# Patient Record
Sex: MALE | Race: WHITE | NOT HISPANIC OR LATINO | Employment: OTHER | RURAL
[De-identification: names, ages, dates, MRNs, and addresses within clinical notes are randomized per-mention and may not be internally consistent; named-entity substitution may affect disease eponyms.]

---

## 2017-02-24 ENCOUNTER — OFFICE VISIT (OUTPATIENT)
Dept: FAMILY MEDICINE CLINIC | Facility: CLINIC | Age: 62
End: 2017-02-24

## 2017-02-24 VITALS
WEIGHT: 304.6 LBS | SYSTOLIC BLOOD PRESSURE: 118 MMHG | DIASTOLIC BLOOD PRESSURE: 80 MMHG | HEART RATE: 74 BPM | OXYGEN SATURATION: 98 % | BODY MASS INDEX: 39.09 KG/M2 | HEIGHT: 74 IN | TEMPERATURE: 99 F

## 2017-02-24 DIAGNOSIS — J06.9 ACUTE URI: Primary | ICD-10-CM

## 2017-02-24 PROCEDURE — 96372 THER/PROPH/DIAG INJ SC/IM: CPT | Performed by: FAMILY MEDICINE

## 2017-02-24 PROCEDURE — 99213 OFFICE O/P EST LOW 20 MIN: CPT | Performed by: FAMILY MEDICINE

## 2017-02-24 RX ORDER — AZITHROMYCIN 500 MG/1
500 TABLET, FILM COATED ORAL DAILY
Qty: 3 TABLET | Refills: 0 | Status: SHIPPED | OUTPATIENT
Start: 2017-02-24 | End: 2017-02-27

## 2017-02-24 RX ORDER — METHYLPREDNISOLONE ACETATE 40 MG/ML
40 INJECTION, SUSPENSION INTRA-ARTICULAR; INTRALESIONAL; INTRAMUSCULAR; SOFT TISSUE ONCE
Status: COMPLETED | OUTPATIENT
Start: 2017-02-24 | End: 2017-02-24

## 2017-02-24 RX ORDER — PREDNISONE 20 MG/1
TABLET ORAL
Qty: 15 TABLET | Refills: 0 | Status: SHIPPED | OUTPATIENT
Start: 2017-02-24 | End: 2017-04-06

## 2017-02-24 RX ADMIN — METHYLPREDNISOLONE ACETATE 40 MG: 40 INJECTION, SUSPENSION INTRA-ARTICULAR; INTRALESIONAL; INTRAMUSCULAR; SOFT TISSUE at 12:54

## 2017-02-24 NOTE — PROGRESS NOTES
"George LION Meño    1955    Chief Complaint   Patient presents with   • URI       Vitals:    02/24/17 1233   BP: 118/80   Pulse: 74   Temp: 99 °F (37.2 °C)   SpO2: 98%   Weight: (!) 304 lb 9.6 oz (138 kg)   Height: 74\" (188 cm)       URI    This is a new problem. The current episode started 1 to 4 weeks ago. The problem has been unchanged. There has been no fever. Associated symptoms include congestion, coughing, sinus pain, sneezing and swollen glands. He has tried antihistamine for the symptoms. The treatment provided no relief.       Review of Systems   HENT: Positive for congestion, facial swelling, sinus pressure and sneezing.    Respiratory: Positive for cough.        Past Medical History   Diagnosis Date   • Arthritis    • Back pain    • Coronary artery disease    • GERD (gastroesophageal reflux disease)    • Hyperlipidemia    • Hypertension          Current Outpatient Prescriptions:   •  aspirin 325 MG tablet, Take 325 mg by mouth Daily., Disp: , Rfl:   •  celecoxib (CeleBREX) 200 MG capsule, Take 200 mg by mouth Daily., Disp: , Rfl:   •  clindamycin (CLEOCIN) 300 MG capsule, Take 1 capsule by mouth 3 (Three) Times a Day., Disp: 15 capsule, Rfl: 0  •  FLUARIX QUADRIVALENT 0.5 ML suspension prefilled syringe injection, TO BE ADMINISTERED BY PHARMACIST FOR IMMUNIZATION, Disp: , Rfl: 0  •  gabapentin (NEURONTIN) 300 MG capsule, Take 300 mg by mouth 3 (Three) Times a Day., Disp: , Rfl:   •  HYDROcodone-acetaminophen (NORCO)  MG per tablet, Take 1 tablet by mouth Every 6 (Six) Hours As Needed for moderate pain (4-6)., Disp: , Rfl:   •  metoprolol tartrate (LOPRESSOR) 50 MG tablet, Take 50 mg by mouth 2 (Two) Times a Day., Disp: , Rfl:   •  omeprazole (priLOSEC) 20 MG capsule, Take 20 mg by mouth Daily., Disp: , Rfl:   •  simvastatin (ZOCOR) 40 MG tablet, TAKE 1 TABLET BY MOUTH AT BEDTIME, Disp: , Rfl: 1  •  SIMVASTATIN PO, Take 40 mg by mouth Daily., Disp: , Rfl:   •  tiZANidine (ZANAFLEX) 4 MG tablet, " "TAKE 1 TABLET BY ORAL ROUTE EVERY 8 HOURS AS NEEDED NOT TO EXCEED 3 DOSES IN 24 HOURS, Disp: , Rfl: 1  •  azithromycin (ZITHROMAX) 500 MG tablet, Take 1 tablet by mouth Daily for 3 days., Disp: 3 tablet, Rfl: 0  •  predniSONE (DELTASONE) 20 MG tablet, Take 2 pills on Saturday and Sunday then 1 pill till medicine gone, Disp: 15 tablet, Rfl: 0  No current facility-administered medications for this visit.     No Known Allergies    Patient Active Problem List   Diagnosis   • Ischemic heart disease due to coronary artery obstruction   • Morbid obesity   • Mixed hyperlipidemia   • Lumbar degenerative disc disease       Social History     Social History   • Marital status:      Spouse name: N/A   • Number of children: N/A   • Years of education: N/A     Social History Main Topics   • Smoking status: Former Smoker   • Smokeless tobacco: None   • Alcohol use No   • Drug use: No   • Sexual activity: Defer     Other Topics Concern   • None     Social History Narrative       Past Surgical History   Procedure Laterality Date   • Back surgery     • Total hip arthroplasty Bilateral    • Hand surgery     • Ankle surgery         Physical Exam   Constitutional:   Morbidly obese   HENT:   Head: Normocephalic.   Right Ear: External ear normal.   Left Ear: External ear normal.   Mouth/Throat: Oropharynx is clear and moist.   Eyes: Conjunctivae are normal.   Cardiovascular: Normal rate and regular rhythm.    Pulmonary/Chest: Effort normal and breath sounds normal. He has no rales.   Lymphadenopathy:     He has no cervical adenopathy.   Psychiatric: He has a normal mood and affect.   Vitals reviewed.      Vitals:    02/24/17 1233   BP: 118/80   Pulse: 74   Temp: 99 °F (37.2 °C)   SpO2: 98%   Weight: (!) 304 lb 9.6 oz (138 kg)   Height: 74\" (188 cm)       George was seen today for uri.    Diagnoses and all orders for this visit:    Acute URI  -     methylPREDNISolone acetate (DEPO-medrol) injection 40 mg; Inject 1 mL into the " shoulder, thigh, or buttocks 1 (One) Time.    Other orders  -     azithromycin (ZITHROMAX) 500 MG tablet; Take 1 tablet by mouth Daily for 3 days.  -     predniSONE (DELTASONE) 20 MG tablet; Take 2 pills on Saturday and Sunday then 1 pill till medicine gone        Problem List Items Addressed This Visit     None      Visit Diagnoses     Acute URI    -  Primary    Relevant Medications    azithromycin (ZITHROMAX) 500 MG tablet    methylPREDNISolone acetate (DEPO-medrol) injection 40 mg (Completed) (Start on 2/24/2017  1:30 PM)       sinusitis-above plus patient has evolving cervical radiculopathy on left-see what steroids do                            Carl Vu MD  2/24/2017

## 2017-03-28 ENCOUNTER — OFFICE VISIT (OUTPATIENT)
Dept: FAMILY MEDICINE CLINIC | Facility: CLINIC | Age: 62
End: 2017-03-28

## 2017-03-28 VITALS
SYSTOLIC BLOOD PRESSURE: 122 MMHG | WEIGHT: 310 LBS | DIASTOLIC BLOOD PRESSURE: 68 MMHG | TEMPERATURE: 98.3 F | BODY MASS INDEX: 39.78 KG/M2 | OXYGEN SATURATION: 96 % | HEART RATE: 71 BPM | HEIGHT: 74 IN

## 2017-03-28 DIAGNOSIS — J06.9 ACUTE URI: Primary | ICD-10-CM

## 2017-03-28 DIAGNOSIS — J32.9 CHRONIC SINUSITIS, UNSPECIFIED LOCATION: ICD-10-CM

## 2017-03-28 PROCEDURE — 99213 OFFICE O/P EST LOW 20 MIN: CPT | Performed by: FAMILY MEDICINE

## 2017-03-28 RX ORDER — LEVOFLOXACIN 500 MG/1
500 TABLET, FILM COATED ORAL DAILY
Qty: 10 TABLET | Refills: 0 | Status: SHIPPED | OUTPATIENT
Start: 2017-03-28 | End: 2017-05-18

## 2017-04-03 ENCOUNTER — TRANSCRIBE ORDERS (OUTPATIENT)
Dept: ADMINISTRATIVE | Facility: HOSPITAL | Age: 62
End: 2017-04-03

## 2017-04-03 DIAGNOSIS — M54.12 RADICULOPATHY, CERVICAL REGION: Primary | ICD-10-CM

## 2017-04-05 PROBLEM — I10 HTN (HYPERTENSION): Status: ACTIVE | Noted: 2017-04-05

## 2017-04-05 PROBLEM — I25.10 CAD (CORONARY ARTERY DISEASE): Status: ACTIVE | Noted: 2017-04-05

## 2017-04-06 ENCOUNTER — OFFICE VISIT (OUTPATIENT)
Dept: FAMILY MEDICINE CLINIC | Facility: CLINIC | Age: 62
End: 2017-04-06

## 2017-04-06 VITALS
HEART RATE: 53 BPM | WEIGHT: 309 LBS | OXYGEN SATURATION: 96 % | BODY MASS INDEX: 39.66 KG/M2 | TEMPERATURE: 98.4 F | DIASTOLIC BLOOD PRESSURE: 80 MMHG | SYSTOLIC BLOOD PRESSURE: 140 MMHG | HEIGHT: 74 IN

## 2017-04-06 DIAGNOSIS — J01.00 ACUTE ABSCESS OF MAXILLARY SINUS: ICD-10-CM

## 2017-04-06 DIAGNOSIS — J01.01 ACUTE RECURRENT MAXILLARY SINUSITIS: Primary | ICD-10-CM

## 2017-04-06 PROCEDURE — 99212 OFFICE O/P EST SF 10 MIN: CPT | Performed by: FAMILY MEDICINE

## 2017-04-06 NOTE — PROGRESS NOTES
Follow-up on left maxillary sinusitis-rule out abscess.  Patient improved less pain but still having trouble.    Physical shows no fever still has maxillary tenderness on the left side and the rest of his nose and throat exam is unremarkable.  Plan CT to rule out anatomical deformity +10 more days of Levaquin.    Continue nasal sprays and washes

## 2017-04-07 ENCOUNTER — HOSPITAL ENCOUNTER (OUTPATIENT)
Dept: MRI IMAGING | Facility: HOSPITAL | Age: 62
Discharge: HOME | End: 2017-04-07
Attending: PAIN MEDICINE | Admitting: PAIN MEDICINE

## 2017-04-07 DIAGNOSIS — M54.12 RADICULOPATHY, CERVICAL REGION: ICD-10-CM

## 2017-04-07 PROCEDURE — 72141 MRI NECK SPINE W/O DYE: CPT

## 2017-04-10 ENCOUNTER — OFFICE VISIT (OUTPATIENT)
Dept: CARDIOLOGY | Facility: CLINIC | Age: 62
End: 2017-04-10

## 2017-04-10 VITALS
DIASTOLIC BLOOD PRESSURE: 78 MMHG | HEIGHT: 74 IN | HEART RATE: 59 BPM | WEIGHT: 310.6 LBS | SYSTOLIC BLOOD PRESSURE: 131 MMHG | BODY MASS INDEX: 39.86 KG/M2

## 2017-04-10 DIAGNOSIS — E78.2 MIXED HYPERLIPIDEMIA: ICD-10-CM

## 2017-04-10 DIAGNOSIS — I25.10 CORONARY ARTERY DISEASE INVOLVING NATIVE CORONARY ARTERY OF NATIVE HEART WITHOUT ANGINA PECTORIS: Primary | ICD-10-CM

## 2017-04-10 DIAGNOSIS — I10 ESSENTIAL HYPERTENSION: ICD-10-CM

## 2017-04-10 PROCEDURE — 93000 ELECTROCARDIOGRAM COMPLETE: CPT | Performed by: NURSE PRACTITIONER

## 2017-04-10 PROCEDURE — 99213 OFFICE O/P EST LOW 20 MIN: CPT | Performed by: NURSE PRACTITIONER

## 2017-04-10 NOTE — PROGRESS NOTES
"    Subjective:     Encounter Date:04/10/2017      Patient ID: George Wilder is a 61 y.o. male.    Chief Complaint:  History of Present Illness  George Wilder is a 61 year old male with a past medical history significant for coronary artery disease, hypertension, hyperlipidemia, who presents for 1 year Cardiology follow up. Denies chest pain, syncope, dizziness,edema, dyspnea, PND, orthopnea, fatigue, or any cardiac concerns at this time. 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 PCP manages hyperlipidemia- \"well controlled\". He does not monitor his blood pressure on a regular basis at home. Patient is asking to decreased dose of ASA to 81mg.       The following portions of the patient's history were reviewed and updated as appropriate: allergies, current medications, past family history, past medical history, past social history, past surgical history and problem list.  /78 (BP Location: Right arm, Patient Position: Sitting, Cuff Size: Adult)  Pulse 59  Ht 74\" (188 cm)  Wt (!) 310 lb 9.6 oz (141 kg)  BMI 39.88 kg/m2    Current Outpatient Prescriptions:   •  aspirin 325 MG tablet, Take 325 mg by mouth Daily., Disp: , Rfl:   •  celecoxib (CeleBREX) 200 MG capsule, Take 200 mg by mouth Daily., Disp: , Rfl:   •  gabapentin (NEURONTIN) 300 MG capsule, Take 300 mg by mouth 3 (Three) Times a Day., Disp: , Rfl:   •  HYDROcodone-acetaminophen (NORCO)  MG per tablet, Take 1 tablet by mouth Every 6 (Six) Hours As Needed for moderate pain (4-6)., Disp: , Rfl:   •  levoFLOXacin (LEVAQUIN) 500 MG tablet, Take 1 tablet by mouth Daily., Disp: 10 tablet, Rfl: 0  •  metoprolol tartrate (LOPRESSOR) 50 MG tablet, Take 50 mg by mouth 2 (Two) Times a Day., Disp: , Rfl:   •  omeprazole (priLOSEC) 20 MG capsule, Take 20 mg by mouth Daily., Disp: , Rfl:   •  simvastatin (ZOCOR) 40 MG tablet, TAKE 1 TABLET BY MOUTH AT BEDTIME, Disp: , Rfl: 1  Past Medical History: "   Diagnosis Date   • Arthritis    • Back pain    • Coronary artery disease 1984    stent LAD, Cx, RCA    • GERD (gastroesophageal reflux disease)    • Hyperlipidemia    • Hypertension    • Myocardial infarction      Past Surgical History:   Procedure Laterality Date   • ANKLE SURGERY     • BACK SURGERY     • CARDIAC CATHETERIZATION  1984, 1994   • CORONARY STENT PLACEMENT     • HAND SURGERY     • TOTAL HIP ARTHROPLASTY Bilateral      No Known Allergies  Social History     Social History   • Marital status:      Spouse name: N/A   • Number of children: N/A   • Years of education: N/A     Occupational History   • Not on file.     Social History Main Topics   • Smoking status: Former Smoker   • Smokeless tobacco: Never Used   • Alcohol use No   • Drug use: No   • Sexual activity: Defer     Other Topics Concern   • Not on file     Social History Narrative     Family History   Problem Relation Age of Onset   • No Known Problems Mother    • Cancer Father    • No Known Problems Brother    • Cancer Brother        Review of Systems   Constitution: Negative for diaphoresis, fever, weakness and malaise/fatigue.   HENT: Positive for congestion (Chronic sinusitis- followed by PCP).    Cardiovascular: Negative for chest pain, dyspnea on exertion, irregular heartbeat, leg swelling, near-syncope, orthopnea, palpitations, paroxysmal nocturnal dyspnea and syncope.   Respiratory: Negative for cough, shortness of breath, sleep disturbances due to breathing, sputum production and wheezing.    Skin: Negative for rash.   Musculoskeletal: Positive for back pain (Chronic- followed by pain management). Negative for falls.   Gastrointestinal: Negative for bloating, abdominal pain, nausea and vomiting.   Neurological: Negative for excessive daytime sleepiness, dizziness and focal weakness.   Psychiatric/Behavioral: Negative for altered mental status.         ECG 12 Lead  Date/Time: 4/10/2017 9:00 AM  Performed by: LAILA DOMINGO  GEORGE  Authorized by: LAILA DOMINGO   Interpreted by ED physician: ISAAC Adhikari.  Comparison: compared with previous ECG from 4/14/2016  Similar to previous ECG  Rhythm: sinus rhythm  Rate: bradycardic  BPM: 59  Conduction: conduction normal  ST Segments: ST segments normal  T Waves: T waves normal  Clinical impression: normal ECG               Objective:     Physical Exam   Constitutional: He is oriented to person, place, and time. He appears well-developed and well-nourished. He is cooperative. No distress.   HENT:   Head: Normocephalic and atraumatic.   Neck: Carotid bruit is not present.   Cardiovascular: Regular rhythm, S1 normal, S2 normal, normal heart sounds and intact distal pulses.  Bradycardia present.    No murmur heard.  Pulmonary/Chest: Effort normal and breath sounds normal. No accessory muscle usage. No respiratory distress. He exhibits no tenderness.   Abdominal: Soft. Normal appearance and bowel sounds are normal. He exhibits no distension. There is no tenderness.   Musculoskeletal: He exhibits no edema.   Neurological: He is alert and oriented to person, place, and time.   Skin: Skin is warm and dry. No rash noted. He is not diaphoretic.   Psychiatric: He has a normal mood and affect. His speech is normal and behavior is normal.   Vitals reviewed.      Lab Review:       Assessment:          Diagnosis Plan   1. Coronary artery disease involving native coronary artery of native heart without angina pectoris. No clinical evidence of ischemia.     2. Mixed hyperlipidemia  Managed by PCP    3. Essential hypertension- controlled            Plan:       1. Remain on ASA for life. Decrease dose to 81mg PO once daily.   2. Healthy lifestyle modifications and healthy heart diet  3. Encouraged to monitor blood pressure daily at home and bring readings to all follow up office visits  4. Follow up in 1 year, sooner if needed. Call with concerns.

## 2017-04-11 DIAGNOSIS — J34.9 SINUS DISEASE: Primary | ICD-10-CM

## 2017-04-11 DIAGNOSIS — J01.00 ACUTE ABSCESS OF MAXILLARY SINUS: ICD-10-CM

## 2017-05-18 ENCOUNTER — OFFICE VISIT (OUTPATIENT)
Dept: OTOLARYNGOLOGY | Facility: CLINIC | Age: 62
End: 2017-05-18

## 2017-05-18 VITALS
TEMPERATURE: 97.1 F | HEIGHT: 74 IN | HEART RATE: 60 BPM | DIASTOLIC BLOOD PRESSURE: 80 MMHG | BODY MASS INDEX: 39.14 KG/M2 | RESPIRATION RATE: 18 BRPM | WEIGHT: 305 LBS | SYSTOLIC BLOOD PRESSURE: 128 MMHG

## 2017-05-18 DIAGNOSIS — J32.0 CHRONIC MAXILLARY SINUSITIS: Primary | ICD-10-CM

## 2017-05-18 DIAGNOSIS — J32.2 CHRONIC ETHMOIDAL SINUSITIS: ICD-10-CM

## 2017-05-18 PROCEDURE — 31231 NASAL ENDOSCOPY DX: CPT | Performed by: OTOLARYNGOLOGY

## 2017-05-18 PROCEDURE — 99203 OFFICE O/P NEW LOW 30 MIN: CPT | Performed by: PHYSICIAN ASSISTANT

## 2017-05-18 RX ORDER — AZELASTINE HYDROCHLORIDE, FLUTICASONE PROPIONATE 137; 50 UG/1; UG/1
2 SPRAY, METERED NASAL 2 TIMES DAILY
Qty: 1 BOTTLE | Refills: 6 | Status: SHIPPED | OUTPATIENT
Start: 2017-05-18 | End: 2017-08-08 | Stop reason: SDUPTHER

## 2017-05-18 RX ORDER — OXYMETAZOLINE HYDROCHLORIDE 0.05 G/100ML
2 SPRAY NASAL
Status: CANCELLED | OUTPATIENT
Start: 2017-05-18 | End: 2017-05-18

## 2017-05-18 RX ORDER — TIZANIDINE 4 MG/1
4 TABLET ORAL EVERY 6 HOURS PRN
Refills: 2 | COMMUNITY
Start: 2017-04-17 | End: 2021-05-21

## 2017-05-18 RX ORDER — AMOXICILLIN AND CLAVULANATE POTASSIUM 875; 125 MG/1; MG/1
1 TABLET, FILM COATED ORAL 2 TIMES DAILY
Qty: 28 TABLET | Refills: 0 | Status: SHIPPED | OUTPATIENT
Start: 2017-05-18 | End: 2017-06-01

## 2017-05-19 ENCOUNTER — TELEPHONE (OUTPATIENT)
Dept: OTOLARYNGOLOGY | Facility: CLINIC | Age: 62
End: 2017-05-19

## 2017-05-22 ENCOUNTER — TELEPHONE (OUTPATIENT)
Dept: OTOLARYNGOLOGY | Facility: CLINIC | Age: 62
End: 2017-05-22

## 2017-05-22 DIAGNOSIS — I25.10 CORONARY ARTERY DISEASE INVOLVING NATIVE CORONARY ARTERY OF NATIVE HEART WITHOUT ANGINA PECTORIS: Primary | ICD-10-CM

## 2017-06-05 ENCOUNTER — APPOINTMENT (OUTPATIENT)
Dept: PREADMISSION TESTING | Facility: HOSPITAL | Age: 62
End: 2017-06-05

## 2017-06-05 ENCOUNTER — HOSPITAL ENCOUNTER (OUTPATIENT)
Dept: CARDIOLOGY | Facility: HOSPITAL | Age: 62
Discharge: HOME | End: 2017-06-05
Attending: INTERNAL MEDICINE | Admitting: INTERNAL MEDICINE

## 2017-06-05 ENCOUNTER — HOSPITAL ENCOUNTER (OUTPATIENT)
Dept: GENERAL RADIOLOGY | Facility: HOSPITAL | Age: 62
Discharge: HOME | End: 2017-06-05

## 2017-06-05 VITALS
HEART RATE: 50 BPM | WEIGHT: 301 LBS | BODY MASS INDEX: 38.63 KG/M2 | HEIGHT: 74 IN | DIASTOLIC BLOOD PRESSURE: 69 MMHG | SYSTOLIC BLOOD PRESSURE: 137 MMHG

## 2017-06-05 VITALS
SYSTOLIC BLOOD PRESSURE: 121 MMHG | BODY MASS INDEX: 38.42 KG/M2 | HEIGHT: 75 IN | RESPIRATION RATE: 20 BRPM | DIASTOLIC BLOOD PRESSURE: 79 MMHG | WEIGHT: 309 LBS | OXYGEN SATURATION: 96 % | HEART RATE: 59 BPM

## 2017-06-05 DIAGNOSIS — J32.2 CHRONIC ETHMOIDAL SINUSITIS: ICD-10-CM

## 2017-06-05 DIAGNOSIS — I25.10 CORONARY ARTERY DISEASE INVOLVING NATIVE CORONARY ARTERY OF NATIVE HEART WITHOUT ANGINA PECTORIS: ICD-10-CM

## 2017-06-05 DIAGNOSIS — J32.0 CHRONIC MAXILLARY SINUSITIS: ICD-10-CM

## 2017-06-05 LAB
ALBUMIN SERPL-MCNC: 3.8 G/DL (ref 3.5–5)
ALBUMIN/GLOB SERPL: 1.3 G/DL (ref 1.1–2.5)
ALP SERPL-CCNC: 58 U/L (ref 24–120)
ALT SERPL W P-5'-P-CCNC: 38 U/L (ref 0–54)
ANION GAP SERPL CALCULATED.3IONS-SCNC: 10 MMOL/L (ref 4–13)
AST SERPL-CCNC: 28 U/L (ref 7–45)
BILIRUB SERPL-MCNC: 1.3 MG/DL (ref 0.1–1)
BUN BLD-MCNC: 26 MG/DL (ref 5–21)
BUN/CREAT SERPL: 20 (ref 7–25)
CALCIUM SPEC-SCNC: 9 MG/DL (ref 8.4–10.4)
CHLORIDE SERPL-SCNC: 104 MMOL/L (ref 98–110)
CO2 SERPL-SCNC: 28 MMOL/L (ref 24–31)
CREAT BLD-MCNC: 1.3 MG/DL (ref 0.5–1.4)
DEPRECATED RDW RBC AUTO: 43 FL (ref 40–54)
ERYTHROCYTE [DISTWIDTH] IN BLOOD BY AUTOMATED COUNT: 13.4 % (ref 12–15)
GFR SERPL CREATININE-BSD FRML MDRD: 56 ML/MIN/1.73
GLOBULIN UR ELPH-MCNC: 3 GM/DL
GLUCOSE BLD-MCNC: 106 MG/DL (ref 70–100)
HCT VFR BLD AUTO: 44.5 % (ref 40–52)
HGB BLD-MCNC: 15.7 G/DL (ref 14–18)
MCH RBC QN AUTO: 31.5 PG (ref 28–32)
MCHC RBC AUTO-ENTMCNC: 35.3 G/DL (ref 33–36)
MCV RBC AUTO: 89.2 FL (ref 82–95)
PLATELET # BLD AUTO: 206 10*3/MM3 (ref 130–400)
PMV BLD AUTO: 10.9 FL (ref 6–12)
POTASSIUM BLD-SCNC: 5.1 MMOL/L (ref 3.5–5.3)
PROT SERPL-MCNC: 6.8 G/DL (ref 6.3–8.7)
RBC # BLD AUTO: 4.99 10*6/MM3 (ref 4.8–5.9)
SODIUM BLD-SCNC: 142 MMOL/L (ref 135–145)
WBC NRBC COR # BLD: 5.85 10*3/MM3 (ref 4.8–10.8)

## 2017-06-05 PROCEDURE — 93018 CV STRESS TEST I&R ONLY: CPT | Performed by: INTERNAL MEDICINE

## 2017-06-05 PROCEDURE — 25010000002 PERFLUTREN (DEFINITY) 8.476 MG IN SODIUM CHLORIDE 10 ML INJECTION: Performed by: INTERNAL MEDICINE

## 2017-06-05 PROCEDURE — 93352 ADMIN ECG CONTRAST AGENT: CPT | Performed by: INTERNAL MEDICINE

## 2017-06-05 PROCEDURE — 71020 HC CHEST PA AND LATERAL: CPT

## 2017-06-05 PROCEDURE — 80053 COMPREHEN METABOLIC PANEL: CPT | Performed by: PHYSICIAN ASSISTANT

## 2017-06-05 PROCEDURE — 25010000003 DOBUTAMINE PER 250 MG: Performed by: INTERNAL MEDICINE

## 2017-06-05 PROCEDURE — C8928 TTE W OR W/O FOL W/CON,STRES: HCPCS

## 2017-06-05 PROCEDURE — 36415 COLL VENOUS BLD VENIPUNCTURE: CPT

## 2017-06-05 PROCEDURE — 93017 CV STRESS TEST TRACING ONLY: CPT

## 2017-06-05 PROCEDURE — 85027 COMPLETE CBC AUTOMATED: CPT | Performed by: PHYSICIAN ASSISTANT

## 2017-06-05 PROCEDURE — 93350 STRESS TTE ONLY: CPT | Performed by: INTERNAL MEDICINE

## 2017-06-05 RX ORDER — ATROPINE SULFATE 1 MG/ML
1 INJECTION, SOLUTION INTRAMUSCULAR; INTRAVENOUS; SUBCUTANEOUS ONCE
Status: COMPLETED | OUTPATIENT
Start: 2017-06-05 | End: 2017-06-05

## 2017-06-05 RX ORDER — ASPIRIN 81 MG/1
81 TABLET ORAL DAILY
COMMUNITY
End: 2017-06-12 | Stop reason: HOSPADM

## 2017-06-05 RX ORDER — MULTIPLE VITAMINS W/ MINERALS TAB 9MG-400MCG
1 TAB ORAL DAILY
COMMUNITY

## 2017-06-05 RX ORDER — ASCORBIC ACID 500 MG
500 TABLET ORAL DAILY
COMMUNITY
End: 2021-10-29

## 2017-06-05 RX ORDER — DOBUTAMINE HYDROCHLORIDE 100 MG/100ML
10-50 INJECTION INTRAVENOUS
Status: DISCONTINUED | OUTPATIENT
Start: 2017-06-05 | End: 2017-06-06 | Stop reason: HOSPADM

## 2017-06-05 RX ADMIN — DOBUTAMINE HYDROCHLORIDE 10 MCG/KG/MIN: 100 INJECTION INTRAVENOUS at 15:20

## 2017-06-05 RX ADMIN — SODIUM CHLORIDE 10 ML: 9 INJECTION INTRAMUSCULAR; INTRAVENOUS; SUBCUTANEOUS at 15:12

## 2017-06-05 RX ADMIN — ATROPINE SULFATE 0.5 MG: 1 INJECTION, SOLUTION INTRAMUSCULAR; INTRAVENOUS; SUBCUTANEOUS at 15:23

## 2017-06-05 NOTE — DISCHARGE INSTRUCTIONS
DAY OF SURGERY INSTRUCTIONS        YOUR SURGEON: LILY    PROCEDURE: SINUS SURGERY    DATE OF SURGERY: June 12, 2017    ARRIVAL TIME: AS DIRECTED BY OFFICE    DAY OF SURGERY TAKE ONLY THESE MEDICATIONS: NO MEDICINES THE MORNING OF SURGERY. NOTHING TO EAT OR DRINK AFTER MIDNIGHT.            BEFORE YOU COME TO THE HOSPITAL  (Pre-op instructions)  • Do not eat, drink, smoke or chew gum after midnight the night before surgery.  This also includes no mints.  • Morning of surgery take only the medicines you have been instructed with a sip of water unless otherwise instructed  by your physician.  • Do not shave, wear makeup or dark nail polish.  • Remove all jewelry including rings.  • Leave anything you consider valuable at home.  • Leave your suitcase in the car until after your surgery.  • Bring the following with you if applicable:  o Picture ID and insurance, Medicare or Medicaid cards  o Co-pay/deductible required by insurance (cash, check, credit card)  o Copy of advance directive, living will or power-of- documents if not brought to PAT  o CPAP or BIPAP mask and tubing  o Relaxation aids (MP3 player, book, magazine)  • Confirm your arrival time with you surgeon the day before your surgery (surgery times are subject to change)  • On the day of surgery check in at registration located at the main entrance of the hospital.       Outpatient Surgery Guidelines, Adult  Outpatient procedures are those for which the person having the procedure is allowed to go home the same day as the procedure. Various procedures are done on an outpatient basis. You should follow some general guidelines if you will be having an outpatient procedure.  LET YOUR HEALTH CARE PROVIDER KNOW ABOUT:  · Any allergies you have.  · All medicines you are taking, including vitamins, herbs, eye drops, creams, and over-the-counter medicines.  · Previous problems you or members of your family have had with the use of anesthetics.  · Any blood  disorders you have.  · Previous surgeries you have had.  · Medical conditions you have.  RISKS AND COMPLICATIONS  Your health care provider will discuss possible risks and complications with you before surgery. Common risks and complications include:    · Problems due to the use of anesthetics.  · Blood loss and replacement (does not apply to minor surgical procedures).  · Temporary increase in pain due to surgery.  · Uncorrected pain or problems that the surgery was meant to correct.  · Infection.  · New damage.  BEFORE THE PROCEDURE  · Ask your health care provider about changing or stopping your regular medicines. You may need to stop taking certain medicines in the days or weeks before the procedure.  · Stop smoking at least 2 weeks before surgery. This lowers your risk for complications during and after surgery. Ask your health care provider for help with this if needed.  · Eat your usual meals and a light supper the day before surgery. Continue fluid intake. Do not drink alcohol.  · Do not eat or drink after midnight the night before your surgery.   · Arrange for someone to take you home and to stay with you for 24 hours after the procedure. Medicine given for your procedure may affect your ability to drive or to care for yourself.  · Call your health care provider's office if you develop an illness or problem that may prevent you from safely having your procedure.  AFTER THE PROCEDURE  After surgery, you will be taken to a recovery area, where your progress will be monitored. If there are no complications, you will be allowed to go home when you are awake, stable, and taking fluids well. You may have numbness around the surgical site. Healing will take some time. You will have tenderness at the surgical site and may have some swelling and bruising. You may also have some nausea.  HOME CARE INSTRUCTIONS  · Do not drive for 24 hours, or as directed by your health care provider. Do not drive while taking  prescription pain medicines.  · Do not drink alcohol for 24 hours.  · Do not make important decisions or sign legal documents for 24 hours.  · You may resume a normal diet and activities as directed.  · Do not lift anything heavier than 10 pounds (4.5 kg) or play contact sports until your health care provider says it is okay.  · Change your bandages (dressings) as directed.  · Only take over-the-counter or prescription medicines as directed by your health care provider.  · Follow up with your health care provider as directed.  SEEK MEDICAL CARE IF:  · You have increased bleeding (more than a small spot) from the surgical site.  · You have redness, swelling, or increasing pain in the wound.  · You see pus coming from the wound.  · You have a fever.  · You notice a bad smell coming from the wound or dressing.  · You feel lightheaded or faint.  · You develop a rash.  · You have trouble breathing.  · You develop allergies.  MAKE SURE YOU:  · Understand these instructions.  · Will watch your condition.  · Will get help right away if you are not doing well or get worse.     This information is not intended to replace advice given to you by your health care provider. Make sure you discuss any questions you have with your health care provider.     Document Released: 09/12/2002 Document Revised: 05/03/2016 Document Reviewed: 05/22/2014  [employer] Interactive Patient Education ©2016 [employer]       Fall Prevention in Hospitals, Adult  As a hospital patient, your condition and the treatments you receive can increase your risk for falls. Some additional risk factors for falls in a hospital include:  · Being in an unfamiliar environment.  · Being on bed rest.  · Your surgery.  · Taking certain medicines.  · Your tubing requirements, such as intravenous (IV) therapy or catheters.  It is important that you learn how to decrease fall risks while at the hospital. Below are important tips that can help prevent falls.  SAFETY TIPS  FOR PREVENTING FALLS  Talk about your risk of falling.  · Ask your health care provider why you are at risk for falling. Is it your medicine, illness, tubing placement, or something else?  · Make a plan with your health care provider to keep you safe from falls.  · Ask your health care provider or pharmacist about side effects of your medicines. Some medicines can make you dizzy or affect your coordination.  Ask for help.  · Ask for help before getting out of bed. You may need to press your call button.  · Ask for assistance in getting safely to the toilet.  · Ask for a walker or cane to be put at your bedside. Ask that most of the side rails on your bed be placed up before your health care provider leaves the room.  · Ask family or friends to sit with you.  · Ask for things that are out of your reach, such as your glasses, hearing aids, telephone, bedside table, or call button.  Follow these tips to avoid falling:  · Stay lying or seated, rather than standing, while waiting for help.  · Wear rubber-soled slippers or shoes whenever you walk in the hospital.  · Avoid quick, sudden movements.  ¨ Change positions slowly.  ¨ Sit on the side of your bed before standing.  ¨ Stand up slowly and wait before you start to walk.  · Let your health care provider know if there is a spill on the floor.  · Pay careful attention to the medical equipment, electrical cords, and tubes around you.  · When you need help, use your call button by your bed or in the bathroom. Wait for one of your health care providers to help you.  · If you feel dizzy or unsure of your footing, return to bed and wait for assistance.  · Avoid being distracted by the TV, telephone, or another person in your room.  · Do not lean or support yourself on rolling objects, such as IV poles or bedside tables.     This information is not intended to replace advice given to you by your health care provider. Make sure you discuss any questions you have with your  health care provider.     Document Released: 12/15/2001 Document Revised: 01/08/2016 Document Reviewed: 08/25/2013  [employer] Interactive Patient Education ©2016 [employer]       Surgical Site Infections FAQs  What is a Surgical Site Infection (SSI)?  A surgical site infection is an infection that occurs after surgery in the part of the body where the surgery took place. Most patients who have surgery do not develop an infection. However, infections develop in about 1 to 3 out of every 100 patients who have surgery.  Some of the common symptoms of a surgical site infection are:  · Redness and pain around the area where you had surgery  · Drainage of cloudy fluid from your surgical wound  · Fever  Can SSIs be treated?  Yes. Most surgical site infections can be treated with antibiotics. The antibiotic given to you depends on the bacteria (germs) causing the infection. Sometimes patients with SSIs also need another surgery to treat the infection.  What are some of the things that hospitals are doing to prevent SSIs?  To prevent SSIs, doctors, nurses, and other healthcare providers:  · Clean their hands and arms up to their elbows with an antiseptic agent just before the surgery.  · Clean their hands with soap and water or an alcohol-based hand rub before and after caring for each patient.  · May remove some of your hair immediately before your surgery using electric clippers if the hair is in the same area where the procedure will occur. They should not shave you with a razor.  · Wear special hair covers, masks, gowns, and gloves during surgery to keep the surgery area clean.  · Give you antibiotics before your surgery starts. In most cases, you should get antibiotics within 60 minutes before the surgery starts and the antibiotics should be stopped within 24 hours after surgery.  · Clean the skin at the site of your surgery with a special soap that kills germs.  What can I do to help prevent SSIs?  Before your  surgery:  · Tell your doctor about other medical problems you may have. Health problems such as allergies, diabetes, and obesity could affect your surgery and your treatment.  · Quit smoking. Patients who smoke get more infections. Talk to your doctor about how you can quit before your surgery.  · Do not shave near where you will have surgery. Shaving with a razor can irritate your skin and make it easier to develop an infection.  At the time of your surgery:  · Speak up if someone tries to shave you with a razor before surgery. Ask why you need to be shaved and talk with your surgeon if you have any concerns.  · Ask if you will get antibiotics before surgery.  After your surgery:  · Make sure that your healthcare providers clean their hands before examining you, either with soap and water or an alcohol-based hand rub.  · If you do not see your providers clean their hands, please ask them to do so.  · Family and friends who visit you should not touch the surgical wound or dressings.  · Family and friends should clean their hands with soap and water or an alcohol-based hand rub before and after visiting you. If you do not see them clean their hands, ask them to clean their hands.  What do I need to do when I go home from the hospital?  · Before you go home, your doctor or nurse should explain everything you need to know about taking care of your wound. Make sure you understand how to care for your wound before you leave the hospital.  · Always clean your hands before and after caring for your wound.  · Before you go home, make sure you know who to contact if you have questions or problems after you get home.  · If you have any symptoms of an infection, such as redness and pain at the surgery site, drainage, or fever, call your doctor immediately.  If you have additional questions, please ask your doctor or nurse.  Developed and co-sponsored by The Society for Healthcare Epidemiology of [place] (SHEA); Infectious  Diseases Society of [place] (IDSA); American Hospital Association; Association for Professionals in Infection Control and Epidemiology (APIC); Centers for Disease Control and Prevention (CDC); and The Joint Commission.     This information is not intended to replace advice given to you by your health care provider. Make sure you discuss any questions you have with your health care provider.     Document Released: 12/23/2014 Document Revised: 01/08/2016 Document Reviewed: 03/02/2016  [employer] Interactive Patient Education ©2016 [employer]

## 2017-06-06 LAB
BH CV STRESS BP STAGE 1: NORMAL
BH CV STRESS BP STAGE 2: NORMAL
BH CV STRESS BP STAGE 3: NORMAL
BH CV STRESS DOB - ATROPINE STAGE 3: 0.5
BH CV STRESS DOSE DOBUTAMINE STAGE 1: 10
BH CV STRESS DOSE DOBUTAMINE STAGE 2: 20
BH CV STRESS DOSE DOBUTAMINE STAGE 3: 30
BH CV STRESS DURATION MIN STAGE 1: 3
BH CV STRESS DURATION MIN STAGE 2: 3
BH CV STRESS DURATION MIN STAGE 3: 5
BH CV STRESS DURATION SEC STAGE 1: 0
BH CV STRESS DURATION SEC STAGE 2: 0
BH CV STRESS DURATION SEC STAGE 3: 32
BH CV STRESS HR STAGE 1: 55
BH CV STRESS HR STAGE 2: 80
BH CV STRESS HR STAGE 3: 140
BH CV STRESS PROTOCOL 1: NORMAL
BH CV STRESS RATE STAGE 1: 84
BH CV STRESS RATE STAGE 2: 169
BH CV STRESS RECOVERY BP: NORMAL MMHG
BH CV STRESS RECOVERY HR: 91 BPM
BH CV STRESS STAGE 1: 1
BH CV STRESS STAGE 2: 2
BH CV STRESS STAGE 3: 3
MAXIMAL PREDICTED HEART RATE: 159 BPM
PERCENT MAX PREDICTED HR: 88.05 %
STRESS BASELINE BP: NORMAL MMHG
STRESS BASELINE HR: 50 BPM
STRESS PERCENT HR: 104 %
STRESS POST PEAK BP: NORMAL MMHG
STRESS POST PEAK HR: 140 BPM
STRESS TARGET HR: 135 BPM

## 2017-06-08 ENCOUNTER — TELEPHONE (OUTPATIENT)
Dept: CARDIOLOGY | Facility: CLINIC | Age: 62
End: 2017-06-08

## 2017-06-08 ENCOUNTER — TELEPHONE (OUTPATIENT)
Dept: OTOLARYNGOLOGY | Facility: CLINIC | Age: 62
End: 2017-06-08

## 2017-06-08 NOTE — TELEPHONE ENCOUNTER
----- Message from Juan A Pickard CMA sent at 6/8/2017  4:24 PM CDT -----   Pt is cleared for surgery and to be off aspirin.  His stress test was neg per Dr. Stiles.    ----- Message -----     From: Kishore Stiles MD     Sent: 6/8/2017  12:45 PM       To: Juan A Pickard CMA    He is ok for surgery. He can be off asa if he has not ever had a stent  ----- Message -----     From: Juan A Pickard CMA     Sent: 6/7/2017   9:27 AM       To: Kishore Stiles MD     Needs clearance for surgery and to be off aspirin. Juan A Pickard CMA   ----- Message -----     From: Marine Ramon RN     Sent: 6/7/2017   9:03 AM       To: Juan A Pickard CMA    I was checking to see if patient is clear for surgery and if he is ok to stop aspirin 7 days prior to procedure.

## 2017-06-08 NOTE — TELEPHONE ENCOUNTER
----- Message from Kishore Stiles MD sent at 6/7/2017  4:00 PM CDT -----  Regarding: stress  Stress test is negative. Ok for surgery   6/8/17  I called Marine at Dr. Garibay office to let her know pt was cleared for surgery and ok to be off asa.  Juan A Pickard, CMA

## 2017-06-08 NOTE — TELEPHONE ENCOUNTER
----- Message from Kishore Stiles MD sent at 6/7/2017  4:21 PM CDT -----  Ok to stop asa if he has never had stents  ----- Message -----     From: Juan A Pickard CMA     Sent: 6/7/2017   9:27 AM       To: Kishore Stiles MD     Needs clearance for surgery and to be off aspirin. Juan A Pickard CMA   ----- Message -----     From: Marine Ramon RN     Sent: 6/7/2017   9:03 AM       To: Juan A Pickard CMA    I was checking to see if patient is clear for surgery and if he is ok to stop aspirin 7 days prior to procedure.      6/8/17 Marine informed.  Juan A Pickard CMA

## 2017-06-12 ENCOUNTER — ANESTHESIA EVENT (OUTPATIENT)
Dept: PERIOP | Facility: HOSPITAL | Age: 62
End: 2017-06-12

## 2017-06-12 ENCOUNTER — HOSPITAL ENCOUNTER (OUTPATIENT)
Facility: HOSPITAL | Age: 62
Setting detail: HOSPITAL OUTPATIENT SURGERY
Discharge: HOME | End: 2017-06-12
Attending: OTOLARYNGOLOGY | Admitting: OTOLARYNGOLOGY

## 2017-06-12 ENCOUNTER — ANESTHESIA (OUTPATIENT)
Dept: PERIOP | Facility: HOSPITAL | Age: 62
End: 2017-06-12

## 2017-06-12 VITALS
SYSTOLIC BLOOD PRESSURE: 129 MMHG | DIASTOLIC BLOOD PRESSURE: 68 MMHG | OXYGEN SATURATION: 94 % | TEMPERATURE: 97.6 F | HEART RATE: 43 BPM | RESPIRATION RATE: 20 BRPM

## 2017-06-12 DIAGNOSIS — J32.2 CHRONIC ETHMOIDAL SINUSITIS: ICD-10-CM

## 2017-06-12 DIAGNOSIS — J32.0 CHRONIC MAXILLARY SINUSITIS: ICD-10-CM

## 2017-06-12 LAB
APTT PPP: 31.3 SECONDS (ref 24.1–34.8)
INR PPP: 0.94 (ref 0.91–1.09)
PROTHROMBIN TIME: 12.9 SECONDS (ref 11.9–14.6)

## 2017-06-12 PROCEDURE — 85730 THROMBOPLASTIN TIME PARTIAL: CPT | Performed by: PHYSICIAN ASSISTANT

## 2017-06-12 PROCEDURE — 25010000002 DEXAMETHASONE PER 1 MG: Performed by: NURSE ANESTHETIST, CERTIFIED REGISTERED

## 2017-06-12 PROCEDURE — 31254 NSL/SINS NDSC W/PRTL ETHMDCT: CPT | Performed by: OTOLARYNGOLOGY

## 2017-06-12 PROCEDURE — 31256 EXPLORATION MAXILLARY SINUS: CPT | Performed by: OTOLARYNGOLOGY

## 2017-06-12 PROCEDURE — 25010000002 ONDANSETRON PER 1 MG: Performed by: NURSE ANESTHETIST, CERTIFIED REGISTERED

## 2017-06-12 PROCEDURE — 25010000002 NEOSTIGMINE PER 0.5 MG: Performed by: NURSE ANESTHETIST, CERTIFIED REGISTERED

## 2017-06-12 PROCEDURE — 25010000002 SUCCINYLCHOLINE PER 20 MG: Performed by: NURSE ANESTHETIST, CERTIFIED REGISTERED

## 2017-06-12 PROCEDURE — 25010000002 NALOXONE PER 1 MG: Performed by: NURSE ANESTHETIST, CERTIFIED REGISTERED

## 2017-06-12 PROCEDURE — 88311 DECALCIFY TISSUE: CPT | Performed by: OTOLARYNGOLOGY

## 2017-06-12 PROCEDURE — 85610 PROTHROMBIN TIME: CPT | Performed by: PHYSICIAN ASSISTANT

## 2017-06-12 PROCEDURE — 88305 TISSUE EXAM BY PATHOLOGIST: CPT | Performed by: OTOLARYNGOLOGY

## 2017-06-12 PROCEDURE — 25010000002 PROPOFOL 10 MG/ML EMULSION: Performed by: NURSE ANESTHETIST, CERTIFIED REGISTERED

## 2017-06-12 DEVICE — PROPEL MINI SINUS IMPLANT
Type: IMPLANTABLE DEVICE | Site: SINUS | Status: FUNCTIONAL
Brand: PROPEL MINI

## 2017-06-12 RX ORDER — ROCURONIUM BROMIDE 10 MG/ML
INJECTION, SOLUTION INTRAVENOUS AS NEEDED
Status: DISCONTINUED | OUTPATIENT
Start: 2017-06-12 | End: 2017-06-12 | Stop reason: SURG

## 2017-06-12 RX ORDER — ONDANSETRON 2 MG/ML
4 INJECTION INTRAMUSCULAR; INTRAVENOUS AS NEEDED
Status: DISCONTINUED | OUTPATIENT
Start: 2017-06-12 | End: 2017-06-12 | Stop reason: HOSPADM

## 2017-06-12 RX ORDER — PROPOFOL 10 MG/ML
VIAL (ML) INTRAVENOUS AS NEEDED
Status: DISCONTINUED | OUTPATIENT
Start: 2017-06-12 | End: 2017-06-12 | Stop reason: SURG

## 2017-06-12 RX ORDER — MEPERIDINE HYDROCHLORIDE 25 MG/ML
12.5 INJECTION INTRAMUSCULAR; INTRAVENOUS; SUBCUTANEOUS
Status: DISCONTINUED | OUTPATIENT
Start: 2017-06-12 | End: 2017-06-12 | Stop reason: HOSPADM

## 2017-06-12 RX ORDER — SODIUM CHLORIDE 0.9 % (FLUSH) 0.9 %
1-10 SYRINGE (ML) INJECTION AS NEEDED
Status: DISCONTINUED | OUTPATIENT
Start: 2017-06-12 | End: 2017-06-12 | Stop reason: HOSPADM

## 2017-06-12 RX ORDER — WATER 1000 ML/1000ML
INJECTION, SOLUTION INTRAVENOUS AS NEEDED
Status: DISCONTINUED | OUTPATIENT
Start: 2017-06-12 | End: 2017-06-12 | Stop reason: HOSPADM

## 2017-06-12 RX ORDER — LIDOCAINE HYDROCHLORIDE AND EPINEPHRINE 10; 10 MG/ML; UG/ML
INJECTION, SOLUTION INFILTRATION; PERINEURAL AS NEEDED
Status: DISCONTINUED | OUTPATIENT
Start: 2017-06-12 | End: 2017-06-12 | Stop reason: HOSPADM

## 2017-06-12 RX ORDER — MIDAZOLAM HYDROCHLORIDE 1 MG/ML
1 INJECTION INTRAMUSCULAR; INTRAVENOUS
Status: DISCONTINUED | OUTPATIENT
Start: 2017-06-12 | End: 2017-06-12 | Stop reason: HOSPADM

## 2017-06-12 RX ORDER — AMOXICILLIN 500 MG/1
500 CAPSULE ORAL 3 TIMES DAILY
Qty: 20 CAPSULE | Refills: 0 | Status: SHIPPED | OUTPATIENT
Start: 2017-06-12 | End: 2017-06-22

## 2017-06-12 RX ORDER — DEXAMETHASONE SODIUM PHOSPHATE 4 MG/ML
INJECTION, SOLUTION INTRA-ARTICULAR; INTRALESIONAL; INTRAMUSCULAR; INTRAVENOUS; SOFT TISSUE AS NEEDED
Status: DISCONTINUED | OUTPATIENT
Start: 2017-06-12 | End: 2017-06-12 | Stop reason: SURG

## 2017-06-12 RX ORDER — HYDRALAZINE HYDROCHLORIDE 20 MG/ML
5 INJECTION INTRAMUSCULAR; INTRAVENOUS
Status: DISCONTINUED | OUTPATIENT
Start: 2017-06-12 | End: 2017-06-12 | Stop reason: HOSPADM

## 2017-06-12 RX ORDER — FLUMAZENIL 0.1 MG/ML
0.2 INJECTION INTRAVENOUS AS NEEDED
Status: DISCONTINUED | OUTPATIENT
Start: 2017-06-12 | End: 2017-06-12 | Stop reason: HOSPADM

## 2017-06-12 RX ORDER — HYDROCODONE BITARTRATE AND ACETAMINOPHEN 5; 325 MG/1; MG/1
1 TABLET ORAL ONCE AS NEEDED
Status: DISCONTINUED | OUTPATIENT
Start: 2017-06-12 | End: 2017-06-12 | Stop reason: HOSPADM

## 2017-06-12 RX ORDER — METOCLOPRAMIDE HYDROCHLORIDE 5 MG/ML
5 INJECTION INTRAMUSCULAR; INTRAVENOUS
Status: DISCONTINUED | OUTPATIENT
Start: 2017-06-12 | End: 2017-06-12 | Stop reason: HOSPADM

## 2017-06-12 RX ORDER — NALOXONE HCL 0.4 MG/ML
0.04 VIAL (ML) INJECTION AS NEEDED
Status: DISCONTINUED | OUTPATIENT
Start: 2017-06-12 | End: 2017-06-12 | Stop reason: HOSPADM

## 2017-06-12 RX ORDER — GLYCOPYRROLATE 0.2 MG/ML
INJECTION INTRAMUSCULAR; INTRAVENOUS AS NEEDED
Status: DISCONTINUED | OUTPATIENT
Start: 2017-06-12 | End: 2017-06-12 | Stop reason: SURG

## 2017-06-12 RX ORDER — LIDOCAINE HYDROCHLORIDE 20 MG/ML
INJECTION, SOLUTION INFILTRATION; PERINEURAL AS NEEDED
Status: DISCONTINUED | OUTPATIENT
Start: 2017-06-12 | End: 2017-06-12 | Stop reason: SURG

## 2017-06-12 RX ORDER — IPRATROPIUM BROMIDE AND ALBUTEROL SULFATE 2.5; .5 MG/3ML; MG/3ML
3 SOLUTION RESPIRATORY (INHALATION) ONCE AS NEEDED
Status: DISCONTINUED | OUTPATIENT
Start: 2017-06-12 | End: 2017-06-12 | Stop reason: HOSPADM

## 2017-06-12 RX ORDER — SODIUM CHLORIDE, SODIUM LACTATE, POTASSIUM CHLORIDE, CALCIUM CHLORIDE 600; 310; 30; 20 MG/100ML; MG/100ML; MG/100ML; MG/100ML
INJECTION, SOLUTION INTRAVENOUS CONTINUOUS PRN
Status: DISCONTINUED | OUTPATIENT
Start: 2017-06-12 | End: 2017-06-12 | Stop reason: HOSPADM

## 2017-06-12 RX ORDER — OXYMETAZOLINE HYDROCHLORIDE 0.05 G/100ML
2 SPRAY NASAL
Status: COMPLETED | OUTPATIENT
Start: 2017-06-12 | End: 2017-06-12

## 2017-06-12 RX ORDER — SODIUM CHLORIDE, SODIUM LACTATE, POTASSIUM CHLORIDE, CALCIUM CHLORIDE 600; 310; 30; 20 MG/100ML; MG/100ML; MG/100ML; MG/100ML
100 INJECTION, SOLUTION INTRAVENOUS CONTINUOUS
Status: DISCONTINUED | OUTPATIENT
Start: 2017-06-12 | End: 2017-06-12 | Stop reason: HOSPADM

## 2017-06-12 RX ORDER — SUFENTANIL CITRATE 50 UG/ML
INJECTION EPIDURAL; INTRAVENOUS AS NEEDED
Status: DISCONTINUED | OUTPATIENT
Start: 2017-06-12 | End: 2017-06-12 | Stop reason: SURG

## 2017-06-12 RX ORDER — MAGNESIUM HYDROXIDE 1200 MG/15ML
LIQUID ORAL AS NEEDED
Status: DISCONTINUED | OUTPATIENT
Start: 2017-06-12 | End: 2017-06-12 | Stop reason: HOSPADM

## 2017-06-12 RX ORDER — NALOXONE HYDROCHLORIDE 0.4 MG/ML
INJECTION, SOLUTION INTRAMUSCULAR; INTRAVENOUS; SUBCUTANEOUS AS NEEDED
Status: DISCONTINUED | OUTPATIENT
Start: 2017-06-12 | End: 2017-06-12 | Stop reason: SURG

## 2017-06-12 RX ORDER — SUCCINYLCHOLINE CHLORIDE 20 MG/ML
INJECTION INTRAMUSCULAR; INTRAVENOUS AS NEEDED
Status: DISCONTINUED | OUTPATIENT
Start: 2017-06-12 | End: 2017-06-12 | Stop reason: SURG

## 2017-06-12 RX ORDER — HYDROCODONE BITARTRATE AND ACETAMINOPHEN 5; 325 MG/1; MG/1
1-2 TABLET ORAL EVERY 4 HOURS PRN
Qty: 8 TABLET | Refills: 0 | Status: SHIPPED | OUTPATIENT
Start: 2017-06-12 | End: 2017-06-20

## 2017-06-12 RX ORDER — ONDANSETRON 2 MG/ML
INJECTION INTRAMUSCULAR; INTRAVENOUS AS NEEDED
Status: DISCONTINUED | OUTPATIENT
Start: 2017-06-12 | End: 2017-06-12 | Stop reason: SURG

## 2017-06-12 RX ORDER — MIDAZOLAM HYDROCHLORIDE 1 MG/ML
2 INJECTION INTRAMUSCULAR; INTRAVENOUS
Status: DISCONTINUED | OUTPATIENT
Start: 2017-06-12 | End: 2017-06-12 | Stop reason: HOSPADM

## 2017-06-12 RX ORDER — LABETALOL HYDROCHLORIDE 5 MG/ML
5 INJECTION, SOLUTION INTRAVENOUS
Status: DISCONTINUED | OUTPATIENT
Start: 2017-06-12 | End: 2017-06-12 | Stop reason: HOSPADM

## 2017-06-12 RX ADMIN — DEXAMETHASONE SODIUM PHOSPHATE 4 MG: 4 INJECTION, SOLUTION INTRAMUSCULAR; INTRAVENOUS at 11:13

## 2017-06-12 RX ADMIN — ROCURONIUM BROMIDE 5 MG: 10 INJECTION INTRAVENOUS at 10:35

## 2017-06-12 RX ADMIN — OXYMETAZOLINE HYDROCHLORIDE 2 SPRAY: 5 SPRAY NASAL at 09:48

## 2017-06-12 RX ADMIN — NALOXONE HYDROCHLORIDE 0.04 MG: 0.4 INJECTION, SOLUTION INTRAMUSCULAR; INTRAVENOUS; SUBCUTANEOUS at 11:20

## 2017-06-12 RX ADMIN — Medication 3 MG: at 11:13

## 2017-06-12 RX ADMIN — LIDOCAINE HYDROCHLORIDE 100 MG: 20 INJECTION, SOLUTION INFILTRATION; PERINEURAL at 10:35

## 2017-06-12 RX ADMIN — NALOXONE HYDROCHLORIDE 0.16 MG: 0.4 INJECTION, SOLUTION INTRAMUSCULAR; INTRAVENOUS; SUBCUTANEOUS at 11:23

## 2017-06-12 RX ADMIN — OXYMETAZOLINE HYDROCHLORIDE 2 SPRAY: 5 SPRAY NASAL at 09:43

## 2017-06-12 RX ADMIN — SUFENTANIL CITRATE 20 MCG: 50 INJECTION, SOLUTION EPIDURAL; INTRAVENOUS at 10:35

## 2017-06-12 RX ADMIN — SUCCINYLCHOLINE CHLORIDE 200 MG: 20 INJECTION, SOLUTION INTRAMUSCULAR; INTRAVENOUS at 10:35

## 2017-06-12 RX ADMIN — GLYCOPYRROLATE 0.4 MG: 0.2 INJECTION, SOLUTION INTRAMUSCULAR; INTRAVENOUS at 11:13

## 2017-06-12 RX ADMIN — OXYMETAZOLINE HYDROCHLORIDE 2 SPRAY: 5 SPRAY NASAL at 09:53

## 2017-06-12 RX ADMIN — SUFENTANIL CITRATE 10 MCG: 50 INJECTION, SOLUTION EPIDURAL; INTRAVENOUS at 10:56

## 2017-06-12 RX ADMIN — LIDOCAINE HYDROCHLORIDE 0.5 ML: 10 INJECTION, SOLUTION EPIDURAL; INFILTRATION; INTRACAUDAL; PERINEURAL at 09:42

## 2017-06-12 RX ADMIN — SODIUM CHLORIDE, POTASSIUM CHLORIDE, SODIUM LACTATE AND CALCIUM CHLORIDE 100 ML/HR: 600; 310; 30; 20 INJECTION, SOLUTION INTRAVENOUS at 09:43

## 2017-06-12 RX ADMIN — SUFENTANIL CITRATE 10 MCG: 50 INJECTION, SOLUTION EPIDURAL; INTRAVENOUS at 10:43

## 2017-06-12 RX ADMIN — ONDANSETRON HYDROCHLORIDE 4 MG: 2 SOLUTION INTRAMUSCULAR; INTRAVENOUS at 11:13

## 2017-06-12 RX ADMIN — SUFENTANIL CITRATE 10 MCG: 50 INJECTION, SOLUTION EPIDURAL; INTRAVENOUS at 11:02

## 2017-06-12 RX ADMIN — ROCURONIUM BROMIDE 35 MG: 10 INJECTION INTRAVENOUS at 10:45

## 2017-06-12 RX ADMIN — PROPOFOL 150 MG: 10 INJECTION, EMULSION INTRAVENOUS at 10:35

## 2017-06-12 NOTE — DISCHARGE INSTRUCTIONS

## 2017-06-12 NOTE — H&P (VIEW-ONLY)
Patient Care Team:  Carl Vu MD as PCP - General (Family Medicine)  Jona Garibay MD as Consulting Physician (Otolaryngology)    Chief Complaint   Patient presents with   • Sinus Problem     chronic        Subjective     George Wilder is a 61 y.o. male who presents for evaluation.  Sinus Problem   This is a chronic problem. Episode onset: January 2017 after a left upper molar tooth extraction. The problem is unchanged. There has been no fever. The pain is moderate. Associated symptoms include congestion, headaches and sinus pressure. Pertinent negatives include no chills, coughing, diaphoresis, ear pain, hoarse voice, neck pain, shortness of breath, sneezing, sore throat or swollen glands. Past treatments include antibiotics (Patient has been on three rounds of antibiotics back to back without any relief of symptoms). The treatment provided no relief.       Review of Systems  Review of Systems   Constitutional: Negative for activity change, appetite change, chills, diaphoresis, fatigue, fever and unexpected weight change.   HENT: Positive for congestion, postnasal drip and sinus pressure. Negative for ear discharge, ear pain, facial swelling, hearing loss, hoarse voice, mouth sores, nosebleeds, rhinorrhea, sneezing, sore throat, tinnitus, trouble swallowing and voice change.    Eyes: Negative for pain, discharge, redness, itching and visual disturbance.   Respiratory: Negative for apnea, cough, choking, chest tightness, shortness of breath, wheezing and stridor.    Gastrointestinal: Negative for nausea and vomiting.   Endocrine: Negative for cold intolerance and heat intolerance.   Musculoskeletal: Negative for arthralgias, back pain, gait problem, neck pain and neck stiffness.   Skin: Negative for rash.   Allergic/Immunologic: Negative for environmental allergies and food allergies.   Neurological: Positive for headaches. Negative for dizziness, tremors, seizures, syncope, facial asymmetry,  speech difficulty, weakness, light-headedness and numbness.   Hematological: Negative for adenopathy. Does not bruise/bleed easily.   Psychiatric/Behavioral: Negative for behavioral problems, sleep disturbance and suicidal ideas. The patient is not nervous/anxious and is not hyperactive.        History  Past Medical History:   Diagnosis Date   • Arthritis    • Back pain    • Coronary artery disease 1984    stent LAD, Cx, RCA    • GERD (gastroesophageal reflux disease)    • Hyperlipidemia    • Hypertension    • Myocardial infarction      Past Surgical History:   Procedure Laterality Date   • ANKLE SURGERY     • BACK SURGERY     • CARDIAC CATHETERIZATION  1984, 1994   • CORONARY STENT PLACEMENT     • HAND SURGERY     • TOTAL HIP ARTHROPLASTY Bilateral      Family History   Problem Relation Age of Onset   • No Known Problems Mother    • Cancer Father    • No Known Problems Brother    • Cancer Brother      Social History   Substance Use Topics   • Smoking status: Former Smoker   • Smokeless tobacco: Never Used   • Alcohol use No       Current Outpatient Prescriptions:   •  aspirin 325 MG tablet, Take 325 mg by mouth Daily., Disp: , Rfl:   •  celecoxib (CeleBREX) 200 MG capsule, Take 200 mg by mouth Daily., Disp: , Rfl:   •  gabapentin (NEURONTIN) 300 MG capsule, Take 300 mg by mouth 3 (Three) Times a Day., Disp: , Rfl:   •  HYDROcodone-acetaminophen (NORCO)  MG per tablet, Take 1 tablet by mouth Every 6 (Six) Hours As Needed for moderate pain (4-6)., Disp: , Rfl:   •  metoprolol tartrate (LOPRESSOR) 50 MG tablet, Take 50 mg by mouth 2 (Two) Times a Day., Disp: , Rfl:   •  omeprazole (priLOSEC) 20 MG capsule, Take 20 mg by mouth Daily., Disp: , Rfl:   •  simvastatin (ZOCOR) 40 MG tablet, TAKE 1 TABLET BY MOUTH AT BEDTIME, Disp: , Rfl: 1  •  amoxicillin-clavulanate (AUGMENTIN) 875-125 MG per tablet, Take 1 tablet by mouth 2 (Two) Times a Day for 14 days., Disp: 28 tablet, Rfl: 0  •  Azelastine-Fluticasone 137-50  MCG/ACT suspension, 2 sprays into each nostril 2 (Two) Times a Day. 2 sprays by nasal route twice a day, Disp: 1 bottle, Rfl: 6  •  tiZANidine (ZANAFLEX) 4 MG tablet, TAKE 1 TABLET BY ORAL ROUTE EVERY 8 HOURS AS NEEDED NOT TO EXCEED 3 DOSES IN 24 HOURS, Disp: , Rfl: 2  Allergies:  Review of patient's allergies indicates no known allergies.    Objective     Vital Signs  Temp:  [97.1 °F (36.2 °C)] 97.1 °F (36.2 °C)  Heart Rate:  [60] 60  Resp:  [18] 18  BP: (128)/(80) 128/80    Physical Exam:  Physical Exam   Constitutional: He is oriented to person, place, and time. Vital signs are normal. He appears well-developed and well-nourished. No distress.   HENT:   Head: Normocephalic and atraumatic.   Right Ear: Hearing, tympanic membrane, external ear and ear canal normal. No lacerations. No drainage, swelling or tenderness. No foreign bodies. No mastoid tenderness. Tympanic membrane is not injected, not scarred, not perforated, not erythematous, not retracted and not bulging. Tympanic membrane mobility is normal. No middle ear effusion. No hemotympanum. No decreased hearing is noted.   Left Ear: Hearing, tympanic membrane, external ear and ear canal normal. No lacerations. No drainage, swelling or tenderness. No foreign bodies. No mastoid tenderness. Tympanic membrane is not injected, not scarred, not perforated, not erythematous, not retracted and not bulging. Tympanic membrane mobility is normal.  No middle ear effusion. No hemotympanum. No decreased hearing is noted.   Nose: Mucosal edema (left sided with moderate inflammation and purulent drainage from left OMC) present. No rhinorrhea or septal deviation.   Mouth/Throat: Uvula is midline, oropharynx is clear and moist and mucous membranes are normal. Mucous membranes are not pale, not dry and not cyanotic. He does not have dentures. No oral lesions. No trismus in the jaw. Normal dentition. No uvula swelling. No oropharyngeal exudate, posterior oropharyngeal edema,  posterior oropharyngeal erythema or tonsillar abscesses. No tonsillar exudate.   Eyes: Conjunctivae, EOM and lids are normal. Pupils are equal, round, and reactive to light. No scleral icterus. Right eye exhibits normal extraocular motion and no nystagmus. Left eye exhibits normal extraocular motion and no nystagmus. Right pupil is round and reactive. Left pupil is round and reactive. Pupils are equal.   Neck: Trachea normal, full passive range of motion without pain and phonation normal. No tracheal tenderness present. No tracheal deviation present. No thyroid mass and no thyromegaly present.   Cardiovascular: Normal rate.    Pulmonary/Chest: Effort normal. No stridor. No respiratory distress.   Musculoskeletal: He exhibits no edema.   Lymphadenopathy:        Head (right side): No submental, no submandibular, no tonsillar, no preauricular, no posterior auricular and no occipital adenopathy present.        Head (left side): No submental, no submandibular, no tonsillar, no preauricular, no posterior auricular and no occipital adenopathy present.     He has no cervical adenopathy.        Right cervical: No superficial cervical, no deep cervical and no posterior cervical adenopathy present.       Left cervical: No superficial cervical, no deep cervical and no posterior cervical adenopathy present.   Neurological: He is alert and oriented to person, place, and time. No cranial nerve deficit. Gait normal.   Skin: Skin is warm, dry and intact. No lesion and no rash noted. He is not diaphoretic. No erythema. No pallor.   Psychiatric: He has a normal mood and affect. His speech is normal and behavior is normal. Judgment and thought content normal.   Vitals reviewed.      Results Review:   I reviewed the patient's new clinical results.      Assessment/Plan     Problems Addressed this Visit        Respiratory    Chronic maxillary sinusitis - Primary    Relevant Orders    Case Request (Completed)    Comprehensive Metabolic  Panel    CBC (No Diff)    Protime-INR    APTT    ECG 12 Lead    XR Chest 2 View    Chronic ethmoidal sinusitis    Relevant Orders    Case Request (Completed)    Comprehensive Metabolic Panel    CBC (No Diff)    Protime-INR    APTT    ECG 12 Lead    XR Chest 2 View          My findings and recommendations were discussed and questions were answered. Patient is going to [place] tomorrow. Will start antibiotic and nasal sprays. Advised that if patient develops a left sided frontal headache that gets worse and worse to go to ER.     FUNCTIONAL ENDOSCOPIC SINUS SURGERY: A functional endoscopic sinus surgery was recommended. The risks and benefits were explained including but not limited to pain, bleeding (with the possible need for nasal packing), infection, risks of the general anesthesia, orbital injury with blurred vision or visual loss, cerebrospinal fluid leak, persistent disease, scarring, synichiae and the possibility for the need of reoperation. Possibilities of additional sinus work or less sinus work depending on the status of the nose at the time of the operation was discussed. Alternatives were discussed. No guarantees were made or implied. Questions were asked appropriately answered.      Return for Follow-up post-operatively as directed.    DANIEL Armas  05/18/17  3:39 PM

## 2017-06-12 NOTE — ANESTHESIA POSTPROCEDURE EVALUATION
Patient: George Wilder    Procedure Summary     Date Anesthesia Start Anesthesia Stop Room / Location    06/12/17 1031 1129  [facility] 03 /  [facility]       Procedure Diagnosis Surgeon Provider    ENDOSCOPIC FUNCTIONAL SINUS SURGERY WITH LEFT MIDDLE MEATAL ANTROSTOMY AND LEFT ETHMOIDECTOMY (N/A Nose); ETHMOIDECTOMY ENDOSCOPIC (Left Nose) Chronic maxillary sinusitis; Chronic ethmoidal sinusitis  (Chronic maxillary sinusitis [J32.0]; Chronic ethmoidal sinusitis [J32.2]) MD Georgi Mejia CRNA          Anesthesia Type: general  Last vitals  /74 (06/12/17 1148)    Temp 97.6 °F (36.4 °C) (06/12/17 1141)    Pulse (!) 46 (06/12/17 1148)   Resp 16 (06/12/17 1148)    SpO2 94 % (06/12/17 1148)      Post Anesthesia Care and Evaluation      Comments: Patient discharged from PACU per protocol, VSS.     Blood pressure 121/74, pulse (!) 46, temperature 97.6 °F (36.4 °C), resp. rate 16, SpO2 94 %.

## 2017-06-12 NOTE — PLAN OF CARE
Problem: Patient Care Overview (Adult)  Goal: Plan of Care Review  Outcome: Outcome(s) achieved Date Met:  06/12/17 06/12/17 2478   Coping/Psychosocial Response Interventions   Plan Of Care Reviewed With patient;spouse   Patient Care Overview   Progress improving   Outcome Evaluation   Outcome Summary/Follow up Plan discharge criteria          Problem: Perioperative Period (Adult)  Goal: Signs and Symptoms of Listed Potential Problems Will be Absent or Manageable (Perioperative Period)  Outcome: Outcome(s) achieved Date Met:  06/12/17

## 2017-06-12 NOTE — PLAN OF CARE
Problem: Perioperative Period (Adult)  Goal: Signs and Symptoms of Listed Potential Problems Will be Absent or Manageable (Perioperative Period)  Outcome: Ongoing (interventions implemented as appropriate)    06/12/17 0950   Perioperative Period   Problems Assessed (Perioperative Period) pain;perioperative injury;infection;situational response   Problems Present (Perioperative Period) situational response;pain

## 2017-06-12 NOTE — ANESTHESIA PREPROCEDURE EVALUATION
Anesthesia Evaluation     Patient summary reviewed   NPO Solid Status: > 8 hours  NPO Liquid Status: > 2 hours     Airway   Mallampati: I  TM distance: >3 FB  Neck ROM: full  no difficulty expected  Dental - normal exam     Pulmonary - normal exam   (+) a smoker Former,   Cardiovascular - normal exam  Exercise tolerance: good (4-7 METS)    (+) hypertension, past MI  >12 months, CAD, hyperlipidemia      Neuro/Psych  GI/Hepatic/Renal/Endo      Musculoskeletal     (+) back pain,   Abdominal  - normal exam   Substance History   (-) alcohol use, drug use     OB/GYN          Other   (+) arthritis                                     Anesthesia Plan    ASA 3     general     intravenous induction   Anesthetic plan and risks discussed with patient.    Plan discussed with CRNA.

## 2017-06-12 NOTE — PLAN OF CARE
Problem: Patient Care Overview (Adult)  Goal: Plan of Care Review  Outcome: Ongoing (interventions implemented as appropriate)    06/12/17 1136   Coping/Psychosocial Response Interventions   Plan Of Care Reviewed With patient   Patient Care Overview   Progress improving   Outcome Evaluation   Outcome Summary/Follow up Plan Denies pain or nausea meets criteria for pacu discharge         Problem: Perioperative Period (Adult)  Goal: Signs and Symptoms of Listed Potential Problems Will be Absent or Manageable (Perioperative Period)  Outcome: Ongoing (interventions implemented as appropriate)

## 2017-06-12 NOTE — ANESTHESIA PROCEDURE NOTES
Airway  Urgency: elective    Airway not difficult    General Information and Staff    Patient location during procedure: OR  CRNA: BALDOMERO BANKS    Indications and Patient Condition  Indications for airway management: airway protection    Preoxygenated: yes  Mask difficulty assessment: 1 - vent by mask    Final Airway Details  Final airway type: endotracheal airway      Successful airway: ETT  Cuffed: yes   Successful intubation technique: direct laryngoscopy  Facilitating devices/methods: intubating stylet  Endotracheal tube insertion site: oral  Blade: Keren  Blade size: #3  ETT size: 7.5 mm  Cormack-Lehane Classification: grade I - full view of glottis  Placement verified by: chest auscultation and capnometry   Cuff volume (mL): 8  Measured from: teeth  ETT to teeth (cm): 23  Number of attempts at approach: 1

## 2017-06-12 NOTE — PLAN OF CARE
Problem: Patient Care Overview (Adult)  Goal: Plan of Care Review  Outcome: Ongoing (interventions implemented as appropriate)    06/12/17 0958   Coping/Psychosocial Response Interventions   Plan Of Care Reviewed With patient   Patient Care Overview   Progress no change

## 2017-06-12 NOTE — OP NOTE
OPERATIVE NOTE  6/12/2017    NAME: George Wilder    YOB: 1955  MRN: [id number]    PRE-OPERATIVE DIAGNOSIS:    Chronic left maxillary and ethmoid rhinosinusitis with superimposed acute rhinosinusitis    POST-OPERATIVE DIAGNOSIS:   Same      PROCEDURE PERFORMED:   Left functional endoscopic ethmoidectomy with middle meatal antrostomy  Right diagnostic rigid nasal endoscopy    SURGEON:   Jona Garibay MD    ASSISTANT(S):   None    ANESTHESIA:   General Anesthesia via Endotracheal Tube    INDICATIONS: The patient is a 61 y.o. male with Chronic maxillary sinusitis [J32.0]  Chronic ethmoidal sinusitis [J32.2]    PROCEDURE:  The patient was brought to the operating room, given General Anesthesia via Endotracheal Tube, and prepped and draped in the usual manner.     Approximately 4 mL of 4% cocaine solution impregnating Codman neurosurgical pledgets were placed intranasally and 5 minutes allowed to pass by the clock.  The pledgets were removed    Rigid nasal endoscopy was subsequently performed with the [employer] 0° endoscope.  The left middle turbinate was medialized with a [place] elevator following injection with a half cc 1% Xylocaine with epinephrine.    An anterior ethmoidectomy was performed with the RAD-12microdebrider.  The uncinate process remnant was removed after the natural opening of the maxillary sinus was identified with the blunt ostium seeker.  The ethmoid bulla was infractured and unroofed utilizing the microdebrider.  No intranasal polyposis was appreciated.  The frontal recess appeared patent and so no dissection was performed in this area.  The dissection progressed posteriorly to the anterior portion of the posterior ethmoids.  No true polypoid changes were noted throughout the dissection.  Edematous mucosa was noted but no antral polyposis was appreciated and there was no purulence secretions appreciated.  Chronic thickening with edema was noted throughout the ethmoid mucosa and  antral mucosa.    No significant bleeding was encountered.    A mini [employer]el implant was placed near the left antrostomy.   Stammberger sinus foam dressing was also placed.  No significant bleeding was noted.       Attention was then turned to the opposite side where diagnostic rigid nasal endoscopy was performed and no abnormalities appreciated..    The patient tolerated the procedure well without complications and was transported to the postanesthesia care unit in stable condition.    SPECIMENS:  * No specimens in log *    COMPLICATIONS: NONE    ESTIMATED BLOOD LOSS:  Minimal    Jona Garibay MD  6/12/2017

## 2017-06-13 LAB
CYTO UR: NORMAL
LAB AP CASE REPORT: NORMAL
Lab: NORMAL
PATH REPORT.FINAL DX SPEC: NORMAL
PATH REPORT.GROSS SPEC: NORMAL

## 2017-06-27 ENCOUNTER — OFFICE VISIT (OUTPATIENT)
Dept: OTOLARYNGOLOGY | Facility: CLINIC | Age: 62
End: 2017-06-27

## 2017-06-27 VITALS
BODY MASS INDEX: 38.63 KG/M2 | HEART RATE: 54 BPM | SYSTOLIC BLOOD PRESSURE: 133 MMHG | DIASTOLIC BLOOD PRESSURE: 79 MMHG | TEMPERATURE: 97.9 F | WEIGHT: 301 LBS | HEIGHT: 74 IN

## 2017-06-27 DIAGNOSIS — Z98.890 S/P SINUS SURGERY: ICD-10-CM

## 2017-06-27 PROCEDURE — 31237 NSL/SINS NDSC SURG BX POLYPC: CPT | Performed by: PHYSICIAN ASSISTANT

## 2017-06-27 RX ORDER — AZELASTINE 1 MG/ML
SPRAY, METERED NASAL
Refills: 5 | COMMUNITY
Start: 2017-06-16 | End: 2017-08-08

## 2017-06-27 NOTE — PROGRESS NOTES
PROCEDURE NOTE      PROCEDURE:    Nasal Endoscopy  ANESTHESIA:  Topical Ponticaine and Afrin Spray   REASON FOR THE PROCEDURE:  Patient is status post recent nasal surgery with a need for evaluation of the surgical site and possible nasal debridement. The patient consented to operative intervention after a full discussion of risks, benefits, and alternatives. No guarantees were made or implied.   DETAILS OF THE OPERATION:  The patient was seated in the exam room chair. Nasal endoscopy with debridement was performed with the 0 degree scope through the nares after applying nebulized ponticaine/ Afrin spray . A 0 degree endoscope was introduced into the nasal cavity and gently directed to the deeper nasal cavity examining the following structures.   FINDINGS:  Mucosal Surfaces:  The mucosal surfaces demonstrated crusting and post surgical changes.   Nasal Septum:  The nasal septum was found to have normal mucosa and no significant deviation.   Inferior Turbinates:  The inferior turbinates were found to have normal mucosa.   Middle Turbinates:  The middle turbinates were found to have normal mucosa.   Middle Meatus:  The middle meatus were found to have normal mucosa.   Sphenoethmoid Recess:  The sphenoethmoidal recess was found to have normal mucosa.   Nasopharynx:  The nasopharynx was found to have no lesion or mass.   Bilateral debridement was performed using the endoscope with suctioning and forceps.

## 2017-07-07 RX ORDER — SIMVASTATIN 40 MG
40 TABLET ORAL NIGHTLY
Qty: 90 TABLET | Refills: 0 | Status: SHIPPED | OUTPATIENT
Start: 2017-07-07 | End: 2017-09-26 | Stop reason: SDUPTHER

## 2017-08-08 ENCOUNTER — OFFICE VISIT (OUTPATIENT)
Dept: OTOLARYNGOLOGY | Facility: CLINIC | Age: 62
End: 2017-08-08

## 2017-08-08 VITALS
BODY MASS INDEX: 40.17 KG/M2 | DIASTOLIC BLOOD PRESSURE: 88 MMHG | WEIGHT: 313 LBS | TEMPERATURE: 97.2 F | HEIGHT: 74 IN | HEART RATE: 59 BPM | SYSTOLIC BLOOD PRESSURE: 138 MMHG | RESPIRATION RATE: 19 BRPM

## 2017-08-08 DIAGNOSIS — J32.2 CHRONIC ETHMOIDAL SINUSITIS: Primary | ICD-10-CM

## 2017-08-08 DIAGNOSIS — J30.89 ALLERGIC RHINITIS DUE TO OTHER ALLERGIC TRIGGER, UNSPECIFIED RHINITIS SEASONALITY: ICD-10-CM

## 2017-08-08 DIAGNOSIS — J32.0 CHRONIC MAXILLARY SINUSITIS: ICD-10-CM

## 2017-08-08 PROBLEM — J30.9 ALLERGIC RHINITIS DUE TO ALLERGEN: Status: ACTIVE | Noted: 2017-08-08

## 2017-08-08 PROCEDURE — 99214 OFFICE O/P EST MOD 30 MIN: CPT | Performed by: PHYSICIAN ASSISTANT

## 2017-08-08 RX ORDER — AZELASTINE HYDROCHLORIDE, FLUTICASONE PROPIONATE 137; 50 UG/1; UG/1
2 SPRAY, METERED NASAL DAILY
Qty: 1 BOTTLE | Refills: 11 | Status: SHIPPED | OUTPATIENT
Start: 2017-08-08 | End: 2017-08-10

## 2017-08-08 NOTE — PROGRESS NOTES
Patient Care Team:  Carl Vu MD as PCP - General (Family Medicine)  Jona Garibay MD as Consulting Physician (Otolaryngology)    Chief Complaint   Patient presents with   • Follow-up     sinus surgery        Subjective     George Wilder is a 61 y.o. male who presents for evaluation.  HPI Comments: Patient presents for follow-up evaluation status post sinus surgery. He reports he has been doing well, but started having increased congestion and drainage again. Due to this he has started his nasal spray and used an OTC antihistamine on Friday, which is improving his symptoms. He reports chronic allergy problems and has been on immunotherapy in the past. No other concerns at this time.       Review of Systems  Review of Systems   Constitutional: Negative for activity change, appetite change, chills, diaphoresis, fatigue, fever and unexpected weight change.   HENT: Positive for congestion and postnasal drip. Negative for ear discharge, ear pain, facial swelling, hearing loss, mouth sores, nosebleeds, rhinorrhea, sinus pressure, sneezing, sore throat, tinnitus, trouble swallowing and voice change.    Eyes: Negative for pain, discharge, redness, itching and visual disturbance.   Respiratory: Negative for apnea, cough, choking, chest tightness, shortness of breath, wheezing and stridor.    Gastrointestinal: Negative for nausea and vomiting.   Endocrine: Negative for cold intolerance and heat intolerance.   Musculoskeletal: Negative for arthralgias, back pain, gait problem, neck pain and neck stiffness.   Skin: Negative for rash.   Allergic/Immunologic: Positive for environmental allergies. Negative for food allergies.   Neurological: Negative for dizziness, tremors, seizures, syncope, facial asymmetry, speech difficulty, weakness, light-headedness, numbness and headaches.   Hematological: Negative for adenopathy. Does not bruise/bleed easily.   Psychiatric/Behavioral: Negative for behavioral  problems, sleep disturbance and suicidal ideas. The patient is not nervous/anxious and is not hyperactive.        History  Past Medical History:   Diagnosis Date   • Arthritis    • Back pain    • Coronary artery disease 1984    stent LAD, Cx, RCA    • GERD (gastroesophageal reflux disease)    • Hyperlipidemia    • Hypertension    • Myocardial infarction      Past Surgical History:   Procedure Laterality Date   • ANKLE SURGERY     • BACK SURGERY     • CARDIAC CATHETERIZATION  1984, 1994   • CHOLECYSTECTOMY     • ENDOSCOPIC FUNCTIONAL SINUS SURGERY (FESS) N/A 6/12/2017    Procedure: ENDOSCOPIC FUNCTIONAL SINUS SURGERY WITH LEFT MIDDLE MEATAL ANTROSTOMY AND LEFT ETHMOIDECTOMY;  Surgeon: Jona Garibay MD;  Location: [facility] OR;  Service:    • ETHMOIDECTOMY Left 6/12/2017    Procedure: ETHMOIDECTOMY ENDOSCOPIC;  Surgeon: Jona Garibay MD;  Location: [facility] OR;  Service:    • EYE SURGERY      LASIK   • HAND SURGERY     • SINUS SURGERY     • TOTAL HIP ARTHROPLASTY Bilateral      Family History   Problem Relation Age of Onset   • No Known Problems Mother    • Cancer Father    • No Known Problems Brother    • Cancer Brother      Social History   Substance Use Topics   • Smoking status: Former Smoker   • Smokeless tobacco: Never Used   • Alcohol use No       Current Outpatient Prescriptions:   •  Azelastine-Fluticasone 137-50 MCG/ACT suspension, 2 sprays into each nostril Daily. 2 sprays by nasal route twice a day, Disp: 1 bottle, Rfl: 11  •  celecoxib (CeleBREX) 200 MG capsule, Take 200 mg by mouth Daily., Disp: , Rfl:   •  Cholecalciferol (VITAMIN D3) 5000 UNITS capsule capsule, Take 5,000 Units by mouth Daily., Disp: , Rfl:   •  gabapentin (NEURONTIN) 300 MG capsule, Take 300 mg by mouth 3 (Three) Times a Day., Disp: , Rfl:   •  metoprolol tartrate (LOPRESSOR) 50 MG tablet, Take 50 mg by mouth every night at bedtime., Disp: , Rfl:   •  Multiple Vitamins-Minerals (MULTIVITAMIN WITH MINERALS) tablet tablet, Take 1  tablet by mouth Daily., Disp: , Rfl:   •  mupirocin (BACTROBAN) 2 % ointment, APPLY INTO EACH NOSTRIL TWICE A DAY FOR 14 DAYS, Disp: , Rfl: 0  •  omeprazole (priLOSEC) 20 MG capsule, Take 20 mg by mouth Daily., Disp: , Rfl:   •  simvastatin (ZOCOR) 40 MG tablet, Take 1 tablet by mouth Every Night., Disp: 90 tablet, Rfl: 0  •  tiZANidine (ZANAFLEX) 4 MG tablet, TAKE 1 TABLET BY ORAL ROUTE EVERY 8 HOURS AS NEEDED NOT TO EXCEED 3 DOSES IN 24 HOURS, Disp: , Rfl: 2  •  vitamin C (ASCORBIC ACID) 500 MG tablet, Take 500 mg by mouth Daily., Disp: , Rfl:   Allergies:  Review of patient's allergies indicates no known allergies.    Objective     Vital Signs  Temp:  [97.2 °F (36.2 °C)] 97.2 °F (36.2 °C)  Heart Rate:  [59] 59  Resp:  [19] 19  BP: (138)/(88) 138/88    Physical Exam:  Physical Exam   Constitutional: He is oriented to person, place, and time. He appears well-developed and well-nourished. No distress.   HENT:   Head: Normocephalic and atraumatic.   Right Ear: Hearing, tympanic membrane, external ear and ear canal normal. No lacerations. No drainage, swelling or tenderness. No foreign bodies. No mastoid tenderness. Tympanic membrane is not injected, not scarred, not perforated, not erythematous, not retracted and not bulging. Tympanic membrane mobility is normal. No middle ear effusion. No hemotympanum. No decreased hearing is noted.   Left Ear: Hearing, tympanic membrane, external ear and ear canal normal. No lacerations. No drainage, swelling or tenderness. No foreign bodies. No mastoid tenderness. Tympanic membrane is not injected, not scarred, not perforated, not erythematous, not retracted and not bulging. Tympanic membrane mobility is normal.  No middle ear effusion. No hemotympanum. No decreased hearing is noted.   Nose: Mucosal edema (mild with moderate inflammation) present. No rhinorrhea or septal deviation.   Mouth/Throat: Uvula is midline and mucous membranes are normal. Mucous membranes are not pale, not  dry and not cyanotic. He does not have dentures. No oral lesions. No trismus in the jaw. Normal dentition. No uvula swelling. Posterior oropharyngeal erythema present. No oropharyngeal exudate, posterior oropharyngeal edema or tonsillar abscesses. No tonsillar exudate.   Eyes: Conjunctivae and EOM are normal. Pupils are equal, round, and reactive to light. No scleral icterus. Right eye exhibits normal extraocular motion and no nystagmus. Left eye exhibits normal extraocular motion and no nystagmus. Right pupil is round and reactive. Left pupil is round and reactive. Pupils are equal.   Neck: Trachea normal, full passive range of motion without pain and phonation normal.   Cardiovascular: Normal rate.    Pulmonary/Chest: Effort normal. No stridor.   Musculoskeletal: He exhibits no edema.   Neurological: He is alert and oriented to person, place, and time. No cranial nerve deficit. Gait normal.   Skin: Skin is warm and dry. No lesion and no rash noted. He is not diaphoretic. No erythema. No pallor.   Psychiatric: He has a normal mood and affect. His behavior is normal. Judgment and thought content normal.   Vitals reviewed.      Results Review:   None    Assessment/Plan     Problems Addressed this Visit        Respiratory    Chronic maxillary sinusitis    Relevant Medications    Azelastine-Fluticasone 137-50 MCG/ACT suspension    Chronic ethmoidal sinusitis - Primary    Relevant Medications    Azelastine-Fluticasone 137-50 MCG/ACT suspension    Allergic rhinitis due to allergen    Relevant Medications    Azelastine-Fluticasone 137-50 MCG/ACT suspension          My findings and recommendations were discussed and questions were answered. Advised to continue nasal sprays as directed, new script sent in. May use OTC antihistamine as needed. Follow-up as directed.    Return in about 4 months (around 12/8/2017) for Recheck sinus symptoms .    DANIEL Armas  08/08/17  12:22 PM

## 2017-08-10 RX ORDER — FLUTICASONE PROPIONATE 50 MCG
2 SPRAY, SUSPENSION (ML) NASAL DAILY
Qty: 16 G | Refills: 11 | Status: SHIPPED | OUTPATIENT
Start: 2017-08-10 | End: 2018-07-03 | Stop reason: SDUPTHER

## 2017-09-27 RX ORDER — SIMVASTATIN 40 MG
TABLET ORAL
Qty: 30 TABLET | Refills: 0 | Status: SHIPPED | OUTPATIENT
Start: 2017-09-27 | End: 2017-10-24 | Stop reason: SDUPTHER

## 2017-09-27 NOTE — TELEPHONE ENCOUNTER
Patient was contacted and advised past due CPE.  Patient stated will contact office back to schedule when he has a day off work.

## 2017-10-10 ENCOUNTER — LAB (OUTPATIENT)
Dept: FAMILY MEDICINE CLINIC | Facility: CLINIC | Age: 62
End: 2017-10-10

## 2017-10-10 DIAGNOSIS — Z23 NEED FOR INFLUENZA VACCINATION: ICD-10-CM

## 2017-10-10 DIAGNOSIS — Z00.00 ANNUAL PHYSICAL EXAM: Primary | ICD-10-CM

## 2017-10-10 DIAGNOSIS — E55.9 VITAMIN D INSUFFICIENCY: ICD-10-CM

## 2017-10-10 PROCEDURE — 90630 INFLUENZA VAC INTRADERMAL QUADRIVALENT: CPT | Performed by: FAMILY MEDICINE

## 2017-10-10 PROCEDURE — 90471 IMMUNIZATION ADMIN: CPT | Performed by: FAMILY MEDICINE

## 2017-10-11 LAB
25(OH)D3+25(OH)D2 SERPL-MCNC: 50.1 NG/ML (ref 30–100)
ALBUMIN SERPL-MCNC: 4.2 G/DL (ref 3.5–5)
ALBUMIN/GLOB SERPL: 1.6 G/DL (ref 1.1–2.5)
ALP SERPL-CCNC: 65 U/L (ref 24–120)
ALT SERPL-CCNC: 42 U/L (ref 0–54)
AST SERPL-CCNC: 27 U/L (ref 7–45)
BASOPHILS # BLD AUTO: 0.04 10*3/MM3 (ref 0–0.2)
BASOPHILS NFR BLD AUTO: 0.7 % (ref 0–2)
BILIRUB SERPL-MCNC: 0.8 MG/DL (ref 0.1–1)
BUN SERPL-MCNC: 18 MG/DL (ref 5–21)
BUN/CREAT SERPL: 17.6 (ref 7–25)
CALCIUM SERPL-MCNC: 9.4 MG/DL (ref 8.4–10.4)
CHLORIDE SERPL-SCNC: 107 MMOL/L (ref 98–110)
CHOLEST SERPL-MCNC: 150 MG/DL (ref 130–200)
CO2 SERPL-SCNC: 26 MMOL/L (ref 24–31)
CREAT SERPL-MCNC: 1.02 MG/DL (ref 0.5–1.4)
EOSINOPHIL # BLD AUTO: 0.18 10*3/MM3 (ref 0–0.7)
EOSINOPHIL NFR BLD AUTO: 3.3 % (ref 0–4)
ERYTHROCYTE [DISTWIDTH] IN BLOOD BY AUTOMATED COUNT: 13.2 % (ref 12–15)
GLOBULIN SER CALC-MCNC: 2.6 GM/DL
GLUCOSE SERPL-MCNC: 99 MG/DL (ref 70–100)
HCT VFR BLD AUTO: 49.8 % (ref 40–52)
HDLC SERPL-MCNC: 44 MG/DL
HGB BLD-MCNC: 16.6 G/DL (ref 14–18)
IMM GRANULOCYTES # BLD: 0.01 10*3/MM3 (ref 0–0.03)
IMM GRANULOCYTES NFR BLD: 0.2 % (ref 0–5)
LDLC SERPL CALC-MCNC: 76 MG/DL (ref 0–99)
LYMPHOCYTES # BLD AUTO: 2.01 10*3/MM3 (ref 0.72–4.86)
LYMPHOCYTES NFR BLD AUTO: 37.4 % (ref 15–45)
MCH RBC QN AUTO: 31.4 PG (ref 28–32)
MCHC RBC AUTO-ENTMCNC: 33.3 G/DL (ref 33–36)
MCV RBC AUTO: 94.3 FL (ref 82–95)
MONOCYTES # BLD AUTO: 0.51 10*3/MM3 (ref 0.19–1.3)
MONOCYTES NFR BLD AUTO: 9.5 % (ref 4–12)
NEUTROPHILS # BLD AUTO: 2.63 10*3/MM3 (ref 1.87–8.4)
NEUTROPHILS NFR BLD AUTO: 48.9 % (ref 39–78)
PLATELET # BLD AUTO: 244 10*3/MM3 (ref 130–400)
POTASSIUM SERPL-SCNC: 5 MMOL/L (ref 3.5–5.3)
PROT SERPL-MCNC: 6.8 G/DL (ref 6.3–8.7)
PSA SERPL-MCNC: 0.47 NG/ML (ref 0–4)
RBC # BLD AUTO: 5.28 10*6/MM3 (ref 4.8–5.9)
SODIUM SERPL-SCNC: 141 MMOL/L (ref 135–145)
TRIGL SERPL-MCNC: 149 MG/DL (ref 0–149)
TSH SERPL DL<=0.005 MIU/L-ACNC: 1.4 MIU/ML (ref 0.47–4.68)
VLDLC SERPL CALC-MCNC: 29.8 MG/DL
WBC # BLD AUTO: 5.38 10*3/MM3 (ref 4.8–10.8)

## 2017-10-24 RX ORDER — SIMVASTATIN 40 MG
TABLET ORAL
Qty: 90 TABLET | Refills: 2 | Status: SHIPPED | OUTPATIENT
Start: 2017-10-24 | End: 2018-06-28 | Stop reason: SDUPTHER

## 2017-11-14 ENCOUNTER — OFFICE VISIT (OUTPATIENT)
Dept: FAMILY MEDICINE CLINIC | Facility: CLINIC | Age: 62
End: 2017-11-14

## 2017-11-14 VITALS
SYSTOLIC BLOOD PRESSURE: 124 MMHG | DIASTOLIC BLOOD PRESSURE: 84 MMHG | BODY MASS INDEX: 40.35 KG/M2 | OXYGEN SATURATION: 98 % | HEIGHT: 74 IN | HEART RATE: 69 BPM | TEMPERATURE: 97.8 F | WEIGHT: 314.4 LBS

## 2017-11-14 DIAGNOSIS — B02.9 HERPES ZOSTER WITHOUT COMPLICATION: Primary | ICD-10-CM

## 2017-11-14 PROCEDURE — 99213 OFFICE O/P EST LOW 20 MIN: CPT | Performed by: FAMILY MEDICINE

## 2017-11-14 RX ORDER — VALACYCLOVIR HYDROCHLORIDE 1 G/1
1000 TABLET, FILM COATED ORAL 3 TIMES DAILY
Qty: 30 TABLET | Refills: 0 | Status: SHIPPED | OUTPATIENT
Start: 2017-11-14 | End: 2017-11-30

## 2017-11-14 RX ORDER — FLUTICASONE PROPIONATE 50 MCG
SPRAY, SUSPENSION (ML) NASAL
Refills: 11 | COMMUNITY
Start: 2017-11-05 | End: 2018-07-03 | Stop reason: SDUPTHER

## 2017-11-14 RX ORDER — AZELASTINE 1 MG/ML
SPRAY, METERED NASAL
Refills: 5 | COMMUNITY
Start: 2017-11-01 | End: 2017-11-27 | Stop reason: SDUPTHER

## 2017-11-14 NOTE — PATIENT INSTRUCTIONS
Shingles  Shingles, which is also known as herpes zoster, is an infection that causes a painful skin rash and fluid-filled blisters. Shingles is not related to genital herpes, which is a sexually transmitted infection.   Shingles only develops in people who:  · Have had chickenpox.  · Have received the chickenpox vaccine. (This is rare.)  CAUSES  Shingles is caused by varicella-zoster virus (VZV). This is the same virus that causes chickenpox. After exposure to VZV, the virus stays in the body in an inactive (dormant) state. Shingles develops if the virus reactivates. This can happen many years after the initial exposure to VZV. It is not known what causes this virus to reactivate.  RISK FACTORS  People who have had chickenpox or received the chickenpox vaccine are at risk for shingles. Infection is more common in people who:  · Are older than age 50.  · Have a weakened defense (immune) system, such as those with HIV, AIDS, or cancer.  · Are taking medicines that weaken the immune system, such as transplant medicines.  · Are under great stress.  SYMPTOMS  Early symptoms of this condition include itching, tingling, and pain in an area on your skin. Pain may be described as burning, stabbing, or throbbing.  A few days or weeks after symptoms start, a painful red rash appears, usually on one side of the body in a bandlike or beltlike pattern. The rash eventually turns into fluid-filled blisters that break open, scab over, and dry up in about 2-3 weeks.  At any time during the infection, you may also develop:  · A fever.  · Chills.  · A headache.  · An upset stomach.  DIAGNOSIS  This condition is diagnosed with a skin exam. Sometimes, skin or fluid samples are taken from the blisters before a diagnosis is made. These samples are examined under a microscope or sent to a lab for testing.  TREATMENT  There is no specific cure for this condition. Your health care provider will probably prescribe medicines to help you manage  pain, recover more quickly, and avoid long-term problems. Medicines may include:  · Antiviral drugs.  · Anti-inflammatory drugs.  · Pain medicines.  If the area involved is on your face, you may be referred to a specialist, such as an eye doctor (ophthalmologist) or an ear, nose, and throat (ENT) doctor to help you avoid eye problems, chronic pain, or disability.  HOME CARE INSTRUCTIONS  Medicines  · Take medicines only as directed by your health care provider.  · Apply an anti-itch or numbing cream to the affected area as directed by your health care provider.  Blister and Rash Care  · Take a cool bath or apply cool compresses to the area of the rash or blisters as directed by your health care provider. This may help with pain and itching.  · Keep your rash covered with a loose bandage (dressing). Wear loose-fitting clothing to help ease the pain of material rubbing against the rash.  · Keep your rash and blisters clean with mild soap and cool water or as directed by your health care provider.  · Check your rash every day for signs of infection. These include redness, swelling, and pain that lasts or increases.  · Do not pick your blisters.  · Do not scratch your rash.  General Instructions  · Rest as directed by your health care provider.  · Keep all follow-up visits as directed by your health care provider. This is important.  · Until your blisters scab over, your infection can cause chickenpox in people who have never had it or been vaccinated against it. To prevent this from happening, avoid contact with other people, especially:    Babies.    Pregnant women.    Children who have eczema.    Elderly people who have transplants.    People who have chronic illnesses, such as leukemia or AIDS.  SEEK MEDICAL CARE IF:  · Your pain is not relieved with prescribed medicines.  · Your pain does not get better after the rash heals.  · Your rash looks infected. Signs of infection include redness, swelling, and pain that  lasts or increases.  SEEK IMMEDIATE MEDICAL CARE IF:  · The rash is on your face or nose.  · You have facial pain, pain around your eye area, or loss of feeling on one side of your face.  · You have ear pain or you have ringing in your ear.  · You have loss of taste.  · Your condition gets worse.     This information is not intended to replace advice given to you by your health care provider. Make sure you discuss any questions you have with your health care provider.     Document Released: 12/18/2006 Document Revised: 04/10/2017 Document Reviewed: 10/29/2015  [employer] Interactive Patient Education ©2017 [employer]

## 2017-11-14 NOTE — PROGRESS NOTES
Subjective   George Wilder is a 62 y.o. male.     Herpes Zoster   This is a new problem. The current episode started in the past 7 days. The problem occurs constantly. The problem has been gradually worsening. Associated symptoms comments: Vesicular rash consistent with herpes zoster of right ear right right cheek- trigeminal distribution. Nothing aggravates the symptoms. He has tried nothing for the symptoms.       The following portions of the patient's history were reviewed and updated as appropriate: allergies, current medications, past family history, past medical history, past social history, past surgical history and problem list.    Review of Systems   HENT: Negative for ear pain.    Eyes: Negative for pain and discharge.       Objective   Physical Exam   Constitutional: He is oriented to person, place, and time.   Morbidly obese   HENT:   Right Ear: External ear normal.   Neurological: He is alert and oriented to person, place, and time.   Skin:   Vesicular rash above her right ear lateral to right eye and right cheek-trigeminal distribution-consistent with herpes zoster   Psychiatric: He has a normal mood and affect. His behavior is normal.   Nursing note and vitals reviewed.      Assessment/Plan   George was seen today for herpes zoster.    Diagnoses and all orders for this visit:    Herpes zoster without complication    Other orders  -     valACYclovir (VALTREX) 1000 MG tablet; Take 1 tablet by mouth 3 (Three) Times a Day.       Plan above-return for complete physical after medicine-may need gabapentin

## 2017-11-27 RX ORDER — AZELASTINE 1 MG/ML
SPRAY, METERED NASAL
Qty: 1 EACH | Refills: 5 | Status: SHIPPED | OUTPATIENT
Start: 2017-11-27 | End: 2018-05-08 | Stop reason: SDUPTHER

## 2017-11-30 ENCOUNTER — OFFICE VISIT (OUTPATIENT)
Dept: FAMILY MEDICINE CLINIC | Facility: CLINIC | Age: 62
End: 2017-11-30

## 2017-11-30 VITALS
HEART RATE: 66 BPM | BODY MASS INDEX: 40.43 KG/M2 | OXYGEN SATURATION: 98 % | TEMPERATURE: 98.2 F | SYSTOLIC BLOOD PRESSURE: 122 MMHG | DIASTOLIC BLOOD PRESSURE: 78 MMHG | HEIGHT: 74 IN | WEIGHT: 315 LBS

## 2017-11-30 DIAGNOSIS — Z12.12 SCREENING FOR MALIGNANT NEOPLASM OF THE RECTUM: ICD-10-CM

## 2017-11-30 DIAGNOSIS — Z00.00 ROUTINE GENERAL MEDICAL EXAMINATION AT A HEALTH CARE FACILITY: ICD-10-CM

## 2017-11-30 DIAGNOSIS — Z00.00 ANNUAL PHYSICAL EXAM: Primary | ICD-10-CM

## 2017-11-30 LAB
BILIRUB BLD-MCNC: NEGATIVE MG/DL
CLARITY, POC: CLEAR
COLOR UR: YELLOW
GLUCOSE UR STRIP-MCNC: NEGATIVE MG/DL
KETONES UR QL: NEGATIVE
LEUKOCYTE EST, POC: NEGATIVE
NITRITE UR-MCNC: NEGATIVE MG/ML
PH UR: 5.5 [PH] (ref 5–8)
PROT UR STRIP-MCNC: ABNORMAL MG/DL
RBC # UR STRIP: ABNORMAL /UL
SP GR UR: 1.03 (ref 1–1.03)
UROBILINOGEN UR QL: NORMAL

## 2017-11-30 PROCEDURE — 99397 PER PM REEVAL EST PAT 65+ YR: CPT | Performed by: FAMILY MEDICINE

## 2017-11-30 PROCEDURE — 81003 URINALYSIS AUTO W/O SCOPE: CPT | Performed by: FAMILY MEDICINE

## 2017-11-30 NOTE — PATIENT INSTRUCTIONS

## 2017-11-30 NOTE — PROGRESS NOTES
Subjective   George Wilder is a 62 y.o. male.     Obesity   This is a chronic problem. The current episode started more than 1 year ago. The problem occurs constantly. The problem has been unchanged. Associated symptoms include a change in bowel habit. The symptoms are aggravated by eating. He has tried nothing for the symptoms.        The following portions of the patient's history were reviewed and updated as appropriate: allergies, current medications, past family history, past medical history, past social history, past surgical history and problem list.    Review of Systems   Cardiovascular:        Sees cardiologist regularly   Gastrointestinal: Positive for change in bowel habit.        Last colonoscopy 2008-recent change in stooling pattern with leakage and frequency   Musculoskeletal:        Off oral narcotics-sees pain management every other month-had nerve ablation last year that has helped   Skin:        Shingles rash right face improved and resolving-new shot advised   All other systems reviewed and are negative.      Objective   Physical Exam   Constitutional: He is oriented to person, place, and time.   Morbid obesity   HENT:   Right Ear: External ear normal.   Left Ear: External ear normal.   Mouth/Throat: Oropharynx is clear and moist.   Eyes: EOM are normal. Pupils are equal, round, and reactive to light.   Neck: Normal range of motion. No thyromegaly present.   Good carotid pulses   Cardiovascular: Normal rate and regular rhythm.    Pulmonary/Chest: Effort normal and breath sounds normal.   Abdominal: Soft. Bowel sounds are normal.   Genitourinary: Rectum normal, prostate normal and penis normal.   Genitourinary Comments: Rectal tone-no rectal masses-prostate normal size-no testicular masses inguinal hernia or adenopathy   Musculoskeletal: He exhibits no edema.   Lymphadenopathy:     He has no cervical adenopathy.   Neurological: He is alert and oriented to person, place, and time.   Skin: Skin is  warm and dry. No rash noted.   Facial rash on the right-trigeminal distribution resolved   Psychiatric: He has a normal mood and affect. His behavior is normal. Judgment and thought content normal.   Nursing note and vitals reviewed.      Assessment/Plan   Problems Addressed this Visit     None      Visit Diagnoses     Annual physical exam    -  Primary    Screening for malignant neoplasm of the rectum        Relevant Orders    Ambulatory Referral to General Surgery    Routine general medical examination at a health care facility        Relevant Orders    Hepatitis C antibody               plan-above-with change in stooling pattern- needs colonoscopy-9 years out-shingles resolved-needs new shingles shot when released

## 2017-12-01 LAB — HCV AB S/CO SERPL IA: 0.1 S/CO RATIO (ref 0–0.9)

## 2017-12-07 ENCOUNTER — OFFICE VISIT (OUTPATIENT)
Dept: OTOLARYNGOLOGY | Facility: CLINIC | Age: 62
End: 2017-12-07

## 2017-12-07 VITALS
RESPIRATION RATE: 18 BRPM | BODY MASS INDEX: 61.53 KG/M2 | SYSTOLIC BLOOD PRESSURE: 135 MMHG | WEIGHT: 313.4 LBS | OXYGEN SATURATION: 98 % | HEIGHT: 60 IN | HEART RATE: 57 BPM | DIASTOLIC BLOOD PRESSURE: 90 MMHG

## 2017-12-07 DIAGNOSIS — Z98.890 S/P SINUS SURGERY: ICD-10-CM

## 2017-12-07 DIAGNOSIS — J30.89 ALLERGIC RHINITIS DUE TO OTHER ALLERGIC TRIGGER, UNSPECIFIED CHRONICITY, UNSPECIFIED SEASONALITY: Primary | ICD-10-CM

## 2017-12-07 PROCEDURE — 99213 OFFICE O/P EST LOW 20 MIN: CPT | Performed by: PHYSICIAN ASSISTANT

## 2017-12-07 NOTE — PROGRESS NOTES
YOB: 1955  Location: [facility]  Location Address: [address] [place] [address], [facility], [address] [place] [address]  Location Phone: [phone]    Chief Complaint   Patient presents with   • Follow-up     4 month / patient states no issues since the sinus surgery / patient also stated of having an mild head cold        History of Present Illness  George Wilder is a 62 y.o. male.  George Wilder is here for follow up of ENT complaints. The patient has had problems with nasal drainage, nasal congestion, postnasal drip and allergy  The symptoms are not localized to a particular location. The patient has had variable symptoms. The symptoms have been present for the last several years The symptoms are aggravated by  allergy. The symptoms are improved by medications.       Past Medical History:   Diagnosis Date   • Allergic rhinitis due to allergen 2017   • Arthritis    • Back pain    • Chronic ethmoidal sinusitis 2017   • Chronic maxillary sinusitis 2017   • Coronary artery disease     stent LAD, Cx, RCA    • GERD (gastroesophageal reflux disease)    • Hyperlipidemia    • Hypertension    • Myocardial infarction        Past Surgical History:   Procedure Laterality Date   • ANKLE SURGERY     • BACK SURGERY     • CARDIAC CATHETERIZATION  1994   • CHOLECYSTECTOMY     • ENDOSCOPIC FUNCTIONAL SINUS SURGERY (FESS) N/A 2017    Procedure: ENDOSCOPIC FUNCTIONAL SINUS SURGERY WITH LEFT MIDDLE MEATAL ANTROSTOMY AND LEFT ETHMOIDECTOMY;  Surgeon: Jona Garibay MD;  Location: [facility];  Service:    • ETHMOIDECTOMY Left 2017    Procedure: ETHMOIDECTOMY ENDOSCOPIC;  Surgeon: Jona Garibay MD;  Location: [facility];  Service:    • EYE SURGERY      LASIK   • HAND SURGERY     • SINUS SURGERY     • TOTAL HIP ARTHROPLASTY Bilateral          Current Outpatient Prescriptions:   •  azelastine (ASTELIN) 0.1 % nasal spray, INHALE 2 SPRAYS INTO EACH NOSTRIL TWICE A DAY, Disp: 1 each, Rfl: 5  •   celecoxib (CeleBREX) 200 MG capsule, Take 200 mg by mouth Daily., Disp: , Rfl:   •  Cholecalciferol (VITAMIN D3) 5000 UNITS capsule capsule, Take 5,000 Units by mouth Daily., Disp: , Rfl:   •  fluticasone (FLONASE) 50 MCG/ACT nasal spray, SPRAY 2 SPRAYS INTO EACH NOSTRIL DAILY, Disp: , Rfl: 11  •  gabapentin (NEURONTIN) 300 MG capsule, Take 300 mg by mouth 3 (Three) Times a Day., Disp: , Rfl:   •  metoprolol tartrate (LOPRESSOR) 50 MG tablet, Take 50 mg by mouth every night at bedtime., Disp: , Rfl:   •  Multiple Vitamins-Minerals (MULTIVITAMIN WITH MINERALS) tablet tablet, Take 1 tablet by mouth Daily., Disp: , Rfl:   •  mupirocin (BACTROBAN) 2 % ointment, APPLY INTO EACH NOSTRIL TWICE A DAY FOR 14 DAYS, Disp: , Rfl: 0  •  omeprazole (priLOSEC) 20 MG capsule, Take 20 mg by mouth Daily., Disp: , Rfl:   •  simvastatin (ZOCOR) 40 MG tablet, TAKE 1 TABLET BY MOUTH EVERY NIGHT., Disp: 90 tablet, Rfl: 2  •  tiZANidine (ZANAFLEX) 4 MG tablet, TAKE 1 TABLET BY ORAL ROUTE EVERY 8 HOURS AS NEEDED NOT TO EXCEED 3 DOSES IN 24 HOURS, Disp: , Rfl: 2  •  vitamin C (ASCORBIC ACID) 500 MG tablet, Take 500 mg by mouth Daily., Disp: , Rfl:     Review of patient's allergies indicates no known allergies.    Family History   Problem Relation Age of Onset   • No Known Problems Mother    • Cancer Father    • No Known Problems Brother    • Cancer Brother        Social History     Social History   • Marital status:      Spouse name: N/A   • Number of children: N/A   • Years of education: N/A     Occupational History   • Not on file.     Social History Main Topics   • Smoking status: Former Smoker   • Smokeless tobacco: Never Used   • Alcohol use No   • Drug use: No   • Sexual activity: Defer     Other Topics Concern   • Not on file     Social History Narrative       Review of Systems   Constitutional: Negative for activity change, appetite change, chills, diaphoresis, fatigue, fever and unexpected weight change.   HENT: Positive for  congestion and postnasal drip. Negative for ear discharge, ear pain, facial swelling, hearing loss, mouth sores, nosebleeds, rhinorrhea, sinus pressure, sneezing, sore throat, tinnitus, trouble swallowing and voice change.    Eyes: Negative for pain, discharge, redness, itching and visual disturbance.   Respiratory: Negative for apnea, cough, choking, chest tightness, shortness of breath, wheezing and stridor.    Gastrointestinal: Negative for nausea and vomiting.   Endocrine: Negative for cold intolerance and heat intolerance.   Musculoskeletal: Negative for arthralgias, back pain, gait problem, neck pain and neck stiffness.   Skin: Negative for rash.   Allergic/Immunologic: Positive for environmental allergies. Negative for food allergies.   Neurological: Negative for dizziness, tremors, seizures, syncope, facial asymmetry, speech difficulty, weakness, light-headedness, numbness and headaches.   Hematological: Negative for adenopathy. Does not bruise/bleed easily.   Psychiatric/Behavioral: Negative for behavioral problems, sleep disturbance and suicidal ideas. The patient is not nervous/anxious and is not hyperactive.        Vitals:    12/07/17 0903   BP: 135/90   Pulse: 57   Resp: 18   SpO2: 98%       Objective     Physical Exam  CONSTITUTIONAL: well nourished, alert, oriented, in no acute distress     COMMUNICATION AND VOICE: able to communicate normally, normal voice quality    HEAD: normocephalic, no lesions, atraumatic, no tenderness, no masses     FACE: appearance normal, no lesions, no tenderness, no deformities, facial motion symmetric    EYES: ocular motility normal, eyelids normal, orbits normal, no proptosis, conjunctiva normal , pupils equal, round     EARS:  Hearing: response to conversational voice normal bilaterally   External Ears: auricles without lesions  Otoscopic: tympanic membrane appearance normal, no lesions, no perforation, normal mobility, no fluid    NOSE:  External Nose: structure  normal, no tenderness on palpation, no nasal discharge, no lesions, no evidence of trauma, nostrils patent   Intranasal Exam: nasal mucosa normal, vestibule within normal limits, inferior turbinate normal, nasal septum midline   Nasopharynx:     ORAL:  Lips: upper and lower lips without lesion   Teeth: dentition within normal limits for age   Gums: gingivae healthy   Oral Mucosa: oral mucosa normal, no mucosal lesions   Floor of Mouth: Warthin’s duct patent, mucosa normal  Tongue: lingual mucosa normal without lesions, normal tongue mobility   Palate: soft and hard palates with normal mucosa and structure  Oropharynx: oropharyngeal mucosa normal    NECK: neck appearance normal,     CHEST/RESPIRATORY: respiratory effort normal, normal breath sounds     CARDIOVASCULAR: rate and rhythm normal, extremities without cyanosis or edema      NEUROLOGIC/PSYCHIATRIC: oriented to time, place and person, mood normal, affect appropriate, CN II-XII intact grossly    Assessment/Plan   Problems Addressed this Visit        Respiratory    Allergic rhinitis due to allergen - Primary       Other    S/P sinus surgery        * Surgery not found *  No orders of the defined types were placed in this encounter.    Return in about 6 months (around 6/7/2018) for Recheck sinuses.       Patient Instructions   Continue treatment as directed, advised to start using raw honey daily for allergy symptoms. Advised to use Mucinex if drainage is thick. Could consider adding Singulair if symptoms continue.

## 2017-12-07 NOTE — PATIENT INSTRUCTIONS
Continue treatment as directed, advised to start using raw honey daily for allergy symptoms. Advised to use Mucinex if drainage is thick. Could consider adding Singulair if symptoms continue.

## 2018-04-10 ENCOUNTER — OFFICE VISIT (OUTPATIENT)
Dept: CARDIOLOGY | Facility: CLINIC | Age: 63
End: 2018-04-10

## 2018-04-10 VITALS
HEIGHT: 74 IN | WEIGHT: 315 LBS | DIASTOLIC BLOOD PRESSURE: 70 MMHG | SYSTOLIC BLOOD PRESSURE: 118 MMHG | RESPIRATION RATE: 18 BRPM | BODY MASS INDEX: 40.43 KG/M2 | HEART RATE: 53 BPM

## 2018-04-10 DIAGNOSIS — I10 ESSENTIAL HYPERTENSION: ICD-10-CM

## 2018-04-10 DIAGNOSIS — M51.36 LUMBAR DEGENERATIVE DISC DISEASE: ICD-10-CM

## 2018-04-10 DIAGNOSIS — E78.2 MIXED HYPERLIPIDEMIA: ICD-10-CM

## 2018-04-10 DIAGNOSIS — I25.10 CORONARY ARTERY DISEASE INVOLVING NATIVE CORONARY ARTERY OF NATIVE HEART WITHOUT ANGINA PECTORIS: Primary | ICD-10-CM

## 2018-04-10 DIAGNOSIS — E66.01 MORBID OBESITY (HCC): ICD-10-CM

## 2018-04-10 PROCEDURE — 93000 ELECTROCARDIOGRAM COMPLETE: CPT | Performed by: NURSE PRACTITIONER

## 2018-04-10 PROCEDURE — 99214 OFFICE O/P EST MOD 30 MIN: CPT | Performed by: NURSE PRACTITIONER

## 2018-04-10 RX ORDER — ASPIRIN 81 MG/1
81 TABLET ORAL DAILY
COMMUNITY

## 2018-04-10 NOTE — PROGRESS NOTES
Subjective:     Encounter Date:04/10/2018      Patient ID: George Wilder is a 62 y.o. male.    Chief Complaint:  Coronary Artery Disease   Presents for follow-up visit. Pertinent negatives include no chest pain, chest pressure, chest tightness, dizziness, leg swelling, muscle weakness, palpitations, shortness of breath or weight gain. Risk factors include hyperlipidemia. The symptoms have been stable. Compliance with diet is good. Compliance with exercise is good. Compliance with medications is good.   Hypertension   This is a chronic problem. The current episode started more than 1 year ago. The problem is controlled. Pertinent negatives include no chest pain, headaches, malaise/fatigue, orthopnea, palpitations, PND or shortness of breath. Past treatments include beta blockers. The current treatment provides significant improvement.   Hyperlipidemia   This is a chronic problem. The current episode started more than 1 year ago. The problem is controlled. Recent lipid tests were reviewed and are normal. Pertinent negatives include no chest pain or shortness of breath. Current antihyperlipidemic treatment includes statins.       Patient is here for yearly follow up for coronary artery disease. Patient had a heart cath in 1994 after having an abnormal stress test. Patient was found to have  to RCA with collaterals from left, LAD 70% stenosis and Left Circ 60% stenosis. Patient was treated medically. Patient had most recent stress test in June 2017 which was low risk for ischemia. Patient denies chest pain, shortness of breath, swelling on legs, palpitations, dizziness, weakness or syncope. He is relatively active. Patient has severe back pain and has had multiple surgeries. He is working with pain management to control pain and follows with [facility]. Patient follows with Dr. Vu who routinely checks blood work. Reports lipids are well controlled.     The following portions of the patient's history were reviewed  and updated as appropriate: allergies, current medications, past family history, past medical history, past social history, past surgical history and problem list.   Prior to Admission medications    Medication Sig Start Date End Date Taking? Authorizing Provider   aspirin 81 MG EC tablet Take 81 mg by mouth Daily.   Yes Historical Provider, MD   azelastine (ASTELIN) 0.1 % nasal spray INHALE 2 SPRAYS INTO EACH NOSTRIL TWICE A DAY 11/27/17  Yes DANIEL Armas   celecoxib (CeleBREX) 200 MG capsule Take 200 mg by mouth Daily.   Yes Historical Provider, MD   Cholecalciferol (VITAMIN D3) 5000 UNITS capsule capsule Take 5,000 Units by mouth Daily.   Yes Historical Provider, MD   fluticasone (FLONASE) 50 MCG/ACT nasal spray SPRAY 2 SPRAYS INTO EACH NOSTRIL DAILY 11/5/17  Yes Historical Provider, MD   gabapentin (NEURONTIN) 300 MG capsule Take 300 mg by mouth 3 (Three) Times a Day.   Yes Historical Provider, MD   metoprolol tartrate (LOPRESSOR) 50 MG tablet Take 50 mg by mouth every night at bedtime.   Yes Historical Provider, MD   Multiple Vitamins-Minerals (MULTIVITAMIN WITH MINERALS) tablet tablet Take 1 tablet by mouth Daily.   Yes Historical Provider, MD   mupirocin (BACTROBAN) 2 % ointment APPLY INTO EACH NOSTRIL TWICE A DAY as needed 6/12/17  Yes Historical Provider, MD   omeprazole (priLOSEC) 20 MG capsule Take 20 mg by mouth Daily.   Yes Historical Provider, MD   simvastatin (ZOCOR) 40 MG tablet TAKE 1 TABLET BY MOUTH EVERY NIGHT. 10/24/17  Yes Carl Vu MD   tiZANidine (ZANAFLEX) 4 MG tablet daily 4/17/17  Yes Historical Provider, MD   vitamin C (ASCORBIC ACID) 500 MG tablet Take 500 mg by mouth Daily.   Yes Historical Provider, MD     Past Medical History:   Diagnosis Date   • Allergic rhinitis due to allergen 8/8/2017   • Arthritis    • Back pain    • Chronic ethmoidal sinusitis 5/18/2017   • Chronic maxillary sinusitis 5/18/2017   • Coronary artery disease 1984    stent LAD, Cx, RCA    •  GERD (gastroesophageal reflux disease)    • Hyperlipidemia    • Hypertension    • Myocardial infarction        Review of Systems   Constitution: Negative for chills, decreased appetite, fever, malaise/fatigue, weight gain and weight loss.   HENT: Negative for nosebleeds.    Eyes: Negative for visual disturbance.   Cardiovascular: Negative for chest pain, dyspnea on exertion, leg swelling, near-syncope, orthopnea, palpitations, paroxysmal nocturnal dyspnea and syncope.   Respiratory: Negative for chest tightness, cough, hemoptysis, shortness of breath and snoring.    Endocrine: Negative for cold intolerance and heat intolerance.   Hematologic/Lymphatic: Negative for bleeding problem. Does not bruise/bleed easily.   Skin: Negative for rash.   Musculoskeletal: Negative for back pain, falls and muscle weakness.   Gastrointestinal: Negative for abdominal pain, constipation, diarrhea, heartburn, melena, nausea and vomiting.   Genitourinary: Negative for hematuria.   Neurological: Negative for dizziness, headaches and light-headedness.   Psychiatric/Behavioral: Negative for altered mental status.   Allergic/Immunologic: Negative for persistent infections.         ECG 12 Lead  Date/Time: 4/10/2018 9:11 AM  Performed by: JEAN CLAUDE KHAN  Authorized by: JEAN CLAUDE KHAN   Comparison: compared with previous ECG from 4/10/2017  Similar to previous ECG  Rhythm: sinus bradycardia               Objective:     Physical Exam   Constitutional: He is oriented to person, place, and time. He appears well-developed and well-nourished.   HENT:   Head: Normocephalic and atraumatic.   Eyes: Pupils are equal, round, and reactive to light.   Neck: Normal range of motion. Neck supple. No JVD present. Carotid bruit is not present.   Cardiovascular: Normal rate, regular rhythm, normal heart sounds and intact distal pulses.    Pulmonary/Chest: Effort normal and breath sounds normal.   Abdominal: Soft. Bowel sounds are normal.   Musculoskeletal:  "Normal range of motion.   Neurological: He is alert and oriented to person, place, and time. He has normal reflexes.   Skin: Skin is warm and dry.   Psychiatric: He has a normal mood and affect. His behavior is normal. Judgment and thought content normal.     Blood pressure 118/70, pulse 53, resp. rate 18, height 188 cm (74\"), weight (!) 144 kg (317 lb).    Lab Review:       Assessment:          Diagnosis Plan   1. Coronary artery disease involving native coronary artery of native heart without angina pectoris     2. Essential hypertension     3. Mixed hyperlipidemia     4. Lumbar degenerative disc disease     5. Morbid obesity            Plan:       1. Coronary Artery Disease- treated medically - known  to RCA. No clinical evidence of ischemia. Low risk stress in June 2017. On aspirin and beta blocker  2. Blood pressure controlled  3. Lipids followed by PCP- on statin reports good control.  4. Chronic back pain- managed by [facility]  5. Discussed the patient's BMI with him. BMI is above normal parameters. Follow-up plan includes:  exercise counseling and nutrition counseling.         "

## 2018-05-08 RX ORDER — AZELASTINE 1 MG/ML
SPRAY, METERED NASAL
Qty: 1 EACH | Refills: 5 | Status: SHIPPED | OUTPATIENT
Start: 2018-05-08 | End: 2021-09-17

## 2018-06-06 NOTE — PROGRESS NOTES
YOB: 1955  Location: [facility]  Location Address: [address] [place] [address], [facility], [address] [place] [address]  Location Phone: [phone]    No chief complaint on file.      History of Present Illness  George Wilder is a 62 y.o. male.  George Wilder is here for follow up of ENT complaints. The patient has had problems with nasal drainage, nasal congestion, postnasal drip and allergy  The symptoms are not localized to a particular location. The patient has had variable symptoms. The symptoms have been present for the last several years The symptoms are aggravated by  allergy. The symptoms are improved by medications.     The patient had sinus surgery on 17.       Past Medical History:   Diagnosis Date   • Allergic rhinitis due to allergen 2017   • Arthritis    • Back pain    • Chronic ethmoidal sinusitis 2017   • Chronic maxillary sinusitis 2017   • Coronary artery disease     stent LAD, Cx, RCA    • GERD (gastroesophageal reflux disease)    • Hyperlipidemia    • Hypertension    • Myocardial infarction        Past Surgical History:   Procedure Laterality Date   • ANKLE SURGERY     • BACK SURGERY     • CARDIAC CATHETERIZATION  1994   • CHOLECYSTECTOMY     • ENDOSCOPIC FUNCTIONAL SINUS SURGERY (FESS) N/A 2017    Procedure: ENDOSCOPIC FUNCTIONAL SINUS SURGERY WITH LEFT MIDDLE MEATAL ANTROSTOMY AND LEFT ETHMOIDECTOMY;  Surgeon: Jona Garibay MD;  Location: [facility];  Service:    • ETHMOIDECTOMY Left 2017    Procedure: ETHMOIDECTOMY ENDOSCOPIC;  Surgeon: Jona Garibay MD;  Location: [facility];  Service:    • EYE SURGERY      LASIK   • HAND SURGERY     • SINUS SURGERY     • TOTAL HIP ARTHROPLASTY Bilateral        No outpatient prescriptions have been marked as taking for the 18 encounter (Office Visit) with Jona Garibay MD.       Patient has no known allergies.    Family History   Problem Relation Age of Onset   • No Known Problems Mother    •  Cancer Father    • No Known Problems Brother    • Cancer Brother        Social History     Social History   • Marital status:      Spouse name: N/A   • Number of children: N/A   • Years of education: N/A     Occupational History   • Not on file.     Social History Main Topics   • Smoking status: Former Smoker   • Smokeless tobacco: Never Used   • Alcohol use No   • Drug use: No   • Sexual activity: Defer     Other Topics Concern   • Not on file     Social History Narrative   • No narrative on file       Review of Systems   Constitutional: Negative for activity change, appetite change, chills, diaphoresis, fatigue, fever and unexpected weight change.   HENT: Negative for congestion, ear discharge, ear pain, facial swelling, hearing loss, mouth sores, nosebleeds, postnasal drip, rhinorrhea, sinus pressure, sneezing, sore throat, tinnitus, trouble swallowing and voice change.    Eyes: Negative for pain, discharge, redness, itching and visual disturbance.   Respiratory: Negative for apnea, cough, choking, chest tightness, shortness of breath, wheezing and stridor.    Gastrointestinal: Negative for nausea and vomiting.   Endocrine: Negative for cold intolerance and heat intolerance.   Musculoskeletal: Negative for arthralgias, back pain, gait problem, neck pain and neck stiffness.   Skin: Negative for rash.   Allergic/Immunologic: Positive for environmental allergies. Negative for food allergies.   Neurological: Negative for dizziness, tremors, seizures, syncope, facial asymmetry, speech difficulty, weakness, light-headedness, numbness and headaches.   Hematological: Negative for adenopathy. Does not bruise/bleed easily.   Psychiatric/Behavioral: Negative for behavioral problems, sleep disturbance and suicidal ideas. The patient is not nervous/anxious and is not hyperactive.        There were no vitals filed for this visit.    There is no height or weight on file to calculate BMI.    Objective     Physical  Exam  CONSTITUTIONAL: well nourished, alert, oriented, in no acute distress     COMMUNICATION AND VOICE: able to communicate normally, normal voice quality    HEAD: normocephalic, no lesions, atraumatic, no tenderness, no masses     FACE: appearance normal, no lesions, no tenderness, no deformities, facial motion symmetric    SALIVARY GLANDS: parotid glands with no tenderness, no swelling, no masses, submandibular glands with normal size, nontender    EYES: ocular motility normal, eyelids normal, orbits normal, no proptosis, conjunctiva normal , pupils equal, round     EARS:  Hearing: response to conversational voice normal bilaterally   External Ears: auricles without lesions  Otoscopic: tympanic membrane appearance normal, no lesions, no perforation, normal mobility, no fluid    NOSE:  External Nose: structure normal, no tenderness on palpation, no nasal discharge, no lesions, no evidence of trauma, nostrils patent   Intranasal Exam: nasal mucosa normal, vestibule within normal limits, inferior turbinate normal, nasal septum midline   Nasopharynx:     ORAL:  Lips: upper and lower lips without lesion   Teeth: dentition within normal limits for age   Gums: gingivae healthy   Oral Mucosa: oral mucosa normal, no mucosal lesions   Floor of Mouth: Warthin’s duct patent, mucosa normal  Tongue: lingual mucosa normal without lesions, normal tongue mobility   Palate: soft and hard palates with normal mucosa and structure  Oropharynx: oropharyngeal mucosa normal    NECK: neck appearance normal, no mass,  noted without erythema or tenderness    THYROID: no overt thyromegaly, no tenderness, nodules or mass present on palpation, position midline     LYMPH NODES: no lymphadenopathy    CHEST/RESPIRATORY: respiratory effort normal, normal breath sounds     CARDIOVASCULAR: rate and rhythm normal, extremities without cyanosis or edema      NEUROLOGIC/PSYCHIATRIC: oriented to time, place and person, mood normal, affect appropriate,  CN II-XII intact grossly    Assessment/Plan   Problems Addressed this Visit        Respiratory    Allergic rhinitis due to allergen - Primary       Other    S/P sinus surgery        * Surgery not found *  No orders of the defined types were placed in this encounter.    Return in about 1 year (around 6/7/2019) for Recheck sinus.       Patient Instructions   Continue medications as directed, follow-up as directed or sooner if symptoms worsen.    ###### BMI  #####   MyPlate from [employer]  The general, healthful diet is based on the 2010 Dietary Guidelines for Americans. The amount of food you need to eat from each food group depends on your age, sex, and level of physical activity and can be individualized by a dietitian. Go to ChooseMyPlate.gov for more information.  What do I need to know about the MyPlate plan?  · Enjoy your food, but eat less.  · Avoid oversized portions.  ¨ ½ of your plate should include fruits and vegetables.  ¨ ¼ of your plate should be grains.  ¨ ¼ of your plate should be protein.  Grains   · Make at least half of your grains whole grains.  · For a 2,000 calorie daily food plan, eat 6 oz every day.  · 1 oz is about 1 slice bread, 1 cup cereal, or ½ cup cooked rice, cereal, or pasta.  Vegetables   · Make half your plate fruits and vegetables.  · For a 2,000 calorie daily food plan, eat 2½ cups every day.  · 1 cup is about 1 cup raw or cooked vegetables or vegetable juice or 2 cups raw leafy greens.  Fruits   · Make half your plate fruits and vegetables.  · For a 2,000 calorie daily food plan, eat 2 cups every day.  · 1 cup is about 1 cup fruit or 100% fruit juice or ½ cup dried fruit.  Protein   · For a 2,000 calorie daily food plan, eat 5½ oz every day.  · 1 oz is about 1 oz meat, poultry, or fish, ¼ cup cooked beans, 1 egg, 1 Tbsp peanut butter, or ½ oz nuts or seeds.  Dairy   · Switch to fat-free or low-fat (1%) milk.  · For a 2,000 calorie daily food plan, eat 3 cups every day.  · 1 cup is about  1 cup milk or yogurt or soy milk (soy beverage), 1½ oz natural cheese, or 2 oz processed cheese.  Fats, Oils, and Empty Calories   · Only small amounts of oils are recommended.  · Empty calories are calories from solid fats or added sugars.  · Compare sodium in foods like soup, bread, and frozen meals. Choose the foods with lower numbers.  · Drink water instead of sugary drinks.  What foods can I eat?  Grains   Whole grains such as whole wheat, quinoa, millet, and bulgur. Bread, rolls, and pasta made from whole grains. Brown or wild rice. Hot or cold cereals made from whole grains and without added sugar.  Vegetables   All fresh vegetables, especially fresh red, dark green, or orange vegetables. Peas and beans. Low-sodium frozen or canned vegetables prepared without added salt. Low-sodium vegetable juices.  Fruits   All fresh, frozen, and dried fruits. Canned fruit packed in water or fruit juice without added sugar. Fruit juices without added sugar.  Meats and Other Protein Sources   Boiled, baked, or grilled lean meat trimmed of fat. Skinless poultry. Fresh seafood and shellfish. Canned seafood packed in water. Unsalted nuts and unsalted nut butters. Tofu. Dried beans and pea. Eggs.  Dairy   Low-fat or fat-free milk, yogurt, and cheeses.  Sweets and Desserts   Frozen desserts made from low-fat milk.  Fats and Oils   Olive, peanut, and canola oils and margarine. Salad dressing and mayonnaise made from these oils.  Other   Soups and casseroles made from allowed ingredients and without added fat or salt.  The items listed above may not be a complete list of recommended foods or beverages. Contact your dietitian for more options.   What foods are not recommended?  Grains   Sweetened, low-fiber cereals. Packaged baked goods. Snack crackers and chips. Cheese crackers, butter crackers, and biscuits. Frozen waffles, sweet breads, doughnuts, pastries, packaged baking mixes, pancakes, cakes, and cookies.  Vegetables    Regular canned or frozen vegetables or vegetables prepared with salt. Canned tomatoes. Canned tomato sauce. Fried vegetables. Vegetables in cream sauce or cheese sauce.  Fruits   Fruits packed in syrup or made with added sugar.  Meats and Other Protein Sources   Marbled or fatty meats such as ribs. Poultry with skin. Fried meats, poultry, eggs, or fish. Sausages, hot dogs, and deli meats such as pastrami, bologna, or salami.  Dairy   Whole milk, cream, cheeses made from whole milk, sour cream. Ice cream or yogurt made from whole milk or with added sugar.  Beverages   For adults, no more than one alcoholic drink per day. Regular soft drinks or other sugary beverages. Juice drinks.  Sweets and Desserts   Sugary or fatty desserts, candy, and other sweets.  Fats and Oils   Solid shortening or partially hydrogenated oils. Solid margarine. Margarine that contains trans fats. Butter.  The items listed above may not be a complete list of foods and beverages to avoid. Contact your dietitian for more information.   This information is not intended to replace advice given to you by your health care provider. Make sure you discuss any questions you have with your health care provider.  Document Released: 01/06/2009 Document Revised: 05/25/2017 Document Reviewed: 11/26/2014  [employer] Patient Education © 2017 [employer]     Calorie Counting for Weight Loss  Calories are units of energy. Your body needs a certain amount of calories from food to keep you going throughout the day. When you eat more calories than your body needs, your body stores the extra calories as fat. When you eat fewer calories than your body needs, your body burns fat to get the energy it needs.  Calorie counting means keeping track of how many calories you eat and drink each day. Calorie counting can be helpful if you need to lose weight. If you make sure to eat fewer calories than your body needs, you should lose weight. Ask your health care  provider what a healthy weight is for you.  For calorie counting to work, you will need to eat the right number of calories in a day in order to lose a healthy amount of weight per week. A dietitian can help you determine how many calories you need in a day and will give you suggestions on how to reach your calorie goal.  · A healthy amount of weight to lose per week is usually 1-2 lb (0.5-0.9 kg). This usually means that your daily calorie intake should be reduced by 500-750 calories.  · Eating 1,200 - 1,500 calories per day can help most women lose weight.  · Eating 1,500 - 1,800 calories per day can help most men lose weight.  What is my plan?  My goal is to have __________ calories per day.  If I have this many calories per day, I should lose around __________ pounds per week.  What do I need to know about calorie counting?  In order to meet your daily calorie goal, you will need to:  · Find out how many calories are in each food you would like to eat. Try to do this before you eat.  · Decide how much of the food you plan to eat.  · Write down what you ate and how many calories it had. Doing this is called keeping a food log.  To successfully lose weight, it is important to balance calorie counting with a healthy lifestyle that includes regular activity. Aim for 150 minutes of moderate exercise (such as walking) or 75 minutes of vigorous exercise (such as running) each week.  Where do I find calorie information?     The number of calories in a food can be found on a Nutrition Facts label. If a food does not have a Nutrition Facts label, try to look up the calories online or ask your dietitian for help.  Remember that calories are listed per serving. If you choose to have more than one serving of a food, you will have to multiply the calories per serving by the amount of servings you plan to eat. For example, the label on a package of bread might say that a serving size is 1 slice and that there are 90 calories  "in a serving. If you eat 1 slice, you will have eaten 90 calories. If you eat 2 slices, you will have eaten 180 calories.  How do I keep a food log?  Immediately after each meal, record the following information in your food log:  · What you ate. Don't forget to include toppings, sauces, and other extras on the food.  · How much you ate. This can be measured in cups, ounces, or number of items.  · How many calories each food and drink had.  · The total number of calories in the meal.  Keep your food log near you, such as in a small notebook in your pocket, or use a mobile nando or website. Some programs will calculate calories for you and show you how many calories you have left for the day to meet your goal.  What are some calorie counting tips?  · Use your calories on foods and drinks that will fill you up and not leave you hungry:  ¨ Some examples of foods that fill you up are nuts and nut butters, vegetables, lean proteins, and high-fiber foods like whole grains. High-fiber foods are foods with more than 5 g fiber per serving.  ¨ Drinks such as sodas, specialty coffee drinks, alcohol, and juices have a lot of calories, yet do not fill you up.  · Eat nutritious foods and avoid empty calories. Empty calories are calories you get from foods or beverages that do not have many vitamins or protein, such as candy, sweets, and soda. It is better to have a nutritious high-calorie food (such as an avocado) than a food with few nutrients (such as a bag of chips).  · Know how many calories are in the foods you eat most often. This will help you calculate calorie counts faster.  · Pay attention to calories in drinks. Low-calorie drinks include water and unsweetened drinks.  · Pay attention to nutrition labels for \"low fat\" or \"fat free\" foods. These foods sometimes have the same amount of calories or more calories than the full fat versions. They also often have added sugar, starch, or salt, to make up for flavor that was " removed with the fat.  · Find a way of tracking calories that works for you. Get creative. Try different apps or programs if writing down calories does not work for you.  What are some portion control tips?  · Know how many calories are in a serving. This will help you know how many servings of a certain food you can have.  · Use a measuring cup to measure serving sizes. You could also try weighing out portions on a kitchen scale. With time, you will be able to estimate serving sizes for some foods.  · Take some time to put servings of different foods on your favorite plates, bowls, and cups so you know what a serving looks like.  · Try not to eat straight from a bag or box. Doing this can lead to overeating. Put the amount you would like to eat in a cup or on a plate to make sure you are eating the right portion.  · Use smaller plates, glasses, and bowls to prevent overeating.  · Try not to multitask (for example, watch TV or use your computer) while eating. If it is time to eat, sit down at a table and enjoy your food. This will help you to know when you are full. It will also help you to be aware of what you are eating and how much you are eating.  What are tips for following this plan?  Reading food labels   · Check the calorie count compared to the serving size. The serving size may be smaller than what you are used to eating.  · Check the source of the calories. Make sure the food you are eating is high in vitamins and protein and low in saturated and trans fats.  Shopping   · Read nutrition labels while you shop. This will help you make healthy decisions before you decide to purchase your food.  · Make a grocery list and stick to it.  Cooking   · Try to cook your favorite foods in a healthier way. For example, try baking instead of frying.  · Use low-fat dairy products.  Meal planning   · Use more fruits and vegetables. Half of your plate should be fruits and vegetables.  · Include lean proteins like poultry  and fish.  How do I count calories when eating out?  · Ask for smaller portion sizes.  · Consider sharing an entree and sides instead of getting your own entree.  · If you get your own entree, eat only half. Ask for a box at the beginning of your meal and put the rest of your entree in it so you are not tempted to eat it.  · If calories are listed on the menu, choose the lower calorie options.  · Choose dishes that include vegetables, fruits, whole grains, low-fat dairy products, and lean protein.  · Choose items that are boiled, broiled, grilled, or steamed. Stay away from items that are buttered, battered, fried, or served with cream sauce. Items labeled “crispy” are usually fried, unless stated otherwise.  · Choose water, low-fat milk, unsweetened iced tea, or other drinks without added sugar. If you want an alcoholic beverage, choose a lower calorie option such as a glass of wine or light beer.  · Ask for dressings, sauces, and syrups on the side. These are usually high in calories, so you should limit the amount you eat.  · If you want a salad, choose a garden salad and ask for grilled meats. Avoid extra toppings like villanueva, cheese, or fried items. Ask for the dressing on the side, or ask for olive oil and vinegar or lemon to use as dressing.  · Estimate how many servings of a food you are given. For example, a serving of cooked rice is ½ cup or about the size of half a baseball. Knowing serving sizes will help you be aware of how much food you are eating at restaurants. The list below tells you how big or small some common portion sizes are based on everyday objects:  ¨ 1 oz--4 stacked dice.  ¨ 3 oz--1 deck of cards.  ¨ 1 tsp--1 die.  ¨ 1 Tbsp--½ a ping-pong ball.  ¨ 2 Tbsp--1 ping-pong ball.  ¨ ½ cup--½ baseball.  ¨ 1 cup--1 baseball.  Summary  · Calorie counting means keeping track of how many calories you eat and drink each day. If you eat fewer calories than your body needs, you should lose weight.  · A  healthy amount of weight to lose per week is usually 1-2 lb (0.5-0.9 kg). This usually means reducing your daily calorie intake by 500-750 calories.  · The number of calories in a food can be found on a Nutrition Facts label. If a food does not have a Nutrition Facts label, try to look up the calories online or ask your dietitian for help.  · Use your calories on foods and drinks that will fill you up, and not on foods and drinks that will leave you hungry.  · Use smaller plates, glasses, and bowls to prevent overeating.  This information is not intended to replace advice given to you by your health care provider. Make sure you discuss any questions you have with your health care provider.  Document Released: 12/18/2006 Document Revised: 11/17/2017 Document Reviewed: 11/17/2017  [employer] Interactive Patient Education © 2017 [employer]     Exercising to Lose Weight  Exercising can help you to lose weight. In order to lose weight through exercise, you need to do vigorous-intensity exercise. You can tell that you are exercising with vigorous intensity if you are breathing very hard and fast and cannot hold a conversation while exercising.  Moderate-intensity exercise helps to maintain your current weight. You can tell that you are exercising at a moderate level if you have a higher heart rate and faster breathing, but you are still able to hold a conversation.  How often should I exercise?  Choose an activity that you enjoy and set realistic goals. Your health care provider can help you to make an activity plan that works for you. Exercise regularly as directed by your health care provider. This may include:  · Doing resistance training twice each week, such as:  ¨ Push-ups.  ¨ Sit-ups.  ¨ Lifting weights.  ¨ Using resistance bands.  · Doing a given intensity of exercise for a given amount of time. Choose from these options:  ¨ 150 minutes of moderate-intensity exercise every week.  ¨ 75 minutes of  vigorous-intensity exercise every week.  ¨ A mix of moderate-intensity and vigorous-intensity exercise every week.  Children, pregnant women, people who are out of shape, people who are overweight, and older adults may need to consult a health care provider for individual recommendations. If you have any sort of medical condition, be sure to consult your health care provider before starting a new exercise program.  What are some activities that can help me to lose weight?  · Walking at a rate of at least 4.5 miles an hour.  · Jogging or running at a rate of 5 miles per hour.  · Biking at a rate of at least 10 miles per hour.  · Lap swimming.  · Roller-skating or in-line skating.  · Cross-country skiing.  · Vigorous competitive sports, such as football, basketball, and soccer.  · Jumping rope.  · Aerobic dancing.  How can I be more active in my day-to-day activities?  · Use the stairs instead of the elevator.  · Take a walk during your lunch break.  · If you drive, park your car farther away from work or school.  · If you take public transportation, get off one stop early and walk the rest of the way.  · Make all of your phone calls while standing up and walking around.  · Get up, stretch, and walk around every 30 minutes throughout the day.  What guidelines should I follow while exercising?  · Do not exercise so much that you hurt yourself, feel dizzy, or get very short of breath.  · Consult your health care provider prior to starting a new exercise program.  · Wear comfortable clothes and shoes with good support.  · Drink plenty of water while you exercise to prevent dehydration or heat stroke. Body water is lost during exercise and must be replaced.  · Work out until you breathe faster and your heart beats faster.  This information is not intended to replace advice given to you by your health care provider. Make sure you discuss any questions you have with your health care provider.  Document Released: 01/20/2012  Document Revised: 05/25/2017 Document Reviewed: 05/21/2015  [employer] Interactive Patient Education © 2017 [employer]

## 2018-06-07 ENCOUNTER — OFFICE VISIT (OUTPATIENT)
Dept: OTOLARYNGOLOGY | Facility: CLINIC | Age: 63
End: 2018-06-07

## 2018-06-07 VITALS
HEIGHT: 74 IN | BODY MASS INDEX: 40.43 KG/M2 | WEIGHT: 315 LBS | TEMPERATURE: 98.2 F | SYSTOLIC BLOOD PRESSURE: 92 MMHG | DIASTOLIC BLOOD PRESSURE: 64 MMHG

## 2018-06-07 DIAGNOSIS — Z98.890 S/P SINUS SURGERY: ICD-10-CM

## 2018-06-07 DIAGNOSIS — J30.89 CHRONIC ALLERGIC RHINITIS DUE TO OTHER ALLERGIC TRIGGER, UNSPECIFIED SEASONALITY: Primary | ICD-10-CM

## 2018-06-07 PROCEDURE — 99214 OFFICE O/P EST MOD 30 MIN: CPT | Performed by: PHYSICIAN ASSISTANT

## 2018-06-07 RX ORDER — HYDROCODONE BITARTRATE AND ACETAMINOPHEN 10; 325 MG/1; MG/1
1 TABLET ORAL 2 TIMES DAILY
COMMUNITY

## 2018-06-07 NOTE — PATIENT INSTRUCTIONS
Continue medications as directed, follow-up as directed or sooner if symptoms worsen.    ###### BMI  #####   MyPlate from [employer]  The general, healthful diet is based on the 2010 Dietary Guidelines for Americans. The amount of food you need to eat from each food group depends on your age, sex, and level of physical activity and can be individualized by a dietitian. Go to ChooseMyPlate.gov for more information.  What do I need to know about the MyPlate plan?  · Enjoy your food, but eat less.  · Avoid oversized portions.  ¨ ½ of your plate should include fruits and vegetables.  ¨ ¼ of your plate should be grains.  ¨ ¼ of your plate should be protein.  Grains   · Make at least half of your grains whole grains.  · For a 2,000 calorie daily food plan, eat 6 oz every day.  · 1 oz is about 1 slice bread, 1 cup cereal, or ½ cup cooked rice, cereal, or pasta.  Vegetables   · Make half your plate fruits and vegetables.  · For a 2,000 calorie daily food plan, eat 2½ cups every day.  · 1 cup is about 1 cup raw or cooked vegetables or vegetable juice or 2 cups raw leafy greens.  Fruits   · Make half your plate fruits and vegetables.  · For a 2,000 calorie daily food plan, eat 2 cups every day.  · 1 cup is about 1 cup fruit or 100% fruit juice or ½ cup dried fruit.  Protein   · For a 2,000 calorie daily food plan, eat 5½ oz every day.  · 1 oz is about 1 oz meat, poultry, or fish, ¼ cup cooked beans, 1 egg, 1 Tbsp peanut butter, or ½ oz nuts or seeds.  Dairy   · Switch to fat-free or low-fat (1%) milk.  · For a 2,000 calorie daily food plan, eat 3 cups every day.  · 1 cup is about 1 cup milk or yogurt or soy milk (soy beverage), 1½ oz natural cheese, or 2 oz processed cheese.  Fats, Oils, and Empty Calories   · Only small amounts of oils are recommended.  · Empty calories are calories from solid fats or added sugars.  · Compare sodium in foods like soup, bread, and frozen meals. Choose the foods with lower numbers.  · Drink water  instead of sugary drinks.  What foods can I eat?  Grains   Whole grains such as whole wheat, quinoa, millet, and bulgur. Bread, rolls, and pasta made from whole grains. Brown or wild rice. Hot or cold cereals made from whole grains and without added sugar.  Vegetables   All fresh vegetables, especially fresh red, dark green, or orange vegetables. Peas and beans. Low-sodium frozen or canned vegetables prepared without added salt. Low-sodium vegetable juices.  Fruits   All fresh, frozen, and dried fruits. Canned fruit packed in water or fruit juice without added sugar. Fruit juices without added sugar.  Meats and Other Protein Sources   Boiled, baked, or grilled lean meat trimmed of fat. Skinless poultry. Fresh seafood and shellfish. Canned seafood packed in water. Unsalted nuts and unsalted nut butters. Tofu. Dried beans and pea. Eggs.  Dairy   Low-fat or fat-free milk, yogurt, and cheeses.  Sweets and Desserts   Frozen desserts made from low-fat milk.  Fats and Oils   Olive, peanut, and canola oils and margarine. Salad dressing and mayonnaise made from these oils.  Other   Soups and casseroles made from allowed ingredients and without added fat or salt.  The items listed above may not be a complete list of recommended foods or beverages. Contact your dietitian for more options.   What foods are not recommended?  Grains   Sweetened, low-fiber cereals. Packaged baked goods. Snack crackers and chips. Cheese crackers, butter crackers, and biscuits. Frozen waffles, sweet breads, doughnuts, pastries, packaged baking mixes, pancakes, cakes, and cookies.  Vegetables   Regular canned or frozen vegetables or vegetables prepared with salt. Canned tomatoes. Canned tomato sauce. Fried vegetables. Vegetables in cream sauce or cheese sauce.  Fruits   Fruits packed in syrup or made with added sugar.  Meats and Other Protein Sources   Marbled or fatty meats such as ribs. Poultry with skin. Fried meats, poultry, eggs, or fish.  Sausages, hot dogs, and deli meats such as pastrami, bologna, or salami.  Dairy   Whole milk, cream, cheeses made from whole milk, sour cream. Ice cream or yogurt made from whole milk or with added sugar.  Beverages   For adults, no more than one alcoholic drink per day. Regular soft drinks or other sugary beverages. Juice drinks.  Sweets and Desserts   Sugary or fatty desserts, candy, and other sweets.  Fats and Oils   Solid shortening or partially hydrogenated oils. Solid margarine. Margarine that contains trans fats. Butter.  The items listed above may not be a complete list of foods and beverages to avoid. Contact your dietitian for more information.   This information is not intended to replace advice given to you by your health care provider. Make sure you discuss any questions you have with your health care provider.  Document Released: 01/06/2009 Document Revised: 05/25/2017 Document Reviewed: 11/26/2014  [employer] Interactive Patient Education © 2017 [employer]     Calorie Counting for Weight Loss  Calories are units of energy. Your body needs a certain amount of calories from food to keep you going throughout the day. When you eat more calories than your body needs, your body stores the extra calories as fat. When you eat fewer calories than your body needs, your body burns fat to get the energy it needs.  Calorie counting means keeping track of how many calories you eat and drink each day. Calorie counting can be helpful if you need to lose weight. If you make sure to eat fewer calories than your body needs, you should lose weight. Ask your health care provider what a healthy weight is for you.  For calorie counting to work, you will need to eat the right number of calories in a day in order to lose a healthy amount of weight per week. A dietitian can help you determine how many calories you need in a day and will give you suggestions on how to reach your calorie goal.  · A healthy amount of weight to  lose per week is usually 1-2 lb (0.5-0.9 kg). This usually means that your daily calorie intake should be reduced by 500-750 calories.  · Eating 1,200 - 1,500 calories per day can help most women lose weight.  · Eating 1,500 - 1,800 calories per day can help most men lose weight.  What is my plan?  My goal is to have __________ calories per day.  If I have this many calories per day, I should lose around __________ pounds per week.  What do I need to know about calorie counting?  In order to meet your daily calorie goal, you will need to:  · Find out how many calories are in each food you would like to eat. Try to do this before you eat.  · Decide how much of the food you plan to eat.  · Write down what you ate and how many calories it had. Doing this is called keeping a food log.  To successfully lose weight, it is important to balance calorie counting with a healthy lifestyle that includes regular activity. Aim for 150 minutes of moderate exercise (such as walking) or 75 minutes of vigorous exercise (such as running) each week.  Where do I find calorie information?     The number of calories in a food can be found on a Nutrition Facts label. If a food does not have a Nutrition Facts label, try to look up the calories online or ask your dietitian for help.  Remember that calories are listed per serving. If you choose to have more than one serving of a food, you will have to multiply the calories per serving by the amount of servings you plan to eat. For example, the label on a package of bread might say that a serving size is 1 slice and that there are 90 calories in a serving. If you eat 1 slice, you will have eaten 90 calories. If you eat 2 slices, you will have eaten 180 calories.  How do I keep a food log?  Immediately after each meal, record the following information in your food log:  · What you ate. Don't forget to include toppings, sauces, and other extras on the food.  · How much you ate. This can be  "measured in cups, ounces, or number of items.  · How many calories each food and drink had.  · The total number of calories in the meal.  Keep your food log near you, such as in a small notebook in your pocket, or use a mobile nando or website. Some programs will calculate calories for you and show you how many calories you have left for the day to meet your goal.  What are some calorie counting tips?  · Use your calories on foods and drinks that will fill you up and not leave you hungry:  ¨ Some examples of foods that fill you up are nuts and nut butters, vegetables, lean proteins, and high-fiber foods like whole grains. High-fiber foods are foods with more than 5 g fiber per serving.  ¨ Drinks such as sodas, specialty coffee drinks, alcohol, and juices have a lot of calories, yet do not fill you up.  · Eat nutritious foods and avoid empty calories. Empty calories are calories you get from foods or beverages that do not have many vitamins or protein, such as candy, sweets, and soda. It is better to have a nutritious high-calorie food (such as an avocado) than a food with few nutrients (such as a bag of chips).  · Know how many calories are in the foods you eat most often. This will help you calculate calorie counts faster.  · Pay attention to calories in drinks. Low-calorie drinks include water and unsweetened drinks.  · Pay attention to nutrition labels for \"low fat\" or \"fat free\" foods. These foods sometimes have the same amount of calories or more calories than the full fat versions. They also often have added sugar, starch, or salt, to make up for flavor that was removed with the fat.  · Find a way of tracking calories that works for you. Get creative. Try different apps or programs if writing down calories does not work for you.  What are some portion control tips?  · Know how many calories are in a serving. This will help you know how many servings of a certain food you can have.  · Use a measuring cup to " measure serving sizes. You could also try weighing out portions on a kitchen scale. With time, you will be able to estimate serving sizes for some foods.  · Take some time to put servings of different foods on your favorite plates, bowls, and cups so you know what a serving looks like.  · Try not to eat straight from a bag or box. Doing this can lead to overeating. Put the amount you would like to eat in a cup or on a plate to make sure you are eating the right portion.  · Use smaller plates, glasses, and bowls to prevent overeating.  · Try not to multitask (for example, watch TV or use your computer) while eating. If it is time to eat, sit down at a table and enjoy your food. This will help you to know when you are full. It will also help you to be aware of what you are eating and how much you are eating.  What are tips for following this plan?  Reading food labels   · Check the calorie count compared to the serving size. The serving size may be smaller than what you are used to eating.  · Check the source of the calories. Make sure the food you are eating is high in vitamins and protein and low in saturated and trans fats.  Shopping   · Read nutrition labels while you shop. This will help you make healthy decisions before you decide to purchase your food.  · Make a grocery list and stick to it.  Cooking   · Try to cook your favorite foods in a healthier way. For example, try baking instead of frying.  · Use low-fat dairy products.  Meal planning   · Use more fruits and vegetables. Half of your plate should be fruits and vegetables.  · Include lean proteins like poultry and fish.  How do I count calories when eating out?  · Ask for smaller portion sizes.  · Consider sharing an entree and sides instead of getting your own entree.  · If you get your own entree, eat only half. Ask for a box at the beginning of your meal and put the rest of your entree in it so you are not tempted to eat it.  · If calories are listed  on the menu, choose the lower calorie options.  · Choose dishes that include vegetables, fruits, whole grains, low-fat dairy products, and lean protein.  · Choose items that are boiled, broiled, grilled, or steamed. Stay away from items that are buttered, battered, fried, or served with cream sauce. Items labeled “crispy” are usually fried, unless stated otherwise.  · Choose water, low-fat milk, unsweetened iced tea, or other drinks without added sugar. If you want an alcoholic beverage, choose a lower calorie option such as a glass of wine or light beer.  · Ask for dressings, sauces, and syrups on the side. These are usually high in calories, so you should limit the amount you eat.  · If you want a salad, choose a garden salad and ask for grilled meats. Avoid extra toppings like villanueva, cheese, or fried items. Ask for the dressing on the side, or ask for olive oil and vinegar or lemon to use as dressing.  · Estimate how many servings of a food you are given. For example, a serving of cooked rice is ½ cup or about the size of half a baseball. Knowing serving sizes will help you be aware of how much food you are eating at restaurants. The list below tells you how big or small some common portion sizes are based on everyday objects:  ¨ 1 oz--4 stacked dice.  ¨ 3 oz--1 deck of cards.  ¨ 1 tsp--1 die.  ¨ 1 Tbsp--½ a ping-pong ball.  ¨ 2 Tbsp--1 ping-pong ball.  ¨ ½ cup--½ baseball.  ¨ 1 cup--1 baseball.  Summary  · Calorie counting means keeping track of how many calories you eat and drink each day. If you eat fewer calories than your body needs, you should lose weight.  · A healthy amount of weight to lose per week is usually 1-2 lb (0.5-0.9 kg). This usually means reducing your daily calorie intake by 500-750 calories.  · The number of calories in a food can be found on a Nutrition Facts label. If a food does not have a Nutrition Facts label, try to look up the calories online or ask your dietitian for help.  · Use  your calories on foods and drinks that will fill you up, and not on foods and drinks that will leave you hungry.  · Use smaller plates, glasses, and bowls to prevent overeating.  This information is not intended to replace advice given to you by your health care provider. Make sure you discuss any questions you have with your health care provider.  Document Released: 12/18/2006 Document Revised: 11/17/2017 Document Reviewed: 11/17/2017  [employer] Interactive Patient Education © 2017 [employer]     Exercising to Lose Weight  Exercising can help you to lose weight. In order to lose weight through exercise, you need to do vigorous-intensity exercise. You can tell that you are exercising with vigorous intensity if you are breathing very hard and fast and cannot hold a conversation while exercising.  Moderate-intensity exercise helps to maintain your current weight. You can tell that you are exercising at a moderate level if you have a higher heart rate and faster breathing, but you are still able to hold a conversation.  How often should I exercise?  Choose an activity that you enjoy and set realistic goals. Your health care provider can help you to make an activity plan that works for you. Exercise regularly as directed by your health care provider. This may include:  · Doing resistance training twice each week, such as:  ¨ Push-ups.  ¨ Sit-ups.  ¨ Lifting weights.  ¨ Using resistance bands.  · Doing a given intensity of exercise for a given amount of time. Choose from these options:  ¨ 150 minutes of moderate-intensity exercise every week.  ¨ 75 minutes of vigorous-intensity exercise every week.  ¨ A mix of moderate-intensity and vigorous-intensity exercise every week.  Children, pregnant women, people who are out of shape, people who are overweight, and older adults may need to consult a health care provider for individual recommendations. If you have any sort of medical condition, be sure to consult your health  care provider before starting a new exercise program.  What are some activities that can help me to lose weight?  · Walking at a rate of at least 4.5 miles an hour.  · Jogging or running at a rate of 5 miles per hour.  · Biking at a rate of at least 10 miles per hour.  · Lap swimming.  · Roller-skating or in-line skating.  · Cross-country skiing.  · Vigorous competitive sports, such as football, basketball, and soccer.  · Jumping rope.  · Aerobic dancing.  How can I be more active in my day-to-day activities?  · Use the stairs instead of the elevator.  · Take a walk during your lunch break.  · If you drive, park your car farther away from work or school.  · If you take public transportation, get off one stop early and walk the rest of the way.  · Make all of your phone calls while standing up and walking around.  · Get up, stretch, and walk around every 30 minutes throughout the day.  What guidelines should I follow while exercising?  · Do not exercise so much that you hurt yourself, feel dizzy, or get very short of breath.  · Consult your health care provider prior to starting a new exercise program.  · Wear comfortable clothes and shoes with good support.  · Drink plenty of water while you exercise to prevent dehydration or heat stroke. Body water is lost during exercise and must be replaced.  · Work out until you breathe faster and your heart beats faster.  This information is not intended to replace advice given to you by your health care provider. Make sure you discuss any questions you have with your health care provider.  Document Released: 01/20/2012 Document Revised: 05/25/2017 Document Reviewed: 05/21/2015  [employer] Interactive Patient Education © 2017 [employer]

## 2018-06-28 RX ORDER — SIMVASTATIN 40 MG
TABLET ORAL
Qty: 90 TABLET | Refills: 1 | Status: SHIPPED | OUTPATIENT
Start: 2018-06-28 | End: 2018-12-08 | Stop reason: SDUPTHER

## 2018-07-03 RX ORDER — FLUTICASONE PROPIONATE 50 MCG
SPRAY, SUSPENSION (ML) NASAL
Qty: 16 ML | Refills: 11 | Status: SHIPPED | OUTPATIENT
Start: 2018-07-03 | End: 2019-05-20 | Stop reason: SDUPTHER

## 2018-07-06 ENCOUNTER — HOSPITAL ENCOUNTER (OUTPATIENT)
Dept: NUCLEAR MEDICINE | Age: 63
Discharge: HOME | End: 2018-07-08
Payer: COMMERCIAL

## 2018-07-06 ENCOUNTER — HOSPITAL ENCOUNTER (OUTPATIENT)
Dept: CT IMAGING | Age: 63
Discharge: HOME | End: 2018-07-06
Payer: COMMERCIAL

## 2018-07-06 DIAGNOSIS — Z96.642 AFTERCARE FOLLOWING LEFT HIP JOINT REPLACEMENT SURGERY: ICD-10-CM

## 2018-07-06 DIAGNOSIS — Z47.1 AFTERCARE FOLLOWING LEFT HIP JOINT REPLACEMENT SURGERY: ICD-10-CM

## 2018-07-06 DIAGNOSIS — Z96.642 PRESENCE OF LEFT ARTIFICIAL HIP JOINT: ICD-10-CM

## 2018-07-06 DIAGNOSIS — M25.552 PAIN IN LEFT HIP: ICD-10-CM

## 2018-07-06 DIAGNOSIS — M25.552 LEFT HIP PAIN: ICD-10-CM

## 2018-07-06 PROCEDURE — 3430000000 HC RX DIAGNOSTIC RADIOPHARMACEUTICAL: Performed by: ORTHOPAEDIC SURGERY

## 2018-07-06 PROCEDURE — 78306 BONE IMAGING WHOLE BODY: CPT

## 2018-07-06 PROCEDURE — A9561 TC99M OXIDRONATE: HCPCS | Performed by: ORTHOPAEDIC SURGERY

## 2018-07-06 PROCEDURE — 72192 CT PELVIS W/O DYE: CPT

## 2018-07-06 RX ADMIN — TECHNETIUM TC 99M OXIDRONATE 20 MILLICURIE: 3.15 INJECTION, POWDER, LYOPHILIZED, FOR SOLUTION INTRAVENOUS at 15:05

## 2018-07-16 ENCOUNTER — HOSPITAL ENCOUNTER (OUTPATIENT)
Dept: PREADMISSION TESTING | Age: 63
Discharge: HOME | End: 2018-07-20
Payer: COMMERCIAL

## 2018-07-16 VITALS — WEIGHT: 315 LBS | BODY MASS INDEX: 40.43 KG/M2 | HEIGHT: 74 IN

## 2018-07-16 LAB
ANION GAP SERPL CALCULATED.3IONS-SCNC: 13 MMOL/L (ref 7–19)
BASOPHILS ABSOLUTE: 0 K/UL (ref 0–0.2)
BASOPHILS RELATIVE PERCENT: 0.7 % (ref 0–1)
BUN BLDV-MCNC: 22 MG/DL (ref 8–23)
CALCIUM SERPL-MCNC: 9 MG/DL (ref 8.8–10.2)
CHLORIDE BLD-SCNC: 107 MMOL/L (ref 98–111)
CO2: 22 MMOL/L (ref 22–29)
CREAT SERPL-MCNC: 1 MG/DL (ref 0.5–1.2)
EOSINOPHILS ABSOLUTE: 0.2 K/UL (ref 0–0.6)
EOSINOPHILS RELATIVE PERCENT: 2.7 % (ref 0–5)
GFR NON-AFRICAN AMERICAN: >60
GLUCOSE BLD-MCNC: 96 MG/DL (ref 74–109)
HCT VFR BLD CALC: 47.2 % (ref 42–52)
HEMOGLOBIN: 16 G/DL (ref 14–18)
LYMPHOCYTES ABSOLUTE: 1.9 K/UL (ref 1.1–4.5)
LYMPHOCYTES RELATIVE PERCENT: 34.7 % (ref 20–40)
MCH RBC QN AUTO: 30.5 PG (ref 27–31)
MCHC RBC AUTO-ENTMCNC: 33.9 G/DL (ref 33–37)
MCV RBC AUTO: 89.9 FL (ref 80–94)
MONOCYTES ABSOLUTE: 0.5 K/UL (ref 0–0.9)
MONOCYTES RELATIVE PERCENT: 8.1 % (ref 0–10)
NEUTROPHILS ABSOLUTE: 3 K/UL (ref 1.5–7.5)
NEUTROPHILS RELATIVE PERCENT: 53.6 % (ref 50–65)
PDW BLD-RTO: 12.1 % (ref 11.5–14.5)
PLATELET # BLD: 199 K/UL (ref 130–400)
PMV BLD AUTO: 10.4 FL (ref 9.4–12.4)
POTASSIUM SERPL-SCNC: 4.6 MMOL/L (ref 3.5–5)
RBC # BLD: 5.25 M/UL (ref 4.7–6.1)
SODIUM BLD-SCNC: 142 MMOL/L (ref 136–145)
WBC # BLD: 5.6 K/UL (ref 4.8–10.8)

## 2018-07-16 PROCEDURE — 80048 BASIC METABOLIC PNL TOTAL CA: CPT

## 2018-07-16 PROCEDURE — 93005 ELECTROCARDIOGRAM TRACING: CPT

## 2018-07-16 PROCEDURE — 85025 COMPLETE CBC W/AUTO DIFF WBC: CPT

## 2018-07-16 RX ORDER — SIMVASTATIN 40 MG
40 TABLET ORAL NIGHTLY
COMMUNITY

## 2018-07-16 RX ORDER — CELECOXIB 200 MG/1
200 CAPSULE ORAL DAILY
COMMUNITY

## 2018-07-16 RX ORDER — LEVOFLOXACIN 500 MG/1
500 TABLET, FILM COATED ORAL NIGHTLY
Status: ON HOLD | COMMUNITY
End: 2018-07-19 | Stop reason: ALTCHOICE

## 2018-07-16 RX ORDER — GABAPENTIN 300 MG/1
300 CAPSULE ORAL 2 TIMES DAILY
COMMUNITY

## 2018-07-16 RX ORDER — OMEPRAZOLE 20 MG/1
20 CAPSULE, DELAYED RELEASE ORAL NIGHTLY
COMMUNITY

## 2018-07-16 RX ORDER — METOPROLOL TARTRATE 50 MG/1
50 TABLET, FILM COATED ORAL NIGHTLY
COMMUNITY

## 2018-07-16 RX ORDER — HYDROCODONE BITARTRATE AND ACETAMINOPHEN 10; 325 MG/1; MG/1
1 TABLET ORAL EVERY 12 HOURS PRN
Status: ON HOLD | COMMUNITY
End: 2018-07-19 | Stop reason: HOSPADM

## 2018-07-18 LAB
EKG P AXIS: 52 DEGREES
EKG P-R INTERVAL: 164 MS
EKG Q-T INTERVAL: 422 MS
EKG QRS DURATION: 88 MS
EKG QTC CALCULATION (BAZETT): 407 MS
EKG T AXIS: 62 DEGREES

## 2018-07-19 ENCOUNTER — HOSPITAL ENCOUNTER (OUTPATIENT)
Age: 63
Setting detail: OUTPATIENT SURGERY
Discharge: HOME | End: 2018-07-19
Attending: ORTHOPAEDIC SURGERY | Admitting: ORTHOPAEDIC SURGERY
Payer: COMMERCIAL

## 2018-07-19 ENCOUNTER — ANESTHESIA (OUTPATIENT)
Dept: OPERATING ROOM | Age: 63
End: 2018-07-19
Payer: COMMERCIAL

## 2018-07-19 ENCOUNTER — ANESTHESIA EVENT (OUTPATIENT)
Dept: OPERATING ROOM | Age: 63
End: 2018-07-19
Payer: COMMERCIAL

## 2018-07-19 VITALS
RESPIRATION RATE: 22 BRPM | DIASTOLIC BLOOD PRESSURE: 58 MMHG | SYSTOLIC BLOOD PRESSURE: 129 MMHG | OXYGEN SATURATION: 98 % | TEMPERATURE: 95.9 F

## 2018-07-19 VITALS
SYSTOLIC BLOOD PRESSURE: 141 MMHG | HEIGHT: 74 IN | BODY MASS INDEX: 40.43 KG/M2 | TEMPERATURE: 97.4 F | RESPIRATION RATE: 14 BRPM | WEIGHT: 315 LBS | HEART RATE: 60 BPM | DIASTOLIC BLOOD PRESSURE: 74 MMHG | OXYGEN SATURATION: 93 %

## 2018-07-19 DIAGNOSIS — S76.912D STRAIN OF LEFT ILIOPSOAS MUSCLE, SUBSEQUENT ENCOUNTER: Primary | ICD-10-CM

## 2018-07-19 PROCEDURE — 2720000001 HC MISC SURG SUPPLY STERILE $51-500: Performed by: ORTHOPAEDIC SURGERY

## 2018-07-19 PROCEDURE — 3700000000 HC ANESTHESIA ATTENDED CARE: Performed by: ORTHOPAEDIC SURGERY

## 2018-07-19 PROCEDURE — 6370000000 HC RX 637 (ALT 250 FOR IP): Performed by: ANESTHESIOLOGY

## 2018-07-19 PROCEDURE — 2580000003 HC RX 258: Performed by: ORTHOPAEDIC SURGERY

## 2018-07-19 PROCEDURE — 3600000014 HC SURGERY LEVEL 4 ADDTL 15MIN: Performed by: ORTHOPAEDIC SURGERY

## 2018-07-19 PROCEDURE — 2500000003 HC RX 250 WO HCPCS: Performed by: NURSE ANESTHETIST, CERTIFIED REGISTERED

## 2018-07-19 PROCEDURE — 3600000004 HC SURGERY LEVEL 4 BASE: Performed by: ORTHOPAEDIC SURGERY

## 2018-07-19 PROCEDURE — 6360000002 HC RX W HCPCS: Performed by: ORTHOPAEDIC SURGERY

## 2018-07-19 PROCEDURE — 7100000010 HC PHASE II RECOVERY - FIRST 15 MIN: Performed by: ORTHOPAEDIC SURGERY

## 2018-07-19 PROCEDURE — 6360000002 HC RX W HCPCS: Performed by: ANESTHESIOLOGY

## 2018-07-19 PROCEDURE — C1729 CATH, DRAINAGE: HCPCS | Performed by: ORTHOPAEDIC SURGERY

## 2018-07-19 PROCEDURE — 3700000001 HC ADD 15 MINUTES (ANESTHESIA): Performed by: ORTHOPAEDIC SURGERY

## 2018-07-19 PROCEDURE — 7100000001 HC PACU RECOVERY - ADDTL 15 MIN: Performed by: ORTHOPAEDIC SURGERY

## 2018-07-19 PROCEDURE — 2709999900 HC NON-CHARGEABLE SUPPLY: Performed by: ORTHOPAEDIC SURGERY

## 2018-07-19 PROCEDURE — 6370000000 HC RX 637 (ALT 250 FOR IP): Performed by: ORTHOPAEDIC SURGERY

## 2018-07-19 PROCEDURE — 7100000011 HC PHASE II RECOVERY - ADDTL 15 MIN: Performed by: ORTHOPAEDIC SURGERY

## 2018-07-19 PROCEDURE — 2580000003 HC RX 258: Performed by: NURSE ANESTHETIST, CERTIFIED REGISTERED

## 2018-07-19 PROCEDURE — 6360000002 HC RX W HCPCS: Performed by: NURSE ANESTHETIST, CERTIFIED REGISTERED

## 2018-07-19 PROCEDURE — 7100000000 HC PACU RECOVERY - FIRST 15 MIN: Performed by: ORTHOPAEDIC SURGERY

## 2018-07-19 RX ORDER — KETOROLAC TROMETHAMINE 30 MG/ML
INJECTION, SOLUTION INTRAMUSCULAR; INTRAVENOUS PRN
Status: DISCONTINUED | OUTPATIENT
Start: 2018-07-19 | End: 2018-07-19 | Stop reason: SDUPTHER

## 2018-07-19 RX ORDER — ONDANSETRON 2 MG/ML
INJECTION INTRAMUSCULAR; INTRAVENOUS PRN
Status: DISCONTINUED | OUTPATIENT
Start: 2018-07-19 | End: 2018-07-19 | Stop reason: SDUPTHER

## 2018-07-19 RX ORDER — MORPHINE SULFATE 4 MG/ML
4 INJECTION, SOLUTION INTRAMUSCULAR; INTRAVENOUS
Status: DISCONTINUED | OUTPATIENT
Start: 2018-07-19 | End: 2018-07-19 | Stop reason: HOSPADM

## 2018-07-19 RX ORDER — PROPOFOL 10 MG/ML
INJECTION, EMULSION INTRAVENOUS PRN
Status: DISCONTINUED | OUTPATIENT
Start: 2018-07-19 | End: 2018-07-19 | Stop reason: SDUPTHER

## 2018-07-19 RX ORDER — SODIUM CHLORIDE, SODIUM LACTATE, POTASSIUM CHLORIDE, CALCIUM CHLORIDE 600; 310; 30; 20 MG/100ML; MG/100ML; MG/100ML; MG/100ML
INJECTION, SOLUTION INTRAVENOUS CONTINUOUS
Status: DISCONTINUED | OUTPATIENT
Start: 2018-07-19 | End: 2018-07-19 | Stop reason: HOSPADM

## 2018-07-19 RX ORDER — HYDROMORPHONE HCL IN 0.9% NACL 0.5 MG/ML
0.5 SYRINGE (ML) INTRAVENOUS EVERY 5 MIN PRN
Status: DISCONTINUED | OUTPATIENT
Start: 2018-07-19 | End: 2018-07-19 | Stop reason: HOSPADM

## 2018-07-19 RX ORDER — DEXAMETHASONE SODIUM PHOSPHATE 10 MG/ML
10 INJECTION INTRAMUSCULAR; INTRAVENOUS ONCE
Status: DISCONTINUED | OUTPATIENT
Start: 2018-07-19 | End: 2018-07-19 | Stop reason: HOSPADM

## 2018-07-19 RX ORDER — SUCCINYLCHOLINE CHLORIDE 20 MG/ML
INJECTION INTRAMUSCULAR; INTRAVENOUS PRN
Status: DISCONTINUED | OUTPATIENT
Start: 2018-07-19 | End: 2018-07-19 | Stop reason: SDUPTHER

## 2018-07-19 RX ORDER — SODIUM CHLORIDE 0.9 % (FLUSH) 0.9 %
10 SYRINGE (ML) INJECTION EVERY 12 HOURS SCHEDULED
Status: DISCONTINUED | OUTPATIENT
Start: 2018-07-19 | End: 2018-07-19 | Stop reason: HOSPADM

## 2018-07-19 RX ORDER — CELECOXIB 200 MG/1
200 CAPSULE ORAL DAILY
Status: DISCONTINUED | OUTPATIENT
Start: 2018-07-19 | End: 2018-07-19 | Stop reason: HOSPADM

## 2018-07-19 RX ORDER — LIDOCAINE HYDROCHLORIDE 10 MG/ML
INJECTION, SOLUTION INFILTRATION; PERINEURAL PRN
Status: DISCONTINUED | OUTPATIENT
Start: 2018-07-19 | End: 2018-07-19 | Stop reason: SDUPTHER

## 2018-07-19 RX ORDER — OXYCODONE AND ACETAMINOPHEN 10; 325 MG/1; MG/1
1 TABLET ORAL EVERY 8 HOURS PRN
Qty: 40 TABLET | Refills: 0 | Status: SHIPPED | OUTPATIENT
Start: 2018-07-19 | End: 2018-08-18

## 2018-07-19 RX ORDER — ACETAMINOPHEN 500 MG
1000 TABLET ORAL
Status: COMPLETED | OUTPATIENT
Start: 2018-07-19 | End: 2018-07-19

## 2018-07-19 RX ORDER — EPHEDRINE SULFATE 50 MG/ML
INJECTION, SOLUTION INTRAVENOUS PRN
Status: DISCONTINUED | OUTPATIENT
Start: 2018-07-19 | End: 2018-07-19 | Stop reason: SDUPTHER

## 2018-07-19 RX ORDER — MIDAZOLAM HYDROCHLORIDE 1 MG/ML
2 INJECTION INTRAMUSCULAR; INTRAVENOUS
Status: COMPLETED | OUTPATIENT
Start: 2018-07-19 | End: 2018-07-19

## 2018-07-19 RX ORDER — LIDOCAINE HYDROCHLORIDE 10 MG/ML
1 INJECTION, SOLUTION EPIDURAL; INFILTRATION; INTRACAUDAL; PERINEURAL
Status: DISCONTINUED | OUTPATIENT
Start: 2018-07-19 | End: 2018-07-19 | Stop reason: HOSPADM

## 2018-07-19 RX ORDER — OXYCODONE HCL 10 MG/1
10 TABLET, FILM COATED, EXTENDED RELEASE ORAL
Status: COMPLETED | OUTPATIENT
Start: 2018-07-19 | End: 2018-07-19

## 2018-07-19 RX ORDER — HYDROMORPHONE HCL IN 0.9% NACL 0.5 MG/ML
SYRINGE (ML) INTRAVENOUS
Status: DISCONTINUED
Start: 2018-07-19 | End: 2018-07-19 | Stop reason: HOSPADM

## 2018-07-19 RX ORDER — ROPIVACAINE HYDROCHLORIDE 5 MG/ML
INJECTION, SOLUTION EPIDURAL; INFILTRATION; PERINEURAL PRN
Status: DISCONTINUED | OUTPATIENT
Start: 2018-07-19 | End: 2018-07-19 | Stop reason: HOSPADM

## 2018-07-19 RX ORDER — SODIUM CHLORIDE, SODIUM LACTATE, POTASSIUM CHLORIDE, CALCIUM CHLORIDE 600; 310; 30; 20 MG/100ML; MG/100ML; MG/100ML; MG/100ML
INJECTION, SOLUTION INTRAVENOUS CONTINUOUS PRN
Status: DISCONTINUED | OUTPATIENT
Start: 2018-07-19 | End: 2018-07-19 | Stop reason: SDUPTHER

## 2018-07-19 RX ORDER — ROCURONIUM BROMIDE 10 MG/ML
INJECTION, SOLUTION INTRAVENOUS PRN
Status: DISCONTINUED | OUTPATIENT
Start: 2018-07-19 | End: 2018-07-19 | Stop reason: SDUPTHER

## 2018-07-19 RX ORDER — DEXAMETHASONE SODIUM PHOSPHATE 10 MG/ML
INJECTION INTRAMUSCULAR; INTRAVENOUS PRN
Status: DISCONTINUED | OUTPATIENT
Start: 2018-07-19 | End: 2018-07-19 | Stop reason: SDUPTHER

## 2018-07-19 RX ORDER — SODIUM CHLORIDE 0.9 % (FLUSH) 0.9 %
10 SYRINGE (ML) INJECTION PRN
Status: DISCONTINUED | OUTPATIENT
Start: 2018-07-19 | End: 2018-07-19 | Stop reason: HOSPADM

## 2018-07-19 RX ORDER — SCOLOPAMINE TRANSDERMAL SYSTEM 1 MG/1
1 PATCH, EXTENDED RELEASE TRANSDERMAL ONCE
Status: DISCONTINUED | OUTPATIENT
Start: 2018-07-19 | End: 2018-07-19 | Stop reason: HOSPADM

## 2018-07-19 RX ORDER — FENTANYL CITRATE 50 UG/ML
50 INJECTION, SOLUTION INTRAMUSCULAR; INTRAVENOUS
Status: COMPLETED | OUTPATIENT
Start: 2018-07-19 | End: 2018-07-19

## 2018-07-19 RX ADMIN — KETOROLAC TROMETHAMINE 30 MG: 30 INJECTION, SOLUTION INTRAMUSCULAR; INTRAVENOUS at 08:20

## 2018-07-19 RX ADMIN — HYDROMORPHONE HYDROCHLORIDE 0.5 MG: 1 INJECTION, SOLUTION INTRAMUSCULAR; INTRAVENOUS; SUBCUTANEOUS at 09:19

## 2018-07-19 RX ADMIN — FENTANYL CITRATE 50 MCG: 50 INJECTION INTRAMUSCULAR; INTRAVENOUS at 08:12

## 2018-07-19 RX ADMIN — PROPOFOL 200 MG: 10 INJECTION, EMULSION INTRAVENOUS at 08:12

## 2018-07-19 RX ADMIN — ONDANSETRON HYDROCHLORIDE 4 MG: 2 INJECTION, SOLUTION INTRAMUSCULAR; INTRAVENOUS at 08:20

## 2018-07-19 RX ADMIN — Medication 200 MG: at 08:12

## 2018-07-19 RX ADMIN — OXYCODONE HYDROCHLORIDE 10 MG: 10 TABLET, FILM COATED, EXTENDED RELEASE ORAL at 07:12

## 2018-07-19 RX ADMIN — LIDOCAINE HYDROCHLORIDE 50 MG: 10 INJECTION, SOLUTION INFILTRATION; PERINEURAL at 08:12

## 2018-07-19 RX ADMIN — MIDAZOLAM 2 MG: 1 INJECTION INTRAMUSCULAR; INTRAVENOUS at 08:09

## 2018-07-19 RX ADMIN — EPHEDRINE SULFATE 5 MG: 50 INJECTION, SOLUTION INTRAMUSCULAR; INTRAVENOUS; SUBCUTANEOUS at 09:05

## 2018-07-19 RX ADMIN — CELECOXIB 200 MG: 200 CAPSULE ORAL at 07:12

## 2018-07-19 RX ADMIN — Medication 2 G: at 08:20

## 2018-07-19 RX ADMIN — EPHEDRINE SULFATE 5 MG: 50 INJECTION, SOLUTION INTRAMUSCULAR; INTRAVENOUS; SUBCUTANEOUS at 08:51

## 2018-07-19 RX ADMIN — ROCURONIUM BROMIDE 10 MG: 10 INJECTION INTRAVENOUS at 08:12

## 2018-07-19 RX ADMIN — SUGAMMADEX 300 MG: 100 INJECTION, SOLUTION INTRAVENOUS at 09:23

## 2018-07-19 RX ADMIN — DEXAMETHASONE SODIUM PHOSPHATE 10 MG: 10 INJECTION INTRAMUSCULAR; INTRAVENOUS at 08:20

## 2018-07-19 RX ADMIN — SODIUM CHLORIDE, SODIUM LACTATE, POTASSIUM CHLORIDE, AND CALCIUM CHLORIDE: 600; 310; 30; 20 INJECTION, SOLUTION INTRAVENOUS at 09:23

## 2018-07-19 RX ADMIN — Medication 0.5 MG: at 10:14

## 2018-07-19 RX ADMIN — SODIUM CHLORIDE, POTASSIUM CHLORIDE, SODIUM LACTATE AND CALCIUM CHLORIDE: 600; 310; 30; 20 INJECTION, SOLUTION INTRAVENOUS at 07:17

## 2018-07-19 RX ADMIN — SODIUM CHLORIDE, SODIUM LACTATE, POTASSIUM CHLORIDE, AND CALCIUM CHLORIDE: 600; 310; 30; 20 INJECTION, SOLUTION INTRAVENOUS at 08:09

## 2018-07-19 RX ADMIN — ROCURONIUM BROMIDE 40 MG: 10 INJECTION INTRAVENOUS at 08:20

## 2018-07-19 RX ADMIN — ACETAMINOPHEN 1000 MG: 500 TABLET, FILM COATED ORAL at 07:12

## 2018-07-19 RX ADMIN — FENTANYL CITRATE 50 MCG: 50 INJECTION INTRAMUSCULAR; INTRAVENOUS at 08:20

## 2018-07-19 ASSESSMENT — PAIN SCALES - GENERAL
PAINLEVEL_OUTOF10: 3
PAINLEVEL_OUTOF10: 5
PAINLEVEL_OUTOF10: 5

## 2018-07-19 ASSESSMENT — PAIN DESCRIPTION - LOCATION: LOCATION: HIP

## 2018-07-19 ASSESSMENT — PAIN DESCRIPTION - DESCRIPTORS: DESCRIPTORS: ACHING

## 2018-07-19 ASSESSMENT — LIFESTYLE VARIABLES: SMOKING_STATUS: 0

## 2018-07-19 ASSESSMENT — PAIN DESCRIPTION - PAIN TYPE: TYPE: SURGICAL PAIN

## 2018-07-19 ASSESSMENT — PAIN DESCRIPTION - ORIENTATION: ORIENTATION: LEFT

## 2018-07-19 ASSESSMENT — PAIN - FUNCTIONAL ASSESSMENT: PAIN_FUNCTIONAL_ASSESSMENT: 0-10

## 2018-07-19 NOTE — ANESTHESIA PRE PROCEDURE
Department of Anesthesiology  Preprocedure Note       Name:  Yang Harden   Age:  58 y.o.  :  1955                                          MRN:  [id number]         Date:  2018      Surgeon: Kacie Guzman):  Garo Carranza MD    Procedure: Procedure(s):  HIP ILISOUS TENDON RELEASE/ COMPARISON    Medications prior to admission:   Prior to Admission medications    Medication Sig Start Date End Date Taking? Authorizing Provider   oxyCODONE-acetaminophen (PERCOCET)  MG per tablet Take 1 tablet by mouth every 8 hours as needed for Pain for up to 30 days. Intended supply: 30 days. 18 Yes Garo Carranza MD   gabapentin (NEURONTIN) 300 MG capsule Take 300 mg by mouth 2 times daily. Chastity Bennett Historical Provider, MD   celecoxib (CELEBREX) 200 MG capsule Take 200 mg by mouth daily    Historical Provider, MD   simvastatin (ZOCOR) 40 MG tablet Take 40 mg by mouth nightly    Historical Provider, MD   metoprolol tartrate (LOPRESSOR) 50 MG tablet Take 50 mg by mouth nightly    Historical Provider, MD   omeprazole (PRILOSEC) 20 MG delayed release capsule Take 20 mg by mouth nightly    Historical Provider, MD   aspirin 81 MG tablet Take 81 mg by mouth daily    Historical Provider, MD       Current medications:    Current Facility-Administered Medications   Medication Dose Route Frequency Provider Last Rate Last Dose    ceFAZolin (ANCEF) 2 g in 0.9% sodium chloride 50 mL IVPB  2 g Intravenous Once Garo Carranza MD        celecoxib (CELEBREX) capsule 200 mg  200 mg Oral Daily Garo Carranza MD   200 mg at 18 [id number]    dexamethasone (DECADRON) injection 10 mg  10 mg Intravenous Once Garo Carranza MD        lactated ringers infusion   Intravenous Continuous Garo Carranza  mL/hr at 18 5552         Allergies:  No Known Allergies    Problem List:  There is no problem list on file for this patient.       Past Medical History:

## 2018-07-19 NOTE — DISCHARGE SUMMARY
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 The patients dressing was changed/incision was checked on day of d/c. The patient will be discharged at this time to  Home with their current diet restrictions and will continue to follow the hip precautions outlined to them by us. Condition on Discharge: Stable    Plan  Followup at scheduled appointment time (1 month post-op). Patient was instructed on the use of pain medications, the signs and symptoms of infection, and was given our number to call should they have any questions or concerns following discharge.

## 2018-07-19 NOTE — OP NOTE
two fingers distal and lateral to the ASIS and extended distally  about 3 or 4 inches. Careful dissection was carried down. Fascia incised. The interval between the tensor fascia duy and the sartorius was developed  with finger dissection. A cobra was placed around the lateral femoral neck  and another cobra placed medially along the femoral neck. Dipak retractor  used distally and the capsule was released for length about an inch and  half along the inferomedial aspect of the femoral neck where it joined the  intertrochanteric line, it was marked with a 0 Vicryl suture. We then  placed large bent carolyne retractor around the lesser trochanter with the hip externally  rotated to 120 degrees and then we were able to release the capsule all the  way down to bone and release the iliopsoas all the way completely from the  attachment to the lesser trochanter. Casiano elevator was used just to  palpate that and make sure we had completely released iliopsoas tendon  insertion. The wound was irrigated, hemostasis was achieved  electrocautery. The fascia was closed with 0 Vicryl, subcu closed with 2-0  Vicryl, skin was closed with 3-0 Vicryl and Prineo. Sterile dressing was  applied. We did inject the tissue with 0.2% Naropin. Plan will be for  weightbearing as tolerated. Follow up in the office in a couple of weeks.         Eleanor Vivar MD    D: 07/19/2018 10:26:21      T: 07/19/2018 14:47:36     [id number]  Job#: [id number]     Doc#: [id number]    CC:

## 2018-07-24 RX ORDER — METOPROLOL TARTRATE 50 MG/1
50 TABLET, FILM COATED ORAL
Qty: 90 TABLET | Refills: 1 | Status: SHIPPED | OUTPATIENT
Start: 2018-07-24 | End: 2019-01-04 | Stop reason: SDUPTHER

## 2018-08-16 ENCOUNTER — OFFICE VISIT (OUTPATIENT)
Dept: BARIATRICS/WEIGHT MGMT | Facility: CLINIC | Age: 63
End: 2018-08-16

## 2018-08-16 ENCOUNTER — OFFICE VISIT (OUTPATIENT)
Dept: BARIATRICS/WEIGHT MGMT | Facility: HOSPITAL | Age: 63
End: 2018-08-16

## 2018-08-16 VITALS
HEIGHT: 73 IN | OXYGEN SATURATION: 97 % | WEIGHT: 315 LBS | BODY MASS INDEX: 41.75 KG/M2 | SYSTOLIC BLOOD PRESSURE: 152 MMHG | HEART RATE: 64 BPM | DIASTOLIC BLOOD PRESSURE: 63 MMHG | TEMPERATURE: 98.6 F

## 2018-08-16 DIAGNOSIS — E66.01 OBESITY, CLASS III, BMI 40-49.9 (MORBID OBESITY) (HCC): Primary | ICD-10-CM

## 2018-08-16 PROBLEM — E66.813 OBESITY, CLASS III, BMI 40-49.9 (MORBID OBESITY): Status: ACTIVE | Noted: 2018-08-16

## 2018-08-16 PROCEDURE — 99203 OFFICE O/P NEW LOW 30 MIN: CPT | Performed by: SURGERY

## 2018-08-16 PROCEDURE — 97802 MEDICAL NUTRITION INDIV IN: CPT

## 2018-08-16 NOTE — PROGRESS NOTES
"Nutrition Bariatric/[facility] Note     Visit   Initial Assessment     Anthropometrics   Height: 72.5\"  Weight: 317.5#  BMI: 42.5    Waist: 57\"  Hip: 55\"  Chest: 53.5\"  Thigh: 27\"  Arm: 16.5\"  Body Fat: 39.2%    Nutrition Recall  24 Hour recall:  (B) sausage biscuit OR cereal OR pop tart, coffee w/milk  (L) peanut butter or bologna sandwich, water OR skips meal (D) meat, potato, vegetable, water  Eating 3 meals daily   Protein lacking at some breakfasts  Snacking: cookies, fruit  Excessive sweet intake  Large portions  Drinking with meals   Drinking  1 carbonated beverages daily  Drinking greater to or equal to 64 fluid ounces    Exercise   None    Habits:  None    Education    Goal Setting and Information Packet    Nutrition Goals   Eat 3-4 meals per day with protein  Eat protein first at meals  Eliminate snacks  Healthier food choices  Portion control / Use smaller plate or measuring cup   Eliminate soda    Exercise Goals  Add 15-30 minutes of walking, cycling, elliptical, swimming, chair, yoga or crossfit daily    Glenna Membreno RD, LD  08/16/2018  1:20 PM    "

## 2018-08-16 NOTE — PROGRESS NOTES
Patient Care Team:  Carl Vu MD as PCP - General (Family Medicine)  Jona Garibay MD as Consulting Physician (Otolaryngology)    Reason for Visit:  Surgical Weight loss    Subjective     Patient is a 62 y.o. male presents with morbid obesity and his Body mass index is 42.46 kg/m².     He is here for discussion of surgical weight loss options.  He stated he has been with the disease of obesity for year(s).  He stated he suffers from hypertension, reflux, hyperlipidemia, heart disease and chronic back pain due to his weight gain.  He stated that weight loss helps alleviate these symptoms.   He stated that he has tried self diets to help with weight loss.  He stated that he has attempted these conservative methods for weight loss without maintaining long term success.  Today he would like to discuss surgical weight loss options such as the Laparoscopic Sleeve Gastrectomy or the Laparoscopic R - Y Gastric Bypass.     Review of Systems  General ROS: negative  Respiratory ROS: no cough, shortness of breath, or wheezing  Cardiovascular ROS: no chest pain or dyspnea on exertion  Gastrointestinal ROS: no abdominal pain, change in bowel habits, or black or bloody stools  Musculoskeletal ROS: positive for - joint pain    History  Past Medical History:   Diagnosis Date   • Allergic rhinitis due to allergen 8/8/2017   • Arthritis    • Back pain    • CHF (congestive heart failure) (CMS/[facility])    • Chronic ethmoidal sinusitis 5/18/2017   • Chronic maxillary sinusitis 5/18/2017   • Coronary artery disease 1984    stent LAD, Cx, RCA    • GERD (gastroesophageal reflux disease)    • Heart attack 1994    Dr Stiles    • Heartburn    • Hyperlipidemia    • Hypertension    • Myocardial infarction    • Pain     back, & Neck      Past Surgical History:   Procedure Laterality Date   • ANKLE SURGERY     • BACK SURGERY     • CARDIAC CATHETERIZATION  1984, 1994   • CHOLECYSTECTOMY  2014    Lararocopic    • ENDOSCOPIC  FUNCTIONAL SINUS SURGERY (FESS) N/A 6/12/2017    Procedure: ENDOSCOPIC FUNCTIONAL SINUS SURGERY WITH LEFT MIDDLE MEATAL ANTROSTOMY AND LEFT ETHMOIDECTOMY;  Surgeon: Jona Garibay MD;  Location: [facility] OR;  Service:    • ETHMOIDECTOMY Left 6/12/2017    Procedure: ETHMOIDECTOMY ENDOSCOPIC;  Surgeon: Jona Garibay MD;  Location: [facility] OR;  Service:    • EYE SURGERY      LASIK   • HAND SURGERY     • SINUS SURGERY     • TOTAL HIP ARTHROPLASTY Bilateral      Family History   Problem Relation Age of Onset   • No Known Problems Mother    • Cancer Father    • No Known Problems Brother    • Cancer Brother      Social History   Substance Use Topics   • Smoking status: Former Smoker     Quit date: 1994   • Smokeless tobacco: Never Used   • Alcohol use No       (Not in a hospital admission)  Allergies:  Patient has no known allergies.      Current Outpatient Prescriptions:   •  aspirin 81 MG EC tablet, Take 81 mg by mouth Daily., Disp: , Rfl:   •  celecoxib (CeleBREX) 200 MG capsule, Take 200 mg by mouth Daily., Disp: , Rfl:   •  Cholecalciferol (VITAMIN D3) 5000 UNITS capsule capsule, Take 5,000 Units by mouth Daily., Disp: , Rfl:   •  fluticasone (FLONASE) 50 MCG/ACT nasal spray, SPRAY 2 SPRAYS INTO EACH NOSTRIL DAILY, Disp: 16 mL, Rfl: 11  •  gabapentin (NEURONTIN) 300 MG capsule, Take 300 mg by mouth 3 (Three) Times a Day., Disp: , Rfl:   •  HYDROcodone-acetaminophen (NORCO)  MG per tablet, Take 1 tablet by mouth 2 (Two) Times a Day., Disp: , Rfl:   •  metoprolol tartrate (LOPRESSOR) 50 MG tablet, Take 1 tablet by mouth every night at bedtime., Disp: 90 tablet, Rfl: 1  •  Multiple Vitamins-Minerals (MULTIVITAMIN WITH MINERALS) tablet tablet, Take 1 tablet by mouth Daily., Disp: , Rfl:   •  omeprazole (priLOSEC) 20 MG capsule, Take 20 mg by mouth Daily., Disp: , Rfl:   •  simvastatin (ZOCOR) 40 MG tablet, TAKE 1 TABLET BY MOUTH EVERY NIGHT., Disp: 90 tablet, Rfl: 1  •  tiZANidine (ZANAFLEX) 4 MG tablet,  daily, Disp: , Rfl: 2  •  vitamin C (ASCORBIC ACID) 500 MG tablet, Take 500 mg by mouth Daily., Disp: , Rfl:   •  azelastine (ASTELIN) 0.1 % nasal spray, INHALE 2 SPRAYS INTO EACH NOSTRIL TWICE A DAY (Patient taking differently: Using PRN), Disp: 1 each, Rfl: 5  •  mupirocin (BACTROBAN) 2 % ointment, APPLY INTO EACH NOSTRIL TWICE A DAY as needed, Disp: , Rfl: 0    Objective     Vital Signs  Temp:  [98.6 °F (37 °C)] 98.6 °F (37 °C)  Heart Rate:  [64] 64  BP: (152)/(63) 152/63  Body mass index is 42.46 kg/m².  1    08/16/18  1309   Weight: (!) 144 kg (317 lb 6.4 oz)       Physical Exam:      HEENT: extra ocular movement intact              Assessment/Plan   Encounter Diagnoses   Name Primary?   • Obesity, Class III, BMI 40-49.9 (morbid obesity) (CMS/[facility]) Yes       A total of 20 minutes was spent face-to-face with this patient during this encounter and over half the time was spent on counseling and coordination of care and treatment of his disease of obesity.  We discussed the etiology of his disease as well as a history of his weight gain.  I have instructed him to modify his diet regimen in accordance to recommendations provided by our dietitian.  I have explained that he is diseased storage and would need to crease the carbs and his daily routine.  I recommended that he adjust his diet and follow-up with our program in 1 month's time to be reassessed.  I discussed the patient's findings and my recommendations with patient.         Dr. Sarkis Dill MD FACS    08/16/18  3:27 PM  Patient Care Team:  Carl Vu MD as PCP - General (Family Medicine)  Jona Garibay MD as Consulting Physician (Otolaryngology)

## 2018-09-07 ENCOUNTER — TELEPHONE (OUTPATIENT)
Dept: BARIATRICS/WEIGHT MGMT | Facility: CLINIC | Age: 63
End: 2018-09-07

## 2018-09-07 NOTE — TELEPHONE ENCOUNTER
I am trying to contact the patient regarding his insurance. His policy does not have any bariatric coverage.

## 2018-09-10 ENCOUNTER — TELEPHONE (OUTPATIENT)
Dept: BARIATRICS/WEIGHT MGMT | Facility: CLINIC | Age: 63
End: 2018-09-10

## 2018-09-10 NOTE — TELEPHONE ENCOUNTER
I spoke with Mr. Wilder and informed him that his [facility] policy has exclusions for bariatric services. He asked me to cancel his upcoming appointment.

## 2018-10-16 ENCOUNTER — CLINICAL SUPPORT (OUTPATIENT)
Dept: FAMILY MEDICINE CLINIC | Facility: CLINIC | Age: 63
End: 2018-10-16

## 2018-10-16 DIAGNOSIS — Z12.5 SCREENING PSA (PROSTATE SPECIFIC ANTIGEN): ICD-10-CM

## 2018-10-16 DIAGNOSIS — E55.9 VITAMIN D DEFICIENCY: ICD-10-CM

## 2018-10-16 DIAGNOSIS — Z00.00 ANNUAL PHYSICAL EXAM: Primary | ICD-10-CM

## 2018-10-16 DIAGNOSIS — Z23 FLU VACCINE NEED: ICD-10-CM

## 2018-10-16 PROCEDURE — 90674 CCIIV4 VAC NO PRSV 0.5 ML IM: CPT | Performed by: FAMILY MEDICINE

## 2018-10-16 PROCEDURE — 90471 IMMUNIZATION ADMIN: CPT | Performed by: FAMILY MEDICINE

## 2018-10-17 LAB
25(OH)D3+25(OH)D2 SERPL-MCNC: 67.9 NG/ML (ref 30–100)
ALBUMIN SERPL-MCNC: 4.1 G/DL (ref 3.5–5)
ALBUMIN/GLOB SERPL: 1.6 G/DL (ref 1.1–2.5)
ALP SERPL-CCNC: 60 U/L (ref 24–120)
ALT SERPL-CCNC: 40 U/L (ref 0–54)
AST SERPL-CCNC: 27 U/L (ref 7–45)
BASOPHILS # BLD AUTO: 0.03 10*3/MM3 (ref 0–0.2)
BASOPHILS NFR BLD AUTO: 0.6 % (ref 0–2)
BILIRUB SERPL-MCNC: 0.7 MG/DL (ref 0.1–1)
BUN SERPL-MCNC: 17 MG/DL (ref 5–21)
BUN/CREAT SERPL: 15.6 (ref 7–25)
CALCIUM SERPL-MCNC: 9.6 MG/DL (ref 8.4–10.4)
CHLORIDE SERPL-SCNC: 105 MMOL/L (ref 98–110)
CHOLEST SERPL-MCNC: 130 MG/DL (ref 130–200)
CO2 SERPL-SCNC: 26 MMOL/L (ref 24–31)
CREAT SERPL-MCNC: 1.09 MG/DL (ref 0.5–1.4)
EOSINOPHIL # BLD AUTO: 0.13 10*3/MM3 (ref 0–0.7)
EOSINOPHIL NFR BLD AUTO: 2.7 % (ref 0–4)
ERYTHROCYTE [DISTWIDTH] IN BLOOD BY AUTOMATED COUNT: 12.5 % (ref 12–15)
GLOBULIN SER CALC-MCNC: 2.5 GM/DL
GLUCOSE SERPL-MCNC: 104 MG/DL (ref 70–100)
HCT VFR BLD AUTO: 50.2 % (ref 40–52)
HDLC SERPL-MCNC: 43 MG/DL
HGB BLD-MCNC: 16.4 G/DL (ref 14–18)
IMM GRANULOCYTES # BLD: 0 10*3/MM3 (ref 0–0.03)
IMM GRANULOCYTES NFR BLD: 0 % (ref 0–5)
LDLC SERPL CALC-MCNC: 63 MG/DL (ref 0–99)
LYMPHOCYTES # BLD AUTO: 1.69 10*3/MM3 (ref 0.72–4.86)
LYMPHOCYTES NFR BLD AUTO: 34.5 % (ref 15–45)
MCH RBC QN AUTO: 30 PG (ref 28–32)
MCHC RBC AUTO-ENTMCNC: 32.7 G/DL (ref 33–36)
MCV RBC AUTO: 91.9 FL (ref 82–95)
MONOCYTES # BLD AUTO: 0.39 10*3/MM3 (ref 0.19–1.3)
MONOCYTES NFR BLD AUTO: 8 % (ref 4–12)
NEUTROPHILS # BLD AUTO: 2.66 10*3/MM3 (ref 1.87–8.4)
NEUTROPHILS NFR BLD AUTO: 54.2 % (ref 39–78)
NRBC BLD AUTO-RTO: 0 /100 WBC (ref 0–0)
PLATELET # BLD AUTO: 195 10*3/MM3 (ref 130–400)
POTASSIUM SERPL-SCNC: 4.7 MMOL/L (ref 3.5–5.3)
PROT SERPL-MCNC: 6.6 G/DL (ref 6.3–8.7)
PSA SERPL-MCNC: 0.52 NG/ML (ref 0–4)
RBC # BLD AUTO: 5.46 10*6/MM3 (ref 4.8–5.9)
SODIUM SERPL-SCNC: 140 MMOL/L (ref 135–145)
T4 FREE SERPL-MCNC: 1.14 NG/DL (ref 0.78–2.19)
TRIGL SERPL-MCNC: 120 MG/DL (ref 0–149)
TSH SERPL DL<=0.005 MIU/L-ACNC: 1.21 MIU/ML (ref 0.47–4.68)
VLDLC SERPL CALC-MCNC: 24 MG/DL
WBC # BLD AUTO: 4.9 10*3/MM3 (ref 4.8–10.8)

## 2018-12-03 ENCOUNTER — OFFICE VISIT (OUTPATIENT)
Dept: FAMILY MEDICINE CLINIC | Facility: CLINIC | Age: 63
End: 2018-12-03

## 2018-12-03 VITALS
HEART RATE: 61 BPM | TEMPERATURE: 98.3 F | OXYGEN SATURATION: 98 % | HEIGHT: 73 IN | BODY MASS INDEX: 39.68 KG/M2 | SYSTOLIC BLOOD PRESSURE: 127 MMHG | DIASTOLIC BLOOD PRESSURE: 77 MMHG | WEIGHT: 299.4 LBS

## 2018-12-03 DIAGNOSIS — I10 ESSENTIAL HYPERTENSION: Primary | ICD-10-CM

## 2018-12-03 DIAGNOSIS — Z00.00 ANNUAL PHYSICAL EXAM: ICD-10-CM

## 2018-12-03 LAB
BILIRUB BLD-MCNC: NEGATIVE MG/DL
CLARITY, POC: CLEAR
COLOR UR: YELLOW
GLUCOSE UR STRIP-MCNC: NEGATIVE MG/DL
KETONES UR QL: ABNORMAL
LEUKOCYTE EST, POC: NEGATIVE
NITRITE UR-MCNC: NEGATIVE MG/ML
PH UR: 5 [PH] (ref 5–8)
PROT UR STRIP-MCNC: NEGATIVE MG/DL
RBC # UR STRIP: NEGATIVE /UL
SP GR UR: 1.03 (ref 1–1.03)
UROBILINOGEN UR QL: NORMAL

## 2018-12-03 PROCEDURE — 99396 PREV VISIT EST AGE 40-64: CPT | Performed by: FAMILY MEDICINE

## 2018-12-03 PROCEDURE — 81003 URINALYSIS AUTO W/O SCOPE: CPT | Performed by: FAMILY MEDICINE

## 2018-12-03 NOTE — PROGRESS NOTES
Subjective   George Wilder is a 63 y.o. male.     Hypertension   This is a chronic problem. The current episode started more than 1 year ago. The problem has been waxing and waning since onset. The problem is controlled. Pertinent negatives include no chest pain or shortness of breath. There are no associated agents to hypertension. Risk factors for coronary artery disease include male gender, obesity, sedentary lifestyle and dyslipidemia. Past treatments include diuretics and beta blockers. Current antihypertension treatment includes diuretics and beta blockers. The current treatment provides moderate improvement. Compliance problems include exercise and diet.  Hypertensive end-organ damage includes CAD/MI.        The following portions of the patient's history were reviewed and updated as appropriate: allergies, current medications, past family history, past medical history, past social history, past surgical history and problem list.    Review of Systems   Respiratory: Negative for shortness of breath.    Cardiovascular: Negative for chest pain and leg swelling.   Gastrointestinal:        Colonoscopy last year normal   Musculoskeletal: Positive for arthralgias and back pain.        Left hip replaced   All other systems reviewed and are negative.      Objective   Physical Exam   Constitutional: He is oriented to person, place, and time.   Morbid obesity   HENT:   Right Ear: External ear normal.   Left Ear: External ear normal.   Mouth/Throat: Oropharynx is clear and moist.   Eyes: EOM are normal. Pupils are equal, round, and reactive to light.   Neck: No thyromegaly present.   Good carotid pulses   Cardiovascular: Normal rate and regular rhythm.   Pulmonary/Chest: Effort normal and breath sounds normal.   Abdominal: Soft. Bowel sounds are normal.   Genitourinary: Rectum normal, prostate normal and penis normal.   Genitourinary Comments: Testicles normal no inguinal hernias or adenopathy-prostate 2+ no nodules    Musculoskeletal: He exhibits no edema.   Lymphadenopathy:     He has no cervical adenopathy.   Neurological: He is alert and oriented to person, place, and time.   Skin: Skin is warm and dry. Capillary refill takes less than 2 seconds.   Psychiatric: He has a normal mood and affect. His behavior is normal. Judgment and thought content normal.   Nursing note and vitals reviewed.      Assessment/Plan   Problems Addressed this Visit        Cardiovascular and Mediastinum    HTN (hypertension) - Primary    Relevant Orders    POC Urinalysis Dipstick, Automated      Other Visit Diagnoses     Annual physical exam                Plan-encouraged to lose weight-comorbidities of morbid obesity degenerative joint disease i.e. hips and back coronary artery disease plan continue same us move more-low-impact stressed

## 2018-12-10 RX ORDER — SIMVASTATIN 40 MG
TABLET ORAL
Qty: 90 TABLET | Refills: 3 | Status: SHIPPED | OUTPATIENT
Start: 2018-12-10 | End: 2019-10-28 | Stop reason: SDUPTHER

## 2019-01-04 RX ORDER — METOPROLOL TARTRATE 50 MG/1
TABLET, FILM COATED ORAL
Qty: 90 TABLET | Refills: 3 | Status: SHIPPED | OUTPATIENT
Start: 2019-01-04 | End: 2019-11-24 | Stop reason: SDUPTHER

## 2019-05-20 RX ORDER — FLUTICASONE PROPIONATE 50 MCG
SPRAY, SUSPENSION (ML) NASAL
Qty: 16 ML | Refills: 11 | Status: SHIPPED | OUTPATIENT
Start: 2019-05-20 | End: 2019-06-20 | Stop reason: SDUPTHER

## 2019-06-20 ENCOUNTER — OFFICE VISIT (OUTPATIENT)
Dept: OTOLARYNGOLOGY | Facility: CLINIC | Age: 64
End: 2019-06-20

## 2019-06-20 VITALS
TEMPERATURE: 97.1 F | SYSTOLIC BLOOD PRESSURE: 140 MMHG | HEIGHT: 73 IN | BODY MASS INDEX: 39.89 KG/M2 | DIASTOLIC BLOOD PRESSURE: 70 MMHG | WEIGHT: 301 LBS

## 2019-06-20 DIAGNOSIS — J30.89 ALLERGIC RHINITIS DUE TO OTHER ALLERGIC TRIGGER, UNSPECIFIED SEASONALITY: Primary | ICD-10-CM

## 2019-06-20 PROCEDURE — 99213 OFFICE O/P EST LOW 20 MIN: CPT | Performed by: PHYSICIAN ASSISTANT

## 2019-06-20 RX ORDER — FLUTICASONE PROPIONATE 50 MCG
2 SPRAY, SUSPENSION (ML) NASAL DAILY
Qty: 16 ML | Refills: 11 | Status: SHIPPED | OUTPATIENT
Start: 2019-06-20 | End: 2020-07-06

## 2019-06-20 NOTE — PROGRESS NOTES
YOB: 1955  Location: [facility]  Location Address: [address] [place] [address], [facility], [address] [place] [address]  Location Phone: [phone]    Chief Complaint   Patient presents with   • Follow-up     sinus       History of Present Illness  George Wilder is a 62 y.o. male.  George Wilder is here for follow up of ENT complaints. The patient has had problems with nasal drainage, nasal congestion, postnasal drip and allergy  The symptoms are not localized to a particular location. The patient has had stable symptoms. The symptoms have beenstable for the last year. The symptoms are aggravated by  allergy. The symptoms are improved by medications.     The patient had sinus surgery on 17.       Past Medical History:   Diagnosis Date   • Allergic rhinitis due to allergen 2017   • Arthritis    • Back pain    • CHF (congestive heart failure) (CMS/HCC)    • Chronic ethmoidal sinusitis 2017   • Chronic maxillary sinusitis 2017   • Coronary artery disease     stent LAD, Cx, RCA    • GERD (gastroesophageal reflux disease)    • Heart attack (CMS/HCC)     Dr Stiles    • Heartburn    • Hyperlipidemia    • Hypertension    • Myocardial infarction (CMS/HCC)    • Pain     back, & Neck        Past Surgical History:   Procedure Laterality Date   • ANKLE SURGERY     • BACK SURGERY     • CARDIAC CATHETERIZATION  ,    • CHOLECYSTECTOMY      Lararocopic    • ENDOSCOPIC FUNCTIONAL SINUS SURGERY (FESS) N/A 2017    Procedure: ENDOSCOPIC FUNCTIONAL SINUS SURGERY WITH LEFT MIDDLE MEATAL ANTROSTOMY AND LEFT ETHMOIDECTOMY;  Surgeon: Jona Garibay MD;  Location: Health system;  Service:    • ETHMOIDECTOMY Left 2017    Procedure: ETHMOIDECTOMY ENDOSCOPIC;  Surgeon: Jona Garibay MD;  Location: [facility] OR;  Service:    • EYE SURGERY      LASIK   • HAND SURGERY     • SINUS SURGERY     • TOTAL HIP ARTHROPLASTY Bilateral        Outpatient Medications Marked as Taking for the 19  encounter (Office Visit) with Michael Curtis PA   Medication Sig Dispense Refill   • aspirin 81 MG EC tablet Take 81 mg by mouth Daily.     • azelastine (ASTELIN) 0.1 % nasal spray INHALE 2 SPRAYS INTO EACH NOSTRIL TWICE A DAY (Patient taking differently: Using PRN) 1 each 5   • celecoxib (CeleBREX) 200 MG capsule Take 200 mg by mouth Daily.     • Cholecalciferol (VITAMIN D3) 5000 UNITS capsule capsule Take 5,000 Units by mouth Daily.     • fluticasone (FLONASE) 50 MCG/ACT nasal spray 2 sprays by Each Nare route Daily. 16 mL 11   • gabapentin (NEURONTIN) 300 MG capsule Take 300 mg by mouth 3 (Three) Times a Day.     • HYDROcodone-acetaminophen (NORCO)  MG per tablet Take 1 tablet by mouth 2 (Two) Times a Day.     • metoprolol tartrate (LOPRESSOR) 50 MG tablet TAKE 1 TABLET BY MOUTH EVERYDAY AT BEDTIME 90 tablet 3   • Multiple Vitamins-Minerals (MULTIVITAMIN WITH MINERALS) tablet tablet Take 1 tablet by mouth Daily.     • mupirocin (BACTROBAN) 2 % ointment APPLY INTO EACH NOSTRIL TWICE A DAY as needed  0   • omeprazole (priLOSEC) 20 MG capsule Take 20 mg by mouth Daily.     • simvastatin (ZOCOR) 40 MG tablet TAKE 1 TABLET BY MOUTH EVERY NIGHT. 90 tablet 3   • tiZANidine (ZANAFLEX) 4 MG tablet daily  2   • vitamin C (ASCORBIC ACID) 500 MG tablet Take 500 mg by mouth Daily.     • [DISCONTINUED] fluticasone (FLONASE) 50 MCG/ACT nasal spray SPRAY 2 SPRAYS INTO EACH NOSTRIL DAILY 16 mL 11       Patient has no known allergies.    Family History   Problem Relation Age of Onset   • No Known Problems Mother    • Cancer Father    • Lung cancer Father         metastatic adenocarcinoma of lung   • No Known Problems Brother    • Cancer Brother        Social History     Socioeconomic History   • Marital status:      Spouse name: Not on file   • Number of children: Not on file   • Years of education: Not on file   • Highest education level: Not on file   Tobacco Use   • Smoking status: Former Smoker     Last  attempt to quit:      Years since quittin.4   • Smokeless tobacco: Never Used   Substance and Sexual Activity   • Alcohol use: No   • Drug use: No   • Sexual activity: Defer       Review of Systems   Constitutional: Negative for activity change, appetite change, chills, diaphoresis, fatigue, fever and unexpected weight change.   HENT: Negative for congestion, ear discharge, ear pain, facial swelling, hearing loss, mouth sores, nosebleeds, postnasal drip, rhinorrhea, sinus pressure, sneezing, sore throat, tinnitus, trouble swallowing and voice change.    Eyes: Negative for pain, discharge, redness, itching and visual disturbance.   Respiratory: Negative for apnea, cough, choking, chest tightness, shortness of breath, wheezing and stridor.    Gastrointestinal: Negative for nausea and vomiting.   Endocrine: Negative for cold intolerance and heat intolerance.   Musculoskeletal: Negative for arthralgias, back pain, gait problem, neck pain and neck stiffness.   Skin: Negative for rash.   Allergic/Immunologic: Positive for environmental allergies. Negative for food allergies.   Neurological: Negative for dizziness, tremors, seizures, syncope, facial asymmetry, speech difficulty, weakness, light-headedness, numbness and headaches.   Hematological: Negative for adenopathy. Does not bruise/bleed easily.   Psychiatric/Behavioral: Negative for behavioral problems, sleep disturbance and suicidal ideas. The patient is not nervous/anxious and is not hyperactive.        Vitals:    19 1355   BP: 140/70   Temp: 97.1 °F (36.2 °C)       Body mass index is 40.24 kg/m².    Objective     Physical Exam  CONSTITUTIONAL: well nourished, alert, oriented, in no acute distress     COMMUNICATION AND VOICE: able to communicate normally, normal voice quality    HEAD: normocephalic, no lesions, atraumatic, no tenderness, no masses     FACE: appearance normal, no lesions, no tenderness, no deformities, facial motion symmetric    EYES:  ocular motility normal, eyelids normal, orbits normal, no proptosis, conjunctiva normal , pupils equal, round     EARS:  Hearing: response to conversational voice normal bilaterally   External Ears: auricles without lesions  Otoscopic: tympanic membrane appearance normal, no lesions, no perforation, normal mobility, no fluid    NOSE:  External Nose: structure normal, no tenderness on palpation, no nasal discharge, no lesions, no evidence of trauma, nostrils patent   Intranasal Exam: nasal mucosa normal, vestibule within normal limits, inferior turbinate normal, nasal septum midline     ORAL:  Lips: upper and lower lips without lesion   Teeth: dentition within normal limits for age   Gums: gingivae healthy   Oral Mucosa: oral mucosa normal, no mucosal lesions   Floor of Mouth: Warthin’s duct patent, mucosa normal  Tongue: lingual mucosa normal without lesions, normal tongue mobility   Palate: soft and hard palates with normal mucosa and structure  Oropharynx: oropharyngeal mucosa normal    NECK: neck appearance normal    CHEST/RESPIRATORY: respiratory effort normal, normal breath sounds     CARDIOVASCULAR: rate and rhythm normal, extremities without cyanosis or edema      NEUROLOGIC/PSYCHIATRIC: oriented to time, place and person, mood normal, affect appropriate, CN II-XII intact grossly    Assessment/Plan   Problems Addressed this Visit        Respiratory    Allergic rhinitis due to allergen - Primary    Relevant Medications    fluticasone (FLONASE) 50 MCG/ACT nasal spray        * Surgery not found *  No orders of the defined types were placed in this encounter.    Return in about 1 year (around 6/20/2020) for Recheck sinus .       Patient Instructions   Continue medications as directed, recheck in one year. If symptoms develop call for sooner appointment.

## 2019-06-20 NOTE — PATIENT INSTRUCTIONS
Continue medications as directed, recheck in one year. If symptoms develop call for sooner appointment.

## 2019-07-09 ENCOUNTER — OFFICE VISIT (OUTPATIENT)
Dept: CARDIOLOGY | Facility: CLINIC | Age: 64
End: 2019-07-09

## 2019-07-09 VITALS
SYSTOLIC BLOOD PRESSURE: 118 MMHG | RESPIRATION RATE: 18 BRPM | WEIGHT: 300 LBS | OXYGEN SATURATION: 97 % | HEIGHT: 74 IN | HEART RATE: 55 BPM | DIASTOLIC BLOOD PRESSURE: 75 MMHG | BODY MASS INDEX: 38.5 KG/M2

## 2019-07-09 DIAGNOSIS — I25.10 CORONARY ARTERY DISEASE INVOLVING NATIVE CORONARY ARTERY OF NATIVE HEART WITHOUT ANGINA PECTORIS: Primary | ICD-10-CM

## 2019-07-09 DIAGNOSIS — E78.2 MIXED HYPERLIPIDEMIA: ICD-10-CM

## 2019-07-09 DIAGNOSIS — I10 ESSENTIAL HYPERTENSION: ICD-10-CM

## 2019-07-09 DIAGNOSIS — M51.36 LUMBAR DEGENERATIVE DISC DISEASE: ICD-10-CM

## 2019-07-09 PROBLEM — M54.9 CHRONIC BACK PAIN: Status: ACTIVE | Noted: 2019-07-09

## 2019-07-09 PROBLEM — G89.29 CHRONIC BACK PAIN: Status: ACTIVE | Noted: 2019-07-09

## 2019-07-09 PROCEDURE — 93000 ELECTROCARDIOGRAM COMPLETE: CPT | Performed by: NURSE PRACTITIONER

## 2019-07-09 PROCEDURE — 99214 OFFICE O/P EST MOD 30 MIN: CPT | Performed by: NURSE PRACTITIONER

## 2019-07-09 NOTE — PROGRESS NOTES
Subjective:     Encounter Date:07/09/2019      Patient ID: George Wilder is a 63 y.o. male.    Chief Complaint:  Coronary Artery Disease   Presents for follow-up visit. Pertinent negatives include no chest pain, chest pressure, chest tightness, dizziness, leg swelling, muscle weakness, palpitations, shortness of breath or weight gain. Risk factors include hyperlipidemia. The symptoms have been stable. Compliance with diet is good. Compliance with exercise is good. Compliance with medications is good.   Hypertension   This is a chronic problem. The current episode started more than 1 year ago. The problem is controlled. Pertinent negatives include no chest pain, headaches, malaise/fatigue, orthopnea, palpitations, PND or shortness of breath. Current antihypertension treatment includes beta blockers. The current treatment provides significant improvement.   Hyperlipidemia   This is a chronic problem. The current episode started more than 1 year ago. The problem is controlled. Recent lipid tests were reviewed and are normal. Pertinent negatives include no chest pain or shortness of breath. Current antihyperlipidemic treatment includes statins. The current treatment provides significant improvement of lipids. There are no compliance problems.      Patient presents today for routine follow up for coronary artery disease. Overall patient has been doing well. He has chronic back pain- following with pain management. Had MRI yesterday- possible upcoming surgery. Patient has known coronary artery disease with  of RCA, and LAD disease- treating medically. Last cath in 1994. He states he is active and only limitation is back pain. Denies chest pain, shortness of breath, palpitations, leg swelling. Follows with Dr. Vu as PCP. Last LDL was 64. Former smoker.   The following portions of the patient's history were reviewed and updated as appropriate: allergies, current medications, past family history, past medical  history, past social history, past surgical history and problem list.   Prior to Admission medications    Medication Sig Start Date End Date Taking? Authorizing Provider   aspirin 81 MG EC tablet Take 81 mg by mouth Daily.   Yes Bassam Ponce MD   azelastine (ASTELIN) 0.1 % nasal spray INHALE 2 SPRAYS INTO EACH NOSTRIL TWICE A DAY  Patient taking differently: Using PRN 5/8/18  Yes Michael Curtis PA   celecoxib (CeleBREX) 200 MG capsule Take 200 mg by mouth Daily.   Yes Bassam Ponce MD   Cholecalciferol (VITAMIN D3) 5000 UNITS capsule capsule Take 5,000 Units by mouth Daily.   Yes Bassam Ponce MD   fluticasone (FLONASE) 50 MCG/ACT nasal spray 2 sprays by Each Nare route Daily. 6/20/19  Yes Michael Curtis PA   gabapentin (NEURONTIN) 300 MG capsule Take 300 mg by mouth 3 (Three) Times a Day.   Yes Bassam Ponce MD   HYDROcodone-acetaminophen (NORCO)  MG per tablet Take 1 tablet by mouth 2 (Two) Times a Day.   Yes Bassam Ponce MD   metoprolol tartrate (LOPRESSOR) 50 MG tablet TAKE 1 TABLET BY MOUTH EVERYDAY AT BEDTIME 1/4/19  Yes Carl Vu MD   Multiple Vitamins-Minerals (MULTIVITAMIN WITH MINERALS) tablet tablet Take 1 tablet by mouth Daily.   Yes Bassam Ponce MD   mupirocin (BACTROBAN) 2 % ointment APPLY INTO EACH NOSTRIL TWICE A DAY as needed 6/12/17  Yes Bassam Ponce MD   omeprazole (priLOSEC) 20 MG capsule Take 20 mg by mouth Daily.   Yes Bassam Ponce MD   simvastatin (ZOCOR) 40 MG tablet TAKE 1 TABLET BY MOUTH EVERY NIGHT. 12/10/18  Yes Carl Vu MD   tiZANidine (ZANAFLEX) 4 MG tablet daily 4/17/17  Yes Bassam Ponce MD   vitamin C (ASCORBIC ACID) 500 MG tablet Take 500 mg by mouth Daily.   Yes Bassam Ponce MD     Past Medical History:   Diagnosis Date   • Allergic rhinitis due to allergen 8/8/2017   • Arthritis    • Back pain    • CHF (congestive heart failure) (CMS/[facility])    •  Chronic ethmoidal sinusitis 5/18/2017   • Chronic maxillary sinusitis 5/18/2017   • Coronary artery disease 1984    stent LAD, Cx, RCA    • GERD (gastroesophageal reflux disease)    • Heart attack (CMS/HCC) 1994    Dr Stiles    • Heartburn    • Hyperlipidemia    • Hypertension    • Myocardial infarction (CMS/HCC)    • Pain     back, & Neck        Review of Systems   Constitution: Negative for chills, decreased appetite, fever, malaise/fatigue, weight gain and weight loss.   HENT: Negative for nosebleeds.    Eyes: Negative for visual disturbance.   Cardiovascular: Negative for chest pain, dyspnea on exertion, leg swelling, near-syncope, orthopnea, palpitations, paroxysmal nocturnal dyspnea and syncope.   Respiratory: Negative for chest tightness, cough, hemoptysis, shortness of breath and snoring.    Endocrine: Negative for cold intolerance and heat intolerance.   Hematologic/Lymphatic: Negative for bleeding problem. Does not bruise/bleed easily.   Skin: Negative for rash.   Musculoskeletal: Positive for back pain. Negative for falls and muscle weakness.   Gastrointestinal: Negative for abdominal pain, constipation, diarrhea, heartburn, melena, nausea and vomiting.   Genitourinary: Negative for hematuria.   Neurological: Negative for dizziness, headaches and light-headedness.   Psychiatric/Behavioral: Negative for altered mental status.   Allergic/Immunologic: Negative for persistent infections.         ECG 12 Lead  Date/Time: 7/9/2019 10:10 AM  Performed by: Jason Fink APRN  Authorized by: Jason Fink APRN   Comparison: compared with previous ECG from 4/10/2018  Similar to previous ECG  Rhythm: sinus bradycardia               Objective:     Physical Exam   Constitutional: He is oriented to person, place, and time. He appears well-developed and well-nourished.   HENT:   Head: Normocephalic and atraumatic.   Eyes: Pupils are equal, round, and reactive to light.   Neck: Normal range of motion. Neck supple. No  "JVD present. Carotid bruit is not present.   Cardiovascular: Normal rate, regular rhythm, normal heart sounds and intact distal pulses.   Pulmonary/Chest: Effort normal and breath sounds normal.   Abdominal: Soft. Bowel sounds are normal.   Musculoskeletal: Normal range of motion.   Neurological: He is alert and oriented to person, place, and time. He has normal reflexes.   Skin: Skin is warm and dry.   Psychiatric: He has a normal mood and affect. His behavior is normal. Judgment and thought content normal.     Blood pressure 118/75, pulse 55, resp. rate 18, height 188 cm (74\"), weight 136 kg (300 lb), SpO2 97 %.      Lab Review:       Assessment:          Diagnosis Plan   1. Coronary artery disease involving native coronary artery of native heart without angina pectoris     2. Mixed hyperlipidemia     3. Essential hypertension     4. BMI 38.0-38.9,adult     5. Lumbar degenerative disc disease            Plan:       1. Coronary Artery Disease- with LAD 70%, left circ 60% and  to RCA on cath in 1994- treated medically. No angina. On aspirin, statin and lopressor.  2. Mixed Hyperlipidemia- on statin- LDL at goal of less than 70  3. Blood Pressure well controlled  4. Patient's Body mass index is 38.52 kg/m². BMI is above normal parameters. Recommendations include: exercise counseling and nutrition counseling.  5. Chronic back pain- follows with pain management. Previous surgery. Had MRI yesterday- he anticipates may need surgery. He is to call for surgical clearance once a plan is in place.          "

## 2019-08-16 ENCOUNTER — OFFICE VISIT (OUTPATIENT)
Dept: FAMILY MEDICINE CLINIC | Facility: CLINIC | Age: 64
End: 2019-08-16

## 2019-08-16 VITALS
WEIGHT: 306 LBS | TEMPERATURE: 98.1 F | DIASTOLIC BLOOD PRESSURE: 80 MMHG | BODY MASS INDEX: 39.27 KG/M2 | SYSTOLIC BLOOD PRESSURE: 130 MMHG | HEIGHT: 74 IN | HEART RATE: 75 BPM | OXYGEN SATURATION: 98 %

## 2019-08-16 DIAGNOSIS — J06.9 ACUTE URI: Primary | ICD-10-CM

## 2019-08-16 PROCEDURE — 99214 OFFICE O/P EST MOD 30 MIN: CPT | Performed by: FAMILY MEDICINE

## 2019-08-16 PROCEDURE — 96372 THER/PROPH/DIAG INJ SC/IM: CPT | Performed by: FAMILY MEDICINE

## 2019-08-16 RX ORDER — METHYLPREDNISOLONE ACETATE 40 MG/ML
40 INJECTION, SUSPENSION INTRA-ARTICULAR; INTRALESIONAL; INTRAMUSCULAR; SOFT TISSUE ONCE
Status: COMPLETED | OUTPATIENT
Start: 2019-08-16 | End: 2019-08-16

## 2019-08-16 RX ORDER — AZITHROMYCIN 500 MG/1
500 TABLET, FILM COATED ORAL DAILY
Qty: 3 TABLET | Refills: 0 | Status: SHIPPED | OUTPATIENT
Start: 2019-08-16 | End: 2019-08-19

## 2019-08-16 RX ORDER — PROMETHAZINE HYDROCHLORIDE AND CODEINE PHOSPHATE 6.25; 1 MG/5ML; MG/5ML
5 SYRUP ORAL EVERY 4 HOURS PRN
Qty: 240 ML | Refills: 0 | Status: SHIPPED | OUTPATIENT
Start: 2019-08-16 | End: 2019-10-09

## 2019-08-16 RX ADMIN — METHYLPREDNISOLONE ACETATE 40 MG: 40 INJECTION, SUSPENSION INTRA-ARTICULAR; INTRALESIONAL; INTRAMUSCULAR; SOFT TISSUE at 13:42

## 2019-08-16 NOTE — PROGRESS NOTES
Subjective   George Wilder is a 63 y.o. male.     63-year-old male with acute URI      URI    This is a new problem. The current episode started in the past 7 days. The problem has been unchanged. There has been no fever. Associated symptoms include congestion, coughing, rhinorrhea and sinus pain. Pertinent negatives include no rash. He has tried antihistamine for the symptoms. The treatment provided no relief.       The following portions of the patient's history were reviewed and updated as appropriate: allergies, current medications, past family history, past medical history, past social history, past surgical history and problem list.    Review of Systems   HENT: Positive for congestion, rhinorrhea and sinus pain.    Respiratory: Positive for cough.    Skin: Negative for rash.       Objective   Physical Exam   Constitutional: He is oriented to person, place, and time.   Morbidly obese   HENT:   Right Ear: External ear normal.   Left Ear: External ear normal.   Mouth/Throat: Oropharyngeal exudate present.   Cardiovascular: Normal rate and regular rhythm.   Pulmonary/Chest: Effort normal and breath sounds normal.   Musculoskeletal: He exhibits no edema.   Lymphadenopathy:     He has no cervical adenopathy.   Neurological: He is alert and oriented to person, place, and time.   Psychiatric: He has a normal mood and affect. His behavior is normal. Judgment and thought content normal.   Nursing note and vitals reviewed.      Assessment/Plan   George was seen today for cough and nasal congestion.    Diagnoses and all orders for this visit:    Acute URI  -     methylPREDNISolone acetate (DEPO-medrol) injection 40 mg    Other orders  -     azithromycin (ZITHROMAX) 500 MG tablet; Take 1 tablet by mouth Daily for 3 days. Start now today  -     promethazine-codeine (PHENERGAN with CODEINE) 6.25-10 MG/5ML syrup; Take 5 mL by mouth Every 4 (Four) Hours As Needed for Cough.         Plan-above plus Zyrtec and Astelin

## 2019-10-08 ENCOUNTER — FLU SHOT (OUTPATIENT)
Dept: FAMILY MEDICINE CLINIC | Facility: CLINIC | Age: 64
End: 2019-10-08

## 2019-10-08 DIAGNOSIS — Z23 NEED FOR INFLUENZA VACCINATION: Primary | ICD-10-CM

## 2019-10-08 PROCEDURE — 90674 CCIIV4 VAC NO PRSV 0.5 ML IM: CPT | Performed by: FAMILY MEDICINE

## 2019-10-08 PROCEDURE — 90471 IMMUNIZATION ADMIN: CPT | Performed by: FAMILY MEDICINE

## 2019-10-09 ENCOUNTER — OFFICE VISIT (OUTPATIENT)
Dept: FAMILY MEDICINE CLINIC | Facility: CLINIC | Age: 64
End: 2019-10-09

## 2019-10-09 VITALS
HEIGHT: 74 IN | TEMPERATURE: 97.9 F | HEART RATE: 63 BPM | BODY MASS INDEX: 38.58 KG/M2 | DIASTOLIC BLOOD PRESSURE: 84 MMHG | WEIGHT: 300.6 LBS | OXYGEN SATURATION: 98 % | SYSTOLIC BLOOD PRESSURE: 127 MMHG

## 2019-10-09 DIAGNOSIS — N23 RENAL COLIC: Primary | ICD-10-CM

## 2019-10-09 DIAGNOSIS — R31.0 GROSS HEMATURIA: ICD-10-CM

## 2019-10-09 LAB
BILIRUB BLD-MCNC: NEGATIVE MG/DL
CLARITY, POC: CLEAR
COLOR UR: YELLOW
GLUCOSE UR STRIP-MCNC: NEGATIVE MG/DL
KETONES UR QL: NEGATIVE
LEUKOCYTE EST, POC: NEGATIVE
NITRITE UR-MCNC: NEGATIVE MG/ML
PH UR: 5 [PH] (ref 5–8)
PROT UR STRIP-MCNC: NEGATIVE MG/DL
RBC # UR STRIP: ABNORMAL /UL
SP GR UR: 1.02 (ref 1–1.03)
UROBILINOGEN UR QL: NORMAL

## 2019-10-09 PROCEDURE — 81003 URINALYSIS AUTO W/O SCOPE: CPT | Performed by: FAMILY MEDICINE

## 2019-10-09 PROCEDURE — 99214 OFFICE O/P EST MOD 30 MIN: CPT | Performed by: FAMILY MEDICINE

## 2019-10-09 NOTE — PROGRESS NOTES
Subjective   George Wilder is a 64 y.o. male.     64-year-old male with back pain suggestive of renal colic and microscopic hematuria      Pain   This is a new problem. The current episode started in the past 7 days. The problem occurs constantly. The problem has been gradually improving. Pertinent negatives include no abdominal pain or chest pain. Nothing aggravates the symptoms. He has tried oral narcotics for the symptoms. The treatment provided mild relief.       The following portions of the patient's history were reviewed and updated as appropriate: allergies, current medications, past family history, past medical history, past social history, past surgical history and problem list.    Review of Systems   Cardiovascular: Negative for chest pain.   Gastrointestinal: Positive for abdominal distention. Negative for abdominal pain.   Genitourinary: Negative for difficulty urinating.       Objective   Physical Exam   Constitutional: He is oriented to person, place, and time.   Cardiovascular: Normal rate and regular rhythm.   Pulmonary/Chest: Effort normal and breath sounds normal.   Abdominal: Soft.   Musculoskeletal:   No CVA tenderness   Neurological: He is alert and oriented to person, place, and time.   Psychiatric: He has a normal mood and affect. His behavior is normal. Judgment and thought content normal.   Nursing note and vitals reviewed.      Assessment/Plan   Patient Active Problem List   Diagnosis   • Ischemic heart disease due to coronary artery obstruction (CMS/HCC)   • Morbid obesity (CMS/HCC)   • Mixed hyperlipidemia   • Lumbar degenerative disc disease   • CAD (coronary artery disease)   • HTN (hypertension)   • Chronic maxillary sinusitis   • Chronic ethmoidal sinusitis   • S/P sinus surgery   • Allergic rhinitis due to allergen   • Obesity, Class III, BMI 40-49.9 (morbid obesity) (CMS/HCC)   • BMI 38.0-38.9,adult   • Chronic back pain     George was seen today for back pain.    Diagnoses and all  orders for this visit:    Renal colic  -     POC Urinalysis Dipstick, Multipro  -     US Retroperitoneal Complete; Future    Gross hematuria  -     US Retroperitoneal Complete; Future       Return if symptoms worsen or fail to improve, for Annual physical.    Plan-suggestive of renal colic-ultrasound in the morning-strain urine-complete physical return         Electronically signed by Carl Vu MD 10/09/2019

## 2019-10-11 ENCOUNTER — TELEPHONE (OUTPATIENT)
Dept: FAMILY MEDICINE CLINIC | Facility: CLINIC | Age: 64
End: 2019-10-11

## 2019-10-11 NOTE — TELEPHONE ENCOUNTER
PT IS CALLING FOR XRAY RESULTS-STATES HE HAD SOMETHING DONE YESTERDAY FOR KIDNEY STONES.    PLEASE ADVISE. RESULTS IN Epic-JUST DIDN'T SEE RESULT NOTE.

## 2019-10-14 NOTE — TELEPHONE ENCOUNTER
I called the patient-pain is gone-observation illness comes back-if comes back will need a CT of abdomen pelvis

## 2019-10-28 RX ORDER — SIMVASTATIN 40 MG
TABLET ORAL
Qty: 30 TABLET | Refills: 3 | Status: SHIPPED | OUTPATIENT
Start: 2019-10-28 | End: 2020-06-10

## 2019-11-05 ENCOUNTER — LAB (OUTPATIENT)
Dept: FAMILY MEDICINE CLINIC | Facility: CLINIC | Age: 64
End: 2019-11-05

## 2019-11-05 DIAGNOSIS — E55.9 VITAMIN D DEFICIENCY: ICD-10-CM

## 2019-11-05 DIAGNOSIS — Z00.00 ANNUAL PHYSICAL EXAM: Primary | ICD-10-CM

## 2019-11-05 DIAGNOSIS — Z12.5 SCREENING PSA (PROSTATE SPECIFIC ANTIGEN): ICD-10-CM

## 2019-11-06 LAB
25(OH)D3+25(OH)D2 SERPL-MCNC: 54.5 NG/ML (ref 30–100)
ALBUMIN SERPL-MCNC: 4.2 G/DL (ref 3.5–5.2)
ALBUMIN/GLOB SERPL: 1.8 G/DL
ALP SERPL-CCNC: 48 U/L (ref 39–117)
ALT SERPL-CCNC: 21 U/L (ref 1–41)
AST SERPL-CCNC: 19 U/L (ref 1–40)
BASOPHILS # BLD AUTO: 0.04 10*3/MM3 (ref 0–0.2)
BASOPHILS NFR BLD AUTO: 0.8 % (ref 0–1.5)
BILIRUB SERPL-MCNC: 1 MG/DL (ref 0.2–1.2)
BUN SERPL-MCNC: 18 MG/DL (ref 8–23)
BUN/CREAT SERPL: 18.6 (ref 7–25)
CALCIUM SERPL-MCNC: 9.2 MG/DL (ref 8.6–10.5)
CHLORIDE SERPL-SCNC: 102 MMOL/L (ref 98–107)
CHOLEST SERPL-MCNC: 146 MG/DL (ref 0–200)
CO2 SERPL-SCNC: 25.9 MMOL/L (ref 22–29)
CREAT SERPL-MCNC: 0.97 MG/DL (ref 0.76–1.27)
EOSINOPHIL # BLD AUTO: 0.16 10*3/MM3 (ref 0–0.4)
EOSINOPHIL NFR BLD AUTO: 3.2 % (ref 0.3–6.2)
ERYTHROCYTE [DISTWIDTH] IN BLOOD BY AUTOMATED COUNT: 12.7 % (ref 12.3–15.4)
GLOBULIN SER CALC-MCNC: 2.4 GM/DL
GLUCOSE SERPL-MCNC: 98 MG/DL (ref 65–99)
HCT VFR BLD AUTO: 46.6 % (ref 37.5–51)
HDLC SERPL-MCNC: 47 MG/DL (ref 40–60)
HGB BLD-MCNC: 15.8 G/DL (ref 13–17.7)
IMM GRANULOCYTES # BLD AUTO: 0.01 10*3/MM3 (ref 0–0.05)
IMM GRANULOCYTES NFR BLD AUTO: 0.2 % (ref 0–0.5)
LDLC SERPL CALC-MCNC: 72 MG/DL (ref 0–100)
LYMPHOCYTES # BLD AUTO: 1.7 10*3/MM3 (ref 0.7–3.1)
LYMPHOCYTES NFR BLD AUTO: 33.7 % (ref 19.6–45.3)
MCH RBC QN AUTO: 31.2 PG (ref 26.6–33)
MCHC RBC AUTO-ENTMCNC: 33.9 G/DL (ref 31.5–35.7)
MCV RBC AUTO: 91.9 FL (ref 79–97)
MONOCYTES # BLD AUTO: 0.41 10*3/MM3 (ref 0.1–0.9)
MONOCYTES NFR BLD AUTO: 8.1 % (ref 5–12)
NEUTROPHILS # BLD AUTO: 2.73 10*3/MM3 (ref 1.7–7)
NEUTROPHILS NFR BLD AUTO: 54 % (ref 42.7–76)
NRBC BLD AUTO-RTO: 0 /100 WBC (ref 0–0.2)
PLATELET # BLD AUTO: 191 10*3/MM3 (ref 140–450)
POTASSIUM SERPL-SCNC: 4.8 MMOL/L (ref 3.5–5.2)
PROT SERPL-MCNC: 6.6 G/DL (ref 6–8.5)
PSA SERPL-MCNC: 0.89 NG/ML (ref 0–4)
RBC # BLD AUTO: 5.07 10*6/MM3 (ref 4.14–5.8)
SODIUM SERPL-SCNC: 141 MMOL/L (ref 136–145)
T4 FREE SERPL-MCNC: 1.11 NG/DL (ref 0.93–1.7)
TRIGL SERPL-MCNC: 134 MG/DL (ref 0–150)
TSH SERPL DL<=0.005 MIU/L-ACNC: 1.43 UIU/ML (ref 0.27–4.2)
VLDLC SERPL CALC-MCNC: 26.8 MG/DL
WBC # BLD AUTO: 5.05 10*3/MM3 (ref 3.4–10.8)

## 2019-11-25 RX ORDER — METOPROLOL TARTRATE 50 MG/1
TABLET, FILM COATED ORAL
Qty: 30 TABLET | Refills: 0 | Status: SHIPPED | OUTPATIENT
Start: 2019-11-25 | End: 2019-12-26

## 2019-12-06 ENCOUNTER — OFFICE VISIT (OUTPATIENT)
Dept: FAMILY MEDICINE CLINIC | Facility: CLINIC | Age: 64
End: 2019-12-06

## 2019-12-06 VITALS
HEART RATE: 69 BPM | WEIGHT: 307 LBS | HEIGHT: 74 IN | DIASTOLIC BLOOD PRESSURE: 88 MMHG | OXYGEN SATURATION: 97 % | BODY MASS INDEX: 39.4 KG/M2 | SYSTOLIC BLOOD PRESSURE: 145 MMHG | TEMPERATURE: 97.2 F

## 2019-12-06 DIAGNOSIS — Z12.12 SCREENING FOR COLORECTAL CANCER: Primary | ICD-10-CM

## 2019-12-06 DIAGNOSIS — Z12.11 SCREENING FOR COLORECTAL CANCER: Primary | ICD-10-CM

## 2019-12-06 DIAGNOSIS — Z00.00 ANNUAL PHYSICAL EXAM: ICD-10-CM

## 2019-12-06 DIAGNOSIS — I10 ESSENTIAL HYPERTENSION: ICD-10-CM

## 2019-12-06 DIAGNOSIS — Z23 NEED FOR PROPHYLACTIC VACCINATION AGAINST STREPTOCOCCUS PNEUMONIAE (PNEUMOCOCCUS): ICD-10-CM

## 2019-12-06 LAB
BILIRUB BLD-MCNC: NEGATIVE MG/DL
CLARITY, POC: CLEAR
COLOR UR: YELLOW
GLUCOSE UR STRIP-MCNC: NEGATIVE MG/DL
KETONES UR QL: NEGATIVE
LEUKOCYTE EST, POC: NEGATIVE
NITRITE UR-MCNC: NEGATIVE MG/ML
PH UR: 5.5 [PH] (ref 5–8)
PROT UR STRIP-MCNC: NEGATIVE MG/DL
RBC # UR STRIP: ABNORMAL /UL
SP GR UR: 1.02 (ref 1–1.03)
UROBILINOGEN UR QL: NORMAL

## 2019-12-06 PROCEDURE — 81003 URINALYSIS AUTO W/O SCOPE: CPT | Performed by: FAMILY MEDICINE

## 2019-12-06 PROCEDURE — 99396 PREV VISIT EST AGE 40-64: CPT | Performed by: FAMILY MEDICINE

## 2019-12-06 PROCEDURE — 90471 IMMUNIZATION ADMIN: CPT | Performed by: FAMILY MEDICINE

## 2019-12-06 PROCEDURE — 90670 PCV13 VACCINE IM: CPT | Performed by: FAMILY MEDICINE

## 2019-12-06 NOTE — PATIENT INSTRUCTIONS
Exercising to Stay Healthy  To become healthy and stay healthy, it is recommended that you do moderate-intensity and vigorous-intensity exercise. You can tell that you are exercising at a moderate intensity if your heart starts beating faster and you start breathing faster but can still hold a conversation. You can tell that you are exercising at a vigorous intensity if you are breathing much harder and faster and cannot hold a conversation while exercising.  Exercising regularly is important. It has many health benefits, such as:  · Improving overall fitness, flexibility, and endurance.  · Increasing bone density.  · Helping with weight control.  · Decreasing body fat.  · Increasing muscle strength.  · Reducing stress and tension.  · Improving overall health.  How often should I exercise?  Choose an activity that you enjoy, and set realistic goals. Your health care provider can help you make an activity plan that works for you.  Exercise regularly as told by your health care provider. This may include:  · Doing strength training two times a week, such as:  ? Lifting weights.  ? Using resistance bands.  ? Push-ups.  ? Sit-ups.  ? Yoga.  · Doing a certain intensity of exercise for a given amount of time. Choose from these options:  ? A total of 150 minutes of moderate-intensity exercise every week.  ? A total of 75 minutes of vigorous-intensity exercise every week.  ? A mix of moderate-intensity and vigorous-intensity exercise every week.  Children, pregnant women, people who have not exercised regularly, people who are overweight, and older adults may need to talk with a health care provider about what activities are safe to do. If you have a medical condition, be sure to talk with your health care provider before you start a new exercise program.  What are some exercise ideas?  Moderate-intensity exercise ideas include:  · Walking 1 mile (1.6 km) in about 15  minutes.  · Biking.  · Hiking.  · Golfing.  · Dancing.  · Water aerobics.  Vigorous-intensity exercise ideas include:  · Walking 4.5 miles (7.2 km) or more in about 1 hour.  · Jogging or running 5 miles (8 km) in about 1 hour.  · Biking 10 miles (16.1 km) or more in about 1 hour.  · Lap swimming.  · Roller-skating or in-line skating.  · Cross-country skiing.  · Vigorous competitive sports, such as football, basketball, and soccer.  · Jumping rope.  · Aerobic dancing.  What are some everyday activities that can help me to get exercise?  · Yard work, such as:  ? Pushing a .  ? Raking and bagging leaves.  · Washing your car.  · Pushing a stroller.  · Shoveling snow.  · Gardening.  · Washing windows or floors.  How can I be more active in my day-to-day activities?  · Use stairs instead of an elevator.  · Take a walk during your lunch break.  · If you drive, park your car farther away from your work or school.  · If you take public transportation, get off one stop early and walk the rest of the way.  · Stand up or walk around during all of your indoor phone calls.  · Get up, stretch, and walk around every 30 minutes throughout the day.  · Enjoy exercise with a friend. Support to continue exercising will help you keep a regular routine of activity.  What guidelines can I follow while exercising?  · Before you start a new exercise program, talk with your health care provider.  · Do not exercise so much that you hurt yourself, feel dizzy, or get very short of breath.  · Wear comfortable clothes and wear shoes with good support.  · Drink plenty of water while you exercise to prevent dehydration or heat stroke.  · Work out until your breathing and your heartbeat get faster.  Where to find more information  · [place] Department of Health and Human Services: www.hhs.gov  · Centers for Disease Control and Prevention (CDC): www.cdc.gov  Summary  · Exercising regularly is important. It will improve your overall fitness,  flexibility, and endurance.  · Regular exercise also will improve your overall health. It can help you control your weight, reduce stress, and improve your bone density.  · Do not exercise so much that you hurt yourself, feel dizzy, or get very short of breath.  · Before you start a new exercise program, talk with your health care provider.  This information is not intended to replace advice given to you by your health care provider. Make sure you discuss any questions you have with your health care provider.  Document Released: 01/20/2012 Document Revised: 11/08/2018 Document Reviewed: 11/08/2018  [employer] Interactive Patient Education © 2019 [employer]    Gastroesophageal Reflux Disease, Adult  Gastroesophageal reflux (SRIDEVI) happens when acid from the stomach flows up into the tube that connects the mouth and the stomach (esophagus). Normally, food travels down the esophagus and stays in the stomach to be digested. However, when a person has SRIDEVI, food and stomach acid sometimes move back up into the esophagus. If this becomes a more serious problem, the person may be diagnosed with a disease called gastroesophageal reflux disease (GERD). GERD occurs when the reflux:  · Happens often.  · Causes frequent or severe symptoms.  · Causes problems such as damage to the esophagus.  When stomach acid comes in contact with the esophagus, the acid may cause soreness (inflammation) in the esophagus. Over time, GERD may create small holes (ulcers) in the lining of the esophagus.  What are the causes?  This condition is caused by a problem with the muscle between the esophagus and the stomach (lower esophageal sphincter, or LES). Normally, the LES muscle closes after food passes through the esophagus to the stomach. When the LES is weakened or abnormal, it does not close properly, and that allows food and stomach acid to go back up into the esophagus.  The LES can be weakened by certain dietary substances, medicines, and  medical conditions, including:  · Tobacco use.  · Pregnancy.  · Having a hiatal hernia.  · Alcohol use.  · Certain foods and beverages, such as coffee, chocolate, onions, and peppermint.  What increases the risk?  You are more likely to develop this condition if you:  · Have an increased body weight.  · Have a connective tissue disorder.  · Use NSAID medicines.  What are the signs or symptoms?  Symptoms of this condition include:  · Heartburn.  · Difficult or painful swallowing.  · The feeling of having a lump in the throat.  · A bitter taste in the mouth.  · Bad breath.  · Having a large amount of saliva.  · Having an upset or bloated stomach.  · Belching.  · Chest pain. Different conditions can cause chest pain. Make sure you see your health care provider if you experience chest pain.  · Shortness of breath or wheezing.  · Ongoing (chronic) cough or a night-time cough.  · Wearing away of tooth enamel.  · Weight loss.  How is this diagnosed?  Your health care provider will take a medical history and perform a physical exam. To determine if you have mild or severe GERD, your health care provider may also monitor how you respond to treatment. You may also have tests, including:  · A test to examine your stomach and esophagus with a small camera (endoscopy).  · A test that measures the acidity level in your esophagus.  · A test that measures how much pressure is on your esophagus.  · A barium swallow or modified barium swallow test to show the shape, size, and functioning of your esophagus.  How is this treated?  The goal of treatment is to help relieve your symptoms and to prevent complications. Treatment for this condition may vary depending on how severe your symptoms are. Your health care provider may recommend:  · Changes to your diet.  · Medicine.  · Surgery.  Follow these instructions at home:  Eating and drinking    · Follow a diet as recommended by your health care provider. This may involve avoiding foods  and drinks such as:  ? Coffee and tea (with or without caffeine).  ? Drinks that contain alcohol.  ? Energy drinks and sports drinks.  ? Carbonated drinks or sodas.  ? Chocolate and cocoa.  ? Peppermint and mint flavorings.  ? Garlic and onions.  ? Horseradish.  ? Spicy and acidic foods, including peppers, chili powder, underwood powder, vinegar, hot sauces, and barbecue sauce.  ? Citrus fruit juices and citrus fruits, such as oranges, richardson, and limes.  ? Tomato-based foods, such as red sauce, chili, salsa, and pizza with red sauce.  ? Fried and fatty foods, such as donuts, french fries, potato chips, and high-fat dressings.  ? High-fat meats, such as hot dogs and fatty cuts of red and white meats, such as rib eye steak, sausage, ham, and villanueva.  ? High-fat dairy items, such as whole milk, butter, and cream cheese.  · Eat small, frequent meals instead of large meals.  · Avoid drinking large amounts of liquid with your meals.  · Avoid eating meals during the 2-3 hours before bedtime.  · Avoid lying down right after you eat.  · Do not exercise right after you eat.  Lifestyle    · Do not use any products that contain nicotine or tobacco, such as cigarettes, e-cigarettes, and chewing tobacco. If you need help quitting, ask your health care provider.  · Try to reduce your stress by using methods such as yoga or meditation. If you need help reducing stress, ask your health care provider.  · If you are overweight, reduce your weight to an amount that is healthy for you. Ask your health care provider for guidance about a safe weight loss goal.  General instructions  · Pay attention to any changes in your symptoms.  · Take over-the-counter and prescription medicines only as told by your health care provider. Do not take aspirin, ibuprofen, or other NSAIDs unless your health care provider told you to do so.  · Wear loose-fitting clothing. Do not wear anything tight around your waist that causes pressure on your  abdomen.  · Raise (elevate) the head of your bed about 6 inches (15 cm).  · Avoid bending over if this makes your symptoms worse.  · Keep all follow-up visits as told by your health care provider. This is important.  Contact a health care provider if:  · You have:  ? New symptoms.  ? Unexplained weight loss.  ? Difficulty swallowing or it hurts to swallow.  ? Wheezing or a persistent cough.  ? A hoarse voice.  · Your symptoms do not improve with treatment.  Get help right away if you:  · Have pain in your arms, neck, jaw, teeth, or back.  · Feel sweaty, dizzy, or light-headed.  · Have chest pain or shortness of breath.  · Vomit and your vomit looks like blood or coffee grounds.  · Faint.  · Have stool that is bloody or black.  · Cannot swallow, drink, or eat.  Summary  · Gastroesophageal reflux happens when acid from the stomach flows up into the esophagus. GERD is a disease in which the reflux happens often, causes frequent or severe symptoms, or causes problems such as damage to the esophagus.  · Treatment for this condition may vary depending on how severe your symptoms are. Your health care provider may recommend diet and lifestyle changes, medicine, or surgery.  · Contact a health care provider if you have new or worsening symptoms.  · Take over-the-counter and prescription medicines only as told by your health care provider. Do not take aspirin, ibuprofen, or other NSAIDs unless your health care provider told you to do so.  · Keep all follow-up visits as told by your health care provider. This is important.  This information is not intended to replace advice given to you by your health care provider. Make sure you discuss any questions you have with your health care provider.  Document Released: 09/27/2006 Document Revised: 06/26/2019 Document Reviewed: 06/26/2019  [employer] Interactive Patient Education © 2019 [employer]

## 2019-12-06 NOTE — PROGRESS NOTES
Subjective   George Wilder is a 64 y.o. male.     64-year-old male with history of DJD intractable back pain bilateral hip replacement ischemic heart disease and morbid obesity      Hyperlipidemia   This is a chronic problem. The current episode started more than 1 year ago. The problem is controlled. Recent lipid tests were reviewed and are normal. Exacerbating diseases include obesity. There are no known factors aggravating his hyperlipidemia. Current antihyperlipidemic treatment includes diet change and statins. The current treatment provides significant improvement of lipids. Compliance problems include adherence to diet and adherence to exercise.  Risk factors for coronary artery disease include dyslipidemia, family history, male sex, a sedentary lifestyle and obesity.        The following portions of the patient's history were reviewed and updated as appropriate: allergies, current medications, past family history, past medical history, past social history, past surgical history and problem list.    Review of Systems   Cardiovascular:        Sees cardiologist yearly   Gastrointestinal:        Colonoscopy 2017   Musculoskeletal: Positive for arthralgias and back pain.        Goes to pain management for chronic back pain and epidural injections   All other systems reviewed and are negative.      Objective   Physical Exam   Constitutional:   Morbid obesity   HENT:   Right Ear: External ear normal.   Left Ear: External ear normal.   Mouth/Throat: Oropharynx is clear and moist.   Eyes: EOM are normal. Pupils are equal, round, and reactive to light.   Neck: No thyromegaly present.   Good carotid pulses no bruits heard   Cardiovascular: Normal rate and regular rhythm.   Pulmonary/Chest: Effort normal and breath sounds normal.   Abdominal: Soft. Bowel sounds are normal. He exhibits no mass. There is no guarding.   Genitourinary: Rectum normal, prostate normal and penis normal.   Genitourinary Comments: No testicular  masses inguinal hernia or adenopathy prostate 2+ no nodules   Musculoskeletal: He exhibits no edema.   Lymphadenopathy:     He has no cervical adenopathy.   Neurological: He is alert.   Skin: Skin is warm. Capillary refill takes less than 2 seconds.   Psychiatric: He has a normal mood and affect. His behavior is normal. Judgment and thought content normal.   Nursing note and vitals reviewed.        Assessment/Plan   Problems Addressed this Visit        Cardiovascular and Mediastinum    HTN (hypertension)    Relevant Orders    POC Urinalysis Dipstick, Multipro      Other Visit Diagnoses     Screening for colorectal cancer    -  Primary    Relevant Orders    Cologuard - Stool, Per Rectum    Annual physical exam        Need for prophylactic vaccination against Streptococcus pneumoniae (pneumococcus)        Relevant Orders    Pneumococcal Conjugate Vaccine 13-Valent All          Plan immunizations up-to-date encouraged exercise probable GERD information given if not better in a month will need chest x-ray and upper GI series patient advised

## 2019-12-06 NOTE — PROGRESS NOTES
Chief Complaint   Patient presents with   • Annual Exam     blood work completed       History:  George Wilder is a 64 y.o. male who presents today for evaluation of the above problems.      ROS:  Review of Systems    No Known Allergies  Past Medical History:   Diagnosis Date   • Allergic rhinitis due to allergen 8/8/2017   • Arthritis    • Back pain    • CHF (congestive heart failure) (CMS/[facility])    • Chronic ethmoidal sinusitis 5/18/2017   • Chronic maxillary sinusitis 5/18/2017   • Coronary artery disease 1984    stent LAD, Cx, RCA    • GERD (gastroesophageal reflux disease)    • Heart attack (CMS/HCC) 1994    Dr Stiles    • Heartburn    • Hyperlipidemia    • Hypertension    • Myocardial infarction (CMS/HCC)    • Pain     back, & Neck      Past Surgical History:   Procedure Laterality Date   • ANKLE SURGERY     • BACK SURGERY     • CARDIAC CATHETERIZATION  1984, 1994   • CHOLECYSTECTOMY  2014    Lararocopic    • ENDOSCOPIC FUNCTIONAL SINUS SURGERY (FESS) N/A 6/12/2017    Procedure: ENDOSCOPIC FUNCTIONAL SINUS SURGERY WITH LEFT MIDDLE MEATAL ANTROSTOMY AND LEFT ETHMOIDECTOMY;  Surgeon: Jona Garibay MD;  Location: [facility] OR;  Service:    • ETHMOIDECTOMY Left 6/12/2017    Procedure: ETHMOIDECTOMY ENDOSCOPIC;  Surgeon: Jona Garibay MD;  Location: [facility] OR;  Service:    • EYE SURGERY      LASIK   • HAND SURGERY     • SINUS SURGERY     • TOTAL HIP ARTHROPLASTY Bilateral      Family History   Problem Relation Age of Onset   • No Known Problems Mother    • Cancer Father    • Lung cancer Father         metastatic adenocarcinoma of lung   • No Known Problems Brother    • Cancer Brother       reports that he quit smoking about 25 years ago. He has never used smokeless tobacco. He reports that he does not drink alcohol or use drugs.      Current Outpatient Medications:   •  aspirin 81 MG EC tablet, Take 81 mg by mouth Daily., Disp: , Rfl:   •  azelastine (ASTELIN) 0.1 % nasal spray, INHALE 2 SPRAYS INTO EACH  "NOSTRIL TWICE A DAY (Patient taking differently: Using PRN), Disp: 1 each, Rfl: 5  •  celecoxib (CeleBREX) 200 MG capsule, Take 200 mg by mouth Daily., Disp: , Rfl:   •  Cholecalciferol (VITAMIN D3) 5000 UNITS capsule capsule, Take 5,000 Units by mouth Daily., Disp: , Rfl:   •  fluticasone (FLONASE) 50 MCG/ACT nasal spray, 2 sprays by Each Nare route Daily., Disp: 16 mL, Rfl: 11  •  gabapentin (NEURONTIN) 300 MG capsule, Take 300 mg by mouth 3 (Three) Times a Day., Disp: , Rfl:   •  HYDROcodone-acetaminophen (NORCO)  MG per tablet, Take 1 tablet by mouth 2 (Two) Times a Day., Disp: , Rfl:   •  metoprolol tartrate (LOPRESSOR) 50 MG tablet, TAKE 1 TABLET BY MOUTH EVERYDAY AT BEDTIME, Disp: 30 tablet, Rfl: 0  •  Multiple Vitamins-Minerals (MULTIVITAMIN WITH MINERALS) tablet tablet, Take 1 tablet by mouth Daily., Disp: , Rfl:   •  omeprazole (priLOSEC) 20 MG capsule, Take 20 mg by mouth Daily., Disp: , Rfl:   •  simvastatin (ZOCOR) 40 MG tablet, TAKE 1 TABLET BY MOUTH EVERY NIGHT., Disp: 30 tablet, Rfl: 3  •  tiZANidine (ZANAFLEX) 4 MG tablet, daily, Disp: , Rfl: 2  •  vitamin C (ASCORBIC ACID) 500 MG tablet, Take 500 mg by mouth Daily., Disp: , Rfl:     OBJECTIVE:  /88 (BP Location: Left arm, Patient Position: Sitting, Cuff Size: Large Adult)   Pulse 69   Temp 97.2 °F (36.2 °C) (Temporal)   Ht 188 cm (74\")   Wt (!) 139 kg (307 lb)   SpO2 97%   BMI 39.42 kg/m²    Physical Exam    Health Maintenance:  Immunizations:   - Tetanus: {IMMUN:00452}   - Influenza: {IMMUN:38158}   - Pneumovax: {IMMUN:75673}   - Prevnar: {IMMUN:28518}   - Shingrix: {IMMUN:77216}    { Health Maintenance:49019}    Assessment/Plan    George was seen today for annual exam.    Diagnoses and all orders for this visit:    Screening for colorectal cancer  -     Cologuard - Stool, Per Rectum; Future    Essential hypertension  -     POC Urinalysis Dipstick, Multipro    Annual physical exam    Need for prophylactic vaccination against " Streptococcus pneumoniae (pneumococcus)  -     Pneumococcal Conjugate Vaccine 13-Valent All          An After Visit Summary was printed and given to the patient at discharge.  Return in 6 months (on 6/6/2020).         Calr Vu MD 12/6/2019   Electronically signed.

## 2019-12-26 RX ORDER — METOPROLOL TARTRATE 50 MG/1
TABLET, FILM COATED ORAL
Qty: 90 TABLET | Refills: 3 | Status: SHIPPED | OUTPATIENT
Start: 2019-12-26 | End: 2020-11-12

## 2020-05-21 ENCOUNTER — OFFICE VISIT (OUTPATIENT)
Dept: FAMILY MEDICINE CLINIC | Facility: CLINIC | Age: 65
End: 2020-05-21

## 2020-05-21 VITALS
OXYGEN SATURATION: 98 % | DIASTOLIC BLOOD PRESSURE: 65 MMHG | SYSTOLIC BLOOD PRESSURE: 118 MMHG | TEMPERATURE: 97.6 F | HEART RATE: 67 BPM | WEIGHT: 315 LBS | BODY MASS INDEX: 41.75 KG/M2 | HEIGHT: 73 IN

## 2020-05-21 DIAGNOSIS — J01.00 ACUTE NON-RECURRENT MAXILLARY SINUSITIS: Primary | ICD-10-CM

## 2020-05-21 PROCEDURE — 99213 OFFICE O/P EST LOW 20 MIN: CPT | Performed by: NURSE PRACTITIONER

## 2020-05-21 PROCEDURE — 96372 THER/PROPH/DIAG INJ SC/IM: CPT | Performed by: NURSE PRACTITIONER

## 2020-05-21 RX ORDER — AZITHROMYCIN 250 MG/1
TABLET, FILM COATED ORAL
Qty: 6 TABLET | Refills: 0 | Status: SHIPPED | OUTPATIENT
Start: 2020-05-21 | End: 2020-06-09

## 2020-05-21 RX ORDER — PREDNISONE 20 MG/1
TABLET ORAL
Qty: 7 TABLET | Refills: 0 | Status: SHIPPED | OUTPATIENT
Start: 2020-05-21 | End: 2020-06-09

## 2020-05-21 RX ORDER — METHYLPREDNISOLONE ACETATE 40 MG/ML
40 INJECTION, SUSPENSION INTRA-ARTICULAR; INTRALESIONAL; INTRAMUSCULAR; SOFT TISSUE ONCE
Status: COMPLETED | OUTPATIENT
Start: 2020-05-21 | End: 2020-05-21

## 2020-05-21 RX ADMIN — METHYLPREDNISOLONE ACETATE 40 MG: 40 INJECTION, SUSPENSION INTRA-ARTICULAR; INTRALESIONAL; INTRAMUSCULAR; SOFT TISSUE at 09:24

## 2020-05-21 NOTE — PROGRESS NOTES
"CC: nasal congestion / sinusitus    History:  George Wilder is a 64 y.o. male who presents today for evaluation of the above problems.   George has had nasal congestion since February, which started with a \"head cold\"  He states that the congestion has never went away.  He uses flonase and astelin daily, as well as zyrtec.  No fever, no sore throat.  Minimal cough that he states he has chronically due to hx of smoking - no change in cough.   Has an appt. With ENT next month.      HPI  ROS:  Review of Systems   Constitutional: Negative for fever.   HENT: Positive for congestion. Negative for ear pain, sinus pressure, sinus pain, sneezing and sore throat.    Respiratory: Positive for cough (minimal - chronic).        No Known Allergies  Past Medical History:   Diagnosis Date   • Allergic rhinitis due to allergen 8/8/2017   • Arthritis    • Back pain    • CHF (congestive heart failure) (CMS/[facility])    • Chronic ethmoidal sinusitis 5/18/2017   • Chronic maxillary sinusitis 5/18/2017   • Coronary artery disease 1984    stent LAD, Cx, RCA    • GERD (gastroesophageal reflux disease)    • Heart attack (CMS/HCC) 1994    Dr Stiles    • Heartburn    • Hyperlipidemia    • Hypertension    • Myocardial infarction (CMS/HCC)    • Pain     back, & Neck      Past Surgical History:   Procedure Laterality Date   • ANKLE SURGERY     • BACK SURGERY     • CARDIAC CATHETERIZATION  1984, 1994   • CHOLECYSTECTOMY  2014    Lararocopic    • ENDOSCOPIC FUNCTIONAL SINUS SURGERY (FESS) N/A 6/12/2017    Procedure: ENDOSCOPIC FUNCTIONAL SINUS SURGERY WITH LEFT MIDDLE MEATAL ANTROSTOMY AND LEFT ETHMOIDECTOMY;  Surgeon: Jona Garibay MD;  Location: [facility] OR;  Service:    • ETHMOIDECTOMY Left 6/12/2017    Procedure: ETHMOIDECTOMY ENDOSCOPIC;  Surgeon: Jona Garibay MD;  Location: [facility] OR;  Service:    • EYE SURGERY      LASIK   • HAND SURGERY     • SINUS SURGERY     • TOTAL HIP ARTHROPLASTY Bilateral      Family History   Problem Relation " Age of Onset   • No Known Problems Mother    • Cancer Father    • Lung cancer Father         metastatic adenocarcinoma of lung   • No Known Problems Brother    • Cancer Brother       reports that he quit smoking about 26 years ago. He has never used smokeless tobacco. He reports that he does not drink alcohol or use drugs.      Current Outpatient Medications:   •  aspirin 81 MG EC tablet, Take 81 mg by mouth Daily., Disp: , Rfl:   •  azelastine (ASTELIN) 0.1 % nasal spray, INHALE 2 SPRAYS INTO EACH NOSTRIL TWICE A DAY (Patient taking differently: Using PRN), Disp: 1 each, Rfl: 5  •  celecoxib (CeleBREX) 200 MG capsule, Take 200 mg by mouth Daily., Disp: , Rfl:   •  Cholecalciferol (VITAMIN D3) 5000 UNITS capsule capsule, Take 5,000 Units by mouth Daily., Disp: , Rfl:   •  fluticasone (FLONASE) 50 MCG/ACT nasal spray, 2 sprays by Each Nare route Daily., Disp: 16 mL, Rfl: 11  •  gabapentin (NEURONTIN) 300 MG capsule, Take 300 mg by mouth 3 (Three) Times a Day., Disp: , Rfl:   •  HYDROcodone-acetaminophen (NORCO)  MG per tablet, Take 1 tablet by mouth 2 (Two) Times a Day., Disp: , Rfl:   •  metoprolol tartrate (LOPRESSOR) 50 MG tablet, TAKE 1 TABLET BY MOUTH EVERYDAY AT BEDTIME, Disp: 90 tablet, Rfl: 3  •  Multiple Vitamins-Minerals (MULTIVITAMIN WITH MINERALS) tablet tablet, Take 1 tablet by mouth Daily., Disp: , Rfl:   •  omeprazole (priLOSEC) 20 MG capsule, Take 20 mg by mouth Daily., Disp: , Rfl:   •  simvastatin (ZOCOR) 40 MG tablet, TAKE 1 TABLET BY MOUTH EVERY NIGHT., Disp: 30 tablet, Rfl: 3  •  tiZANidine (ZANAFLEX) 4 MG tablet, daily, Disp: , Rfl: 2  •  vitamin C (ASCORBIC ACID) 500 MG tablet, Take 500 mg by mouth Daily., Disp: , Rfl:   •  azithromycin (Zithromax) 250 MG tablet, Take 2 tablets the first day, then 1 tablet daily for 4 days., Disp: 6 tablet, Rfl: 0  •  predniSONE (DELTASONE) 20 MG tablet, Take 2 tablets daily for 2 days and then one tablet daily until gone., Disp: 7 tablet, Rfl: 0  No  "current facility-administered medications for this visit.     OBJECTIVE:  /65 (BP Location: Left arm, Patient Position: Sitting, Cuff Size: Large Adult)   Pulse 67   Temp 97.6 °F (36.4 °C) (Temporal)   Ht 185.4 cm (73\")   Wt (!) 143 kg (316 lb)   SpO2 98%   BMI 41.69 kg/m²    Physical Exam   Constitutional: He is oriented to person, place, and time. Vital signs are normal. He appears well-developed and well-nourished.   HENT:   Right Ear: Tympanic membrane, external ear and ear canal normal.   Left Ear: Tympanic membrane, external ear and ear canal normal.   Nose: Mucosal edema present. Right sinus exhibits no maxillary sinus tenderness and no frontal sinus tenderness. Left sinus exhibits no maxillary sinus tenderness and no frontal sinus tenderness.   Mouth/Throat: Uvula is midline, oropharynx is clear and moist and mucous membranes are normal.   Mucosal erythema   Cardiovascular: Normal rate.   Pulmonary/Chest: Effort normal.   Neurological: He is alert and oriented to person, place, and time.   Psychiatric: He has a normal mood and affect. His behavior is normal.   Vitals reviewed.      Assessment/Plan    George was seen today for nasal congestion and sinusitis.    Diagnoses and all orders for this visit:    Acute non-recurrent maxillary sinusitis  -     azithromycin (Zithromax) 250 MG tablet; Take 2 tablets the first day, then 1 tablet daily for 4 days.  -     methylPREDNISolone acetate (DEPO-medrol) injection 40 mg  -     predniSONE (DELTASONE) 20 MG tablet; Take 2 tablets daily for 2 days and then one tablet daily until gone.    Advised that he change antihistamine to claritin or allegra since he has been on Zyrtec for quite some time.  Continue astelin and flonase.     Patient was masked upon entering facility.  I wore N95 mask, with face shield, gown, and gloves.  MA wore N95 mask with eyeglasses, gown and gloves.       An After Visit Summary was printed and given to the patient at " discharge.  Return if symptoms worsen or fail to improve.       Janina Flores, ISAAC 05/21/2020    Electronically signed.

## 2020-06-09 ENCOUNTER — OFFICE VISIT (OUTPATIENT)
Dept: FAMILY MEDICINE CLINIC | Facility: CLINIC | Age: 65
End: 2020-06-09

## 2020-06-09 VITALS
OXYGEN SATURATION: 98 % | BODY MASS INDEX: 41.64 KG/M2 | WEIGHT: 314.2 LBS | HEART RATE: 60 BPM | TEMPERATURE: 98.2 F | HEIGHT: 73 IN | DIASTOLIC BLOOD PRESSURE: 78 MMHG | SYSTOLIC BLOOD PRESSURE: 131 MMHG

## 2020-06-09 DIAGNOSIS — E78.2 MIXED HYPERLIPIDEMIA: Primary | ICD-10-CM

## 2020-06-09 PROCEDURE — 99214 OFFICE O/P EST MOD 30 MIN: CPT | Performed by: FAMILY MEDICINE

## 2020-06-09 NOTE — PROGRESS NOTES
Subjective   George Wilder is a 64 y.o. male.     64-year-old morbidly obese patient with hyperlipidemia and heart disease    Hyperlipidemia   This is a chronic problem. The current episode started more than 1 year ago. Recent lipid tests were reviewed and are normal. Exacerbating diseases include obesity. There are no known factors aggravating his hyperlipidemia. Pertinent negatives include no chest pain or shortness of breath. Current antihyperlipidemic treatment includes diet change and statins. The current treatment provides moderate improvement of lipids. Compliance problems include adherence to exercise.  Risk factors for coronary artery disease include dyslipidemia, hypertension, male sex, family history and obesity.     Vitals:    06/09/20 0745   BP: 131/78   Pulse: 60   Temp: 98.2 °F (36.8 °C)   SpO2: 98%       The following portions of the patient's history were reviewed and updated as appropriate: allergies, current medications, past family history, past medical history, past social history, past surgical history and problem list.    Review of Systems   Respiratory: Negative for shortness of breath.    Cardiovascular: Negative for chest pain and leg swelling.   Musculoskeletal: Positive for arthralgias and back pain.       Objective   Physical Exam   Constitutional: He is oriented to person, place, and time.   Morbidly obese-not interested in bariatric surgery   Cardiovascular: Normal rate and regular rhythm.   Pulmonary/Chest: Effort normal and breath sounds normal.   Musculoskeletal: He exhibits no edema.   Neurological: He is alert and oriented to person, place, and time.   Skin: Skin is warm and dry.   Psychiatric: He has a normal mood and affect. His behavior is normal. Judgment and thought content normal.   Nursing note and vitals reviewed.      Patient's Body mass index is 42.03 kg/m². BMI is above normal parameters. Recommendations include: none (medical contraindication).      Assessment/Plan    Patient Active Problem List   Diagnosis   • Ischemic heart disease due to coronary artery obstruction (CMS/HCC)   • Morbid obesity (CMS/HCC)   • Mixed hyperlipidemia   • Lumbar degenerative disc disease   • CAD (coronary artery disease)   • HTN (hypertension)   • Chronic maxillary sinusitis   • Chronic ethmoidal sinusitis   • S/P sinus surgery   • Allergic rhinitis due to allergen   • Obesity, Class III, BMI 40-49.9 (morbid obesity) (CMS/[facility])   • BMI 38.0-38.9,adult   • Chronic back pain     George was seen today for follow-up.    Diagnoses and all orders for this visit:    Mixed hyperlipidemia  -     Comprehensive Metabolic Panel  -     Lipid Panel       Return if symptoms worsen or fail to improve, for Annual physical.         Plan-COVID the protection strategy, get flu shot early, not interested in bariatric surgery    Electronically signed by Carl Vu MD 06/09/2020

## 2020-06-10 LAB
ALBUMIN SERPL-MCNC: 4.2 G/DL (ref 3.5–5.2)
ALBUMIN/GLOB SERPL: 1.5 G/DL
ALP SERPL-CCNC: 52 U/L (ref 39–117)
ALT SERPL-CCNC: 27 U/L (ref 1–41)
AST SERPL-CCNC: 16 U/L (ref 1–40)
BILIRUB SERPL-MCNC: 0.8 MG/DL (ref 0.2–1.2)
BUN SERPL-MCNC: 25 MG/DL (ref 8–23)
BUN/CREAT SERPL: 22.3 (ref 7–25)
CALCIUM SERPL-MCNC: 9.6 MG/DL (ref 8.6–10.5)
CHLORIDE SERPL-SCNC: 106 MMOL/L (ref 98–107)
CHOLEST SERPL-MCNC: 163 MG/DL (ref 0–200)
CO2 SERPL-SCNC: 25.1 MMOL/L (ref 22–29)
CREAT SERPL-MCNC: 1.12 MG/DL (ref 0.76–1.27)
GLOBULIN SER CALC-MCNC: 2.8 GM/DL
GLUCOSE SERPL-MCNC: 118 MG/DL (ref 65–99)
HDLC SERPL-MCNC: 58 MG/DL (ref 40–60)
LDLC SERPL CALC-MCNC: 86 MG/DL (ref 0–100)
POTASSIUM SERPL-SCNC: 5.3 MMOL/L (ref 3.5–5.2)
PROT SERPL-MCNC: 7 G/DL (ref 6–8.5)
SODIUM SERPL-SCNC: 140 MMOL/L (ref 136–145)
TRIGL SERPL-MCNC: 93 MG/DL (ref 0–150)
VLDLC SERPL CALC-MCNC: 18.6 MG/DL

## 2020-06-10 RX ORDER — SIMVASTATIN 40 MG
TABLET ORAL
Qty: 90 TABLET | Refills: 1 | Status: SHIPPED | OUTPATIENT
Start: 2020-06-10 | End: 2020-09-17 | Stop reason: SDUPTHER

## 2020-06-19 ENCOUNTER — OFFICE VISIT (OUTPATIENT)
Dept: OTOLARYNGOLOGY | Facility: CLINIC | Age: 65
End: 2020-06-19

## 2020-06-19 VITALS
BODY MASS INDEX: 39.17 KG/M2 | HEART RATE: 54 BPM | SYSTOLIC BLOOD PRESSURE: 132 MMHG | TEMPERATURE: 97 F | HEIGHT: 75 IN | WEIGHT: 315 LBS | DIASTOLIC BLOOD PRESSURE: 68 MMHG

## 2020-06-19 DIAGNOSIS — J01.81 OTHER ACUTE RECURRENT SINUSITIS: ICD-10-CM

## 2020-06-19 DIAGNOSIS — J32.0 CHRONIC MAXILLARY SINUSITIS: ICD-10-CM

## 2020-06-19 DIAGNOSIS — J30.89 ALLERGIC RHINITIS DUE TO OTHER ALLERGIC TRIGGER, UNSPECIFIED SEASONALITY: Primary | ICD-10-CM

## 2020-06-19 PROCEDURE — 99214 OFFICE O/P EST MOD 30 MIN: CPT | Performed by: PHYSICIAN ASSISTANT

## 2020-06-19 RX ORDER — CEFUROXIME AXETIL 500 MG/1
500 TABLET ORAL 2 TIMES DAILY
Qty: 20 TABLET | Refills: 0 | Status: SHIPPED | OUTPATIENT
Start: 2020-06-19 | End: 2020-06-29

## 2020-06-19 RX ORDER — PREDNISONE 10 MG/1
TABLET ORAL
Qty: 30 TABLET | Refills: 0 | Status: SHIPPED | OUTPATIENT
Start: 2020-06-19 | End: 2020-08-07

## 2020-06-19 NOTE — PATIENT INSTRUCTIONS
Will start steroid and ceftin and recheck in 6 weeks. If symptoms do not improve advised to call to get set up for a sinus CT prior to follow-up appointment.

## 2020-06-19 NOTE — PROGRESS NOTES
YOB: 1955  Location: [facility]  Location Address: [address] [place] [address], [facility], [address] [place] [address]  Location Phone: [phone]    Chief Complaint   Patient presents with   • Sinus Problem     Since Feb, post-nasal drip and congestion       History of Present Illness  George Wilder is a 62 y.o. male.  George Wilder is here for follow up of ENT complaints. The patient has had problems with nasal drainage, nasal congestion, postnasal drip and allergy  The symptoms are not localized to a particular location. The patient has had a recent flair up of symptoms. The symptoms have been worse since February. The symptoms are aggravated by allergy. The symptoms are improved by no identifiable factors.    The patient has been on several rounds of antibiotics without a resolution of symptoms.     The patient had sinus surgery on 17.       Past Medical History:   Diagnosis Date   • Allergic rhinitis due to allergen 2017   • Arthritis    • Back pain    • CHF (congestive heart failure) (CMS/HCC)    • Chronic ethmoidal sinusitis 2017   • Chronic maxillary sinusitis 2017   • Coronary artery disease     stent LAD, Cx, RCA    • GERD (gastroesophageal reflux disease)    • Heart attack (CMS/HCC)     Dr Stiles    • Heartburn    • Hyperlipidemia    • Hypertension    • Myocardial infarction (CMS/HCC)    • Pain     back, & Neck        Past Surgical History:   Procedure Laterality Date   • ANKLE SURGERY     • BACK SURGERY     • CARDIAC CATHETERIZATION  ,    • CHOLECYSTECTOMY      Lararocopic    • ENDOSCOPIC FUNCTIONAL SINUS SURGERY (FESS) N/A 2017    Procedure: ENDOSCOPIC FUNCTIONAL SINUS SURGERY WITH LEFT MIDDLE MEATAL ANTROSTOMY AND LEFT ETHMOIDECTOMY;  Surgeon: Jona Garibay MD;  Location: [facility] OR;  Service:    • ETHMOIDECTOMY Left 2017    Procedure: ETHMOIDECTOMY ENDOSCOPIC;  Surgeon: Jona Garibay MD;  Location: [facility] OR;  Service:    • EYE SURGERY       LASIK   • HAND SURGERY     • SINUS SURGERY     • TOTAL HIP ARTHROPLASTY Bilateral        Outpatient Medications Marked as Taking for the 6/19/20 encounter (Office Visit) with Michael Curtis PA   Medication Sig Dispense Refill   • aspirin 81 MG EC tablet Take 81 mg by mouth Daily.     • azelastine (ASTELIN) 0.1 % nasal spray INHALE 2 SPRAYS INTO EACH NOSTRIL TWICE A DAY (Patient taking differently: Using PRN) 1 each 5   • celecoxib (CeleBREX) 200 MG capsule Take 200 mg by mouth Daily.     • Cholecalciferol (VITAMIN D3) 5000 UNITS capsule capsule Take 5,000 Units by mouth Daily.     • fluticasone (FLONASE) 50 MCG/ACT nasal spray 2 sprays by Each Nare route Daily. 16 mL 11   • gabapentin (NEURONTIN) 300 MG capsule Take 300 mg by mouth 3 (Three) Times a Day.     • HYDROcodone-acetaminophen (NORCO)  MG per tablet Take 1 tablet by mouth 2 (Two) Times a Day.     • metoprolol tartrate (LOPRESSOR) 50 MG tablet TAKE 1 TABLET BY MOUTH EVERYDAY AT BEDTIME 90 tablet 3   • Multiple Vitamins-Minerals (MULTIVITAMIN WITH MINERALS) tablet tablet Take 1 tablet by mouth Daily.     • omeprazole (priLOSEC) 20 MG capsule Take 20 mg by mouth Daily.     • simvastatin (ZOCOR) 40 MG tablet TAKE 1 TABLET BY MOUTH EVERY NIGHT. 90 tablet 1   • tiZANidine (ZANAFLEX) 4 MG tablet daily  2   • vitamin C (ASCORBIC ACID) 500 MG tablet Take 500 mg by mouth Daily.         Patient has no known allergies.    Family History   Problem Relation Age of Onset   • No Known Problems Mother    • Cancer Father    • Lung cancer Father         metastatic adenocarcinoma of lung   • No Known Problems Brother    • Cancer Brother        Social History     Socioeconomic History   • Marital status:      Spouse name: Not on file   • Number of children: Not on file   • Years of education: Not on file   • Highest education level: Not on file   Tobacco Use   • Smoking status: Former Smoker     Packs/day: 2.00     Years: 24.00     Pack years: 48.00      Types: Cigarettes     Last attempt to quit:      Years since quittin.4   • Smokeless tobacco: Never Used   Substance and Sexual Activity   • Alcohol use: No   • Drug use: No   • Sexual activity: Defer       Review of Systems   Constitutional: Negative for activity change, appetite change, chills, diaphoresis, fatigue, fever and unexpected weight change.   HENT: Positive for congestion, postnasal drip and sinus pressure. Negative for ear discharge, ear pain, facial swelling, hearing loss, mouth sores, nosebleeds, rhinorrhea, sneezing, sore throat, tinnitus, trouble swallowing and voice change.    Eyes: Negative for pain, discharge, redness, itching and visual disturbance.   Respiratory: Negative for apnea, cough, choking, chest tightness, shortness of breath, wheezing and stridor.    Gastrointestinal: Negative for nausea and vomiting.   Endocrine: Negative for cold intolerance and heat intolerance.   Musculoskeletal: Negative for arthralgias, back pain, gait problem, neck pain and neck stiffness.   Skin: Negative for rash.   Allergic/Immunologic: Positive for environmental allergies. Negative for food allergies.   Neurological: Negative for dizziness, tremors, seizures, syncope, facial asymmetry, speech difficulty, weakness, light-headedness, numbness and headaches.   Hematological: Negative for adenopathy. Does not bruise/bleed easily.   Psychiatric/Behavioral: Negative for behavioral problems, sleep disturbance and suicidal ideas. The patient is not nervous/anxious and is not hyperactive.        Vitals:    20 0958   BP: 132/68   Pulse: 54   Temp: 97 °F (36.1 °C)       Body mass index is 39.77 kg/m².    Objective     Physical Exam  CONSTITUTIONAL: well nourished, alert, oriented, in no acute distress     COMMUNICATION AND VOICE: able to communicate normally, normal voice quality    HEAD: normocephalic, no lesions, atraumatic, no tenderness, no masses     FACE: appearance normal, no lesions, no  tenderness, no deformities, facial motion symmetric    EYES: ocular motility normal, eyelids normal, orbits normal, no proptosis, conjunctiva normal , pupils equal, round     EARS:  Hearing: response to conversational voice normal bilaterally   External Ears: auricles without lesions  Otoscopic: tympanic membrane appearance normal, no lesions, no perforation, normal mobility, no fluid    NOSE:  External Nose: structure normal, no tenderness on palpation, no nasal discharge, no lesions, no evidence of trauma, nostrils patent   Intranasal Exam: nasal mucosa erythema and edema with purulent drainage, vestibule within normal limits, inferior turbinate normal, nasal septum midline     ORAL:  Lips: upper and lower lips without lesion   Teeth: dentition within normal limits for age   Gums: gingivae healthy   Oral Mucosa: oral mucosa normal, no mucosal lesions   Floor of Mouth: Warthin’s duct patent, mucosa normal  Tongue: lingual mucosa normal without lesions, normal tongue mobility   Palate: soft and hard palates with normal mucosa and structure  Oropharynx: oropharyngeal mucosa erythema with thick postnasal drainage    NECK: neck appearance normal    CHEST/RESPIRATORY: respiratory effort normal, normal breath sounds     CARDIOVASCULAR: rate and rhythm normal, extremities without cyanosis or edema      NEUROLOGIC/PSYCHIATRIC: oriented to time, place and person, mood normal, affect appropriate, CN II-XII intact grossly    Assessment/Plan   Problems Addressed this Visit        Respiratory    Chronic maxillary sinusitis    Allergic rhinitis due to allergen - Primary    Relevant Medications    predniSONE (DELTASONE) 10 MG tablet    Other acute recurrent sinusitis    Relevant Medications    predniSONE (DELTASONE) 10 MG tablet    cefuroxime (CEFTIN) 500 MG tablet        * Surgery not found *  No orders of the defined types were placed in this encounter.    Return in about 6 weeks (around 7/31/2020) for Recheck sinus.        Patient Instructions   Will start steroid and ceftin and recheck in 6 weeks. If symptoms do not improve advised to call to get set up for a sinus CT prior to follow-up appointment.

## 2020-06-23 PROBLEM — J01.81 OTHER ACUTE RECURRENT SINUSITIS: Status: ACTIVE | Noted: 2020-06-23

## 2020-07-01 DIAGNOSIS — J30.89 ALLERGIC RHINITIS DUE TO OTHER ALLERGIC TRIGGER, UNSPECIFIED SEASONALITY: ICD-10-CM

## 2020-07-01 DIAGNOSIS — J01.81 OTHER ACUTE RECURRENT SINUSITIS: ICD-10-CM

## 2020-07-01 DIAGNOSIS — Z98.890 S/P SINUS SURGERY: ICD-10-CM

## 2020-07-01 DIAGNOSIS — J32.2 CHRONIC ETHMOIDAL SINUSITIS: ICD-10-CM

## 2020-07-01 DIAGNOSIS — J32.0 CHRONIC MAXILLARY SINUSITIS: Primary | ICD-10-CM

## 2020-07-06 DIAGNOSIS — J30.89 ALLERGIC RHINITIS DUE TO OTHER ALLERGIC TRIGGER, UNSPECIFIED SEASONALITY: ICD-10-CM

## 2020-07-06 RX ORDER — FLUTICASONE PROPIONATE 50 MCG
SPRAY, SUSPENSION (ML) NASAL
Qty: 16 ML | Refills: 11 | Status: SHIPPED | OUTPATIENT
Start: 2020-07-06 | End: 2021-03-17 | Stop reason: SDUPTHER

## 2020-07-21 ENCOUNTER — HOSPITAL ENCOUNTER (OUTPATIENT)
Dept: CT IMAGING | Facility: HOSPITAL | Age: 65
Discharge: HOME | End: 2020-07-21
Admitting: PHYSICIAN ASSISTANT

## 2020-07-21 DIAGNOSIS — J32.0 CHRONIC MAXILLARY SINUSITIS: ICD-10-CM

## 2020-07-21 DIAGNOSIS — J32.2 CHRONIC ETHMOIDAL SINUSITIS: ICD-10-CM

## 2020-07-21 DIAGNOSIS — J01.81 OTHER ACUTE RECURRENT SINUSITIS: ICD-10-CM

## 2020-07-21 DIAGNOSIS — J30.89 ALLERGIC RHINITIS DUE TO OTHER ALLERGIC TRIGGER, UNSPECIFIED SEASONALITY: ICD-10-CM

## 2020-07-21 DIAGNOSIS — Z98.890 S/P SINUS SURGERY: ICD-10-CM

## 2020-07-21 PROCEDURE — 70486 CT MAXILLOFACIAL W/O DYE: CPT

## 2020-07-24 ENCOUNTER — TRANSCRIBE ORDERS (OUTPATIENT)
Dept: ADMINISTRATIVE | Facility: HOSPITAL | Age: 65
End: 2020-07-24

## 2020-07-24 DIAGNOSIS — Z01.818 PREOP TESTING: Primary | ICD-10-CM

## 2020-07-28 ENCOUNTER — LAB (OUTPATIENT)
Dept: LAB | Facility: HOSPITAL | Age: 65
End: 2020-07-28

## 2020-07-28 PROCEDURE — C9803 HOPD COVID-19 SPEC COLLECT: HCPCS | Performed by: PHYSICIAN ASSISTANT

## 2020-07-28 PROCEDURE — U0003 INFECTIOUS AGENT DETECTION BY NUCLEIC ACID (DNA OR RNA); SEVERE ACUTE RESPIRATORY SYNDROME CORONAVIRUS 2 (SARS-COV-2) (CORONAVIRUS DISEASE [COVID-19]), AMPLIFIED PROBE TECHNIQUE, MAKING USE OF HIGH THROUGHPUT TECHNOLOGIES AS DESCRIBED BY CMS-2020-01-R: HCPCS | Performed by: PHYSICIAN ASSISTANT

## 2020-07-29 LAB
COVID LABCORP PRIORITY: NORMAL
SARS-COV-2 RNA RESP QL NAA+PROBE: NOT DETECTED

## 2020-07-31 ENCOUNTER — OFFICE VISIT (OUTPATIENT)
Dept: OTOLARYNGOLOGY | Facility: CLINIC | Age: 65
End: 2020-07-31

## 2020-07-31 VITALS
TEMPERATURE: 97.7 F | HEIGHT: 75 IN | BODY MASS INDEX: 39.17 KG/M2 | HEART RATE: 70 BPM | WEIGHT: 315 LBS | SYSTOLIC BLOOD PRESSURE: 147 MMHG | DIASTOLIC BLOOD PRESSURE: 80 MMHG

## 2020-07-31 DIAGNOSIS — J34.89 CONCHA BULLOSA: ICD-10-CM

## 2020-07-31 DIAGNOSIS — J32.0 CHRONIC MAXILLARY SINUSITIS: Primary | ICD-10-CM

## 2020-07-31 DIAGNOSIS — J01.81 OTHER ACUTE RECURRENT SINUSITIS: ICD-10-CM

## 2020-07-31 DIAGNOSIS — J34.2 NASAL SEPTAL DEVIATION: ICD-10-CM

## 2020-07-31 PROCEDURE — 99214 OFFICE O/P EST MOD 30 MIN: CPT | Performed by: PHYSICIAN ASSISTANT

## 2020-08-03 RX ORDER — OXYMETAZOLINE HYDROCHLORIDE 0.05 G/100ML
2 SPRAY NASAL
Status: CANCELLED | OUTPATIENT
Start: 2020-08-03 | End: 2020-08-06

## 2020-08-04 ENCOUNTER — TRANSCRIBE ORDERS (OUTPATIENT)
Dept: LAB | Facility: HOSPITAL | Age: 65
End: 2020-08-04

## 2020-08-04 DIAGNOSIS — Z01.818 OTHER SPECIFIED PRE-OPERATIVE EXAMINATION: Primary | ICD-10-CM

## 2020-08-04 PROBLEM — J34.89 CONCHA BULLOSA: Status: ACTIVE | Noted: 2020-08-04

## 2020-08-04 PROBLEM — J34.2 NASAL SEPTAL DEVIATION: Status: ACTIVE | Noted: 2020-08-04

## 2020-08-06 ENCOUNTER — PREP FOR SURGERY (OUTPATIENT)
Dept: OTHER | Facility: HOSPITAL | Age: 65
End: 2020-08-06

## 2020-08-07 ENCOUNTER — LAB (OUTPATIENT)
Dept: LAB | Facility: HOSPITAL | Age: 65
End: 2020-08-07

## 2020-08-07 ENCOUNTER — APPOINTMENT (OUTPATIENT)
Dept: PREADMISSION TESTING | Facility: HOSPITAL | Age: 65
End: 2020-08-07

## 2020-08-07 ENCOUNTER — HOSPITAL ENCOUNTER (OUTPATIENT)
Dept: GENERAL RADIOLOGY | Facility: HOSPITAL | Age: 65
Discharge: HOME | End: 2020-08-07
Admitting: PHYSICIAN ASSISTANT

## 2020-08-07 VITALS
SYSTOLIC BLOOD PRESSURE: 134 MMHG | HEART RATE: 56 BPM | WEIGHT: 315 LBS | RESPIRATION RATE: 18 BRPM | OXYGEN SATURATION: 98 % | BODY MASS INDEX: 40.43 KG/M2 | DIASTOLIC BLOOD PRESSURE: 68 MMHG | HEIGHT: 74 IN

## 2020-08-07 DIAGNOSIS — J34.89 CONCHA BULLOSA: ICD-10-CM

## 2020-08-07 DIAGNOSIS — J32.0 CHRONIC MAXILLARY SINUSITIS: ICD-10-CM

## 2020-08-07 DIAGNOSIS — J34.2 NASAL SEPTAL DEVIATION: ICD-10-CM

## 2020-08-07 DIAGNOSIS — J01.81 OTHER ACUTE RECURRENT SINUSITIS: ICD-10-CM

## 2020-08-07 LAB
ALBUMIN SERPL-MCNC: 4.1 G/DL (ref 3.5–5.2)
ALBUMIN/GLOB SERPL: 1.7 G/DL
ALP SERPL-CCNC: 48 U/L (ref 39–117)
ALT SERPL W P-5'-P-CCNC: 25 U/L (ref 1–41)
ANION GAP SERPL CALCULATED.3IONS-SCNC: 10 MMOL/L (ref 5–15)
AST SERPL-CCNC: 28 U/L (ref 1–40)
BILIRUB SERPL-MCNC: 1 MG/DL (ref 0–1.2)
BUN SERPL-MCNC: 26 MG/DL (ref 8–23)
BUN/CREAT SERPL: 22.6 (ref 7–25)
CALCIUM SPEC-SCNC: 9.1 MG/DL (ref 8.6–10.5)
CHLORIDE SERPL-SCNC: 103 MMOL/L (ref 98–107)
CO2 SERPL-SCNC: 27 MMOL/L (ref 22–29)
CREAT SERPL-MCNC: 1.15 MG/DL (ref 0.76–1.27)
DEPRECATED RDW RBC AUTO: 40.8 FL (ref 37–54)
ERYTHROCYTE [DISTWIDTH] IN BLOOD BY AUTOMATED COUNT: 12.5 % (ref 12.3–15.4)
GFR SERPL CREATININE-BSD FRML MDRD: 64 ML/MIN/1.73
GLOBULIN UR ELPH-MCNC: 2.4 GM/DL
GLUCOSE SERPL-MCNC: 109 MG/DL (ref 65–99)
HCT VFR BLD AUTO: 41.6 % (ref 37.5–51)
HGB BLD-MCNC: 14.4 G/DL (ref 13–17.7)
MCH RBC QN AUTO: 31.2 PG (ref 26.6–33)
MCHC RBC AUTO-ENTMCNC: 34.6 G/DL (ref 31.5–35.7)
MCV RBC AUTO: 90 FL (ref 79–97)
PLATELET # BLD AUTO: 179 10*3/MM3 (ref 140–450)
PMV BLD AUTO: 10.2 FL (ref 6–12)
POTASSIUM SERPL-SCNC: 4.7 MMOL/L (ref 3.5–5.2)
PROT SERPL-MCNC: 6.5 G/DL (ref 6–8.5)
RBC # BLD AUTO: 4.62 10*6/MM3 (ref 4.14–5.8)
SODIUM SERPL-SCNC: 140 MMOL/L (ref 136–145)
WBC # BLD AUTO: 6.18 10*3/MM3 (ref 3.4–10.8)

## 2020-08-07 PROCEDURE — 71046 X-RAY EXAM CHEST 2 VIEWS: CPT

## 2020-08-07 PROCEDURE — 85027 COMPLETE CBC AUTOMATED: CPT | Performed by: PHYSICIAN ASSISTANT

## 2020-08-07 PROCEDURE — 93010 ELECTROCARDIOGRAM REPORT: CPT | Performed by: INTERNAL MEDICINE

## 2020-08-07 PROCEDURE — 93005 ELECTROCARDIOGRAM TRACING: CPT

## 2020-08-07 PROCEDURE — C9803 HOPD COVID-19 SPEC COLLECT: HCPCS | Performed by: OTOLARYNGOLOGY

## 2020-08-07 PROCEDURE — 36415 COLL VENOUS BLD VENIPUNCTURE: CPT

## 2020-08-07 PROCEDURE — 80053 COMPREHEN METABOLIC PANEL: CPT | Performed by: PHYSICIAN ASSISTANT

## 2020-08-07 PROCEDURE — U0003 INFECTIOUS AGENT DETECTION BY NUCLEIC ACID (DNA OR RNA); SEVERE ACUTE RESPIRATORY SYNDROME CORONAVIRUS 2 (SARS-COV-2) (CORONAVIRUS DISEASE [COVID-19]), AMPLIFIED PROBE TECHNIQUE, MAKING USE OF HIGH THROUGHPUT TECHNOLOGIES AS DESCRIBED BY CMS-2020-01-R: HCPCS | Performed by: OTOLARYNGOLOGY

## 2020-08-07 NOTE — DISCHARGE INSTRUCTIONS
DAY OF SURGERY INSTRUCTIONS        YOUR SURGEON: ADINA BAUTISTA    PROCEDURE: IMAGE GUIDED FUNCTIONAL ENDOSCOPIC SINUS SURGERY WITH SEPTOPLASTY WITH RESECTION OF LEFT NICKY BULLOSA AND POSSIBLE BILATERAL MAXILLARY ANTROSIES    DATE OF SURGERY: 8/10/2020    ARRIVAL TIME: AS DIRECTED BY OFFICE    YOU MAY TAKE THE FOLLOWING MEDICATION(S) THE MORNING OF SURGERY WITH A SIP OF WATER: NORCO      ALL OTHER HOME MEDICATIONS CHECK WITH YOUR DOCTOR    DO NOT TAKE ANY ERECTILE DYSFUNCTION MEDICATIONS (EX:  CIALIS, VIAGRA) 24 HOURS PRIOR TO SURGERY              MANAGING PAIN AFTER SURGERY    We know you are probably wondering what your pain will be like after surgery.  Following surgery it is unrealistic to expect you will not have pain.   Pain is how our bodies let us know that something is wrong or cautions us to be careful.  That said, our goal is to make your pain tolerable.    Methods we may use to treat your pain include (oral or IV medications, PCAs, epidurals, nerve blocks, etc.)   While some procedures require IV pain medications for a short time after surgery, transitioning to pain medications by mouth allows for better management of pain.   Your nurse will encourage you to take oral pain medications whenever possible.  IV medications work almost immediately, but only last a short while.  Taking medications by mouth allows for a more constant level of medication in your blood stream for a longer period of time.      Once your pain is out of control it is harder to get back under control.  It is important you are aware when your next dose of pain medication is due.  If you are admitted, your nurse may write the time of your next dose on the white board in your room to help you remember.      We are interested in your pain and encourage you to inform us about aggravating factors during your visit.   Many times a simple repositioning every few hours can make a big difference.    If your physician says it is okay, do  not let your pain prevent you from getting out of bed. Be sure to call your nurse for assistance prior to getting up so you do not fall.      Before surgery, please decide your tolerable pain goal.  These faces help describe the pain ratings we use on a 0-10 scale.   Be prepared to tell us your goal and whether or not you take pain or anxiety medications at home.      BEFORE YOU COME TO THE HOSPITAL  (Pre-op instructions)  • Do not eat, drink, smoke or chew gum after midnight the night before surgery.  This also includes no mints.  • Morning of surgery take only the medicines you have been instructed with a sip of water unless otherwise instructed  by your physician.  • Do not shave, wear makeup or dark nail polish.  • Remove all jewelry including rings.  • Leave anything you consider valuable at home.  • Leave your suitcase in the car until after your surgery.  • Bring the following with you if applicable:  o Picture ID and insurance, Medicare or Medicaid cards  o Co-pay/deductible required by insurance (cash, check, credit card)  o Copy of advance directive, living will or power-of- documents if not brought to PAT  o CPAP or BIPAP mask and tubing  o Relaxation aids ( book, magazine), etc.  o Hearing aids                                 ON THE DAY OF SURGERY  · On the day of surgery check in at registration located at the main entrance of the hospital.   ? You will be registered and given a beeper with instructions where to wait in the main lobby.  ? When your beeper lights up and vibrates a member of the Outpatient Surgery staff will meet you at the double doors under the stair steps and escort you to your preoperative room.   · You may have cloth compression devices placed on your legs. These help to prevent blood clots and reduce swelling in your legs.  · An IV may be inserted into one of your veins.  · In the operating room, you may be given one or more of the following:  ? A medicine to help you relax  "(sedative).  ? A medicine to numb the area (local anesthetic).  ? A medicine to make you fall asleep (general anesthetic).  ? A medicine that is injected into an area of your body to numb everything below the injection site (regional anesthetic).  · Your surgical site will be marked or identified.  · You may be given an antibiotic through your IV to help prevent infection.  Contact a health care provider if you:  · Develop a fever of more than 100.4°F (38°C) or other feelings of illness during the 48 hours before your surgery.  · Have symptoms that get worse.  Have questions or concerns about your surgery    General Anesthesia/Surgery, Adult  General anesthesia is the use of medicines to make a person \"go to sleep\" (unconscious) for a medical procedure. General anesthesia must be used for certain procedures, and is often recommended for procedures that:  · Last a long time.  · Require you to be still or in an unusual position.  · Are major and can cause blood loss.  The medicines used for general anesthesia are called general anesthetics. As well as making you unconscious for a certain amount of time, these medicines:  · Prevent pain.  · Control your blood pressure.  · Relax your muscles.  Tell a health care provider about:  · Any allergies you have.  · All medicines you are taking, including vitamins, herbs, eye drops, creams, and over-the-counter medicines.  · Any problems you or family members have had with anesthetic medicines.  · Types of anesthetics you have had in the past.  · Any blood disorders you have.  · Any surgeries you have had.  · Any medical conditions you have.  · Any recent upper respiratory, chest, or ear infections.  · Any history of:  ? Heart or lung conditions, such as heart failure, sleep apnea, asthma, or chronic obstructive pulmonary disease (COPD).  ?  service.  ? Depression or anxiety.  · Any tobacco or drug use, including marijuana or alcohol use.  · Whether you are pregnant or " may be pregnant.  What are the risks?  Generally, this is a safe procedure. However, problems may occur, including:  · Allergic reaction.  · Lung and heart problems.  · Inhaling food or liquid from the stomach into the lungs (aspiration).  · Nerve injury.  · Air in the bloodstream, which can lead to stroke.  · Extreme agitation or confusion (delirium) when you wake up from the anesthetic.  · Waking up during your procedure and being unable to move. This is rare.  These problems are more likely to develop if you are having a major surgery or if you have an advanced or serious medical condition. You can prevent some of these complications by answering all of your health care provider's questions thoroughly and by following all instructions before your procedure.  General anesthesia can cause side effects, including:  · Nausea or vomiting.  · A sore throat from the breathing tube.  · Hoarseness.  · Wheezing or coughing.  · Shaking chills.  · Tiredness.  · Body aches.  · Anxiety.  · Sleepiness or drowsiness.  · Confusion or agitation.  RISKS AND COMPLICATIONS OF SURGERY  Your health care provider will discuss possible risks and complications with you before surgery. Common risks and complications include:    · Problems due to the use of anesthetics.  · Blood loss and replacement (does not apply to minor surgical procedures).  · Temporary increase in pain due to surgery.  · Uncorrected pain or problems that the surgery was meant to correct.  · Infection.  · New damage.    What happens before the procedure?    Medicines  Ask your health care provider about:  · Changing or stopping your regular medicines. This is especially important if you are taking diabetes medicines or blood thinners.  · Taking medicines such as aspirin and ibuprofen. These medicines can thin your blood. Do not take these medicines unless your health care provider tells you to take them.  · Taking over-the-counter medicines, vitamins, herbs, and  supplements. Do not take these during the week before your procedure unless your health care provider approves them.  General instructions  · Starting 3-6 weeks before the procedure, do not use any products that contain nicotine or tobacco, such as cigarettes and e-cigarettes. If you need help quitting, ask your health care provider.  · If you brush your teeth on the morning of the procedure, make sure to spit out all of the toothpaste.  · Tell your health care provider if you become ill or develop a cold, cough, or fever.  · If instructed by your health care provider, bring your sleep apnea device with you on the day of your surgery (if applicable).  · Ask your health care provider if you will be going home the same day, the following day, or after a longer hospital stay.  ? Plan to have someone take you home from the hospital or clinic.  ? Plan to have a responsible adult care for you for at least 24 hours after you leave the hospital or clinic. This is important.  What happens during the procedure?  · You will be given anesthetics through both of the following:  ? A mask placed over your nose and mouth.  ? An IV in one of your veins.  · You may receive a medicine to help you relax (sedative).  · After you are unconscious, a breathing tube may be inserted down your throat to help you breathe. This will be removed before you wake up.  · An anesthesia specialist will stay with you throughout your procedure. He or she will:  ? Keep you comfortable and safe by continuing to give you medicines and adjusting the amount of medicine that you get.  ? Monitor your blood pressure, pulse, and oxygen levels to make sure that the anesthetics do not cause any problems.  The procedure may vary among health care providers and hospitals.  What happens after the procedure?  · Your blood pressure, temperature, heart rate, breathing rate, and blood oxygen level will be monitored until the medicines you were given have worn off.  · You  will wake up in a recovery area. You may wake up slowly.  · If you feel anxious or agitated, you may be given medicine to help you calm down.  · If you will be going home the same day, your health care provider may check to make sure you can walk, drink, and urinate.  · Your health care provider will treat any pain or side effects you have before you go home.  · Do not drive for 24 hours if you were given a sedative.  Summary  · General anesthesia is used to keep you still and prevent pain during a procedure.  · It is important to tell your healthcare provider about your medical history and any surgeries you have had, and previous experience with anesthesia.  · Follow your healthcare provider’s instructions about when to stop eating, drinking, or taking certain medicines before your procedure.  · Plan to have someone take you home from the hospital or clinic.  This information is not intended to replace advice given to you by your health care provider. Make sure you discuss any questions you have with your health care provider.  Document Released: 03/26/2009 Document Revised: 08/03/2018 Document Reviewed: 08/03/2018  [employer] Interactive Patient Education © 2019 [employer]      Fall Prevention in Hospitals, Adult  As a hospital patient, your condition and the treatments you receive can increase your risk for falls. Some additional risk factors for falls in a hospital include:  · Being in an unfamiliar environment.  · Being on bed rest.  · Your surgery.  · Taking certain medicines.  · Your tubing requirements, such as intravenous (IV) therapy or catheters.  It is important that you learn how to decrease fall risks while at the hospital. Below are important tips that can help prevent falls.  SAFETY TIPS FOR PREVENTING FALLS  Talk about your risk of falling.  · Ask your health care provider why you are at risk for falling. Is it your medicine, illness, tubing placement, or something else?  · Make a plan with your  health care provider to keep you safe from falls.  · Ask your health care provider or pharmacist about side effects of your medicines. Some medicines can make you dizzy or affect your coordination.  Ask for help.  · Ask for help before getting out of bed. You may need to press your call button.  · Ask for assistance in getting safely to the toilet.  · Ask for a walker or cane to be put at your bedside. Ask that most of the side rails on your bed be placed up before your health care provider leaves the room.  · Ask family or friends to sit with you.  · Ask for things that are out of your reach, such as your glasses, hearing aids, telephone, bedside table, or call button.  Follow these tips to avoid falling:  · Stay lying or seated, rather than standing, while waiting for help.  · Wear rubber-soled slippers or shoes whenever you walk in the hospital.  · Avoid quick, sudden movements.  ¨ Change positions slowly.  ¨ Sit on the side of your bed before standing.  ¨ Stand up slowly and wait before you start to walk.  · Let your health care provider know if there is a spill on the floor.  · Pay careful attention to the medical equipment, electrical cords, and tubes around you.  · When you need help, use your call button by your bed or in the bathroom. Wait for one of your health care providers to help you.  · If you feel dizzy or unsure of your footing, return to bed and wait for assistance.  · Avoid being distracted by the TV, telephone, or another person in your room.  · Do not lean or support yourself on rolling objects, such as IV poles or bedside tables.     This information is not intended to replace advice given to you by your health care provider. Make sure you discuss any questions you have with your health care provider.     Document Released: 12/15/2001 Document Revised: 01/08/2016 Document Reviewed: 08/25/2013  [employer] Interactive Patient Education ©2016 [employer]            PATIENT/FAMILY/RESPONSIBLE PARTY  VERBALIZES UNDERSTANDING OF ABOVE EDUCATION.  COPY OF PAIN SCALE GIVEN AND REVIEWED WITH VERBALIZED UNDERSTANDING.

## 2020-08-08 LAB
COVID LABCORP PRIORITY: NORMAL
SARS-COV-2 RNA RESP QL NAA+PROBE: NOT DETECTED

## 2020-08-10 ENCOUNTER — ANESTHESIA (OUTPATIENT)
Dept: PERIOP | Facility: HOSPITAL | Age: 65
End: 2020-08-10

## 2020-08-10 ENCOUNTER — HOSPITAL ENCOUNTER (OUTPATIENT)
Facility: HOSPITAL | Age: 65
Setting detail: HOSPITAL OUTPATIENT SURGERY
Discharge: HOME | End: 2020-08-10
Attending: OTOLARYNGOLOGY | Admitting: PHYSICIAN ASSISTANT

## 2020-08-10 ENCOUNTER — ANESTHESIA EVENT (OUTPATIENT)
Dept: PERIOP | Facility: HOSPITAL | Age: 65
End: 2020-08-10

## 2020-08-10 VITALS
RESPIRATION RATE: 20 BRPM | SYSTOLIC BLOOD PRESSURE: 133 MMHG | HEART RATE: 101 BPM | OXYGEN SATURATION: 94 % | TEMPERATURE: 97.3 F | DIASTOLIC BLOOD PRESSURE: 69 MMHG

## 2020-08-10 DIAGNOSIS — J34.2 NASAL SEPTAL DEVIATION: ICD-10-CM

## 2020-08-10 DIAGNOSIS — J34.89 CONCHA BULLOSA: ICD-10-CM

## 2020-08-10 DIAGNOSIS — J01.81 OTHER ACUTE RECURRENT SINUSITIS: ICD-10-CM

## 2020-08-10 DIAGNOSIS — J32.0 CHRONIC MAXILLARY SINUSITIS: ICD-10-CM

## 2020-08-10 PROCEDURE — 88305 TISSUE EXAM BY PATHOLOGIST: CPT | Performed by: OTOLARYNGOLOGY

## 2020-08-10 PROCEDURE — C9046 COCAINE HCL NASAL SOLUTION: HCPCS | Performed by: OTOLARYNGOLOGY

## 2020-08-10 PROCEDURE — 25010000002 ONDANSETRON PER 1 MG: Performed by: ANESTHESIOLOGY

## 2020-08-10 PROCEDURE — 25010000002 PROPOFOL 10 MG/ML EMULSION: Performed by: NURSE ANESTHETIST, CERTIFIED REGISTERED

## 2020-08-10 PROCEDURE — 31256 EXPLORATION MAXILLARY SINUS: CPT | Performed by: OTOLARYNGOLOGY

## 2020-08-10 PROCEDURE — C1769 GUIDE WIRE: HCPCS | Performed by: OTOLARYNGOLOGY

## 2020-08-10 PROCEDURE — 25010000002 COCAINE HCL 40 MG/ML SOLUTION: Performed by: OTOLARYNGOLOGY

## 2020-08-10 PROCEDURE — 30520 REPAIR OF NASAL SEPTUM: CPT | Performed by: OTOLARYNGOLOGY

## 2020-08-10 PROCEDURE — 88311 DECALCIFY TISSUE: CPT | Performed by: OTOLARYNGOLOGY

## 2020-08-10 PROCEDURE — 30140 RESECT INFERIOR TURBINATE: CPT | Performed by: OTOLARYNGOLOGY

## 2020-08-10 PROCEDURE — 25010000002 ONDANSETRON PER 1 MG: Performed by: NURSE ANESTHETIST, CERTIFIED REGISTERED

## 2020-08-10 PROCEDURE — C2625 STENT, NON-COR, TEM W/DEL SY: HCPCS | Performed by: OTOLARYNGOLOGY

## 2020-08-10 PROCEDURE — 25010000002 FENTANYL CITRATE (PF) 100 MCG/2ML SOLUTION: Performed by: NURSE ANESTHETIST, CERTIFIED REGISTERED

## 2020-08-10 PROCEDURE — 25010000002 DEXAMETHASONE PER 1 MG: Performed by: NURSE ANESTHETIST, CERTIFIED REGISTERED

## 2020-08-10 DEVICE — PROPEL CONTOUR SINUS IMPLANT
Type: IMPLANTABLE DEVICE | Status: FUNCTIONAL
Brand: PROPEL CONTOUR

## 2020-08-10 DEVICE — STPLR SEPTL ENTACT .5X3MM: Type: IMPLANTABLE DEVICE | Site: SINUS | Status: FUNCTIONAL

## 2020-08-10 DEVICE — PROPEL SINUS IMPLANT
Type: IMPLANTABLE DEVICE | Status: FUNCTIONAL
Brand: PROPEL

## 2020-08-10 RX ORDER — DEXAMETHASONE SODIUM PHOSPHATE 4 MG/ML
INJECTION, SOLUTION INTRA-ARTICULAR; INTRALESIONAL; INTRAMUSCULAR; INTRAVENOUS; SOFT TISSUE AS NEEDED
Status: DISCONTINUED | OUTPATIENT
Start: 2020-08-10 | End: 2020-08-10 | Stop reason: SURG

## 2020-08-10 RX ORDER — COCAINE HYDROCHLORIDE 40 MG/ML
SOLUTION NASAL AS NEEDED
Status: DISCONTINUED | OUTPATIENT
Start: 2020-08-10 | End: 2020-08-10 | Stop reason: HOSPADM

## 2020-08-10 RX ORDER — SODIUM CHLORIDE, SODIUM LACTATE, POTASSIUM CHLORIDE, CALCIUM CHLORIDE 600; 310; 30; 20 MG/100ML; MG/100ML; MG/100ML; MG/100ML
100 INJECTION, SOLUTION INTRAVENOUS CONTINUOUS
Status: DISCONTINUED | OUTPATIENT
Start: 2020-08-10 | End: 2020-08-10 | Stop reason: HOSPADM

## 2020-08-10 RX ORDER — NEOSTIGMINE METHYLSULFATE 5 MG/5 ML
SYRINGE (ML) INTRAVENOUS AS NEEDED
Status: DISCONTINUED | OUTPATIENT
Start: 2020-08-10 | End: 2020-08-10 | Stop reason: SURG

## 2020-08-10 RX ORDER — ONDANSETRON 2 MG/ML
INJECTION INTRAMUSCULAR; INTRAVENOUS AS NEEDED
Status: DISCONTINUED | OUTPATIENT
Start: 2020-08-10 | End: 2020-08-10 | Stop reason: SURG

## 2020-08-10 RX ORDER — AMOXICILLIN 500 MG/1
500 CAPSULE ORAL 3 TIMES DAILY
Qty: 30 CAPSULE | Refills: 0 | Status: SHIPPED | OUTPATIENT
Start: 2020-08-10 | End: 2020-08-20

## 2020-08-10 RX ORDER — HYDROCODONE BITARTRATE AND ACETAMINOPHEN 5; 325 MG/1; MG/1
1 TABLET ORAL ONCE AS NEEDED
Status: DISCONTINUED | OUTPATIENT
Start: 2020-08-10 | End: 2020-08-10 | Stop reason: HOSPADM

## 2020-08-10 RX ORDER — FLUMAZENIL 0.1 MG/ML
0.2 INJECTION INTRAVENOUS AS NEEDED
Status: DISCONTINUED | OUTPATIENT
Start: 2020-08-10 | End: 2020-08-10 | Stop reason: HOSPADM

## 2020-08-10 RX ORDER — IBUPROFEN 600 MG/1
600 TABLET ORAL ONCE AS NEEDED
Status: DISCONTINUED | OUTPATIENT
Start: 2020-08-10 | End: 2020-08-10 | Stop reason: HOSPADM

## 2020-08-10 RX ORDER — SODIUM CHLORIDE 0.9 % (FLUSH) 0.9 %
3 SYRINGE (ML) INJECTION EVERY 12 HOURS SCHEDULED
Status: DISCONTINUED | OUTPATIENT
Start: 2020-08-10 | End: 2020-08-10 | Stop reason: HOSPADM

## 2020-08-10 RX ORDER — ROCURONIUM BROMIDE 10 MG/ML
INJECTION, SOLUTION INTRAVENOUS AS NEEDED
Status: DISCONTINUED | OUTPATIENT
Start: 2020-08-10 | End: 2020-08-10 | Stop reason: SURG

## 2020-08-10 RX ORDER — LABETALOL HYDROCHLORIDE 5 MG/ML
5 INJECTION, SOLUTION INTRAVENOUS
Status: DISCONTINUED | OUTPATIENT
Start: 2020-08-10 | End: 2020-08-10 | Stop reason: HOSPADM

## 2020-08-10 RX ORDER — ONDANSETRON 2 MG/ML
4 INJECTION INTRAMUSCULAR; INTRAVENOUS ONCE AS NEEDED
Status: COMPLETED | OUTPATIENT
Start: 2020-08-10 | End: 2020-08-10

## 2020-08-10 RX ORDER — FENTANYL CITRATE 50 UG/ML
25 INJECTION, SOLUTION INTRAMUSCULAR; INTRAVENOUS
Status: DISCONTINUED | OUTPATIENT
Start: 2020-08-10 | End: 2020-08-10 | Stop reason: HOSPADM

## 2020-08-10 RX ORDER — LIDOCAINE HYDROCHLORIDE AND EPINEPHRINE 10; 10 MG/ML; UG/ML
INJECTION, SOLUTION INFILTRATION; PERINEURAL AS NEEDED
Status: DISCONTINUED | OUTPATIENT
Start: 2020-08-10 | End: 2020-08-10 | Stop reason: HOSPADM

## 2020-08-10 RX ORDER — SODIUM CHLORIDE 0.9 % (FLUSH) 0.9 %
3 SYRINGE (ML) INJECTION AS NEEDED
Status: DISCONTINUED | OUTPATIENT
Start: 2020-08-10 | End: 2020-08-10 | Stop reason: HOSPADM

## 2020-08-10 RX ORDER — FAMOTIDINE 10 MG/ML
20 INJECTION, SOLUTION INTRAVENOUS
Status: COMPLETED | OUTPATIENT
Start: 2020-08-10 | End: 2020-08-10

## 2020-08-10 RX ORDER — LIDOCAINE HYDROCHLORIDE 10 MG/ML
0.5 INJECTION, SOLUTION EPIDURAL; INFILTRATION; INTRACAUDAL; PERINEURAL ONCE AS NEEDED
Status: DISCONTINUED | OUTPATIENT
Start: 2020-08-10 | End: 2020-08-10 | Stop reason: HOSPADM

## 2020-08-10 RX ORDER — FENTANYL CITRATE 50 UG/ML
INJECTION, SOLUTION INTRAMUSCULAR; INTRAVENOUS AS NEEDED
Status: DISCONTINUED | OUTPATIENT
Start: 2020-08-10 | End: 2020-08-10 | Stop reason: SURG

## 2020-08-10 RX ORDER — HYDROCODONE BITARTRATE AND ACETAMINOPHEN 5; 325 MG/1; MG/1
1-2 TABLET ORAL EVERY 4 HOURS PRN
Qty: 8 TABLET | Refills: 0 | Status: SHIPPED | OUTPATIENT
Start: 2020-08-10 | End: 2020-08-10

## 2020-08-10 RX ORDER — OXYMETAZOLINE HYDROCHLORIDE 0.05 G/100ML
2 SPRAY NASAL
Status: DISCONTINUED | OUTPATIENT
Start: 2020-08-10 | End: 2020-08-10 | Stop reason: HOSPADM

## 2020-08-10 RX ORDER — ACETAMINOPHEN 500 MG
1000 TABLET ORAL ONCE
Status: COMPLETED | OUTPATIENT
Start: 2020-08-10 | End: 2020-08-10

## 2020-08-10 RX ORDER — NALOXONE HCL 0.4 MG/ML
0.4 VIAL (ML) INJECTION AS NEEDED
Status: DISCONTINUED | OUTPATIENT
Start: 2020-08-10 | End: 2020-08-10 | Stop reason: HOSPADM

## 2020-08-10 RX ORDER — COCAINE HYDROCHLORIDE 40 MG/ML
SOLUTION NASAL
Status: DISCONTINUED
Start: 2020-08-10 | End: 2020-08-10 | Stop reason: HOSPADM

## 2020-08-10 RX ORDER — OXYCODONE AND ACETAMINOPHEN 7.5; 325 MG/1; MG/1
2 TABLET ORAL EVERY 4 HOURS PRN
Status: DISCONTINUED | OUTPATIENT
Start: 2020-08-10 | End: 2020-08-10 | Stop reason: HOSPADM

## 2020-08-10 RX ORDER — SODIUM CHLORIDE, SODIUM LACTATE, POTASSIUM CHLORIDE, CALCIUM CHLORIDE 600; 310; 30; 20 MG/100ML; MG/100ML; MG/100ML; MG/100ML
1000 INJECTION, SOLUTION INTRAVENOUS CONTINUOUS
Status: DISCONTINUED | OUTPATIENT
Start: 2020-08-10 | End: 2020-08-10 | Stop reason: HOSPADM

## 2020-08-10 RX ORDER — SODIUM CHLORIDE, SODIUM LACTATE, POTASSIUM CHLORIDE, CALCIUM CHLORIDE 600; 310; 30; 20 MG/100ML; MG/100ML; MG/100ML; MG/100ML
INJECTION, SOLUTION INTRAVENOUS CONTINUOUS PRN
Status: COMPLETED | OUTPATIENT
Start: 2020-08-10 | End: 2020-08-10

## 2020-08-10 RX ORDER — OXYCODONE AND ACETAMINOPHEN 10; 325 MG/1; MG/1
1 TABLET ORAL ONCE AS NEEDED
Status: DISCONTINUED | OUTPATIENT
Start: 2020-08-10 | End: 2020-08-10 | Stop reason: HOSPADM

## 2020-08-10 RX ORDER — WATER 1000 ML/1000ML
INJECTION, SOLUTION INTRAVENOUS AS NEEDED
Status: DISCONTINUED | OUTPATIENT
Start: 2020-08-10 | End: 2020-08-10 | Stop reason: HOSPADM

## 2020-08-10 RX ORDER — PROPOFOL 10 MG/ML
VIAL (ML) INTRAVENOUS AS NEEDED
Status: DISCONTINUED | OUTPATIENT
Start: 2020-08-10 | End: 2020-08-10 | Stop reason: SURG

## 2020-08-10 RX ORDER — MAGNESIUM HYDROXIDE 1200 MG/15ML
LIQUID ORAL AS NEEDED
Status: DISCONTINUED | OUTPATIENT
Start: 2020-08-10 | End: 2020-08-10 | Stop reason: HOSPADM

## 2020-08-10 RX ORDER — SODIUM CHLORIDE 0.9 % (FLUSH) 0.9 %
3-10 SYRINGE (ML) INJECTION AS NEEDED
Status: DISCONTINUED | OUTPATIENT
Start: 2020-08-10 | End: 2020-08-10 | Stop reason: HOSPADM

## 2020-08-10 RX ADMIN — DEXAMETHASONE SODIUM PHOSPHATE 4 MG: 4 INJECTION, SOLUTION INTRAMUSCULAR; INTRAVENOUS at 11:41

## 2020-08-10 RX ADMIN — FAMOTIDINE 20 MG: 10 INJECTION INTRAVENOUS at 10:28

## 2020-08-10 RX ADMIN — GLYCOPYRROLATE 0.4 MG: 0.2 INJECTION, SOLUTION INTRAMUSCULAR; INTRAVENOUS at 12:26

## 2020-08-10 RX ADMIN — Medication 2 SPRAY: at 10:19

## 2020-08-10 RX ADMIN — ONDANSETRON HYDROCHLORIDE 4 MG: 2 SOLUTION INTRAMUSCULAR; INTRAVENOUS at 12:51

## 2020-08-10 RX ADMIN — Medication 4 MG: at 12:26

## 2020-08-10 RX ADMIN — PROPOFOL 200 MG: 10 INJECTION, EMULSION INTRAVENOUS at 11:35

## 2020-08-10 RX ADMIN — SODIUM CHLORIDE, POTASSIUM CHLORIDE, SODIUM LACTATE AND CALCIUM CHLORIDE 1000 ML: 600; 310; 30; 20 INJECTION, SOLUTION INTRAVENOUS at 08:50

## 2020-08-10 RX ADMIN — ROCURONIUM BROMIDE 50 MG: 10 INJECTION INTRAVENOUS at 11:35

## 2020-08-10 RX ADMIN — FENTANYL CITRATE 100 MCG: 50 INJECTION, SOLUTION INTRAMUSCULAR; INTRAVENOUS at 11:32

## 2020-08-10 RX ADMIN — ACETAMINOPHEN 1000 MG: 500 TABLET, FILM COATED ORAL at 10:27

## 2020-08-10 RX ADMIN — LIDOCAINE HYDROCHLORIDE 100 MG: 20 INJECTION, SOLUTION INTRAVENOUS at 11:35

## 2020-08-10 RX ADMIN — ONDANSETRON HYDROCHLORIDE 4 MG: 2 SOLUTION INTRAMUSCULAR; INTRAVENOUS at 12:25

## 2020-08-10 NOTE — ANESTHESIA PROCEDURE NOTES
Airway  Urgency: elective    Date/Time: 8/10/2020 11:36 AM  Airway not difficult    General Information and Staff    Patient location during procedure: OR  CRNA: Mary Carmen Montez CRNA    Indications and Patient Condition  Indications for airway management: airway protection    Preoxygenated: yes  Mask difficulty assessment: 1 - vent by mask    Final Airway Details  Final airway type: endotracheal airway      Successful airway: ETT  Cuffed: yes   Successful intubation technique: direct laryngoscopy  Facilitating devices/methods: intubating stylet  Endotracheal tube insertion site: oral  Blade: Rojas  Blade size: 2  ETT size (mm): 7.5  Cormack-Lehane Classification: grade I - full view of glottis  Placement verified by: chest auscultation and capnometry   Cuff volume (mL): 8  Measured from: lips  ETT/EBT  to lips (cm): 24  Number of attempts at approach: 1  Assessment: lips, teeth, and gum same as pre-op and atraumatic intubation

## 2020-08-10 NOTE — ANESTHESIA POSTPROCEDURE EVALUATION
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        Post Anesthesia Care and Evaluation    Patient location during evaluation: PACU  Patient participation: complete - patient participated  Level of consciousness: awake and alert  Pain management: adequate  Airway patency: patent  Anesthetic complications: No anesthetic complications    Cardiovascular status: acceptable  Respiratory status: acceptable  Hydration status: acceptable    Comments: Blood pressure 150/82, pulse (!) 48, temperature 97.3 °F (36.3 °C), resp. rate 20, SpO2 96 %.    Pt discharged from PACU based on misael score >8

## 2020-08-10 NOTE — ANESTHESIA PREPROCEDURE EVALUATION
Anesthesia Evaluation     Patient summary reviewed and Nursing notes reviewed   no history of anesthetic complications (pt reports awareness during sinus surgery in 70s):  NPO Solid Status: > 8 hours  NPO Liquid Status: > 8 hours           Airway   Mallampati: I  TM distance: >3 FB  Neck ROM: full  No difficulty expected  Dental      Pulmonary    (-) asthma, sleep apnea, not a smoker  Cardiovascular   Exercise tolerance: good (4-7 METS)    (+) hypertension, past MI , CAD (medical management), CHF , hyperlipidemia,     ROS comment: Pt reports h/o positive stress test in 1994 Had subsequent cath and was treated with medical management. Followed by Dr Stiles. Unable to review cath report. He denies stent placement. He denies chest pain or dyspnea.     Neuro/Psych  (-) seizures, TIA, CVA  GI/Hepatic/Renal/Endo    (+) morbid obesity, GERD,    (-) liver disease, no renal disease, diabetes    Musculoskeletal     (+) back pain,   Abdominal    Substance History      OB/GYN          Other   arthritis,                      Anesthesia Plan    ASA 3     general     intravenous induction     Anesthetic plan, all risks, benefits, and alternatives have been provided, discussed and informed consent has been obtained with: patient.

## 2020-08-10 NOTE — DISCHARGE INSTRUCTIONS

## 2020-08-10 NOTE — OP NOTE
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     Approximately 4 mL of 4% cocaine solution impregnating Codman neurosurgical pledgets were placed intranasally and 5 minutes allowed to pass by the clock.  The pledgets were removed and a hemitransfixion incision accomplished 12 mm cephalad to the caudal margin of the quadrangular cartilage.  Septal flaps were elevated bilaterally consisting of the mucoperichondrium.  The deviated portion of quadrangular cartilage was resected with care taken to leave 10 mm of quadrangular cartilage for dorsal support.  The dissection progressed posteriorly to the perpendicular plate of the ethmoid bone and a cartilaginous spur was removed with Eliceo forceps and a [place] elevator on the left.  Subsequently the caudal incision was closed with interrupted 4-0 plain gut and the ENTrigue surgical stapler utilized to place \"whip\" stitches.    Rigid nasal endoscopy was subsequently performed " with the Stortz 0° endoscope.  On the left a sammy bullosa deformity was subluxed medially utilizing a [place] elevator and injection accomplished with 1 mL 1% Xylocaine with epinephrine.  The turbinate was transected from its lateral nasal wall attachments utilizing Bellucci scissors and further removed with Eliceo forceps and the ground lamella attachments resected utilizing Santosh-Cut forceps.  No significant bleeding was encountered at the ground lamella and the little bleeding which was encountered was controlled with the Weck suction cautery.    Maxillary antrostomy was performed on the left and resection of the uncinate process accomplished with a Rad-12 microdebrider.  No true polypoid changes were noted throughout the dissection.  Edematous mucosa was noted but no antral polyposis was appreciated    A contour propel implant was placed in the left antrostomy and subsequently a regular Propel implant was placed near the antrostomy.   Staberger sinus foam dressing was also placed.  No significant bleeding was noted.   Attention was then turned to the opposite side where a similar procedure was performed with the exception that there was no sammy bullosa deformity on the right and therefore the middle turbinate was left intact.  In addition only a contour implant was placed in the right antrostomy no regular propel implant was placed.       The patient was transported upon extubation to the postanesthesia care unit in stable condition.    SPECIMENS:  A: Septal cartilage and bone with ethmoid bone and mucosa    COMPLICATIONS: NONE    ESTIMATED BLOOD LOSS:  Minimal    Jona Garibay MD  8/10/2020

## 2020-08-15 LAB
CYTO UR: NORMAL
LAB AP CASE REPORT: NORMAL
PATH REPORT.FINAL DX SPEC: NORMAL
PATH REPORT.GROSS SPEC: NORMAL

## 2020-08-24 ENCOUNTER — OFFICE VISIT (OUTPATIENT)
Dept: OTOLARYNGOLOGY | Facility: CLINIC | Age: 65
End: 2020-08-24

## 2020-08-24 VITALS
HEIGHT: 74 IN | HEART RATE: 72 BPM | BODY MASS INDEX: 40.43 KG/M2 | WEIGHT: 315 LBS | SYSTOLIC BLOOD PRESSURE: 135 MMHG | DIASTOLIC BLOOD PRESSURE: 80 MMHG | TEMPERATURE: 97.3 F

## 2020-08-24 DIAGNOSIS — J34.2 NASAL SEPTAL DEVIATION: ICD-10-CM

## 2020-08-24 DIAGNOSIS — J30.89 ALLERGIC RHINITIS DUE TO OTHER ALLERGIC TRIGGER, UNSPECIFIED SEASONALITY: ICD-10-CM

## 2020-08-24 DIAGNOSIS — J01.81 OTHER ACUTE RECURRENT SINUSITIS: ICD-10-CM

## 2020-08-24 DIAGNOSIS — J34.89 CONCHA BULLOSA: ICD-10-CM

## 2020-08-24 DIAGNOSIS — J32.0 CHRONIC MAXILLARY SINUSITIS: Primary | ICD-10-CM

## 2020-08-24 PROCEDURE — 99024 POSTOP FOLLOW-UP VISIT: CPT | Performed by: NURSE PRACTITIONER

## 2020-08-24 PROCEDURE — 31237 NSL/SINS NDSC SURG BX POLYPC: CPT | Performed by: NURSE PRACTITIONER

## 2020-08-24 NOTE — PROGRESS NOTES
PRIMARY CARE PROVIDER: Carl Vu MD  REFERRING PROVIDER: No ref. provider found    Chief Complaint   Patient presents with   • Follow-up     Follow up with no issues voiced at this time       Subjective   History of Present Illness:  George Wilder is a  64 y.o. male  who presents for follow up s/p septoplasty, resection of left sammy bullosa, and bilateral maxillary antrostomy and uncinectomy by Dr. Garibay on 8/10/2020. The patient has had a relatively normal postoperative course and currently has no related complaints.      Review of Systems:  Review of Systems   Constitutional: Negative for chills and fever.   HENT: Positive for congestion and rhinorrhea. Negative for ear discharge, ear pain, sinus pressure, sinus pain and sore throat.    Respiratory: Negative for cough and shortness of breath.    Cardiovascular: Negative for chest pain.   Gastrointestinal: Negative for diarrhea, nausea and vomiting.   Musculoskeletal: Positive for arthralgias.   Allergic/Immunologic: Positive for environmental allergies.       Past History:  Past Medical History:   Diagnosis Date   • Allergic rhinitis due to allergen 8/8/2017   • Arthritis    • Back pain    • CHF (congestive heart failure) (CMS/HCC)    • Chronic ethmoidal sinusitis 5/18/2017   • Chronic maxillary sinusitis 5/18/2017   • Coronary artery disease 1984    stent LAD, Cx, RCA    • GERD (gastroesophageal reflux disease)    • Heart attack (CMS/HCC) 1994    Dr Stiles    • Heartburn    • Hyperlipidemia    • Hypertension    • Myocardial infarction (CMS/HCC)    • Pain     back, & Neck      Past Surgical History:   Procedure Laterality Date   • ANKLE SURGERY     • BACK SURGERY     • CARDIAC CATHETERIZATION  1984, 1994   • CHOLECYSTECTOMY  2014    Lararocopic    • ENDOSCOPIC FUNCTIONAL SINUS SURGERY (FESS) N/A 6/12/2017    Procedure: ENDOSCOPIC FUNCTIONAL SINUS SURGERY WITH LEFT MIDDLE MEATAL ANTROSTOMY AND LEFT ETHMOIDECTOMY;  Surgeon: Jona Garibay MD;   Location: [facility] OR;  Service:    • ENDOSCOPIC FUNCTIONAL SINUS SURGERY (FESS) Bilateral 8/10/2020    Procedure: Septoplasty Resection of left sammy bullosa deformity Bilateral maxillary antrostomy and uncinectomy;  Surgeon: Jona Garibay MD;  Location: [facility] OR;  Service: ENT;  Laterality: Bilateral;   • ETHMOIDECTOMY Left 2017    Procedure: ETHMOIDECTOMY ENDOSCOPIC;  Surgeon: Jona Garibay MD;  Location: [facility] OR;  Service:    • EYE SURGERY      LASIK   • HAND SURGERY     • SINUS SURGERY     • TOTAL HIP ARTHROPLASTY Bilateral      Family History   Problem Relation Age of Onset   • No Known Problems Mother    • Cancer Father    • Lung cancer Father         metastatic adenocarcinoma of lung   • No Known Problems Brother    • Cancer Brother      Social History     Tobacco Use   • Smoking status: Former Smoker     Packs/day: 2.00     Years: 24.00     Pack years: 48.00     Types: Cigarettes     Last attempt to quit:      Years since quittin.6   • Smokeless tobacco: Never Used   Substance Use Topics   • Alcohol use: No   • Drug use: No     Allergies:  Patient has no known allergies.    Current Outpatient Medications:   •  aspirin 81 MG EC tablet, Take 81 mg by mouth Daily., Disp: , Rfl:   •  azelastine (ASTELIN) 0.1 % nasal spray, INHALE 2 SPRAYS INTO EACH NOSTRIL TWICE A DAY (Patient taking differently: Using PRN), Disp: 1 each, Rfl: 5  •  celecoxib (CeleBREX) 200 MG capsule, Take 200 mg by mouth Daily., Disp: , Rfl:   •  Cholecalciferol (VITAMIN D3) 5000 UNITS capsule capsule, Take 5,000 Units by mouth Daily., Disp: , Rfl:   •  fluticasone (FLONASE) 50 MCG/ACT nasal spray, SPRAY 2 SPRAYS INTO EACH NOSTRIL EVERY DAY, Disp: 16 mL, Rfl: 11  •  gabapentin (NEURONTIN) 300 MG capsule, Take 300 mg by mouth 3 (Three) Times a Day., Disp: , Rfl:   •  HYDROcodone-acetaminophen (NORCO)  MG per tablet, Take 1 tablet by mouth 2 (Two) Times a Day. BACK PAIN, Disp: , Rfl:   •  metoprolol tartrate  (LOPRESSOR) 50 MG tablet, TAKE 1 TABLET BY MOUTH EVERYDAY AT BEDTIME, Disp: 90 tablet, Rfl: 3  •  Multiple Vitamins-Minerals (MULTIVITAMIN WITH MINERALS) tablet tablet, Take 1 tablet by mouth Daily., Disp: , Rfl:   •  mupirocin (BACTROBAN) 2 % nasal ointment, into the nostril(s) as directed by provider 2 (Two) Times a Day for 14 days. Apply to the nose twice daily, Disp: 3 g, Rfl: 0  •  omeprazole (priLOSEC) 20 MG capsule, Take 20 mg by mouth Daily., Disp: , Rfl:   •  simvastatin (ZOCOR) 40 MG tablet, TAKE 1 TABLET BY MOUTH EVERY NIGHT., Disp: 90 tablet, Rfl: 1  •  tiZANidine (ZANAFLEX) 4 MG tablet, Take 4 mg by mouth Every 6 (Six) Hours As Needed for Muscle Spasms., Disp: , Rfl: 2  •  vitamin C (ASCORBIC ACID) 500 MG tablet, Take 500 mg by mouth Daily., Disp: , Rfl:       Objective     Vital Signs:  Temp:  [97.3 °F (36.3 °C)] 97.3 °F (36.3 °C)  Heart Rate:  [72] 72  BP: (135)/(80) 135/80    Physical Exam:  Physical Exam  CONSTITUTIONAL: well nourished, well-developed, alert, oriented, in no acute distress   COMMUNICATION AND VOICE: able to communicate normally, normal voice quality  HEAD: normocephalic, no lesions, atraumatic, no tenderness, no masses   FACE: appearance normal, no lesions, no tenderness, no deformities, facial motion symmetric  SALIVARY GLANDS: parotid glands with no tenderness, no swelling, no masses, submandibular glands with normal size, nontender  EYES: ocular motility normal, eyelids normal, orbits normal, no proptosis, conjunctiva normal , pupils equal, round   EARS:  Hearing: response to conversational voice normal bilaterally   External Ears: auricles without lesions  NOSE:  External Nose: structure normal, no tenderness on palpation, no nasal discharge, no lesions, no evidence of trauma, nostrils patent   Intranasal Exam: See scope note  ORAL:  Lips: upper and lower lips without lesion   NECK: neck appearance normal, no masses or tenderness  LYMPH NODES: no  lymphadenopathy  CHEST/RESPIRATORY: respiratory effort normal, normal breath sounds   CARDIOVASCULAR: rate and rhythm normal, extremities without cyanosis or edema    NEUROLOGIC/PSYCHIATRIC: oriented to time, place and person, mood normal, affect appropriate, CN II-XII intact grossly      PROCEDURE:      Nasal Endoscopy  ANESTHESIA:  Topical Ponticaine and Afrin Spray   REASON FOR THE PROCEDURE:  Patient is status post recent nasal surgery with a need for evaluation of the surgical site and possible nasal debridement. The patient consented to operative intervention after a full discussion of risks, benefits, and alternatives. No guarantees were made or implied.   DETAILS OF THE OPERATION:  The patient was seated in the exam room chair. Nasal endoscopy with debridement was performed with the 0 degree scope through the nares after applying nebulized ponticaine/ Afrin spray . A 0 degree endoscope was introduced into the nasal cavity and gently directed to the deeper nasal cavity examining the following structures.   FINDINGS:  Mucosal Surfaces:  The mucosal surfaces demonstrated crusting and post surgical changes.   Nasal Septum:  The nasal septum was found to be relatively midline without perforation or hematoma  Inferior Turbinates:  The inferior turbinates were found to be intact  Middle Turbinates:  The right middle turbinate is intact and the left middle turbinate has been resected  Middle Meatus:  The middle meatus was found to have postop debris bilaterally   Bilateral debridement was performed using the endoscope with suctioning and forceps.      Assessment   Assessment:  1. Chronic maxillary sinusitis    2. Other acute recurrent sinusitis    3. Lilian bullosa    4. Nasal septal deviation    5. Allergic rhinitis due to other allergic trigger, unspecified seasonality        Plan   Plan:  Continue postop nasal hygiene.  Call for any problems worsening symptoms.  Follow-up in 2 weeks.    No orders of the defined  types were placed in this encounter.    There are no Patient Instructions on file for this visit.  Return in about 2 weeks (around 9/7/2020), or if symptoms worsen or fail to improve, for Recheck.    My findings and recommendations were discussed and questions were answered.     Ira Coyle, ISAAC  08/24/20  16:50

## 2020-09-09 ENCOUNTER — OFFICE VISIT (OUTPATIENT)
Dept: OTOLARYNGOLOGY | Facility: CLINIC | Age: 65
End: 2020-09-09

## 2020-09-09 VITALS
DIASTOLIC BLOOD PRESSURE: 80 MMHG | BODY MASS INDEX: 40.06 KG/M2 | SYSTOLIC BLOOD PRESSURE: 146 MMHG | WEIGHT: 312 LBS | HEART RATE: 65 BPM | TEMPERATURE: 97.1 F

## 2020-09-09 DIAGNOSIS — J34.2 NASAL SEPTAL DEVIATION: ICD-10-CM

## 2020-09-09 DIAGNOSIS — J32.0 CHRONIC MAXILLARY SINUSITIS: Primary | ICD-10-CM

## 2020-09-09 DIAGNOSIS — J30.89 ALLERGIC RHINITIS DUE TO OTHER ALLERGIC TRIGGER, UNSPECIFIED SEASONALITY: ICD-10-CM

## 2020-09-09 DIAGNOSIS — Z98.890 S/P SINUS SURGERY: ICD-10-CM

## 2020-09-09 DIAGNOSIS — J34.89 CONCHA BULLOSA: ICD-10-CM

## 2020-09-09 DIAGNOSIS — J01.81 OTHER ACUTE RECURRENT SINUSITIS: ICD-10-CM

## 2020-09-09 PROCEDURE — 31237 NSL/SINS NDSC SURG BX POLYPC: CPT | Performed by: NURSE PRACTITIONER

## 2020-09-09 PROCEDURE — 99024 POSTOP FOLLOW-UP VISIT: CPT | Performed by: NURSE PRACTITIONER

## 2020-09-09 NOTE — PROGRESS NOTES
PRIMARY CARE PROVIDER: Carl Vu MD  REFERRING PROVIDER: No ref. provider found    Chief Complaint   Patient presents with   • Follow-up     2 week follow up with no issues voiced at this time       Subjective   History of Present Illness:  George Wilder is a  64 y.o. male  who presents for follow up s/p septoplasty, resection of left sammy bullosa, and bilateral maxillary antrostomy and uncinectomy by Dr. Garibay on 8/10/2020. The patient has had a relatively normal postoperative course and currently has no related complaints.      Review of Systems:  Review of Systems   Constitutional: Negative for chills and fever.   HENT: Negative for congestion, ear discharge, ear pain, rhinorrhea, sinus pressure, sinus pain and sore throat.    Respiratory: Negative for cough and shortness of breath.    Cardiovascular: Negative for chest pain.   Gastrointestinal: Negative for diarrhea, nausea and vomiting.   Musculoskeletal: Positive for arthralgias.   Allergic/Immunologic: Positive for environmental allergies.       Past History:  Past Medical History:   Diagnosis Date   • Allergic rhinitis due to allergen 8/8/2017   • Arthritis    • Back pain    • CHF (congestive heart failure) (CMS/HCC)    • Chronic ethmoidal sinusitis 5/18/2017   • Chronic maxillary sinusitis 5/18/2017   • Coronary artery disease 1984    stent LAD, Cx, RCA    • GERD (gastroesophageal reflux disease)    • Heart attack (CMS/HCC) 1994    Dr Stiles    • Heartburn    • Hyperlipidemia    • Hypertension    • Myocardial infarction (CMS/HCC)    • Pain     back, & Neck      Past Surgical History:   Procedure Laterality Date   • ANKLE SURGERY     • BACK SURGERY     • CARDIAC CATHETERIZATION  1984, 1994   • CHOLECYSTECTOMY  2014    Lararocopic    • ENDOSCOPIC FUNCTIONAL SINUS SURGERY (FESS) N/A 6/12/2017    Procedure: ENDOSCOPIC FUNCTIONAL SINUS SURGERY WITH LEFT MIDDLE MEATAL ANTROSTOMY AND LEFT ETHMOIDECTOMY;  Surgeon: Jona Garibay MD;  Location:   [facility] OR;  Service:    • ENDOSCOPIC FUNCTIONAL SINUS SURGERY (FESS) Bilateral 8/10/2020    Procedure: Septoplasty Resection of left sammy bullosa deformity Bilateral maxillary antrostomy and uncinectomy;  Surgeon: Jona Garibay MD;  Location: [facility] OR;  Service: ENT;  Laterality: Bilateral;   • ETHMOIDECTOMY Left 2017    Procedure: ETHMOIDECTOMY ENDOSCOPIC;  Surgeon: Jona Garibay MD;  Location: [facility] OR;  Service:    • EYE SURGERY      LASIK   • HAND SURGERY     • SINUS SURGERY     • TOTAL HIP ARTHROPLASTY Bilateral      Family History   Problem Relation Age of Onset   • No Known Problems Mother    • Cancer Father    • Lung cancer Father         metastatic adenocarcinoma of lung   • No Known Problems Brother    • Cancer Brother      Social History     Tobacco Use   • Smoking status: Former Smoker     Packs/day: 2.00     Years: 24.00     Pack years: 48.00     Types: Cigarettes     Last attempt to quit:      Years since quittin.7   • Smokeless tobacco: Never Used   Substance Use Topics   • Alcohol use: No   • Drug use: No     Allergies:  Patient has no known allergies.    Current Outpatient Medications:   •  aspirin 81 MG EC tablet, Take 81 mg by mouth Daily., Disp: , Rfl:   •  azelastine (ASTELIN) 0.1 % nasal spray, INHALE 2 SPRAYS INTO EACH NOSTRIL TWICE A DAY (Patient taking differently: Using PRN), Disp: 1 each, Rfl: 5  •  celecoxib (CeleBREX) 200 MG capsule, Take 200 mg by mouth Daily., Disp: , Rfl:   •  Cholecalciferol (VITAMIN D3) 5000 UNITS capsule capsule, Take 5,000 Units by mouth Daily., Disp: , Rfl:   •  fluticasone (FLONASE) 50 MCG/ACT nasal spray, SPRAY 2 SPRAYS INTO EACH NOSTRIL EVERY DAY, Disp: 16 mL, Rfl: 11  •  gabapentin (NEURONTIN) 300 MG capsule, Take 300 mg by mouth 3 (Three) Times a Day., Disp: , Rfl:   •  HYDROcodone-acetaminophen (NORCO)  MG per tablet, Take 1 tablet by mouth 2 (Two) Times a Day. BACK PAIN, Disp: , Rfl:   •  metoprolol tartrate (LOPRESSOR)  50 MG tablet, TAKE 1 TABLET BY MOUTH EVERYDAY AT BEDTIME, Disp: 90 tablet, Rfl: 3  •  Multiple Vitamins-Minerals (MULTIVITAMIN WITH MINERALS) tablet tablet, Take 1 tablet by mouth Daily., Disp: , Rfl:   •  omeprazole (priLOSEC) 20 MG capsule, Take 20 mg by mouth Daily., Disp: , Rfl:   •  simvastatin (ZOCOR) 40 MG tablet, TAKE 1 TABLET BY MOUTH EVERY NIGHT., Disp: 90 tablet, Rfl: 1  •  tiZANidine (ZANAFLEX) 4 MG tablet, Take 4 mg by mouth Every 6 (Six) Hours As Needed for Muscle Spasms., Disp: , Rfl: 2  •  vitamin C (ASCORBIC ACID) 500 MG tablet, Take 500 mg by mouth Daily., Disp: , Rfl:       Objective     Vital Signs:  Temp:  [97.1 °F (36.2 °C)] 97.1 °F (36.2 °C)  Heart Rate:  [65] 65  BP: (146)/(80) 146/80    Physical Exam:  Physical Exam  CONSTITUTIONAL: well nourished, well-developed, alert, oriented, in no acute distress   COMMUNICATION AND VOICE: able to communicate normally, normal voice quality  HEAD: normocephalic, no lesions, atraumatic, no tenderness, no masses   FACE: appearance normal, no lesions, no tenderness, no deformities, facial motion symmetric  SALIVARY GLANDS: parotid glands with no tenderness, no swelling, no masses, submandibular glands with normal size, nontender  EYES: ocular motility normal, eyelids normal, orbits normal, no proptosis, conjunctiva normal , pupils equal, round   EARS:  Hearing: response to conversational voice normal bilaterally   External Ears: auricles without lesions  NOSE:  External Nose: structure normal, no tenderness on palpation, no nasal discharge, no lesions, no evidence of trauma, nostrils patent   Intranasal Exam: See scope note  ORAL:  Lips: upper and lower lips without lesion   NECK: neck appearance normal, no masses or tenderness  LYMPH NODES: no lymphadenopathy  CHEST/RESPIRATORY: respiratory effort normal, normal breath sounds   CARDIOVASCULAR: rate and rhythm normal, extremities without cyanosis or edema    NEUROLOGIC/PSYCHIATRIC: oriented to time, place  and person, mood normal, affect appropriate, CN II-XII intact grossly      PROCEDURE:      Nasal Endoscopy  ANESTHESIA:  Topical Ponticaine and Afrin Spray   REASON FOR THE PROCEDURE:  Patient is status post recent nasal surgery with a need for evaluation of the surgical site and possible nasal debridement. The patient consented to operative intervention after a full discussion of risks, benefits, and alternatives. No guarantees were made or implied.   DETAILS OF THE OPERATION:  The patient was seated in the exam room chair. Nasal endoscopy with debridement was performed with the 0 degree scope through the nares after applying nebulized ponticaine/ Afrin spray . A 0 degree endoscope was introduced into the nasal cavity and gently directed to the deeper nasal cavity examining the following structures.   FINDINGS:  Mucosal Surfaces:  The mucosal surfaces demonstrated crusting and post surgical changes.   Nasal Septum:  The nasal septum was found to be relatively midline without perforation or hematoma  Inferior Turbinates:  The inferior turbinates were found to be intact  Middle Turbinates:  The right middle turbinate is intact and the left middle turbinate has been resected  Middle Meatus:  The middle meatus was found to have postop debris bilaterally (left>right)  Bilateral debridement was performed using the endoscope with suctioning and forceps.      Assessment   Assessment:  1. Chronic maxillary sinusitis    2. Other acute recurrent sinusitis    3. Lilian bullosa    4. Nasal septal deviation    5. Allergic rhinitis due to other allergic trigger, unspecified seasonality    6. S/P sinus surgery        Plan   Plan:     Continue nasal sprays as previously prescribed.    Call for any problems worsening symptoms.      Follow-up in 6 weeks.    No orders of the defined types were placed in this encounter.    There are no Patient Instructions on file for this visit.  Return in about 6 weeks (around 10/21/2020), or if  symptoms worsen or fail to improve, for Recheck.    My findings and recommendations were discussed and questions were answered.     Ira Coyle, APRN  09/09/20  10:35

## 2020-09-16 PROBLEM — I25.82 CORONARY ARTERY CHRONIC TOTAL OCCLUSION: Status: ACTIVE | Noted: 2020-09-16

## 2020-09-16 NOTE — PROGRESS NOTES
Subjective:     Encounter Date:09/17/2020      Patient ID: George Wilder is a 65 y.o. male with a history of coronary artery disease with  of RCA and noted disease of LAD and LCX, HTN, and HLD.    Chief Complaint: follow up  Coronary Artery Disease  Presents for follow-up visit. Pertinent negatives include no chest pain, dizziness, leg swelling, palpitations, shortness of breath or weight gain. Risk factors include hyperlipidemia. The symptoms have been stable.   Hypertension  This is a chronic problem. The current episode started more than 1 year ago. The problem has been rapidly improving since onset. The problem is controlled. Pertinent negatives include no chest pain, malaise/fatigue, orthopnea, palpitations, PND or shortness of breath.   Hyperlipidemia  This is a chronic problem. The current episode started more than 1 year ago. The problem is uncontrolled. Recent lipid tests were reviewed and are high. Pertinent negatives include no chest pain or shortness of breath.     Patient presents today for a routine follow up. Patient has a history of CAD with a  of the RCA and 70% stenosis of the LAD and 60% LCX stenosis that are being treated medically. His last stress echo was in 2017 and noted to be low risk of ischemia. He reports he has been well with the exception of mildly increased fatigue. He denies chest pain, palpitations, edema, orthopnea and PND. He follows routinely with Dr. Vu for his PCP.     The following portions of the patient's history were reviewed and updated as appropriate: allergies, current medications, past family history, past medical history, past social history, past surgical history and problem list.    No Known Allergies    Current Outpatient Medications:   •  aspirin 81 MG EC tablet, Take 81 mg by mouth Daily., Disp: , Rfl:   •  azelastine (ASTELIN) 0.1 % nasal spray, INHALE 2 SPRAYS INTO EACH NOSTRIL TWICE A DAY (Patient taking differently: Using PRN), Disp: 1 each, Rfl:  5  •  celecoxib (CeleBREX) 200 MG capsule, Take 200 mg by mouth Daily., Disp: , Rfl:   •  Cholecalciferol (VITAMIN D3) 5000 UNITS capsule capsule, Take 5,000 Units by mouth Daily., Disp: , Rfl:   •  fluticasone (FLONASE) 50 MCG/ACT nasal spray, SPRAY 2 SPRAYS INTO EACH NOSTRIL EVERY DAY, Disp: 16 mL, Rfl: 11  •  gabapentin (NEURONTIN) 300 MG capsule, Take 300 mg by mouth 3 (Three) Times a Day., Disp: , Rfl:   •  HYDROcodone-acetaminophen (NORCO)  MG per tablet, Take 1 tablet by mouth 2 (Two) Times a Day. BACK PAIN, Disp: , Rfl:   •  metoprolol tartrate (LOPRESSOR) 50 MG tablet, TAKE 1 TABLET BY MOUTH EVERYDAY AT BEDTIME, Disp: 90 tablet, Rfl: 3  •  Multiple Vitamins-Minerals (MULTIVITAMIN WITH MINERALS) tablet tablet, Take 1 tablet by mouth Daily., Disp: , Rfl:   •  omeprazole (priLOSEC) 20 MG capsule, Take 20 mg by mouth Daily., Disp: , Rfl:   •  simvastatin (ZOCOR) 80 MG tablet, Take 1 tablet by mouth Every Night., Disp: 30 tablet, Rfl: 11  •  tiZANidine (ZANAFLEX) 4 MG tablet, Take 4 mg by mouth Every 6 (Six) Hours As Needed for Muscle Spasms., Disp: , Rfl: 2  •  vitamin C (ASCORBIC ACID) 500 MG tablet, Take 500 mg by mouth Daily., Disp: , Rfl:   Past Medical History:   Diagnosis Date   • Allergic rhinitis due to allergen 8/8/2017   • Arthritis    • Back pain    • CHF (congestive heart failure) (CMS/HCC)    • Chronic ethmoidal sinusitis 5/18/2017   • Chronic maxillary sinusitis 5/18/2017   • Coronary artery disease 1984    stent LAD, Cx, RCA    • GERD (gastroesophageal reflux disease)    • Heart attack (CMS/HCC) 1994    Dr Stiles    • Heartburn    • Hyperlipidemia    • Hypertension    • Myocardial infarction (CMS/HCC)    • Pain     back, & Neck        Social History     Socioeconomic History   • Marital status:      Spouse name: Not on file   • Number of children: Not on file   • Years of education: Not on file   • Highest education level: Not on file   Tobacco Use   • Smoking status: Former Smoker      Packs/day: 2.00     Years: 24.00     Pack years: 48.00     Types: Cigarettes     Quit date:      Years since quittin.7   • Smokeless tobacco: Never Used   Substance and Sexual Activity   • Alcohol use: No   • Drug use: No   • Sexual activity: Defer       Review of Systems   Constitution: Negative for malaise/fatigue, weight gain and weight loss.   Cardiovascular: Negative for chest pain, dyspnea on exertion, irregular heartbeat, leg swelling, near-syncope, orthopnea, palpitations, paroxysmal nocturnal dyspnea and syncope.   Respiratory: Negative for cough, shortness of breath, sleep disturbances due to breathing, sputum production and wheezing.    Skin: Negative for dry skin, flushing, itching and rash.   Gastrointestinal: Negative for hematemesis and hematochezia.   Neurological: Negative for dizziness, light-headedness, loss of balance and weakness.   All other systems reviewed and are negative.         Objective:     Vitals signs reviewed.   Constitutional:       General: Not in acute distress.     Appearance: Well-developed. Not diaphoretic.   Eyes:      General: No scleral icterus.     Conjunctiva/sclera: Conjunctivae normal.      Pupils: Pupils are equal, round, and reactive to light.   HENT:      Head: Normocephalic.    Mouth/Throat:      Pharynx: No oropharyngeal exudate.   Neck:      Musculoskeletal: Normal range of motion and neck supple.   Pulmonary:      Effort: Pulmonary effort is normal. No respiratory distress.      Breath sounds: Normal breath sounds. No wheezing. No rales.   Chest:      Chest wall: Not tender to palpatation.   Cardiovascular:      Normal rate. Regular rhythm.   Pulses:     Intact distal pulses.   Edema:     Peripheral edema absent.   Abdominal:      General: Bowel sounds are normal. There is no distension.      Palpations: Abdomen is soft.      Tenderness: There is no abdominal tenderness.   Musculoskeletal: Normal range of motion.   Skin:     General: Skin is warm and dry.  "     Coloration: Skin is not pale.      Findings: No erythema or rash.   Neurological:      Mental Status: Alert and oriented to person, place, and time.      Deep Tendon Reflexes: Reflexes are normal and symmetric.   Psychiatric:         Behavior: Behavior normal.             ECG 12 Lead    Date/Time: 9/17/2020 11:26 AM  Performed by: Giovanna Márquez APRN  Authorized by: Giovanna Márquez APRN   Comparison: compared with previous ECG from 8/7/2020  Similar to previous ECG  Rhythm: sinus bradycardia  Rate: bradycardic  BPM: 50  Conduction: conduction normal  QRS axis: normal  Other findings: non-specific ST-T wave changes    Clinical impression: abnormal EKG          /74   Pulse 50   Ht 188 cm (74\")   Wt (!) 143 kg (316 lb)   SpO2 98%   BMI 40.57 kg/m²     Lab Review:   I have reviewed previous office notes, recent labs and recent cardiac testing.     Lab Results   Component Value Date    CHLPL 163 06/09/2020    TRIG 93 06/09/2020    HDL 58 06/09/2020    LDL 86 06/09/2020           Assessment:          Diagnosis Plan   1. Coronary artery disease involving native coronary artery of native heart without angina pectoris     2. Coronary artery chronic total occlusion     3. Mixed hyperlipidemia     4. Essential hypertension     5. Morbid obesity (CMS/[facility])            Plan:       1. CAD- stable. No clinical signs of ischemia. Known stenosis in LAD and LCX, treating medically. Continue ASA, BB, and statin. If fatigue worsens could consider stress echo. Will continue to monitor.  2. - of RCA. stable  3. HLD- uncontrolled with LDL of 86. Increase Zocor to 80mg daily. Patient has had reactions to lipitor and crestor. I have discussed the importance of cholesterol control with a LDL goal of <70 as recommended by the AHA.     4. HTN- controlled. Continue current therapy.   5. BMI- Patient's Body mass index is 40.57 kg/m². BMI is above normal parameters. Recommendations include: educational material, exercise " counseling and nutrition counseling.      Follow up in 1 year or sooner if symptoms worsen.

## 2020-09-17 ENCOUNTER — OFFICE VISIT (OUTPATIENT)
Dept: CARDIOLOGY | Facility: CLINIC | Age: 65
End: 2020-09-17

## 2020-09-17 VITALS
OXYGEN SATURATION: 98 % | SYSTOLIC BLOOD PRESSURE: 140 MMHG | HEART RATE: 50 BPM | WEIGHT: 315 LBS | BODY MASS INDEX: 40.43 KG/M2 | HEIGHT: 74 IN | DIASTOLIC BLOOD PRESSURE: 74 MMHG

## 2020-09-17 DIAGNOSIS — I25.82 CORONARY ARTERY CHRONIC TOTAL OCCLUSION: ICD-10-CM

## 2020-09-17 DIAGNOSIS — I25.10 CORONARY ARTERY DISEASE INVOLVING NATIVE CORONARY ARTERY OF NATIVE HEART WITHOUT ANGINA PECTORIS: Primary | ICD-10-CM

## 2020-09-17 DIAGNOSIS — I10 ESSENTIAL HYPERTENSION: ICD-10-CM

## 2020-09-17 DIAGNOSIS — E66.01 MORBID OBESITY (HCC): ICD-10-CM

## 2020-09-17 DIAGNOSIS — E78.2 MIXED HYPERLIPIDEMIA: ICD-10-CM

## 2020-09-17 PROCEDURE — 93000 ELECTROCARDIOGRAM COMPLETE: CPT | Performed by: NURSE PRACTITIONER

## 2020-09-17 PROCEDURE — 99214 OFFICE O/P EST MOD 30 MIN: CPT | Performed by: NURSE PRACTITIONER

## 2020-09-17 RX ORDER — SIMVASTATIN 80 MG
80 TABLET ORAL NIGHTLY
Qty: 30 TABLET | Refills: 11 | Status: SHIPPED | OUTPATIENT
Start: 2020-09-17 | End: 2020-10-16 | Stop reason: DRUGHIGH

## 2020-10-15 ENCOUNTER — FLU SHOT (OUTPATIENT)
Dept: FAMILY MEDICINE CLINIC | Facility: CLINIC | Age: 65
End: 2020-10-15

## 2020-10-15 DIAGNOSIS — Z23 NEED FOR INFLUENZA VACCINATION: ICD-10-CM

## 2020-10-15 PROCEDURE — 90471 IMMUNIZATION ADMIN: CPT | Performed by: FAMILY MEDICINE

## 2020-10-15 PROCEDURE — 90694 VACC AIIV4 NO PRSRV 0.5ML IM: CPT | Performed by: FAMILY MEDICINE

## 2020-10-16 ENCOUNTER — OFFICE VISIT (OUTPATIENT)
Dept: OTOLARYNGOLOGY | Facility: CLINIC | Age: 65
End: 2020-10-16

## 2020-10-16 VITALS
HEART RATE: 65 BPM | WEIGHT: 315 LBS | DIASTOLIC BLOOD PRESSURE: 66 MMHG | BODY MASS INDEX: 40.43 KG/M2 | SYSTOLIC BLOOD PRESSURE: 155 MMHG | TEMPERATURE: 97.6 F | HEIGHT: 74 IN

## 2020-10-16 DIAGNOSIS — J01.81 OTHER ACUTE RECURRENT SINUSITIS: ICD-10-CM

## 2020-10-16 DIAGNOSIS — J32.0 CHRONIC MAXILLARY SINUSITIS: Primary | ICD-10-CM

## 2020-10-16 DIAGNOSIS — J30.89 ALLERGIC RHINITIS DUE TO OTHER ALLERGIC TRIGGER, UNSPECIFIED SEASONALITY: ICD-10-CM

## 2020-10-16 DIAGNOSIS — J34.89 CONCHA BULLOSA: ICD-10-CM

## 2020-10-16 DIAGNOSIS — J34.2 NASAL SEPTAL DEVIATION: ICD-10-CM

## 2020-10-16 DIAGNOSIS — Z98.890 S/P SINUS SURGERY: ICD-10-CM

## 2020-10-16 PROCEDURE — 99024 POSTOP FOLLOW-UP VISIT: CPT | Performed by: NURSE PRACTITIONER

## 2020-10-16 PROCEDURE — 31237 NSL/SINS NDSC SURG BX POLYPC: CPT | Performed by: NURSE PRACTITIONER

## 2020-10-16 RX ORDER — SIMVASTATIN 40 MG
40 TABLET ORAL
COMMUNITY
Start: 2020-09-30 | End: 2020-12-21

## 2020-11-12 RX ORDER — METOPROLOL TARTRATE 50 MG/1
TABLET, FILM COATED ORAL
Qty: 30 TABLET | Refills: 1 | Status: SHIPPED | OUTPATIENT
Start: 2020-11-12 | End: 2021-01-06

## 2020-12-07 ENCOUNTER — OFFICE VISIT (OUTPATIENT)
Dept: FAMILY MEDICINE CLINIC | Facility: CLINIC | Age: 65
End: 2020-12-07

## 2020-12-07 VITALS
TEMPERATURE: 95.9 F | OXYGEN SATURATION: 97 % | DIASTOLIC BLOOD PRESSURE: 84 MMHG | HEART RATE: 57 BPM | BODY MASS INDEX: 40.02 KG/M2 | SYSTOLIC BLOOD PRESSURE: 126 MMHG | HEIGHT: 74 IN | WEIGHT: 311.8 LBS

## 2020-12-07 DIAGNOSIS — I10 ESSENTIAL HYPERTENSION: ICD-10-CM

## 2020-12-07 DIAGNOSIS — I25.10 CORONARY ARTERY DISEASE INVOLVING NATIVE CORONARY ARTERY OF NATIVE HEART WITHOUT ANGINA PECTORIS: ICD-10-CM

## 2020-12-07 DIAGNOSIS — Z00.00 ANNUAL PHYSICAL EXAM: Primary | ICD-10-CM

## 2020-12-07 DIAGNOSIS — E78.2 MIXED HYPERLIPIDEMIA: ICD-10-CM

## 2020-12-07 DIAGNOSIS — M51.36 LUMBAR DEGENERATIVE DISC DISEASE: ICD-10-CM

## 2020-12-07 DIAGNOSIS — Z23 ENCOUNTER FOR IMMUNIZATION: ICD-10-CM

## 2020-12-07 DIAGNOSIS — E55.9 VITAMIN D DEFICIENCY: ICD-10-CM

## 2020-12-07 DIAGNOSIS — R53.83 FATIGUE, UNSPECIFIED TYPE: ICD-10-CM

## 2020-12-07 DIAGNOSIS — E66.01 MORBID OBESITY (HCC): ICD-10-CM

## 2020-12-07 PROCEDURE — 90732 PPSV23 VACC 2 YRS+ SUBQ/IM: CPT | Performed by: NURSE PRACTITIONER

## 2020-12-07 PROCEDURE — 90471 IMMUNIZATION ADMIN: CPT | Performed by: NURSE PRACTITIONER

## 2020-12-07 PROCEDURE — 81003 URINALYSIS AUTO W/O SCOPE: CPT | Performed by: NURSE PRACTITIONER

## 2020-12-07 PROCEDURE — 99397 PER PM REEVAL EST PAT 65+ YR: CPT | Performed by: NURSE PRACTITIONER

## 2020-12-07 NOTE — PROGRESS NOTES
CC: annual physical    History:  George Wilder is a 65 y.o. male who presents today for evaluation of the above problems.      Patient reports that he is doing well. His BP is well controlled. He denies any chest pain or shortness of breath. He does have issues with chronic back pain and pain in his left hip, however, he does take Norco and gabapentin for this which help control his discomfort. He had already had a flu shot this year. He is due for Pneumo 23. Colonoscopy and Cologuard are up to date.     HPI  ROS:  Review of Systems   Constitutional: Negative for fatigue.   Respiratory: Negative for shortness of breath.    Cardiovascular: Negative for chest pain.   Gastrointestinal: Negative for constipation, diarrhea, nausea and vomiting.   Genitourinary: Negative for testicular pain.   Musculoskeletal: Positive for arthralgias (left hip) and back pain.   Neurological: Negative for dizziness, light-headedness and headaches.       No Known Allergies  Past Medical History:   Diagnosis Date   • Allergic rhinitis due to allergen 8/8/2017   • Arthritis    • Back pain    • CHF (congestive heart failure) (CMS/HCC)    • Chronic ethmoidal sinusitis 5/18/2017   • Chronic maxillary sinusitis 5/18/2017   • Coronary artery disease 1984    stent LAD, Cx, RCA    • GERD (gastroesophageal reflux disease)    • Heart attack (CMS/HCC) 1994    Dr Stiles    • Heartburn    • Hyperlipidemia    • Hypertension    • Myocardial infarction (CMS/HCC)    • Pain     back, & Neck      Past Surgical History:   Procedure Laterality Date   • ANKLE SURGERY     • BACK SURGERY     • CARDIAC CATHETERIZATION  1984, 1994   • CHOLECYSTECTOMY  2014    Lararocopic    • ENDOSCOPIC FUNCTIONAL SINUS SURGERY (FESS) N/A 6/12/2017    Procedure: ENDOSCOPIC FUNCTIONAL SINUS SURGERY WITH LEFT MIDDLE MEATAL ANTROSTOMY AND LEFT ETHMOIDECTOMY;  Surgeon: Jona Garibay MD;  Location: [facility] OR;  Service:    • ENDOSCOPIC FUNCTIONAL SINUS SURGERY (FESS) Bilateral  8/10/2020    Procedure: Septoplasty Resection of left sammy bullosa deformity Bilateral maxillary antrostomy and uncinectomy;  Surgeon: Jona Garibay MD;  Location: [facility] OR;  Service: ENT;  Laterality: Bilateral;   • ETHMOIDECTOMY Left 6/12/2017    Procedure: ETHMOIDECTOMY ENDOSCOPIC;  Surgeon: Jona Garibay MD;  Location: [facility] OR;  Service:    • EYE SURGERY      LASIK   • HAND SURGERY     • SINUS SURGERY     • TOTAL HIP ARTHROPLASTY Bilateral      Family History   Problem Relation Age of Onset   • No Known Problems Mother    • Cancer Father    • Lung cancer Father         metastatic adenocarcinoma of lung   • No Known Problems Brother    • Cancer Brother       reports that he quit smoking about 26 years ago. His smoking use included cigarettes. He has a 48.00 pack-year smoking history. He has never used smokeless tobacco. He reports that he does not drink alcohol or use drugs.      Current Outpatient Medications:   •  aspirin 81 MG EC tablet, Take 81 mg by mouth Daily., Disp: , Rfl:   •  azelastine (ASTELIN) 0.1 % nasal spray, INHALE 2 SPRAYS INTO EACH NOSTRIL TWICE A DAY (Patient taking differently: Using PRN), Disp: 1 each, Rfl: 5  •  celecoxib (CeleBREX) 200 MG capsule, Take 200 mg by mouth Daily., Disp: , Rfl:   •  Cholecalciferol (VITAMIN D3) 5000 UNITS capsule capsule, Take 5,000 Units by mouth Daily., Disp: , Rfl:   •  fluticasone (FLONASE) 50 MCG/ACT nasal spray, SPRAY 2 SPRAYS INTO EACH NOSTRIL EVERY DAY, Disp: 16 mL, Rfl: 11  •  gabapentin (NEURONTIN) 300 MG capsule, Take 300 mg by mouth 3 (Three) Times a Day., Disp: , Rfl:   •  HYDROcodone-acetaminophen (NORCO)  MG per tablet, Take 1 tablet by mouth 2 (Two) Times a Day. BACK PAIN, Disp: , Rfl:   •  metoprolol tartrate (LOPRESSOR) 50 MG tablet, TAKE 1 TABLET BY MOUTH EVERYDAY AT BEDTIME, Disp: 30 tablet, Rfl: 1  •  Multiple Vitamins-Minerals (MULTIVITAMIN WITH MINERALS) tablet tablet, Take 1 tablet by mouth Daily., Disp: , Rfl:   •  " omeprazole (priLOSEC) 20 MG capsule, Take 20 mg by mouth Daily., Disp: , Rfl:   •  simvastatin (ZOCOR) 40 MG tablet, Take 40 mg by mouth every night at bedtime., Disp: , Rfl:   •  tiZANidine (ZANAFLEX) 4 MG tablet, Take 4 mg by mouth Every 6 (Six) Hours As Needed for Muscle Spasms., Disp: , Rfl: 2  •  vitamin C (ASCORBIC ACID) 500 MG tablet, Take 500 mg by mouth Daily., Disp: , Rfl:     OBJECTIVE:  /84 (BP Location: Right arm, Patient Position: Sitting, Cuff Size: Large Adult)   Pulse 57   Temp 95.9 °F (35.5 °C) (Temporal)   Ht 188 cm (74\")   Wt (!) 141 kg (311 lb 12.8 oz)   SpO2 97%   BMI 40.03 kg/m²    Physical Exam  Vitals signs reviewed.   Constitutional:       Appearance: He is well-developed. He is obese.   HENT:      Right Ear: Tympanic membrane, ear canal and external ear normal.      Left Ear: Tympanic membrane, ear canal and external ear normal.   Neck:      Vascular: No carotid bruit.   Cardiovascular:      Rate and Rhythm: Normal rate and regular rhythm.      Heart sounds: Normal heart sounds. No murmur.   Pulmonary:      Effort: Pulmonary effort is normal.      Breath sounds: Normal breath sounds.   Abdominal:      General: Bowel sounds are normal. There is no distension.      Palpations: Abdomen is soft.      Hernia: There is no hernia in the left inguinal area or right inguinal area.   Genitourinary:     Penis: Normal.       Scrotum/Testes: Normal.         Right: Mass or tenderness not present.         Left: Mass or tenderness not present.      Prostate: Normal.   Neurological:      Mental Status: He is alert and oriented to person, place, and time.   Psychiatric:         Behavior: Behavior normal.         Assessment/Plan    Diagnoses and all orders for this visit:    1. Annual physical exam (Primary)  -     CBC & Differential  -     TSH  -     T4, free  -     Comprehensive Metabolic Panel  -     Lipid Panel  -     POC Urinalysis Dipstick, Multipro  -     Vitamin D 25 Hydroxy    2. Mixed " hyperlipidemia  -     Lipid Panel    3. Coronary artery disease involving native coronary artery of native heart without angina pectoris  -     CBC & Differential  -     TSH  -     Comprehensive Metabolic Panel  -     Lipid Panel    4. Essential hypertension  -     Comprehensive Metabolic Panel    5. Morbid obesity (CMS/HCC)    6. Lumbar degenerative disc disease    7. Vitamin D deficiency  -     Vitamin D 25 Hydroxy    8. Fatigue, unspecified type  -     CBC & Differential  -     TSH  -     T4, free  -     Comprehensive Metabolic Panel    9. Encounter for immunization  -     Pneumococcal Polysaccharide Vaccine 23-Valent Greater Than or Equal To 1yo Subcutaneous / IM    HEALTH MAINTENANCE  Already had flu shot. Pneumo 23 today. Colon screening up to date.       An After Visit Summary was printed and given to the patient at discharge.  Return in about 6 months (around 6/7/2021).       ISAAC Mendoza 12/7/2020    Electronically signed.

## 2020-12-08 LAB
25(OH)D3+25(OH)D2 SERPL-MCNC: 55.1 NG/ML (ref 30–100)
ALBUMIN SERPL-MCNC: 4.3 G/DL (ref 3.5–5.2)
ALBUMIN/GLOB SERPL: 1.9 G/DL
ALP SERPL-CCNC: 55 U/L (ref 39–117)
ALT SERPL-CCNC: 23 U/L (ref 1–41)
AST SERPL-CCNC: 19 U/L (ref 1–40)
BASOPHILS # BLD AUTO: 0.05 10*3/MM3 (ref 0–0.2)
BASOPHILS NFR BLD AUTO: 0.8 % (ref 0–1.5)
BILIRUB SERPL-MCNC: 0.7 MG/DL (ref 0–1.2)
BUN SERPL-MCNC: 23 MG/DL (ref 8–23)
BUN/CREAT SERPL: 19.3 (ref 7–25)
CALCIUM SERPL-MCNC: 9.2 MG/DL (ref 8.6–10.5)
CHLORIDE SERPL-SCNC: 103 MMOL/L (ref 98–107)
CHOLEST SERPL-MCNC: 155 MG/DL (ref 0–200)
CO2 SERPL-SCNC: 28.5 MMOL/L (ref 22–29)
CREAT SERPL-MCNC: 1.19 MG/DL (ref 0.76–1.27)
EOSINOPHIL # BLD AUTO: 0.17 10*3/MM3 (ref 0–0.4)
EOSINOPHIL NFR BLD AUTO: 2.7 % (ref 0.3–6.2)
ERYTHROCYTE [DISTWIDTH] IN BLOOD BY AUTOMATED COUNT: 12.3 % (ref 12.3–15.4)
GLOBULIN SER CALC-MCNC: 2.3 GM/DL
GLUCOSE SERPL-MCNC: 98 MG/DL (ref 65–99)
HCT VFR BLD AUTO: 50.6 % (ref 37.5–51)
HDLC SERPL-MCNC: 47 MG/DL (ref 40–60)
HGB BLD-MCNC: 16.9 G/DL (ref 13–17.7)
IMM GRANULOCYTES # BLD AUTO: 0.02 10*3/MM3 (ref 0–0.05)
IMM GRANULOCYTES NFR BLD AUTO: 0.3 % (ref 0–0.5)
LDLC SERPL CALC-MCNC: 83 MG/DL (ref 0–100)
LYMPHOCYTES # BLD AUTO: 1.86 10*3/MM3 (ref 0.7–3.1)
LYMPHOCYTES NFR BLD AUTO: 29.8 % (ref 19.6–45.3)
Lab: NORMAL
Lab: NORMAL
MCH RBC QN AUTO: 30.6 PG (ref 26.6–33)
MCHC RBC AUTO-ENTMCNC: 33.4 G/DL (ref 31.5–35.7)
MCV RBC AUTO: 91.5 FL (ref 79–97)
MONOCYTES # BLD AUTO: 0.5 10*3/MM3 (ref 0.1–0.9)
MONOCYTES NFR BLD AUTO: 8 % (ref 5–12)
NEUTROPHILS # BLD AUTO: 3.64 10*3/MM3 (ref 1.7–7)
NEUTROPHILS NFR BLD AUTO: 58.4 % (ref 42.7–76)
NRBC BLD AUTO-RTO: 0 /100 WBC (ref 0–0.2)
PLATELET # BLD AUTO: 220 10*3/MM3 (ref 140–450)
POTASSIUM SERPL-SCNC: 5.1 MMOL/L (ref 3.5–5.2)
PROT SERPL-MCNC: 6.6 G/DL (ref 6–8.5)
RBC # BLD AUTO: 5.53 10*6/MM3 (ref 4.14–5.8)
SODIUM SERPL-SCNC: 140 MMOL/L (ref 136–145)
T4 FREE SERPL-MCNC: 1.24 NG/DL (ref 0.93–1.7)
TRIGL SERPL-MCNC: 141 MG/DL (ref 0–150)
TSH SERPL DL<=0.005 MIU/L-ACNC: 1.47 UIU/ML (ref 0.27–4.2)
VLDLC SERPL CALC-MCNC: 25 MG/DL (ref 5–40)
WBC # BLD AUTO: 6.24 10*3/MM3 (ref 3.4–10.8)

## 2020-12-09 LAB
PSA SERPL-MCNC: 1.71 NG/ML (ref 0–4)
WRITTEN AUTHORIZATION: NORMAL

## 2020-12-21 RX ORDER — SIMVASTATIN 40 MG
TABLET ORAL
Qty: 30 TABLET | Refills: 11 | Status: SHIPPED | OUTPATIENT
Start: 2020-12-21 | End: 2021-03-17

## 2020-12-23 NOTE — PROGRESS NOTES
"George Wilder    1955    Chief Complaint   Patient presents with   • URI       Vitals:    03/28/17 1530   BP: 122/68   Pulse: 71   Temp: 98.3 °F (36.8 °C)   SpO2: 96%   Weight: (!) 310 lb (141 kg)   Height: 74\" (188 cm)       URI    This is a recurrent problem. The current episode started more than 1 month ago. The problem has been gradually worsening. There has been no fever. Associated symptoms include sinus pain. Treatments tried: Completed 1 course of antibiotics. The treatment provided mild relief.       Review of Systems   HENT: Positive for sinus pressure.         Remote history of sinus surgery on left       Past Medical History:   Diagnosis Date   • Arthritis    • Back pain    • Coronary artery disease    • GERD (gastroesophageal reflux disease)    • Hyperlipidemia    • Hypertension          Current Outpatient Prescriptions:   •  aspirin 325 MG tablet, Take 325 mg by mouth Daily., Disp: , Rfl:   •  celecoxib (CeleBREX) 200 MG capsule, Take 200 mg by mouth Daily., Disp: , Rfl:   •  clindamycin (CLEOCIN) 300 MG capsule, Take 1 capsule by mouth 3 (Three) Times a Day., Disp: 15 capsule, Rfl: 0  •  FLUARIX QUADRIVALENT 0.5 ML suspension prefilled syringe injection, TO BE ADMINISTERED BY PHARMACIST FOR IMMUNIZATION, Disp: , Rfl: 0  •  gabapentin (NEURONTIN) 300 MG capsule, Take 300 mg by mouth 3 (Three) Times a Day., Disp: , Rfl:   •  HYDROcodone-acetaminophen (NORCO)  MG per tablet, Take 1 tablet by mouth Every 6 (Six) Hours As Needed for moderate pain (4-6)., Disp: , Rfl:   •  metoprolol tartrate (LOPRESSOR) 50 MG tablet, Take 50 mg by mouth 2 (Two) Times a Day., Disp: , Rfl:   •  omeprazole (priLOSEC) 20 MG capsule, Take 20 mg by mouth Daily., Disp: , Rfl:   •  predniSONE (DELTASONE) 20 MG tablet, Take 2 pills on Saturday and Sunday then 1 pill till medicine gone, Disp: 15 tablet, Rfl: 0  •  simvastatin (ZOCOR) 40 MG tablet, TAKE 1 TABLET BY MOUTH AT BEDTIME, Disp: , Rfl: 1  •  SIMVASTATIN PO, Take " ----- Message from Charlette Omer PharmD sent at 12/22/2020  3:00 PM CST -----  Regarding: sent to [facility]  Good afternoon,    We received a call from Ms. Ephraim saying that [facility] did not have her script for Ibrance. Can you please send a new script to [facility] who will be filling the script through [facility]?      I have added [facility] to the patients preferred pharmacies.        Please inform us if there is anything further we can do to assist.     Thank you!    Charlette Omer, PharmD  [facility]   [phone]           "40 mg by mouth Daily., Disp: , Rfl:   •  tiZANidine (ZANAFLEX) 4 MG tablet, TAKE 1 TABLET BY ORAL ROUTE EVERY 8 HOURS AS NEEDED NOT TO EXCEED 3 DOSES IN 24 HOURS, Disp: , Rfl: 1  •  levoFLOXacin (LEVAQUIN) 500 MG tablet, Take 1 tablet by mouth Daily., Disp: 10 tablet, Rfl: 0    No Known Allergies    Patient Active Problem List   Diagnosis   • Ischemic heart disease due to coronary artery obstruction   • Morbid obesity   • Mixed hyperlipidemia   • Lumbar degenerative disc disease       Social History     Social History   • Marital status:      Spouse name: N/A   • Number of children: N/A   • Years of education: N/A     Social History Main Topics   • Smoking status: Former Smoker   • Smokeless tobacco: None   • Alcohol use No   • Drug use: No   • Sexual activity: Defer     Other Topics Concern   • None     Social History Narrative       Past Surgical History:   Procedure Laterality Date   • ANKLE SURGERY     • BACK SURGERY     • HAND SURGERY     • TOTAL HIP ARTHROPLASTY Bilateral        Physical Exam   Constitutional:   Morbidly obese   HENT:   Right Ear: External ear normal.   Left Ear: External ear normal.   Mouth/Throat: Oropharynx is clear and moist.   No oral sinus or hard palate tenderness   Eyes: Pupils are equal, round, and reactive to light.   Cardiovascular: Normal rate and regular rhythm.    Pulmonary/Chest: Effort normal and breath sounds normal.   Lymphadenopathy:     He has no cervical adenopathy.   Neurological: He is alert.   Vitals reviewed.      Vitals:    03/28/17 1530   BP: 122/68   Pulse: 71   Temp: 98.3 °F (36.8 °C)   SpO2: 96%   Weight: (!) 310 lb (141 kg)   Height: 74\" (188 cm)       George was seen today for uri.    Diagnoses and all orders for this visit:    Acute URI    Chronic sinusitis, unspecified location  -     XR Sinuses 3+ View; Future    Other orders  -     levoFLOXacin (LEVAQUIN) 500 MG tablet; Take 1 tablet by mouth Daily.        Problem List Items Addressed This Visit     " None      Visit Diagnoses     Acute URI    -  Primary    Relevant Medications    levoFLOXacin (LEVAQUIN) 500 MG tablet    Chronic sinusitis, unspecified location        Relevant Orders    XR Sinuses 3+ View                Flonase, saline washes, above antibiotic, sinus x-rays-may need CT of sinuses    Dentist --Root of  tooth left with an extraction of upper molar                      Carl Vu MD  3/28/2017

## 2021-01-06 RX ORDER — METOPROLOL TARTRATE 50 MG/1
TABLET, FILM COATED ORAL
Qty: 90 TABLET | Refills: 3 | Status: SHIPPED | OUTPATIENT
Start: 2021-01-06 | End: 2021-12-03

## 2021-03-17 ENCOUNTER — OFFICE VISIT (OUTPATIENT)
Dept: OTOLARYNGOLOGY | Facility: CLINIC | Age: 66
End: 2021-03-17

## 2021-03-17 VITALS
TEMPERATURE: 96.8 F | SYSTOLIC BLOOD PRESSURE: 114 MMHG | HEART RATE: 73 BPM | WEIGHT: 311 LBS | BODY MASS INDEX: 39.91 KG/M2 | DIASTOLIC BLOOD PRESSURE: 73 MMHG | HEIGHT: 74 IN

## 2021-03-17 DIAGNOSIS — J30.89 ALLERGIC RHINITIS DUE TO OTHER ALLERGIC TRIGGER, UNSPECIFIED SEASONALITY: ICD-10-CM

## 2021-03-17 DIAGNOSIS — Z98.890 S/P SINUS SURGERY: Primary | ICD-10-CM

## 2021-03-17 PROCEDURE — 99214 OFFICE O/P EST MOD 30 MIN: CPT | Performed by: NURSE PRACTITIONER

## 2021-03-17 RX ORDER — FLUTICASONE PROPIONATE 50 MCG
2 SPRAY, SUSPENSION (ML) NASAL DAILY
Qty: 16 ML | Refills: 11 | Status: SHIPPED | OUTPATIENT
Start: 2021-03-17 | End: 2022-01-13 | Stop reason: SDUPTHER

## 2021-03-17 RX ORDER — SIMVASTATIN 80 MG
80 TABLET ORAL
COMMUNITY
Start: 2021-03-05 | End: 2021-09-14

## 2021-03-17 NOTE — PROGRESS NOTES
PRIMARY CARE PROVIDER: Carl Vu MD  REFERRING PROVIDER: No ref. provider found    Chief Complaint   Patient presents with   • Sinus Problem       Subjective   History of Present Illness:  George Wilder is a  65 y.o. male  who presents for follow up s/p septoplasty, resection of left sammy bullosa, and bilateral maxillary antrostomy and uncinectomy by Dr. Garibay on 8/10/2020. The patient has had a relatively normal postoperative course and currently has no related complaints.  He states he has been doing very well.  He is using Flonase.      Review of Systems:  Review of Systems   Constitutional: Negative for chills and fever.   HENT: Negative for congestion, ear discharge, ear pain, rhinorrhea, sinus pressure, sinus pain and sore throat.    Respiratory: Negative for cough and shortness of breath.    Cardiovascular: Negative for chest pain.   Gastrointestinal: Negative for diarrhea, nausea and vomiting.   Musculoskeletal: Positive for arthralgias.   Allergic/Immunologic: Positive for environmental allergies.       Past History:  Past Medical History:   Diagnosis Date   • Allergic rhinitis due to allergen 8/8/2017   • Arthritis    • Back pain    • CHF (congestive heart failure) (CMS/HCC)    • Chronic ethmoidal sinusitis 5/18/2017   • Chronic maxillary sinusitis 5/18/2017   • Coronary artery disease 1984    stent LAD, Cx, RCA    • GERD (gastroesophageal reflux disease)    • Heart attack (CMS/HCC) 1994    Dr Stiles    • Heartburn    • Hyperlipidemia    • Hypertension    • Myocardial infarction (CMS/HCC)    • Pain     back, & Neck      Past Surgical History:   Procedure Laterality Date   • ANKLE SURGERY     • BACK SURGERY     • CARDIAC CATHETERIZATION  1984, 1994   • CHOLECYSTECTOMY  2014    Lararocopic    • ENDOSCOPIC FUNCTIONAL SINUS SURGERY (FESS) N/A 6/12/2017    Procedure: ENDOSCOPIC FUNCTIONAL SINUS SURGERY WITH LEFT MIDDLE MEATAL ANTROSTOMY AND LEFT ETHMOIDECTOMY;  Surgeon: Jona Garibay MD;   Location: [facility] OR;  Service:    • ENDOSCOPIC FUNCTIONAL SINUS SURGERY (FESS) Bilateral 8/10/2020    Procedure: Septoplasty Resection of left sammy bullosa deformity Bilateral maxillary antrostomy and uncinectomy;  Surgeon: Jona Garibay MD;  Location: [facility] OR;  Service: ENT;  Laterality: Bilateral;   • ETHMOIDECTOMY Left 2017    Procedure: ETHMOIDECTOMY ENDOSCOPIC;  Surgeon: Jona Garibay MD;  Location: [facility] OR;  Service:    • EYE SURGERY      LASIK   • HAND SURGERY     • SINUS SURGERY     • TOTAL HIP ARTHROPLASTY Bilateral      Family History   Problem Relation Age of Onset   • No Known Problems Mother    • Cancer Father    • Lung cancer Father         metastatic adenocarcinoma of lung   • No Known Problems Brother    • Cancer Brother      Social History     Tobacco Use   • Smoking status: Former Smoker     Packs/day: 2.00     Years: 24.00     Pack years: 48.00     Types: Cigarettes     Quit date:      Years since quittin.2   • Smokeless tobacco: Never Used   Substance Use Topics   • Alcohol use: No   • Drug use: No     Allergies:  Patient has no known allergies.    Current Outpatient Medications:   •  aspirin 81 MG EC tablet, Take 81 mg by mouth Daily., Disp: , Rfl:   •  azelastine (ASTELIN) 0.1 % nasal spray, INHALE 2 SPRAYS INTO EACH NOSTRIL TWICE A DAY (Patient taking differently: Using PRN), Disp: 1 each, Rfl: 5  •  celecoxib (CeleBREX) 200 MG capsule, Take 200 mg by mouth Daily., Disp: , Rfl:   •  Cholecalciferol (VITAMIN D3) 5000 UNITS capsule capsule, Take 5,000 Units by mouth Daily., Disp: , Rfl:   •  fluticasone (FLONASE) 50 MCG/ACT nasal spray, 2 sprays by Each Nare route Daily for 30 days., Disp: 16 mL, Rfl: 11  •  gabapentin (NEURONTIN) 300 MG capsule, Take 300 mg by mouth 3 (Three) Times a Day., Disp: , Rfl:   •  HYDROcodone-acetaminophen (NORCO)  MG per tablet, Take 1 tablet by mouth 2 (Two) Times a Day. BACK PAIN, Disp: , Rfl:   •  metoprolol tartrate  "(LOPRESSOR) 50 MG tablet, TAKE 1 TABLET BY MOUTH EVERYDAY AT BEDTIME, Disp: 90 tablet, Rfl: 3  •  Multiple Vitamins-Minerals (MULTIVITAMIN WITH MINERALS) tablet tablet, Take 1 tablet by mouth Daily., Disp: , Rfl:   •  omeprazole (priLOSEC) 20 MG capsule, Take 20 mg by mouth Daily., Disp: , Rfl:   •  simvastatin (ZOCOR) 80 MG tablet, Take 80 mg by mouth every night at bedtime., Disp: , Rfl:   •  tiZANidine (ZANAFLEX) 4 MG tablet, Take 4 mg by mouth Every 6 (Six) Hours As Needed for Muscle Spasms., Disp: , Rfl: 2  •  vitamin C (ASCORBIC ACID) 500 MG tablet, Take 500 mg by mouth Daily., Disp: , Rfl:       Objective     Vital Signs:  Temp:  [96.8 °F (36 °C)] 96.8 °F (36 °C)  Heart Rate:  [73] 73  BP: (114)/(73) 114/73 /73   Pulse 73   Temp 96.8 °F (36 °C) (Temporal)   Ht 188 cm (74\")   Wt (!) 141 kg (311 lb)   BMI 39.93 kg/m²     Physical Exam:  Physical Exam  CONSTITUTIONAL: well nourished, well-developed, alert, oriented, in no acute distress   COMMUNICATION AND VOICE: able to communicate normally, normal voice quality  HEAD: normocephalic, no lesions, atraumatic, no tenderness, no masses   FACE: appearance normal, no lesions, no tenderness, no deformities, facial motion symmetric  SALIVARY GLANDS: parotid glands with no tenderness, no swelling, no masses, submandibular glands with normal size, nontender  EYES: ocular motility normal, eyelids normal, orbits normal, no proptosis, conjunctiva normal , pupils equal, round   EARS:  Hearing: response to conversational voice normal bilaterally   External Ears: auricles without lesions  NOSE:  External Nose: structure normal, no tenderness on palpation, no nasal discharge, no lesions, no evidence of trauma, nostrils patent   Intranasal Exam: postoperative changes present, nasal mucosa is mildly inflamed, nasal septum is midline without septal perforation or hematoma  ORAL:  Lips: upper and lower lips without lesion   NECK: neck appearance normal, no masses or " tenderness  LYMPH NODES: no lymphadenopathy  CHEST/RESPIRATORY: respiratory effort normal, normal breath sounds   CARDIOVASCULAR: rate and rhythm normal, extremities without cyanosis or edema    NEUROLOGIC/PSYCHIATRIC: oriented to time, place and person, mood normal, affect appropriate, CN II-XII intact grossly      Assessment   Assessment:  1. S/P sinus surgery    2. Allergic rhinitis due to other allergic trigger, unspecified seasonality        Plan   Plan:     Continue Flonase.  Call for any problems worsening symptoms.  May follow-up as needed.      New Medications Ordered This Visit   Medications   • fluticasone (FLONASE) 50 MCG/ACT nasal spray     Si sprays by Each Nare route Daily for 30 days.     Dispense:  16 mL     Refill:  11     There are no Patient Instructions on file for this visit.  Return if symptoms worsen or fail to improve, for Recheck.    My findings and recommendations were discussed and questions were answered.     Ira Coyle, APRN

## 2021-05-21 ENCOUNTER — TELEPHONE (OUTPATIENT)
Dept: FAMILY MEDICINE CLINIC | Facility: CLINIC | Age: 66
End: 2021-05-21

## 2021-05-21 ENCOUNTER — OFFICE VISIT (OUTPATIENT)
Dept: FAMILY MEDICINE CLINIC | Facility: CLINIC | Age: 66
End: 2021-05-21

## 2021-05-21 VITALS
HEART RATE: 68 BPM | BODY MASS INDEX: 38.76 KG/M2 | OXYGEN SATURATION: 95 % | DIASTOLIC BLOOD PRESSURE: 81 MMHG | WEIGHT: 302 LBS | RESPIRATION RATE: 18 BRPM | HEIGHT: 74 IN | SYSTOLIC BLOOD PRESSURE: 149 MMHG | TEMPERATURE: 98.7 F

## 2021-05-21 DIAGNOSIS — T14.8XXA ABRASION: ICD-10-CM

## 2021-05-21 DIAGNOSIS — S09.90XD TRAUMATIC INJURY OF HEAD, SUBSEQUENT ENCOUNTER: Primary | ICD-10-CM

## 2021-05-21 PROCEDURE — 99213 OFFICE O/P EST LOW 20 MIN: CPT | Performed by: NURSE PRACTITIONER

## 2021-05-21 RX ORDER — CYCLOBENZAPRINE HCL 10 MG
10 TABLET ORAL 3 TIMES DAILY PRN
COMMUNITY
Start: 2021-03-14
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
## 2021-05-21 NOTE — TELEPHONE ENCOUNTER
LUIS FERNANDO and asked the patient to call back and let me know exactly what happened to determine if its something we could see him here for or if he didn't to go to the ER.
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
## 2021-06-09 ENCOUNTER — OFFICE VISIT (OUTPATIENT)
Dept: FAMILY MEDICINE CLINIC | Facility: CLINIC | Age: 66
End: 2021-06-09

## 2021-06-09 VITALS
DIASTOLIC BLOOD PRESSURE: 89 MMHG | HEIGHT: 74 IN | WEIGHT: 301 LBS | SYSTOLIC BLOOD PRESSURE: 147 MMHG | HEART RATE: 61 BPM | BODY MASS INDEX: 38.63 KG/M2 | TEMPERATURE: 98.2 F | OXYGEN SATURATION: 99 %

## 2021-06-09 DIAGNOSIS — E78.2 MIXED HYPERLIPIDEMIA: ICD-10-CM

## 2021-06-09 DIAGNOSIS — I10 ESSENTIAL HYPERTENSION: ICD-10-CM

## 2021-06-09 DIAGNOSIS — E66.01 MORBID OBESITY (HCC): Primary | ICD-10-CM

## 2021-06-09 DIAGNOSIS — I25.10 CORONARY ARTERY DISEASE INVOLVING NATIVE CORONARY ARTERY OF NATIVE HEART WITHOUT ANGINA PECTORIS: ICD-10-CM

## 2021-06-09 PROCEDURE — 99214 OFFICE O/P EST MOD 30 MIN: CPT | Performed by: NURSE PRACTITIONER

## 2021-06-09 NOTE — PROGRESS NOTES
CC: 6 month check - HTN, CAD, hyperlipidemia    History:  George Wilder is a 65 y.o. male who presents today for evaluation of the above problems.     Patient reports that he is doing well. He has no current complaints. BP is mildly elevated today.  He denies any current symptoms of chest pain or shortness of breath.  He denies any lightheadedness, diziness or headaches. He has had his COVID vaccines.      HPI  ROS:  Review of Systems   Constitutional: Negative for fatigue and fever.   Respiratory: Negative for shortness of breath.    Cardiovascular: Negative for chest pain.   Gastrointestinal: Negative for constipation, diarrhea, nausea and vomiting.   Neurological: Negative for dizziness, light-headedness and headaches.       No Known Allergies  Past Medical History:   Diagnosis Date   • Allergic rhinitis due to allergen 8/8/2017   • Arthritis    • Back pain    • CHF (congestive heart failure) (CMS/[facility])    • Chronic ethmoidal sinusitis 5/18/2017   • Chronic maxillary sinusitis 5/18/2017   • Coronary artery disease 1984    stent LAD, Cx, RCA    • GERD (gastroesophageal reflux disease)    • Heart attack (CMS/HCC) 1994    Dr Stiles    • Heartburn    • Hyperlipidemia    • Hypertension    • Myocardial infarction (CMS/[facility])    • Pain     back, & Neck      Past Surgical History:   Procedure Laterality Date   • ANKLE SURGERY     • BACK SURGERY     • CARDIAC CATHETERIZATION  1984, 1994   • CHOLECYSTECTOMY  2014    Lararocopic    • ENDOSCOPIC FUNCTIONAL SINUS SURGERY (FESS) N/A 6/12/2017    Procedure: ENDOSCOPIC FUNCTIONAL SINUS SURGERY WITH LEFT MIDDLE MEATAL ANTROSTOMY AND LEFT ETHMOIDECTOMY;  Surgeon: Jona Garibay MD;  Location: [facility] OR;  Service:    • ENDOSCOPIC FUNCTIONAL SINUS SURGERY (FESS) Bilateral 8/10/2020    Procedure: Septoplasty Resection of left sammy bullosa deformity Bilateral maxillary antrostomy and uncinectomy;  Surgeon: Jona Garibay MD;  Location: [facility] OR;  Service: ENT;  Laterality:  Bilateral;   • ETHMOIDECTOMY Left 6/12/2017    Procedure: ETHMOIDECTOMY ENDOSCOPIC;  Surgeon: Jona Garibay MD;  Location: [facility] OR;  Service:    • EYE SURGERY      LASIK   • HAND SURGERY     • SINUS SURGERY     • TOTAL HIP ARTHROPLASTY Bilateral      Family History   Problem Relation Age of Onset   • No Known Problems Mother    • Cancer Father    • Lung cancer Father         metastatic adenocarcinoma of lung   • No Known Problems Brother    • Cancer Brother       reports that he quit smoking about 27 years ago. His smoking use included cigarettes. He has a 48.00 pack-year smoking history. He has never used smokeless tobacco. He reports that he does not drink alcohol and does not use drugs.      Current Outpatient Medications:   •  aspirin 81 MG EC tablet, Take 81 mg by mouth Daily., Disp: , Rfl:   •  azelastine (ASTELIN) 0.1 % nasal spray, INHALE 2 SPRAYS INTO EACH NOSTRIL TWICE A DAY (Patient taking differently: Using PRN), Disp: 1 each, Rfl: 5  •  celecoxib (CeleBREX) 200 MG capsule, Take 200 mg by mouth Daily., Disp: , Rfl:   •  Cholecalciferol (VITAMIN D3) 5000 UNITS capsule capsule, Take 5,000 Units by mouth Daily., Disp: , Rfl:   •  cyclobenzaprine (FLEXERIL) 10 MG tablet, Take 10 mg by mouth 3 (Three) Times a Day As Needed., Disp: , Rfl:   •  gabapentin (NEURONTIN) 300 MG capsule, Take 300 mg by mouth 3 (Three) Times a Day., Disp: , Rfl:   •  HYDROcodone-acetaminophen (NORCO)  MG per tablet, Take 1 tablet by mouth 2 (Two) Times a Day. BACK PAIN, Disp: , Rfl:   •  metoprolol tartrate (LOPRESSOR) 50 MG tablet, TAKE 1 TABLET BY MOUTH EVERYDAY AT BEDTIME, Disp: 90 tablet, Rfl: 3  •  Multiple Vitamins-Minerals (MULTIVITAMIN WITH MINERALS) tablet tablet, Take 1 tablet by mouth Daily., Disp: , Rfl:   •  omeprazole (priLOSEC) 20 MG capsule, Take 20 mg by mouth Daily., Disp: , Rfl:   •  simvastatin (ZOCOR) 80 MG tablet, Take 80 mg by mouth every night at bedtime., Disp: , Rfl:   •  vitamin C (ASCORBIC  "ACID) 500 MG tablet, Take 500 mg by mouth Daily., Disp: , Rfl:   •  fluticasone (FLONASE) 50 MCG/ACT nasal spray, 2 sprays by Each Nare route Daily for 30 days., Disp: 16 mL, Rfl: 11    OBJECTIVE:  /89 (BP Location: Left arm, Patient Position: Sitting, Cuff Size: Large Adult)   Pulse 61   Temp 98.2 °F (36.8 °C) (Temporal)   Ht 188 cm (74\")   Wt (!) 137 kg (301 lb)   SpO2 99%   BMI 38.65 kg/m²    Physical Exam  Vitals reviewed.   Constitutional:       Appearance: He is well-developed. He is obese.   Cardiovascular:      Rate and Rhythm: Normal rate and regular rhythm.      Heart sounds: Normal heart sounds.   Pulmonary:      Effort: Pulmonary effort is normal.      Breath sounds: Normal breath sounds.   Neurological:      Mental Status: He is alert and oriented to person, place, and time.   Psychiatric:         Behavior: Behavior normal.         Assessment/Plan    Diagnoses and all orders for this visit:    1. Morbid obesity (CMS/HCC) (Primary)  -     CBC & Differential  -     Comprehensive Metabolic Panel    2. Mixed hyperlipidemia  -     Lipid Panel    3. Essential hypertension  -     CBC & Differential  -     Comprehensive Metabolic Panel    4. Coronary artery disease involving native coronary artery of native heart without angina pectoris  -     CBC & Differential  -     Comprehensive Metabolic Panel  -     Lipid Panel        An After Visit Summary was printed and given to the patient at discharge.  Return in about 6 months (around 12/9/2021) for Annual physical.       ISAAC Mendoza 6/9/21    Electronically signed.  "

## 2021-06-10 DIAGNOSIS — E87.5 HIGH POTASSIUM: Primary | ICD-10-CM

## 2021-06-10 LAB
ALBUMIN SERPL-MCNC: 4.6 G/DL (ref 3.5–5.2)
ALBUMIN/GLOB SERPL: 2.3 G/DL
ALP SERPL-CCNC: 57 U/L (ref 39–117)
ALT SERPL-CCNC: 25 U/L (ref 1–41)
AST SERPL-CCNC: 20 U/L (ref 1–40)
BASOPHILS # BLD AUTO: 0.04 10*3/MM3 (ref 0–0.2)
BASOPHILS NFR BLD AUTO: 0.6 % (ref 0–1.5)
BILIRUB SERPL-MCNC: 1.1 MG/DL (ref 0–1.2)
BUN SERPL-MCNC: 21 MG/DL (ref 8–23)
BUN/CREAT SERPL: 20.2 (ref 7–25)
CALCIUM SERPL-MCNC: 9.6 MG/DL (ref 8.6–10.5)
CHLORIDE SERPL-SCNC: 105 MMOL/L (ref 98–107)
CHOLEST SERPL-MCNC: 142 MG/DL (ref 0–200)
CO2 SERPL-SCNC: 27.1 MMOL/L (ref 22–29)
CREAT SERPL-MCNC: 1.04 MG/DL (ref 0.76–1.27)
EOSINOPHIL # BLD AUTO: 0.11 10*3/MM3 (ref 0–0.4)
EOSINOPHIL NFR BLD AUTO: 1.7 % (ref 0.3–6.2)
ERYTHROCYTE [DISTWIDTH] IN BLOOD BY AUTOMATED COUNT: 12.7 % (ref 12.3–15.4)
GLOBULIN SER CALC-MCNC: 2 GM/DL
GLUCOSE SERPL-MCNC: 87 MG/DL (ref 65–99)
HCT VFR BLD AUTO: 48.3 % (ref 37.5–51)
HDLC SERPL-MCNC: 54 MG/DL (ref 40–60)
HGB BLD-MCNC: 16.5 G/DL (ref 13–17.7)
IMM GRANULOCYTES # BLD AUTO: 0.02 10*3/MM3 (ref 0–0.05)
IMM GRANULOCYTES NFR BLD AUTO: 0.3 % (ref 0–0.5)
LDLC SERPL CALC-MCNC: 68 MG/DL (ref 0–100)
LYMPHOCYTES # BLD AUTO: 1.6 10*3/MM3 (ref 0.7–3.1)
LYMPHOCYTES NFR BLD AUTO: 25 % (ref 19.6–45.3)
MCH RBC QN AUTO: 32.1 PG (ref 26.6–33)
MCHC RBC AUTO-ENTMCNC: 34.2 G/DL (ref 31.5–35.7)
MCV RBC AUTO: 94 FL (ref 79–97)
MONOCYTES # BLD AUTO: 0.47 10*3/MM3 (ref 0.1–0.9)
MONOCYTES NFR BLD AUTO: 7.4 % (ref 5–12)
NEUTROPHILS # BLD AUTO: 4.15 10*3/MM3 (ref 1.7–7)
NEUTROPHILS NFR BLD AUTO: 65 % (ref 42.7–76)
NRBC BLD AUTO-RTO: 0 /100 WBC (ref 0–0.2)
PLATELET # BLD AUTO: 224 10*3/MM3 (ref 140–450)
POTASSIUM SERPL-SCNC: 5.3 MMOL/L (ref 3.5–5.2)
PROT SERPL-MCNC: 6.6 G/DL (ref 6–8.5)
RBC # BLD AUTO: 5.14 10*6/MM3 (ref 4.14–5.8)
SODIUM SERPL-SCNC: 140 MMOL/L (ref 136–145)
TRIGL SERPL-MCNC: 112 MG/DL (ref 0–150)
VLDLC SERPL CALC-MCNC: 20 MG/DL (ref 5–40)
WBC # BLD AUTO: 6.39 10*3/MM3 (ref 3.4–10.8)

## 2021-07-16 ENCOUNTER — LAB (OUTPATIENT)
Dept: FAMILY MEDICINE CLINIC | Facility: CLINIC | Age: 66
End: 2021-07-16

## 2021-07-20 ENCOUNTER — OFFICE VISIT (OUTPATIENT)
Dept: FAMILY MEDICINE CLINIC | Facility: CLINIC | Age: 66
End: 2021-07-20

## 2021-07-20 VITALS
BODY MASS INDEX: 38.89 KG/M2 | HEIGHT: 74 IN | HEART RATE: 61 BPM | DIASTOLIC BLOOD PRESSURE: 83 MMHG | SYSTOLIC BLOOD PRESSURE: 135 MMHG | TEMPERATURE: 98.5 F | OXYGEN SATURATION: 98 % | WEIGHT: 303 LBS

## 2021-07-20 DIAGNOSIS — R05.9 COUGH: Primary | ICD-10-CM

## 2021-07-20 PROCEDURE — 96372 THER/PROPH/DIAG INJ SC/IM: CPT | Performed by: FAMILY MEDICINE

## 2021-07-20 PROCEDURE — 99213 OFFICE O/P EST LOW 20 MIN: CPT | Performed by: FAMILY MEDICINE

## 2021-07-20 RX ORDER — PREDNISONE 10 MG/1
TABLET ORAL
Qty: 21 TABLET | Refills: 0 | Status: SHIPPED | OUTPATIENT
Start: 2021-07-20 | End: 2021-09-17

## 2021-07-20 RX ORDER — AZITHROMYCIN 250 MG/1
TABLET, FILM COATED ORAL
Qty: 6 TABLET | Refills: 0 | Status: SHIPPED | OUTPATIENT
Start: 2021-07-20 | End: 2021-09-17

## 2021-07-20 RX ORDER — METHYLPREDNISOLONE ACETATE 40 MG/ML
40 INJECTION, SUSPENSION INTRA-ARTICULAR; INTRALESIONAL; INTRAMUSCULAR; SOFT TISSUE ONCE
Status: COMPLETED | OUTPATIENT
Start: 2021-07-20 | End: 2021-07-20

## 2021-07-20 RX ADMIN — METHYLPREDNISOLONE ACETATE 40 MG: 40 INJECTION, SUSPENSION INTRA-ARTICULAR; INTRALESIONAL; INTRAMUSCULAR; SOFT TISSUE at 11:01

## 2021-07-20 NOTE — PROGRESS NOTES
Subjective   George Wilder is a 65 y.o. male.     65-year-old male with a 2-week history of cough congestion after being a-denies fever productive sputum      The following portions of the patient's history were reviewed and updated as appropriate: allergies, current medications, past family history, past medical history, past social history, past surgical history and problem list.    Review of Systems   HENT: Negative for facial swelling, sinus pressure and sinus pain.    Respiratory: Positive for cough and shortness of breath. Negative for wheezing.         Had Maderna shots in the winter   Cardiovascular: Negative for chest pain.   Musculoskeletal: Positive for arthralgias and back pain.       Objective   Physical Exam  Vitals and nursing note reviewed.   Constitutional:       Appearance: Normal appearance. He is obese.   HENT:      Mouth/Throat:      Mouth: Mucous membranes are moist.      Pharynx: Oropharynx is clear.   Cardiovascular:      Rate and Rhythm: Normal rate and regular rhythm.      Pulses: Normal pulses.      Heart sounds: Normal heart sounds.   Pulmonary:      Effort: Pulmonary effort is normal.      Breath sounds: Normal breath sounds. No wheezing or rhonchi.   Musculoskeletal:      Right lower leg: No edema.      Left lower leg: No edema.   Skin:     General: Skin is warm and dry.   Neurological:      General: No focal deficit present.      Mental Status: He is alert and oriented to person, place, and time.   Psychiatric:         Mood and Affect: Mood normal.         Behavior: Behavior normal.         Thought Content: Thought content normal.         Judgment: Judgment normal.         Assessment/Plan   Diagnoses and all orders for this visit:    1. Cough (Primary)  -     XR Chest PA & Lateral; Future  -     methylPREDNISolone acetate (DEPO-medrol) injection 40 mg    Other orders  -     predniSONE (DELTASONE) 10 MG (21) dose pack; Use as directed on package  Dispense: 21 tablet; Refill: 0  -      azithromycin (Zithromax Z-Elder) 250 MG tablet; Take 2 tablets the first day, then 1 tablet daily for 4 days.  Dispense: 6 tablet; Refill: 0         Plan above-examination done with full PPE

## 2021-08-12 ENCOUNTER — TELEPHONE (OUTPATIENT)
Dept: FAMILY MEDICINE CLINIC | Facility: CLINIC | Age: 66
End: 2021-08-12

## 2021-08-12 RX ORDER — AMOXICILLIN 500 MG/1
1000 CAPSULE ORAL 3 TIMES DAILY
Qty: 30 CAPSULE | Refills: 0 | Status: SHIPPED | OUTPATIENT
Start: 2021-08-12 | End: 2021-09-17

## 2021-08-12 NOTE — TELEPHONE ENCOUNTER
Needing antibiotic as soon as possible since he had hip surgery and is about to have tooth extraction

## 2021-08-12 NOTE — TELEPHONE ENCOUNTER
Caller: George Wilder    Relationship: Self    Best call back number: [phone]    What medication are you requesting: Antibiotic    What are your current symptoms:     Patient is having a possible tooth extraction, next week. He states he has had hip replacements and he usually has to take an antibiotic prior to dental procedures.     If a prescription is needed, what is your preferred pharmacy and phone number:          [facility]/pharmacy #[id number] - [address] [phone] [facility] [phone]

## 2021-08-12 NOTE — TELEPHONE ENCOUNTER
PATIENT CALLED BACK IN TO ADVISE THAT SURGERY IS SCHEDULED FOR TOMORROW 08/13/2021 AND WILL NEED MEDS CALLED IN AS SOON AS TODAY

## 2021-09-14 RX ORDER — SIMVASTATIN 80 MG
TABLET ORAL
Qty: 30 TABLET | Refills: 11 | Status: SHIPPED | OUTPATIENT
Start: 2021-09-14 | End: 2021-10-15 | Stop reason: HOSPADM

## 2021-09-17 ENCOUNTER — OFFICE VISIT (OUTPATIENT)
Dept: CARDIOLOGY | Facility: CLINIC | Age: 66
End: 2021-09-17

## 2021-09-17 VITALS
WEIGHT: 308 LBS | HEART RATE: 57 BPM | DIASTOLIC BLOOD PRESSURE: 70 MMHG | HEIGHT: 74 IN | BODY MASS INDEX: 39.53 KG/M2 | OXYGEN SATURATION: 96 % | SYSTOLIC BLOOD PRESSURE: 118 MMHG

## 2021-09-17 DIAGNOSIS — I25.118 CORONARY ARTERY DISEASE OF NATIVE ARTERY OF NATIVE HEART WITH STABLE ANGINA PECTORIS (HCC): Primary | ICD-10-CM

## 2021-09-17 DIAGNOSIS — I10 ESSENTIAL HYPERTENSION: ICD-10-CM

## 2021-09-17 DIAGNOSIS — E78.2 MIXED HYPERLIPIDEMIA: ICD-10-CM

## 2021-09-17 DIAGNOSIS — I25.82 CORONARY ARTERY CHRONIC TOTAL OCCLUSION: ICD-10-CM

## 2021-09-17 PROCEDURE — 99214 OFFICE O/P EST MOD 30 MIN: CPT | Performed by: NURSE PRACTITIONER

## 2021-09-17 PROCEDURE — 93000 ELECTROCARDIOGRAM COMPLETE: CPT | Performed by: NURSE PRACTITIONER

## 2021-09-17 RX ORDER — NITROGLYCERIN 0.4 MG/1
TABLET SUBLINGUAL
Qty: 25 TABLET | Refills: 2 | Status: SHIPPED | OUTPATIENT
Start: 2021-09-17

## 2021-09-17 RX ORDER — CETIRIZINE HYDROCHLORIDE 10 MG/1
10 TABLET ORAL DAILY
COMMUNITY

## 2021-09-17 NOTE — PATIENT INSTRUCTIONS
Advance Care Planning and Advance Directives     You make decisions on a daily basis - decisions about where you want to live, your career, your home, your life. Perhaps one of the most important decisions you face is your choice for future medical care. Take time to talk with your family and your healthcare team and start planning today.  Advance Care Planning is a process that can help you:  · Understand possible future healthcare decisions in light of your own experiences  · Reflect on those decision in light of your goals and values  · Discuss your decisions with those closest to you and the healthcare professionals that care for you  · Make a plan by creating a document that reflects your wishes    Surrogate Decision Maker  In the event of a medical emergency, which has left you unable to communicate or to make your own decisions, you would need someone to make decisions for you.  It is important to discuss your preferences for medical treatment with this person while you are in good health.     Qualities of a surrogate decision maker:  • Willing to take on this role and responsibility  • Knows what you want for future medical care  • Willing to follow your wishes even if they don't agree with them  • Able to make difficult medical decisions under stressful circumstances    Advance Directives  These are legal documents you can create that will guide your healthcare team and decision maker(s) when needed. These documents can be stored in the electronic medical record.    · Living Will - a legal document to guide your care if you have a terminal condition or a serious illness and are unable to communicate. States vary by statute in document names/types, but most forms may include one or more of the following:        -  Directions regarding life-prolonging treatments        -  Directions regarding artificially provided nutrition/hydration        -  Choosing a healthcare decision maker        -  Direction  regarding organ/tissue donation    · Durable Power of  for Healthcare - this document names an -in-fact to make medical decisions for you, but it may also allow this person to make personal and financial decisions for you. Please seek the advice of an  if you need this type of document.    **Advance Directives are not required and no one may discriminate against you if you do not sign one.    Medical Orders  Many states allow specific forms/orders signed by your physician to record your wishes for medical treatment in your current state of health. This form, signed in personal communication with your physician, addresses resuscitation and other medical interventions that you may or may not want.      For more information or to schedule a time with a [facility] Advance Care Planning Facilitator contact: [email or web address] or call [phone] and someone will contact you directly.

## 2021-09-17 NOTE — PROGRESS NOTES
Subjective:     Encounter Date:09/17/2021      Patient ID: George Wilder is a 66 y.o. male with a history of coronary artery disease with  of RCA and noted disease of LAD and LCX, HTN, and HLD.    Chief Complaint: follow up  Coronary Artery Disease  Presents for follow-up visit. Symptoms include chest pain. Pertinent negatives include no dizziness, leg swelling, palpitations, shortness of breath or weight gain. Risk factors include hyperlipidemia. The symptoms have been stable.   Hypertension  This is a chronic problem. The current episode started more than 1 year ago. The problem has been rapidly improving since onset. The problem is controlled. Associated symptoms include chest pain. Pertinent negatives include no malaise/fatigue, orthopnea, palpitations, PND or shortness of breath.   Hyperlipidemia  This is a chronic problem. The current episode started more than 1 year ago. The problem is uncontrolled. Recent lipid tests were reviewed and are high. Associated symptoms include chest pain. Pertinent negatives include no shortness of breath.     Patient presents today for a routine follow up. Patient has a history of CAD with a  of the RCA and 70% stenosis of the LAD and 60% LCX stenosis that are being treated medically. His last stress echo was in 2017 and noted to be low risk of ischemia. He complains of recent onset of exertional chest tightness. The first episode occurred while he was walking in a field looking for his horse. The second episode happened a few weeks ago while he was walking to the restroom in the [facility]. He notes both episodes subsided after rest. He denies symptoms associated with his chest pain. He denies palpitations, edema, orthopnea and PND.     The following portions of the patient's history were reviewed and updated as appropriate: allergies, current medications, past family history, past medical history, past social history, past surgical history and problem  list.    No Known Allergies    Current Outpatient Medications:   •  aspirin 81 MG EC tablet, Take 81 mg by mouth Daily., Disp: , Rfl:   •  celecoxib (CeleBREX) 200 MG capsule, Take 200 mg by mouth Daily., Disp: , Rfl:   •  cetirizine (zyrTEC) 10 MG tablet, Take 10 mg by mouth Daily., Disp: , Rfl:   •  Cholecalciferol (VITAMIN D3) 5000 UNITS capsule capsule, Take 5,000 Units by mouth Daily., Disp: , Rfl:   •  cyclobenzaprine (FLEXERIL) 10 MG tablet, Take 10 mg by mouth 3 (Three) Times a Day As Needed., Disp: , Rfl:   •  gabapentin (NEURONTIN) 300 MG capsule, Take 300 mg by mouth 3 (Three) Times a Day., Disp: , Rfl:   •  HYDROcodone-acetaminophen (NORCO)  MG per tablet, Take 1 tablet by mouth 2 (Two) Times a Day. BACK PAIN, Disp: , Rfl:   •  metoprolol tartrate (LOPRESSOR) 50 MG tablet, TAKE 1 TABLET BY MOUTH EVERYDAY AT BEDTIME, Disp: 90 tablet, Rfl: 3  •  Multiple Vitamins-Minerals (MULTIVITAMIN WITH MINERALS) tablet tablet, Take 1 tablet by mouth Daily., Disp: , Rfl:   •  omeprazole (priLOSEC) 20 MG capsule, Take 20 mg by mouth Daily., Disp: , Rfl:   •  Psyllium (DAILY FIBER PO), Take  by mouth., Disp: , Rfl:   •  simvastatin (ZOCOR) 80 MG tablet, TAKE 1 TABLET BY MOUTH EVERY DAY AT NIGHT, Disp: 30 tablet, Rfl: 11  •  vitamin C (ASCORBIC ACID) 500 MG tablet, Take 500 mg by mouth Daily., Disp: , Rfl:   •  fluticasone (FLONASE) 50 MCG/ACT nasal spray, 2 sprays by Each Nare route Daily for 30 days., Disp: 16 mL, Rfl: 11  •  nitroglycerin (NITROSTAT) 0.4 MG SL tablet, 1 under the tongue as needed for angina, may repeat q5mins for up three doses, Disp: 25 tablet, Rfl: 2  Past Medical History:   Diagnosis Date   • Allergic rhinitis due to allergen 8/8/2017   • Arthritis    • Back pain    • CHF (congestive heart failure) (CMS/HCC)    • Chronic ethmoidal sinusitis 5/18/2017   • Chronic maxillary sinusitis 5/18/2017   • Coronary artery disease 1984    stent LAD, Cx, RCA    • GERD (gastroesophageal reflux disease)    •  Heart attack (CMS/HCC)     Dr Stiles    • Heartburn    • Hyperlipidemia    • Hypertension    • Myocardial infarction (CMS/HCC)    • Pain     back, & Neck        Social History     Socioeconomic History   • Marital status:      Spouse name: Not on file   • Number of children: Not on file   • Years of education: Not on file   • Highest education level: Not on file   Tobacco Use   • Smoking status: Former Smoker     Packs/day: 2.00     Years: 24.00     Pack years: 48.00     Types: Cigarettes     Quit date:      Years since quittin.7   • Smokeless tobacco: Never Used   Substance and Sexual Activity   • Alcohol use: No   • Drug use: No   • Sexual activity: Defer       Review of Systems   Constitutional: Negative for malaise/fatigue, weight gain and weight loss.   Cardiovascular: Positive for chest pain. Negative for dyspnea on exertion, irregular heartbeat, leg swelling, near-syncope, orthopnea, palpitations, paroxysmal nocturnal dyspnea and syncope.   Respiratory: Negative for cough, shortness of breath, sleep disturbances due to breathing, sputum production and wheezing.    Skin: Negative for dry skin, flushing, itching and rash.   Gastrointestinal: Negative for hematemesis and hematochezia.   Neurological: Negative for dizziness, light-headedness, loss of balance and weakness.   All other systems reviewed and are negative.         Objective:     Vitals reviewed.   Constitutional:       General: Not in acute distress.     Appearance: Well-developed. Not diaphoretic.   Eyes:      General: No scleral icterus.     Conjunctiva/sclera: Conjunctivae normal.      Pupils: Pupils are equal, round, and reactive to light.   HENT:      Head: Normocephalic.    Mouth/Throat:      Pharynx: No oropharyngeal exudate.   Pulmonary:      Effort: Pulmonary effort is normal. No respiratory distress.      Breath sounds: Normal breath sounds. No wheezing. No rales.   Chest:      Chest wall: Not tender to palpatation.  "  Cardiovascular:      Normal rate. Regular rhythm.   Pulses:     Intact distal pulses.   Edema:     Peripheral edema absent.   Abdominal:      General: Bowel sounds are normal. There is no distension.      Palpations: Abdomen is soft.      Tenderness: There is no abdominal tenderness.   Musculoskeletal: Normal range of motion.      Cervical back: Normal range of motion and neck supple. Skin:     General: Skin is warm and dry.      Coloration: Skin is not pale.      Findings: No erythema or rash.   Neurological:      Mental Status: Alert and oriented to person, place, and time.      Deep Tendon Reflexes: Reflexes are normal and symmetric.   Psychiatric:         Behavior: Behavior normal.             ECG 12 Lead    Date/Time: 9/17/2021 11:13 AM  Performed by: Giovanna Márquez APRN  Authorized by: Giovanna Márquez APRN   Comparison: compared with previous ECG from 9/17/2020  Similar to previous ECG  Rhythm: sinus bradycardia  Rate: bradycardic  BPM: 57  Conduction: conduction normal  ST Segments: ST segments normal  T Waves: T waves normal  QRS axis: normal  Other: no other findings    Clinical impression: abnormal EKG          /70   Pulse 57   Ht 188 cm (74\")   Wt (!) 140 kg (308 lb)   SpO2 96%   BMI 39.54 kg/m²     Lab Review:   I have reviewed previous office notes, recent labs and recent cardiac testing.     Lab Results   Component Value Date    CHLPL 142 06/09/2021    TRIG 112 06/09/2021    HDL 54 06/09/2021    LDL 68 06/09/2021     Results for orders placed during the hospital encounter of 06/05/17    Adult Stress Echo Only    Interpretation Summary  Low risk for ischemia          Assessment:          Diagnosis Plan   1. Coronary artery disease of native artery of native heart with stable angina pectoris (CMS/[facility])  Stress Test With Myocardial Perfusion (1 Day)   2. Coronary artery chronic total occlusion     3. Mixed hyperlipidemia     4. Essential hypertension            Plan:       1. CAD- c/o " exertional chest pain. Check Lexiscan.  Known stenosis in LAD and LCX, treating medically. Low risk stress echo in 2017. Continue ASA, BB, and statin.   2. - of RCA. stable  3. HLD- controlled with LDL of 68. Patient has had reactions to lipitor and crestor. I have discussed the importance of cholesterol control with a LDL goal of <70 as recommended by the AHA.     4. HTN- controlled. Continue current therapy.       Follow up in 1 year or sooner if symptoms worsen.     I spent 30 minutes caring for George on this date of service. This time includes time spent by me in the following activities:preparing for the visit, reviewing tests, obtaining and/or reviewing a separately obtained history, performing a medically appropriate examination and/or evaluation , counseling and educating the patient/family/caregiver, ordering medications, tests, or procedures, documenting information in the medical record, independently interpreting results and communicating that information with the patient/family/caregiver and care coordination    Advance Care Planning   ACP discussion was held with the patient during this visit. Patient does not have an advance directive, information provided.

## 2021-10-04 ENCOUNTER — HOSPITAL ENCOUNTER (OUTPATIENT)
Dept: CARDIOLOGY | Facility: HOSPITAL | Age: 66
Discharge: HOME | End: 2021-10-04

## 2021-10-04 VITALS
BODY MASS INDEX: 41.08 KG/M2 | WEIGHT: 310 LBS | DIASTOLIC BLOOD PRESSURE: 70 MMHG | HEART RATE: 49 BPM | HEIGHT: 73 IN | SYSTOLIC BLOOD PRESSURE: 119 MMHG

## 2021-10-04 DIAGNOSIS — I25.118 CORONARY ARTERY DISEASE OF NATIVE ARTERY OF NATIVE HEART WITH STABLE ANGINA PECTORIS (HCC): ICD-10-CM

## 2021-10-04 PROCEDURE — 25010000002 REGADENOSON 0.4 MG/5ML SOLUTION: Performed by: INTERNAL MEDICINE

## 2021-10-04 PROCEDURE — A9500 TC99M SESTAMIBI: HCPCS | Performed by: NURSE PRACTITIONER

## 2021-10-04 PROCEDURE — 78452 HT MUSCLE IMAGE SPECT MULT: CPT

## 2021-10-04 PROCEDURE — 0 TECHNETIUM SESTAMIBI: Performed by: NURSE PRACTITIONER

## 2021-10-04 PROCEDURE — 78452 HT MUSCLE IMAGE SPECT MULT: CPT | Performed by: INTERNAL MEDICINE

## 2021-10-04 PROCEDURE — 93017 CV STRESS TEST TRACING ONLY: CPT

## 2021-10-04 PROCEDURE — 93018 CV STRESS TEST I&R ONLY: CPT | Performed by: INTERNAL MEDICINE

## 2021-10-04 RX ADMIN — TECHNETIUM TC 99M SESTAMIBI 1 DOSE: 1 INJECTION INTRAVENOUS at 09:16

## 2021-10-04 RX ADMIN — REGADENOSON 0.4 MG: 0.08 INJECTION, SOLUTION INTRAVENOUS at 10:20

## 2021-10-04 RX ADMIN — TECHNETIUM TC 99M SESTAMIBI 1 DOSE: 1 INJECTION INTRAVENOUS at 10:20

## 2021-10-05 LAB
BH CV NUCLEAR PRIOR STUDY: 3
BH CV REST NUCLEAR ISOTOPE DOSE: 10.4 MCI
BH CV STRESS BP STAGE 1: NORMAL
BH CV STRESS COMMENTS STAGE 1: NORMAL
BH CV STRESS DOSE REGADENOSON STAGE 1: 0.4
BH CV STRESS DURATION MIN STAGE 1: 0
BH CV STRESS DURATION SEC STAGE 1: 10
BH CV STRESS HR STAGE 1: 67
BH CV STRESS NUCLEAR ISOTOPE DOSE: 35 MCI
BH CV STRESS PROTOCOL 1: NORMAL
BH CV STRESS RECOVERY BP: NORMAL MMHG
BH CV STRESS RECOVERY HR: 61 BPM
BH CV STRESS STAGE 1: 1
LV EF NUC BP: 81 %
MAXIMAL PREDICTED HEART RATE: 154 BPM
PERCENT MAX PREDICTED HR: 43.51 %
STRESS BASELINE BP: NORMAL MMHG
STRESS BASELINE HR: 49 BPM
STRESS PERCENT HR: 51 %
STRESS POST EXERCISE DUR MIN: 0 MIN
STRESS POST EXERCISE DUR SEC: 10 SEC
STRESS POST PEAK BP: NORMAL MMHG
STRESS POST PEAK HR: 67 BPM
STRESS TARGET HR: 131 BPM

## 2021-10-06 ENCOUNTER — PREP FOR SURGERY (OUTPATIENT)
Dept: OTHER | Facility: HOSPITAL | Age: 66
End: 2021-10-06

## 2021-10-06 DIAGNOSIS — R94.39 ABNORMAL NUCLEAR STRESS TEST: Primary | ICD-10-CM

## 2021-10-06 RX ORDER — SODIUM CHLORIDE 0.9 % (FLUSH) 0.9 %
3 SYRINGE (ML) INJECTION EVERY 12 HOURS SCHEDULED
Status: CANCELLED | OUTPATIENT
Start: 2021-10-06

## 2021-10-07 ENCOUNTER — FLU SHOT (OUTPATIENT)
Dept: FAMILY MEDICINE CLINIC | Facility: CLINIC | Age: 66
End: 2021-10-07

## 2021-10-07 DIAGNOSIS — Z23 NEED FOR INFLUENZA VACCINATION: Primary | ICD-10-CM

## 2021-10-07 PROCEDURE — 90471 IMMUNIZATION ADMIN: CPT | Performed by: FAMILY MEDICINE

## 2021-10-07 PROCEDURE — 90662 IIV NO PRSV INCREASED AG IM: CPT | Performed by: FAMILY MEDICINE

## 2021-10-14 ENCOUNTER — HOSPITAL ENCOUNTER (OUTPATIENT)
Facility: HOSPITAL | Age: 66
Discharge: HOME | End: 2021-10-15
Attending: INTERNAL MEDICINE | Admitting: INTERNAL MEDICINE

## 2021-10-14 DIAGNOSIS — R94.39 ABNORMAL NUCLEAR STRESS TEST: ICD-10-CM

## 2021-10-14 PROBLEM — I25.118 CORONARY ARTERY DISEASE OF NATIVE ARTERY OF NATIVE HEART WITH STABLE ANGINA PECTORIS (HCC): Status: RESOLVED | Noted: 2017-04-05 | Resolved: 2021-10-14

## 2021-10-14 PROBLEM — I20.0 UNSTABLE ANGINA: Status: ACTIVE | Noted: 2021-10-14

## 2021-10-14 LAB
ANION GAP SERPL CALCULATED.3IONS-SCNC: 7 MMOL/L (ref 5–15)
BASOPHILS # BLD AUTO: 0.03 10*3/MM3 (ref 0–0.2)
BASOPHILS NFR BLD AUTO: 0.5 % (ref 0–1.5)
BUN SERPL-MCNC: 17 MG/DL (ref 8–23)
BUN/CREAT SERPL: 17.2 (ref 7–25)
CALCIUM SPEC-SCNC: 9.1 MG/DL (ref 8.6–10.5)
CHLORIDE SERPL-SCNC: 107 MMOL/L (ref 98–107)
CO2 SERPL-SCNC: 28 MMOL/L (ref 22–29)
CREAT SERPL-MCNC: 0.99 MG/DL (ref 0.76–1.27)
DEPRECATED RDW RBC AUTO: 41.9 FL (ref 37–54)
EOSINOPHIL # BLD AUTO: 0.15 10*3/MM3 (ref 0–0.4)
EOSINOPHIL NFR BLD AUTO: 2.6 % (ref 0.3–6.2)
ERYTHROCYTE [DISTWIDTH] IN BLOOD BY AUTOMATED COUNT: 12.1 % (ref 12.3–15.4)
GFR SERPL CREATININE-BSD FRML MDRD: 76 ML/MIN/1.73
GLUCOSE SERPL-MCNC: 114 MG/DL (ref 65–99)
HCT VFR BLD AUTO: 48.5 % (ref 37.5–51)
HGB BLD-MCNC: 15.8 G/DL (ref 13–17.7)
IMM GRANULOCYTES # BLD AUTO: 0.01 10*3/MM3 (ref 0–0.05)
IMM GRANULOCYTES NFR BLD AUTO: 0.2 % (ref 0–0.5)
INR PPP: 1.02 (ref 0.91–1.09)
LYMPHOCYTES # BLD AUTO: 1.63 10*3/MM3 (ref 0.7–3.1)
LYMPHOCYTES NFR BLD AUTO: 28.2 % (ref 19.6–45.3)
MCH RBC QN AUTO: 30.3 PG (ref 26.6–33)
MCHC RBC AUTO-ENTMCNC: 32.6 G/DL (ref 31.5–35.7)
MCV RBC AUTO: 93.1 FL (ref 79–97)
MONOCYTES # BLD AUTO: 0.54 10*3/MM3 (ref 0.1–0.9)
MONOCYTES NFR BLD AUTO: 9.3 % (ref 5–12)
NEUTROPHILS NFR BLD AUTO: 3.43 10*3/MM3 (ref 1.7–7)
NEUTROPHILS NFR BLD AUTO: 59.2 % (ref 42.7–76)
NRBC BLD AUTO-RTO: 0 /100 WBC (ref 0–0.2)
PLATELET # BLD AUTO: 232 10*3/MM3 (ref 140–450)
PMV BLD AUTO: 10 FL (ref 6–12)
POTASSIUM SERPL-SCNC: 4.3 MMOL/L (ref 3.5–5.2)
PROTHROMBIN TIME: 13 SECONDS (ref 11.9–14.6)
RBC # BLD AUTO: 5.21 10*6/MM3 (ref 4.14–5.8)
SARS-COV-2 RNA PNL SPEC NAA+PROBE: NOT DETECTED
SODIUM SERPL-SCNC: 142 MMOL/L (ref 136–145)
WBC # BLD AUTO: 5.79 10*3/MM3 (ref 3.4–10.8)

## 2021-10-14 PROCEDURE — 85025 COMPLETE CBC W/AUTO DIFF WBC: CPT | Performed by: NURSE PRACTITIONER

## 2021-10-14 PROCEDURE — A9270 NON-COVERED ITEM OR SERVICE: HCPCS | Performed by: INTERNAL MEDICINE

## 2021-10-14 PROCEDURE — 87635 SARS-COV-2 COVID-19 AMP PRB: CPT | Performed by: INTERNAL MEDICINE

## 2021-10-14 PROCEDURE — 63710000001 ASPIRIN 81 MG CHEWABLE TABLET: Performed by: INTERNAL MEDICINE

## 2021-10-14 PROCEDURE — 93571 IV DOP VEL&/PRESS C FLO 1ST: CPT | Performed by: INTERNAL MEDICINE

## 2021-10-14 PROCEDURE — 25010000002 ADENOSINE (DIAGNOSTIC) PER 30 MG: Performed by: INTERNAL MEDICINE

## 2021-10-14 PROCEDURE — C1894 INTRO/SHEATH, NON-LASER: HCPCS | Performed by: INTERNAL MEDICINE

## 2021-10-14 PROCEDURE — C1874 STENT, COATED/COV W/DEL SYS: HCPCS | Performed by: INTERNAL MEDICINE

## 2021-10-14 PROCEDURE — C1769 GUIDE WIRE: HCPCS | Performed by: INTERNAL MEDICINE

## 2021-10-14 PROCEDURE — 93572 IV DOP VEL&/PRESS C FLO EA: CPT | Performed by: INTERNAL MEDICINE

## 2021-10-14 PROCEDURE — 93458 L HRT ARTERY/VENTRICLE ANGIO: CPT | Performed by: INTERNAL MEDICINE

## 2021-10-14 PROCEDURE — C1887 CATHETER, GUIDING: HCPCS | Performed by: INTERNAL MEDICINE

## 2021-10-14 PROCEDURE — 63710000001 ATORVASTATIN 40 MG TABLET: Performed by: INTERNAL MEDICINE

## 2021-10-14 PROCEDURE — 25010000002 BIVALIRUDIN TRIFLUOROACETATE 250 MG RECONSTITUTED SOLUTION: Performed by: INTERNAL MEDICINE

## 2021-10-14 PROCEDURE — G0378 HOSPITAL OBSERVATION PER HR: HCPCS

## 2021-10-14 PROCEDURE — 25010000002 MIDAZOLAM HCL (PF) 5 MG/5ML SOLUTION: Performed by: INTERNAL MEDICINE

## 2021-10-14 PROCEDURE — 25010000002 DIPHENHYDRAMINE PER 50 MG: Performed by: INTERNAL MEDICINE

## 2021-10-14 PROCEDURE — 25010000002 HEPARIN (PORCINE) 1000-0.9 UT/500ML-% SOLUTION: Performed by: INTERNAL MEDICINE

## 2021-10-14 PROCEDURE — C9600 PERC DRUG-EL COR STENT SING: HCPCS | Performed by: INTERNAL MEDICINE

## 2021-10-14 PROCEDURE — 80048 BASIC METABOLIC PNL TOTAL CA: CPT | Performed by: NURSE PRACTITIONER

## 2021-10-14 PROCEDURE — 63710000001 METOPROLOL TARTRATE 50 MG TABLET: Performed by: INTERNAL MEDICINE

## 2021-10-14 PROCEDURE — 85610 PROTHROMBIN TIME: CPT | Performed by: NURSE PRACTITIONER

## 2021-10-14 PROCEDURE — 99153 MOD SED SAME PHYS/QHP EA: CPT | Performed by: INTERNAL MEDICINE

## 2021-10-14 PROCEDURE — 63710000001 FLUTICASONE 50 MCG/ACT SUSPENSION 16 G BOTTLE: Performed by: INTERNAL MEDICINE

## 2021-10-14 PROCEDURE — C1725 CATH, TRANSLUMIN NON-LASER: HCPCS | Performed by: INTERNAL MEDICINE

## 2021-10-14 PROCEDURE — 63710000001 CYCLOBENZAPRINE 10 MG TABLET: Performed by: INTERNAL MEDICINE

## 2021-10-14 PROCEDURE — C9803 HOPD COVID-19 SPEC COLLECT: HCPCS

## 2021-10-14 PROCEDURE — 0 IOPAMIDOL PER 1 ML: Performed by: INTERNAL MEDICINE

## 2021-10-14 PROCEDURE — 25010000002 HEPARIN (PORCINE) 2000-0.9 UNIT/L-% SOLUTION: Performed by: INTERNAL MEDICINE

## 2021-10-14 PROCEDURE — 63710000001 TICAGRELOR 90 MG TABLET: Performed by: INTERNAL MEDICINE

## 2021-10-14 PROCEDURE — 99152 MOD SED SAME PHYS/QHP 5/>YRS: CPT | Performed by: INTERNAL MEDICINE

## 2021-10-14 PROCEDURE — C1760 CLOSURE DEV, VASC: HCPCS | Performed by: INTERNAL MEDICINE

## 2021-10-14 PROCEDURE — 93005 ELECTROCARDIOGRAM TRACING: CPT | Performed by: INTERNAL MEDICINE

## 2021-10-14 PROCEDURE — 25010000002 FENTANYL CITRATE (PF) 100 MCG/2ML SOLUTION: Performed by: INTERNAL MEDICINE

## 2021-10-14 PROCEDURE — 63710000001 HYDROCODONE-ACETAMINOPHEN 10-325 MG TABLET: Performed by: INTERNAL MEDICINE

## 2021-10-14 PROCEDURE — 63710000001 GABAPENTIN 300 MG CAPSULE: Performed by: INTERNAL MEDICINE

## 2021-10-14 PROCEDURE — 92928 PRQ TCAT PLMT NTRAC ST 1 LES: CPT | Performed by: INTERNAL MEDICINE

## 2021-10-14 DEVICE — XIENCE SIERRA™ EVEROLIMUS ELUTING CORONARY STENT SYSTEM 3.50 MM X 15 MM / RAPID-EXCHANGE
Type: IMPLANTABLE DEVICE | Status: FUNCTIONAL
Brand: XIENCE SIERRA™

## 2021-10-14 RX ORDER — ASPIRIN 81 MG/1
81 TABLET ORAL DAILY
Status: DISCONTINUED | OUTPATIENT
Start: 2021-10-14 | End: 2021-10-15 | Stop reason: HOSPADM

## 2021-10-14 RX ORDER — PANTOPRAZOLE SODIUM 40 MG/1
40 TABLET, DELAYED RELEASE ORAL EVERY MORNING
Status: DISCONTINUED | OUTPATIENT
Start: 2021-10-15 | End: 2021-10-15 | Stop reason: HOSPADM

## 2021-10-14 RX ORDER — CETIRIZINE HYDROCHLORIDE 10 MG/1
10 TABLET ORAL DAILY
Status: DISCONTINUED | OUTPATIENT
Start: 2021-10-15 | End: 2021-10-15 | Stop reason: HOSPADM

## 2021-10-14 RX ORDER — ACETAMINOPHEN 325 MG/1
650 TABLET ORAL EVERY 4 HOURS PRN
Status: DISCONTINUED | OUTPATIENT
Start: 2021-10-14 | End: 2021-10-15 | Stop reason: HOSPADM

## 2021-10-14 RX ORDER — CELECOXIB 200 MG/1
200 CAPSULE ORAL DAILY
Status: DISCONTINUED | OUTPATIENT
Start: 2021-10-15 | End: 2021-10-15 | Stop reason: HOSPADM

## 2021-10-14 RX ORDER — SODIUM CHLORIDE 9 MG/ML
125 INJECTION, SOLUTION INTRAVENOUS CONTINUOUS
Status: DISPENSED | OUTPATIENT
Start: 2021-10-14 | End: 2021-10-14

## 2021-10-14 RX ORDER — ASPIRIN 81 MG/1
TABLET, CHEWABLE ORAL AS NEEDED
Status: DISCONTINUED | OUTPATIENT
Start: 2021-10-14 | End: 2021-10-14 | Stop reason: HOSPADM

## 2021-10-14 RX ORDER — FLUTICASONE PROPIONATE 50 MCG
2 SPRAY, SUSPENSION (ML) NASAL NIGHTLY
Status: DISCONTINUED | OUTPATIENT
Start: 2021-10-14 | End: 2021-10-15 | Stop reason: HOSPADM

## 2021-10-14 RX ORDER — HEPARIN SODIUM 200 [USP'U]/100ML
INJECTION, SOLUTION INTRAVENOUS AS NEEDED
Status: DISCONTINUED | OUTPATIENT
Start: 2021-10-14 | End: 2021-10-14 | Stop reason: HOSPADM

## 2021-10-14 RX ORDER — MIDAZOLAM HYDROCHLORIDE 1 MG/ML
INJECTION, SOLUTION INTRAMUSCULAR; INTRAVENOUS AS NEEDED
Status: DISCONTINUED | OUTPATIENT
Start: 2021-10-14 | End: 2021-10-14 | Stop reason: HOSPADM

## 2021-10-14 RX ORDER — SODIUM CHLORIDE 0.9 % (FLUSH) 0.9 %
3 SYRINGE (ML) INJECTION EVERY 12 HOURS SCHEDULED
Status: DISCONTINUED | OUTPATIENT
Start: 2021-10-14 | End: 2021-10-14 | Stop reason: HOSPADM

## 2021-10-14 RX ORDER — MULTIPLE VITAMINS W/ MINERALS TAB 9MG-400MCG
1 TAB ORAL DAILY
Status: DISCONTINUED | OUTPATIENT
Start: 2021-10-15 | End: 2021-10-15 | Stop reason: HOSPADM

## 2021-10-14 RX ORDER — ATORVASTATIN CALCIUM 40 MG/1
40 TABLET, FILM COATED ORAL NIGHTLY
Status: DISCONTINUED | OUTPATIENT
Start: 2021-10-14 | End: 2021-10-15 | Stop reason: HOSPADM

## 2021-10-14 RX ORDER — GABAPENTIN 300 MG/1
300 CAPSULE ORAL 3 TIMES DAILY
Status: DISCONTINUED | OUTPATIENT
Start: 2021-10-14 | End: 2021-10-15 | Stop reason: HOSPADM

## 2021-10-14 RX ORDER — METOPROLOL TARTRATE 50 MG/1
50 TABLET, FILM COATED ORAL EVERY 12 HOURS SCHEDULED
Status: DISCONTINUED | OUTPATIENT
Start: 2021-10-14 | End: 2021-10-15 | Stop reason: HOSPADM

## 2021-10-14 RX ORDER — FLUTICASONE PROPIONATE 50 MCG
2 SPRAY, SUSPENSION (ML) NASAL DAILY
Status: DISCONTINUED | OUTPATIENT
Start: 2021-10-14 | End: 2021-10-14

## 2021-10-14 RX ORDER — CYCLOBENZAPRINE HCL 10 MG
10 TABLET ORAL 3 TIMES DAILY PRN
Status: DISCONTINUED | OUTPATIENT
Start: 2021-10-14 | End: 2021-10-15 | Stop reason: HOSPADM

## 2021-10-14 RX ORDER — HYDROCODONE BITARTRATE AND ACETAMINOPHEN 10; 325 MG/1; MG/1
1 TABLET ORAL 2 TIMES DAILY
Status: DISCONTINUED | OUTPATIENT
Start: 2021-10-14 | End: 2021-10-15 | Stop reason: HOSPADM

## 2021-10-14 RX ORDER — FENTANYL CITRATE 50 UG/ML
INJECTION, SOLUTION INTRAMUSCULAR; INTRAVENOUS AS NEEDED
Status: DISCONTINUED | OUTPATIENT
Start: 2021-10-14 | End: 2021-10-14 | Stop reason: HOSPADM

## 2021-10-14 RX ORDER — LIDOCAINE HYDROCHLORIDE 20 MG/ML
INJECTION, SOLUTION INFILTRATION; PERINEURAL AS NEEDED
Status: DISCONTINUED | OUTPATIENT
Start: 2021-10-14 | End: 2021-10-14 | Stop reason: HOSPADM

## 2021-10-14 RX ORDER — ASCORBIC ACID 500 MG
500 TABLET ORAL DAILY
Status: DISCONTINUED | OUTPATIENT
Start: 2021-10-15 | End: 2021-10-15 | Stop reason: HOSPADM

## 2021-10-14 RX ORDER — DIPHENHYDRAMINE HYDROCHLORIDE 50 MG/ML
INJECTION INTRAMUSCULAR; INTRAVENOUS AS NEEDED
Status: DISCONTINUED | OUTPATIENT
Start: 2021-10-14 | End: 2021-10-14 | Stop reason: HOSPADM

## 2021-10-14 RX ADMIN — GABAPENTIN 300 MG: 300 CAPSULE ORAL at 16:05

## 2021-10-14 RX ADMIN — ATORVASTATIN CALCIUM 40 MG: 40 TABLET, FILM COATED ORAL at 21:49

## 2021-10-14 RX ADMIN — TICAGRELOR 90 MG: 90 TABLET ORAL at 21:50

## 2021-10-14 RX ADMIN — FLUTICASONE PROPIONATE 2 SPRAY: 50 SPRAY, METERED NASAL at 21:50

## 2021-10-14 RX ADMIN — GABAPENTIN 300 MG: 300 CAPSULE ORAL at 21:48

## 2021-10-14 RX ADMIN — SODIUM CHLORIDE 125 ML/HR: 9 INJECTION, SOLUTION INTRAVENOUS at 14:18

## 2021-10-14 RX ADMIN — CYCLOBENZAPRINE 10 MG: 10 TABLET, FILM COATED ORAL at 11:42

## 2021-10-14 RX ADMIN — HYDROCODONE BITARTRATE AND ACETAMINOPHEN 1 TABLET: 10; 325 TABLET ORAL at 21:48

## 2021-10-14 RX ADMIN — METOPROLOL TARTRATE 50 MG: 50 TABLET, FILM COATED ORAL at 21:48

## 2021-10-14 NOTE — PLAN OF CARE
Goal Outcome Evaluation:  Plan of Care Reviewed With: patient        Progress: no change  Outcome Summary: pt admit today. heart cath to Right groin. stent placed to LAD. Drg is clean, dry,inact and soft. cont to monitor.

## 2021-10-14 NOTE — INTERVAL H&P NOTE
H&P updated. The patient was examined and the following changes are noted:  stress test reveals a high risk study. Scheduled for cath

## 2021-10-15 VITALS
WEIGHT: 306.9 LBS | HEART RATE: 60 BPM | RESPIRATION RATE: 16 BRPM | BODY MASS INDEX: 40.67 KG/M2 | OXYGEN SATURATION: 97 % | HEIGHT: 73 IN | DIASTOLIC BLOOD PRESSURE: 64 MMHG | SYSTOLIC BLOOD PRESSURE: 127 MMHG | TEMPERATURE: 97.4 F

## 2021-10-15 DIAGNOSIS — Z95.5 S/P DRUG ELUTING CORONARY STENT PLACEMENT: Primary | ICD-10-CM

## 2021-10-15 LAB
ANION GAP SERPL CALCULATED.3IONS-SCNC: 6 MMOL/L (ref 5–15)
BUN SERPL-MCNC: 15 MG/DL (ref 8–23)
BUN/CREAT SERPL: 15.6 (ref 7–25)
CALCIUM SPEC-SCNC: 8.7 MG/DL (ref 8.6–10.5)
CHLORIDE SERPL-SCNC: 109 MMOL/L (ref 98–107)
CO2 SERPL-SCNC: 27 MMOL/L (ref 22–29)
CREAT SERPL-MCNC: 0.96 MG/DL (ref 0.76–1.27)
DEPRECATED RDW RBC AUTO: 41.8 FL (ref 37–54)
ERYTHROCYTE [DISTWIDTH] IN BLOOD BY AUTOMATED COUNT: 12.2 % (ref 12.3–15.4)
GFR SERPL CREATININE-BSD FRML MDRD: 78 ML/MIN/1.73
GLUCOSE SERPL-MCNC: 105 MG/DL (ref 65–99)
HCT VFR BLD AUTO: 45.2 % (ref 37.5–51)
HGB BLD-MCNC: 14.9 G/DL (ref 13–17.7)
MCH RBC QN AUTO: 30.6 PG (ref 26.6–33)
MCHC RBC AUTO-ENTMCNC: 33 G/DL (ref 31.5–35.7)
MCV RBC AUTO: 92.8 FL (ref 79–97)
PLATELET # BLD AUTO: 199 10*3/MM3 (ref 140–450)
PMV BLD AUTO: 10.3 FL (ref 6–12)
POTASSIUM SERPL-SCNC: 4.3 MMOL/L (ref 3.5–5.2)
QT INTERVAL: 472 MS
QTC INTERVAL: 403 MS
RBC # BLD AUTO: 4.87 10*6/MM3 (ref 4.14–5.8)
SODIUM SERPL-SCNC: 142 MMOL/L (ref 136–145)
WBC # BLD AUTO: 6.03 10*3/MM3 (ref 3.4–10.8)

## 2021-10-15 PROCEDURE — 63710000001 PANTOPRAZOLE 40 MG TABLET DELAYED-RELEASE: Performed by: INTERNAL MEDICINE

## 2021-10-15 PROCEDURE — 80048 BASIC METABOLIC PNL TOTAL CA: CPT | Performed by: INTERNAL MEDICINE

## 2021-10-15 PROCEDURE — 85027 COMPLETE CBC AUTOMATED: CPT | Performed by: INTERNAL MEDICINE

## 2021-10-15 PROCEDURE — A9270 NON-COVERED ITEM OR SERVICE: HCPCS | Performed by: INTERNAL MEDICINE

## 2021-10-15 PROCEDURE — 63710000001 METOPROLOL TARTRATE 50 MG TABLET: Performed by: INTERNAL MEDICINE

## 2021-10-15 PROCEDURE — 63710000001 ASPIRIN 81 MG TABLET DELAYED-RELEASE: Performed by: INTERNAL MEDICINE

## 2021-10-15 PROCEDURE — 63710000001 HYDROCODONE-ACETAMINOPHEN 10-325 MG TABLET: Performed by: INTERNAL MEDICINE

## 2021-10-15 PROCEDURE — 63710000001 TICAGRELOR 90 MG TABLET: Performed by: INTERNAL MEDICINE

## 2021-10-15 PROCEDURE — 63710000001 CELECOXIB 200 MG CAPSULE: Performed by: INTERNAL MEDICINE

## 2021-10-15 PROCEDURE — 99217 PR OBSERVATION CARE DISCHARGE MANAGEMENT: CPT | Performed by: NURSE PRACTITIONER

## 2021-10-15 PROCEDURE — 63710000001 CETIRIZINE 10 MG TABLET: Performed by: INTERNAL MEDICINE

## 2021-10-15 PROCEDURE — 63710000001 GABAPENTIN 300 MG CAPSULE: Performed by: INTERNAL MEDICINE

## 2021-10-15 PROCEDURE — 63710000001 MULTIVITAMIN WITH MINERALS TABLET: Performed by: INTERNAL MEDICINE

## 2021-10-15 RX ORDER — ROSUVASTATIN CALCIUM 20 MG/1
20 TABLET, COATED ORAL DAILY
Qty: 30 TABLET | Refills: 11 | Status: SHIPPED | OUTPATIENT
Start: 2021-10-15 | End: 2022-09-19

## 2021-10-15 RX ADMIN — ASPIRIN 81 MG: 81 TABLET, COATED ORAL at 08:47

## 2021-10-15 RX ADMIN — Medication 1 TABLET: at 08:48

## 2021-10-15 RX ADMIN — GABAPENTIN 300 MG: 300 CAPSULE ORAL at 08:47

## 2021-10-15 RX ADMIN — HYDROCODONE BITARTRATE AND ACETAMINOPHEN 1 TABLET: 10; 325 TABLET ORAL at 08:48

## 2021-10-15 RX ADMIN — CELECOXIB 200 MG: 200 CAPSULE ORAL at 08:47

## 2021-10-15 RX ADMIN — CETIRIZINE HYDROCHLORIDE 10 MG: 10 TABLET ORAL at 08:47

## 2021-10-15 RX ADMIN — TICAGRELOR 90 MG: 90 TABLET ORAL at 08:48

## 2021-10-15 RX ADMIN — PANTOPRAZOLE SODIUM 40 MG: 40 TABLET, DELAYED RELEASE ORAL at 06:53

## 2021-10-15 RX ADMIN — METOPROLOL TARTRATE 50 MG: 50 TABLET, FILM COATED ORAL at 08:47

## 2021-10-15 NOTE — DISCHARGE SUMMARY
Date of Discharge:  10/15/2021    Discharge Diagnosis:   Unstable angina (HCC)    Abnormal nuclear stress test      Presenting Problem/History of Present Illness  Abnormal nuclear stress test [R94.39]    George Wlider is a 66 y.o. male with coronary artery disease with  of RCA and noted disease of LAD and LCX, HTN, and HLD. He presented to the office in September with complaints of recent onset of exertional chest tightness that subsided after rest. He underwent a nuclear stress echo that was noted to be high risk for multi-arterial ischemia. He was scheduled for an outpatient LHC per Dr. Stiles.     Hospital Course  Patient presented yesterday for an elective LHC. Angiography revealed a hazy area of undetermined significance within the LAD, 50-60% stenosis in the LCX and known  of the distal RCA with collaterals from the left. RFR of the LAD was 0.82 indicating highly significant stenosis. FFR of the LCX lesion was 0.93 indicating non-significant stenosis. The LAD was treated with a 3.5 x 15mm Xience PAUL. He tolerated the procedure well. No cath site complaints. Vitals have remained stable. He is felt safe for discharge.      Procedures Performed  Procedure(s):  Left Heart Cath       Review of Systems   Constitutional: Negative for chills, diaphoresis, fatigue and unexpected weight change.   HENT: Negative for nosebleeds.    Respiratory: Negative for cough, chest tightness, shortness of breath and wheezing.    Cardiovascular: Negative for chest pain, palpitations and leg swelling.   Gastrointestinal: Negative for anal bleeding, blood in stool, diarrhea and nausea.   Neurological: Negative for dizziness, syncope and light-headedness.   All other systems reviewed and are negative.      Consults:   Consults     No orders found for last 30 day(s).          Pertinent Test Results:  Lab Results (last 72 hours)     Procedure Component Value Units Date/Time    Basic Metabolic Panel [[id number]]  (Abnormal) Collected:  10/15/21 0523    Specimen: Blood Updated: 10/15/21 0619     Glucose 105 mg/dL      BUN 15 mg/dL      Creatinine 0.96 mg/dL      Sodium 142 mmol/L      Potassium 4.3 mmol/L      Chloride 109 mmol/L      CO2 27.0 mmol/L      Calcium 8.7 mg/dL      eGFR Non African Amer 78 mL/min/1.73      BUN/Creatinine Ratio 15.6     Anion Gap 6.0 mmol/L     Narrative:      GFR Normal >60  Chronic Kidney Disease <60  Kidney Failure <15      CBC (No Diff) [[id number]]  (Abnormal) Collected: 10/15/21 0523    Specimen: Blood Updated: 10/15/21 0554     WBC 6.03 10*3/mm3      RBC 4.87 10*6/mm3      Hemoglobin 14.9 g/dL      Hematocrit 45.2 %      MCV 92.8 fL      MCH 30.6 pg      MCHC 33.0 g/dL      RDW 12.2 %      RDW-SD 41.8 fl      MPV 10.3 fL      Platelets 199 10*3/mm3     COVID PRE-OP / PRE-PROCEDURE SCREENING ORDER (NO ISOLATION) - Swab, Nasal Cavity [[id number]]  (Normal) Collected: 10/14/21 1015    Specimen: Swab from Nasal Cavity Updated: 10/14/21 1103    Narrative:      The following orders were created for panel order COVID PRE-OP / PRE-PROCEDURE SCREENING ORDER (NO ISOLATION) - Swab, Nasal Cavity.  Procedure                               Abnormality         Status                     ---------                               -----------         ------                     COVID-19,[facility] IN-BIGG...[[id number]]  Normal              Final result                 Please view results for these tests on the individual orders.    COVID-19,[facility] IN-HOUSE,Nasal Swab No Transport Media 3-4 HR TAT - Swab, Nasal Cavity [[id number]]  (Normal) Collected: 10/14/21 1015    Specimen: Swab from Nasal Cavity Updated: 10/14/21 1103     COVID19 Not Detected    Narrative:      Fact sheet for providers: https://www.fda.gov/media/088140/download     Fact sheet for patients: https://www.fda.gov/media/529522/download    Test performed by PCR.    Consider negative results in combination with clinical observations, patient history, and epidemiological  information.  Fact sheet for providers: https://www.fda.gov/media/098152/download     Fact sheet for patients: https://www.fda.gov/media/660181/download    Test performed by PCR.    Consider negative results in combination with clinical observations, patient history, and epidemiological information.    Basic Metabolic Panel [[id number]]  (Abnormal) Collected: 10/14/21 0723    Specimen: Blood Updated: 10/14/21 0805     Glucose 114 mg/dL      BUN 17 mg/dL      Creatinine 0.99 mg/dL      Sodium 142 mmol/L      Potassium 4.3 mmol/L      Comment: Slight hemolysis detected by analyzer. Results may be affected.        Chloride 107 mmol/L      CO2 28.0 mmol/L      Calcium 9.1 mg/dL      eGFR Non African Amer 76 mL/min/1.73      BUN/Creatinine Ratio 17.2     Anion Gap 7.0 mmol/L     Narrative:      GFR Normal >60  Chronic Kidney Disease <60  Kidney Failure <15      Protime-INR [[id number]]  (Normal) Collected: 10/14/21 0723    Specimen: Blood Updated: 10/14/21 0755     Protime 13.0 Seconds      INR 1.02    CBC & Differential [[id number]]  (Abnormal) Collected: 10/14/21 0723    Specimen: Blood Updated: 10/14/21 0743    Narrative:      The following orders were created for panel order CBC & Differential.  Procedure                               Abnormality         Status                     ---------                               -----------         ------                     CBC Auto Differential[[id number]]        Abnormal            Final result                 Please view results for these tests on the individual orders.    CBC Auto Differential [[id number]]  (Abnormal) Collected: 10/14/21 0723    Specimen: Blood Updated: 10/14/21 0743     WBC 5.79 10*3/mm3      RBC 5.21 10*6/mm3      Hemoglobin 15.8 g/dL      Hematocrit 48.5 %      MCV 93.1 fL      MCH 30.3 pg      MCHC 32.6 g/dL      RDW 12.1 %      RDW-SD 41.9 fl      MPV 10.0 fL      Platelets 232 10*3/mm3      Neutrophil % 59.2 %      Lymphocyte % 28.2 %      Monocyte %  9.3 %      Eosinophil % 2.6 %      Basophil % 0.5 %      Immature Grans % 0.2 %      Neutrophils, Absolute 3.43 10*3/mm3      Lymphocytes, Absolute 1.63 10*3/mm3      Monocytes, Absolute 0.54 10*3/mm3      Eosinophils, Absolute 0.15 10*3/mm3      Basophils, Absolute 0.03 10*3/mm3      Immature Grans, Absolute 0.01 10*3/mm3      nRBC 0.0 /100 WBC           Ejection Fraction  Lab Results   Component Value Date    EF 81 10/04/2021       Echo EF Estimated  No results found for: ECHOEFEST    Nuclear Stress Ejection Fraction  No components found for: NUCEF    Cath Ejection Fraction Quantitative  No results found for: CATHEF    Condition on Discharge:  stable    Vital Signs  Temp:  [97.4 °F (36.3 °C)-98 °F (36.7 °C)] 97.7 °F (36.5 °C)  Heart Rate:  [42-57] 53  Resp:  [8-18] 16  BP: (112-155)/(54-92) 124/64    Physical Exam:  Vitals reviewed.   Constitutional:       General: Not in acute distress.     Appearance: Healthy appearance. Well-developed. Not diaphoretic.   Eyes:      General: No scleral icterus.     Conjunctiva/sclera: Conjunctivae normal.      Pupils: Pupils are equal, round, and reactive to light.   HENT:      Head: Normocephalic.    Mouth/Throat:      Pharynx: No oropharyngeal exudate.   Neck:      Vascular: No JVR.   Pulmonary:      Effort: Pulmonary effort is normal. No respiratory distress.      Breath sounds: Normal breath sounds. No wheezing. No rhonchi. No rales.   Chest:      Chest wall: Not tender to palpatation.   Cardiovascular:      Normal rate. Regular rhythm.   Pulses:     Intact distal pulses.   Edema:     Peripheral edema absent.   Abdominal:      General: Bowel sounds are normal. There is no distension.      Palpations: Abdomen is soft.      Tenderness: There is no abdominal tenderness.   Musculoskeletal: Normal range of motion.      Cervical back: Normal range of motion and neck supple. Skin:     General: Skin is warm and dry.      Coloration: Skin is not pale.      Findings: No erythema or  rash.        Neurological:      Mental Status: Alert, oriented to person, place, and time and oriented to person, place and time.      Deep Tendon Reflexes: Reflexes are normal and symmetric.   Psychiatric:         Behavior: Behavior normal.         Discharge Disposition  Home or Self Care    Discharge Medications     Discharge Medications      New Medications      Instructions Start Date   rosuvastatin 20 MG tablet  Commonly known as: CRESTOR   20 mg, Oral, Daily      ticagrelor 90 MG tablet tablet  Commonly known as: BRILINTA   90 mg, Oral, 2 Times Daily         Continue These Medications      Instructions Start Date   aspirin 81 MG EC tablet   81 mg, Oral, Daily      celecoxib 200 MG capsule  Commonly known as: CeleBREX   200 mg, Oral, Daily      cetirizine 10 MG tablet  Commonly known as: zyrTEC   10 mg, Oral, Daily      cyclobenzaprine 10 MG tablet  Commonly known as: FLEXERIL   10 mg, Oral, 3 Times Daily PRN      DAILY FIBER PO   Oral      fluticasone 50 MCG/ACT nasal spray  Commonly known as: FLONASE   2 sprays, Each Nare, Daily      gabapentin 300 MG capsule  Commonly known as: NEURONTIN   300 mg, Oral, 3 Times Daily      HYDROcodone-acetaminophen  MG per tablet  Commonly known as: NORCO   1 tablet, Oral, 2 Times Daily, BACK PAIN      metoprolol tartrate 50 MG tablet  Commonly known as: LOPRESSOR   TAKE 1 TABLET BY MOUTH EVERYDAY AT BEDTIME      multivitamin with minerals tablet tablet   1 tablet, Oral, Daily      nitroglycerin 0.4 MG SL tablet  Commonly known as: NITROSTAT   1 under the tongue as needed for angina, may repeat q5mins for up three doses      omeprazole 20 MG capsule  Commonly known as: priLOSEC   20 mg, Oral, Daily      vitamin C 500 MG tablet  Commonly known as: ASCORBIC ACID   500 mg, Oral, Daily      vitamin D3 125 MCG (5000 UT) capsule capsule   5,000 Units, Oral, Daily         Stop These Medications    simvastatin 80 MG tablet  Commonly known as: ZOCOR            Discharge Diet:   cardiac    Activity at Discharge:  Do not lift greater than 5 pounds. Do not drive for 48 hours. Do not bend and twist at waist. Shower only for one week, do not submerge in water. Discussed signs and symptoms of infection including drainage, redness, and pain. Discussed routine groin care.     Follow-up Appointments  Future Appointments   Date Time Provider Department Center   10/22/2021 10:30 AM Carl Vu MD [facility]   10/29/2021 11:00 AM Giovanna Márquez APRN [facility]   12/8/2021  8:00 AM Janina Flores APRN [facility]         Plan:   -d/c home today   -new home medications to include Brilinta and Crestor. Stop simvastatin. Previous myalgias with lipitor  -continue home medications including ASA and Lopressor   -educated on importance of medication compliance with DAPT with no missed doses for a minimum of 1 year.   -groin care discussed  -cardiac rehab  -follow up with PCP in 1 week  -follow up in my office in 6 weeks.        Electronically signed by ISAAC Cobos, 10/15/21, 8:15 AM CDT.     Time spent on discharge: 25 minutes

## 2021-10-15 NOTE — PLAN OF CARE
Goal Outcome Evaluation:  Plan of Care Reviewed With: patient        Progress: no change  Outcome Summary: VSS. No c/o pain. Rested well through night. Cath site C/D/I. PPP. Plans to discharge home later today.

## 2021-10-16 ENCOUNTER — APPOINTMENT (OUTPATIENT)
Dept: GENERAL RADIOLOGY | Facility: HOSPITAL | Age: 66
End: 2021-10-16

## 2021-10-16 ENCOUNTER — HOSPITAL ENCOUNTER (EMERGENCY)
Facility: HOSPITAL | Age: 66
Discharge: HOME | End: 2021-10-16
Attending: EMERGENCY MEDICINE | Admitting: EMERGENCY MEDICINE

## 2021-10-16 ENCOUNTER — APPOINTMENT (OUTPATIENT)
Dept: CT IMAGING | Facility: HOSPITAL | Age: 66
End: 2021-10-16

## 2021-10-16 VITALS
RESPIRATION RATE: 16 BRPM | DIASTOLIC BLOOD PRESSURE: 77 MMHG | TEMPERATURE: 98 F | BODY MASS INDEX: 37.55 KG/M2 | HEART RATE: 48 BPM | SYSTOLIC BLOOD PRESSURE: 128 MMHG | HEIGHT: 75 IN | WEIGHT: 302 LBS | OXYGEN SATURATION: 100 %

## 2021-10-16 DIAGNOSIS — R06.00 DYSPNEA, UNSPECIFIED TYPE: Primary | ICD-10-CM

## 2021-10-16 DIAGNOSIS — I25.10 CORONARY ARTERY DISEASE INVOLVING NATIVE HEART, UNSPECIFIED VESSEL OR LESION TYPE, UNSPECIFIED WHETHER ANGINA PRESENT: ICD-10-CM

## 2021-10-16 LAB
ALBUMIN SERPL-MCNC: 4 G/DL (ref 3.5–5.2)
ALBUMIN/GLOB SERPL: 1.7 G/DL
ALP SERPL-CCNC: 52 U/L (ref 39–117)
ALT SERPL W P-5'-P-CCNC: 21 U/L (ref 1–41)
ANION GAP SERPL CALCULATED.3IONS-SCNC: 6 MMOL/L (ref 5–15)
AST SERPL-CCNC: 19 U/L (ref 1–40)
BASOPHILS # BLD AUTO: 0.03 10*3/MM3 (ref 0–0.2)
BASOPHILS NFR BLD AUTO: 0.6 % (ref 0–1.5)
BILIRUB SERPL-MCNC: 1 MG/DL (ref 0–1.2)
BUN SERPL-MCNC: 15 MG/DL (ref 8–23)
BUN/CREAT SERPL: 14.9 (ref 7–25)
CALCIUM SPEC-SCNC: 8.9 MG/DL (ref 8.6–10.5)
CHLORIDE SERPL-SCNC: 109 MMOL/L (ref 98–107)
CO2 SERPL-SCNC: 27 MMOL/L (ref 22–29)
CREAT SERPL-MCNC: 1.01 MG/DL (ref 0.76–1.27)
D DIMER PPP FEU-MCNC: 0.55 MG/L (FEU) (ref 0–0.5)
DEPRECATED RDW RBC AUTO: 39.8 FL (ref 37–54)
EOSINOPHIL # BLD AUTO: 0.17 10*3/MM3 (ref 0–0.4)
EOSINOPHIL NFR BLD AUTO: 3.3 % (ref 0.3–6.2)
ERYTHROCYTE [DISTWIDTH] IN BLOOD BY AUTOMATED COUNT: 12 % (ref 12.3–15.4)
GFR SERPL CREATININE-BSD FRML MDRD: 74 ML/MIN/1.73
GLOBULIN UR ELPH-MCNC: 2.4 GM/DL
GLUCOSE SERPL-MCNC: 114 MG/DL (ref 65–99)
HCT VFR BLD AUTO: 44.5 % (ref 37.5–51)
HGB BLD-MCNC: 15 G/DL (ref 13–17.7)
HOLD SPECIMEN: NORMAL
IMM GRANULOCYTES # BLD AUTO: 0.01 10*3/MM3 (ref 0–0.05)
IMM GRANULOCYTES NFR BLD AUTO: 0.2 % (ref 0–0.5)
LYMPHOCYTES # BLD AUTO: 1.3 10*3/MM3 (ref 0.7–3.1)
LYMPHOCYTES NFR BLD AUTO: 25.6 % (ref 19.6–45.3)
MCH RBC QN AUTO: 30.7 PG (ref 26.6–33)
MCHC RBC AUTO-ENTMCNC: 33.7 G/DL (ref 31.5–35.7)
MCV RBC AUTO: 91 FL (ref 79–97)
MONOCYTES # BLD AUTO: 0.4 10*3/MM3 (ref 0.1–0.9)
MONOCYTES NFR BLD AUTO: 7.9 % (ref 5–12)
NEUTROPHILS NFR BLD AUTO: 3.17 10*3/MM3 (ref 1.7–7)
NEUTROPHILS NFR BLD AUTO: 62.4 % (ref 42.7–76)
NRBC BLD AUTO-RTO: 0 /100 WBC (ref 0–0.2)
NT-PROBNP SERPL-MCNC: 80.7 PG/ML (ref 0–900)
PLATELET # BLD AUTO: 211 10*3/MM3 (ref 140–450)
PMV BLD AUTO: 9.9 FL (ref 6–12)
POTASSIUM SERPL-SCNC: 4.4 MMOL/L (ref 3.5–5.2)
PROT SERPL-MCNC: 6.4 G/DL (ref 6–8.5)
RBC # BLD AUTO: 4.89 10*6/MM3 (ref 4.14–5.8)
SARS-COV-2 RNA PNL SPEC NAA+PROBE: NOT DETECTED
SODIUM SERPL-SCNC: 142 MMOL/L (ref 136–145)
TROPONIN T SERPL-MCNC: <0.01 NG/ML (ref 0–0.03)
TROPONIN T SERPL-MCNC: <0.01 NG/ML (ref 0–0.03)
WBC # BLD AUTO: 5.08 10*3/MM3 (ref 3.4–10.8)
WHOLE BLOOD HOLD SPECIMEN: NORMAL
WHOLE BLOOD HOLD SPECIMEN: NORMAL

## 2021-10-16 PROCEDURE — 87635 SARS-COV-2 COVID-19 AMP PRB: CPT | Performed by: EMERGENCY MEDICINE

## 2021-10-16 PROCEDURE — 71275 CT ANGIOGRAPHY CHEST: CPT

## 2021-10-16 PROCEDURE — 85025 COMPLETE CBC W/AUTO DIFF WBC: CPT | Performed by: EMERGENCY MEDICINE

## 2021-10-16 PROCEDURE — 99284 EMERGENCY DEPT VISIT MOD MDM: CPT

## 2021-10-16 PROCEDURE — 85379 FIBRIN DEGRADATION QUANT: CPT | Performed by: EMERGENCY MEDICINE

## 2021-10-16 PROCEDURE — 84484 ASSAY OF TROPONIN QUANT: CPT | Performed by: EMERGENCY MEDICINE

## 2021-10-16 PROCEDURE — 0 IOPAMIDOL PER 1 ML: Performed by: EMERGENCY MEDICINE

## 2021-10-16 PROCEDURE — 80053 COMPREHEN METABOLIC PANEL: CPT | Performed by: EMERGENCY MEDICINE

## 2021-10-16 PROCEDURE — 93010 ELECTROCARDIOGRAM REPORT: CPT | Performed by: INTERNAL MEDICINE

## 2021-10-16 PROCEDURE — 71045 X-RAY EXAM CHEST 1 VIEW: CPT

## 2021-10-16 PROCEDURE — 93005 ELECTROCARDIOGRAM TRACING: CPT | Performed by: EMERGENCY MEDICINE

## 2021-10-16 PROCEDURE — 83880 ASSAY OF NATRIURETIC PEPTIDE: CPT | Performed by: EMERGENCY MEDICINE

## 2021-10-16 RX ORDER — SODIUM CHLORIDE 0.9 % (FLUSH) 0.9 %
10 SYRINGE (ML) INJECTION AS NEEDED
Status: DISCONTINUED | OUTPATIENT
Start: 2021-10-16 | End: 2021-10-16 | Stop reason: HOSPADM

## 2021-10-16 RX ORDER — ISOSORBIDE MONONITRATE 30 MG/1
30 TABLET, EXTENDED RELEASE ORAL DAILY
Qty: 30 TABLET | Refills: 0 | Status: SHIPPED | OUTPATIENT
Start: 2021-10-16

## 2021-10-16 RX ORDER — ASPIRIN 81 MG/1
243 TABLET, CHEWABLE ORAL ONCE
Status: COMPLETED | OUTPATIENT
Start: 2021-10-16 | End: 2021-10-16

## 2021-10-16 RX ADMIN — ASPIRIN 243 MG: 81 TABLET, CHEWABLE ORAL at 07:53

## 2021-10-16 RX ADMIN — IOPAMIDOL 100 ML: 755 INJECTION, SOLUTION INTRAVENOUS at 09:10

## 2021-10-16 NOTE — ED PROVIDER NOTES
Subjective   Patient presents with a complaint mostly of shortness of breath.  He said about midnight he began have a lot of indigestion.  He got up to use the bathroom about 2 and was somewhat short winded.  He then sat in a recliner but he continued to be short of breath all morning.  Finally he talked to his wife this morning but decided to come get checked out.  He does have history of heart disease and had a heart cath about 4 days ago and had a stent placed at that time.  He had a heart attack in 1994.  He does not normally have shortness of breath.  He is not had any fever or chills or cough or congestion.  He did take a nitroglycerin at home and that seemed to help his feeling of shortness of breath.  He says his discomfort in his chest was a 7 out of 10 at its worst and is now 2 out of 10.      History provided by:  Patient   used: No    Shortness of Breath  Severity:  Moderate  Onset quality:  Gradual  Duration:  6 hours  Timing:  Constant  Progression:  Unchanged  Chronicity:  New  Context: not activity, not animal exposure, not emotional upset, not fumes, not known allergens, not occupational exposure, not pollens, not smoke exposure, not strong odors, not URI and not weather changes    Relieved by:  Nothing  Worsened by:  Nothing  Ineffective treatments:  None tried  Associated symptoms: no abdominal pain, no chest pain, no claudication, no cough, no diaphoresis, no ear pain, no fever, no headaches, no hemoptysis, no neck pain, no PND, no rash, no sore throat, no sputum production, no syncope, no swollen glands, no vomiting and no wheezing    Risk factors: recent surgery    Risk factors: no recent alcohol use, no family hx of DVT, no hx of cancer, no hx of PE/DVT, no obesity, no oral contraceptive use, no prolonged immobilization and no tobacco use        Review of Systems   Constitutional: Negative.  Negative for diaphoresis and fever.   HENT: Negative.  Negative for ear pain and  sore throat.    Respiratory: Positive for shortness of breath. Negative for cough, hemoptysis, sputum production and wheezing.    Cardiovascular: Negative.  Negative for chest pain, claudication, syncope and PND.   Gastrointestinal: Negative.  Negative for abdominal pain and vomiting.   Genitourinary: Negative.    Musculoskeletal: Negative.  Negative for neck pain.   Skin: Negative.  Negative for rash.   Neurological: Negative.  Negative for headaches.   Hematological: Negative.    Psychiatric/Behavioral: Negative.    All other systems reviewed and are negative.      Past Medical History:   Diagnosis Date   • Allergic rhinitis due to allergen 8/8/2017   • Arthritis    • Back pain    • CHF (congestive heart failure) ([facility])    • Chronic ethmoidal sinusitis 5/18/2017   • Chronic maxillary sinusitis 5/18/2017   • Coronary artery disease 1984    stent LAD, Cx, RCA    • GERD (gastroesophageal reflux disease)    • Heart attack ([facility]) 1994    Dr Stiles    • Heartburn    • Hyperlipidemia    • Hypertension    • Myocardial infarction ([facility])    • Pain     back, & Neck        No Known Allergies    Past Surgical History:   Procedure Laterality Date   • ANKLE SURGERY     • BACK SURGERY     • CARDIAC CATHETERIZATION  1984, 1994   • CARDIAC CATHETERIZATION N/A 10/14/2021    Procedure: Left Heart Cath;  Surgeon: Kishore Stiles MD;  Location: [facility] CATH INVASIVE LOCATION;  Service: Cardiology;  Laterality: N/A;   • CHOLECYSTECTOMY  2014    Lararocopic    • ENDOSCOPIC FUNCTIONAL SINUS SURGERY (FESS) N/A 6/12/2017    Procedure: ENDOSCOPIC FUNCTIONAL SINUS SURGERY WITH LEFT MIDDLE MEATAL ANTROSTOMY AND LEFT ETHMOIDECTOMY;  Surgeon: Jona Garibay MD;  Location: [facility] OR;  Service:    • ENDOSCOPIC FUNCTIONAL SINUS SURGERY (FESS) Bilateral 8/10/2020    Procedure: Septoplasty Resection of left sammy bullosa deformity Bilateral maxillary antrostomy and uncinectomy;  Surgeon: Jona Garibay MD;  Location: [facility] OR;  Service: ENT;   Laterality: Bilateral;   • ETHMOIDECTOMY Left 2017    Procedure: ETHMOIDECTOMY ENDOSCOPIC;  Surgeon: Jona Garibay MD;  Location: [facility];  Service:    • EYE SURGERY      LASIK   • HAND SURGERY     • SINUS SURGERY     • TOTAL HIP ARTHROPLASTY Bilateral        Family History   Problem Relation Age of Onset   • No Known Problems Mother    • Cancer Father    • Lung cancer Father         metastatic adenocarcinoma of lung   • No Known Problems Brother    • Cancer Brother        Social History     Socioeconomic History   • Marital status:    Tobacco Use   • Smoking status: Former Smoker     Packs/day: 2.00     Years: 24.00     Pack years: 48.00     Types: Cigarettes     Quit date:      Years since quittin.8   • Smokeless tobacco: Never Used   Substance and Sexual Activity   • Alcohol use: No   • Drug use: No   • Sexual activity: Defer       Prior to Admission medications    Medication Sig Start Date End Date Taking? Authorizing Provider   aspirin 81 MG EC tablet Take 81 mg by mouth Daily.    Bassam Ponce MD   celecoxib (CeleBREX) 200 MG capsule Take 200 mg by mouth Daily.    ProviderBassam MD   cetirizine (zyrTEC) 10 MG tablet Take 10 mg by mouth Daily.    Bassam Ponce MD   Cholecalciferol (VITAMIN D3) 5000 UNITS capsule capsule Take 5,000 Units by mouth Daily.    Bassam Ponce MD   cyclobenzaprine (FLEXERIL) 10 MG tablet Take 10 mg by mouth 3 (Three) Times a Day As Needed. 3/14/21   Bassam Ponce MD   fluticasone (FLONASE) 50 MCG/ACT nasal spray 2 sprays by Each Nare route Daily for 30 days. 3/17/21 4/16/21  Ira Coyle APRN   gabapentin (NEURONTIN) 300 MG capsule Take 300 mg by mouth 3 (Three) Times a Day.    Bassam Ponce MD   HYDROcodone-acetaminophen (NORCO)  MG per tablet Take 1 tablet by mouth 2 (Two) Times a Day. BACK PAIN    Bassam Ponce MD   metoprolol tartrate (LOPRESSOR) 50 MG tablet TAKE 1 TABLET BY MOUTH EVERYDAY  AT BEDTIME 1/6/21   Carl Vu MD   Multiple Vitamins-Minerals (MULTIVITAMIN WITH MINERALS) tablet tablet Take 1 tablet by mouth Daily.    ProviderBassam MD   nitroglycerin (NITROSTAT) 0.4 MG SL tablet 1 under the tongue as needed for angina, may repeat q5mins for up three doses 9/17/21   Giovanna Márquez APRN   omeprazole (priLOSEC) 20 MG capsule Take 20 mg by mouth Daily.    Bassam Ponce MD   Psyllium (DAILY FIBER PO) Take  by mouth.    Bassam Ponce MD   rosuvastatin (CRESTOR) 20 MG tablet Take 1 tablet by mouth Daily. 10/15/21   Giovanna Márquez APRN   ticagrelor (BRILINTA) 90 MG tablet tablet Take 1 tablet by mouth 2 (Two) Times a Day. 10/15/21   Giovanna Márquez APRN   vitamin C (ASCORBIC ACID) 500 MG tablet Take 500 mg by mouth Daily.    Bassam Ponce MD       Medications   sodium chloride 0.9 % flush 10 mL (has no administration in time range)   sodium chloride 0.9 % flush 10 mL (has no administration in time range)   aspirin chewable tablet 243 mg (243 mg Oral Given 10/16/21 0753)   iopamidol (ISOVUE-370) 76 % injection 100 mL (100 mL Intravenous Given 10/16/21 0910)       Vitals:    10/16/21 1030   BP: 128/77   Pulse: (!) 48   Resp: 16   Temp:    SpO2: 100%         Objective   Physical Exam  Vitals and nursing note reviewed.   Constitutional:       Appearance: He is well-developed.   HENT:      Head: Normocephalic and atraumatic.   Cardiovascular:      Rate and Rhythm: Normal rate and regular rhythm.   Pulmonary:      Effort: Tachypnea present.      Breath sounds: Normal breath sounds.   Abdominal:      General: Bowel sounds are normal.      Palpations: Abdomen is soft.   Musculoskeletal:         General: Normal range of motion.      Cervical back: Normal range of motion and neck supple.   Skin:     General: Skin is warm.   Neurological:      General: No focal deficit present.      Mental Status: He is alert and oriented to person, place, and time.    Psychiatric:         Mood and Affect: Mood normal.         Behavior: Behavior normal.         Procedures         Lab Results (last 24 hours)     Procedure Component Value Units Date/Time    CBC & Differential [[id number]]  (Abnormal) Collected: 10/16/21 0745    Specimen: Blood Updated: 10/16/21 0757    Narrative:      The following orders were created for panel order CBC & Differential.  Procedure                               Abnormality         Status                     ---------                               -----------         ------                     CBC Auto Differential[[id number]]        Abnormal            Final result                 Please view results for these tests on the individual orders.    Comprehensive Metabolic Panel [[id number]]  (Abnormal) Collected: 10/16/21 0745    Specimen: Blood Updated: 10/16/21 0817     Glucose 114 mg/dL      BUN 15 mg/dL      Creatinine 1.01 mg/dL      Sodium 142 mmol/L      Potassium 4.4 mmol/L      Chloride 109 mmol/L      CO2 27.0 mmol/L      Calcium 8.9 mg/dL      Total Protein 6.4 g/dL      Albumin 4.00 g/dL      ALT (SGPT) 21 U/L      AST (SGOT) 19 U/L      Alkaline Phosphatase 52 U/L      Total Bilirubin 1.0 mg/dL      eGFR Non African Amer 74 mL/min/1.73      Globulin 2.4 gm/dL      A/G Ratio 1.7 g/dL      BUN/Creatinine Ratio 14.9     Anion Gap 6.0 mmol/L     Narrative:      GFR Normal >60  Chronic Kidney Disease <60  Kidney Failure <15      Troponin [[id number]]  (Normal) Collected: 10/16/21 0745    Specimen: Blood Updated: 10/16/21 0815     Troponin T <0.010 ng/mL     Narrative:      Troponin T Reference Range:  <= 0.03 ng/mL-   Negative for AMI  >0.03 ng/mL-     Abnormal for myocardial necrosis.  Clinicians would have to utilize clinical acumen, EKG, Troponin and serial changes to determine if it is an Acute Myocardial Infarction or myocardial injury due to an underlying chronic condition.       Results may be falsely decreased if patient taking Biotin.       CBC Auto Differential [[id number]]  (Abnormal) Collected: 10/16/21 0745    Specimen: Blood Updated: 10/16/21 0757     WBC 5.08 10*3/mm3      RBC 4.89 10*6/mm3      Hemoglobin 15.0 g/dL      Hematocrit 44.5 %      MCV 91.0 fL      MCH 30.7 pg      MCHC 33.7 g/dL      RDW 12.0 %      RDW-SD 39.8 fl      MPV 9.9 fL      Platelets 211 10*3/mm3      Neutrophil % 62.4 %      Lymphocyte % 25.6 %      Monocyte % 7.9 %      Eosinophil % 3.3 %      Basophil % 0.6 %      Immature Grans % 0.2 %      Neutrophils, Absolute 3.17 10*3/mm3      Lymphocytes, Absolute 1.30 10*3/mm3      Monocytes, Absolute 0.40 10*3/mm3      Eosinophils, Absolute 0.17 10*3/mm3      Basophils, Absolute 0.03 10*3/mm3      Immature Grans, Absolute 0.01 10*3/mm3      nRBC 0.0 /100 WBC     BNP [[id number]]  (Normal) Collected: 10/16/21 0745    Specimen: Blood Updated: 10/16/21 0814     proBNP 80.7 pg/mL     Narrative:      Among patients with dyspnea, NT-proBNP is highly sensitive for the detection of acute congestive heart failure. In addition NT-proBNP of <300 pg/ml effectively rules out acute congestive heart failure with 99% negative predictive value.    Results may be falsely decreased if patient taking Biotin.      D-dimer, Quantitative [[id number]]  (Abnormal) Collected: 10/16/21 0745    Specimen: Blood Updated: 10/16/21 0807     D-Dimer, Quantitative 0.55 mg/L (FEU)     Narrative:      Reference Range is 0-0.50 mg/L FEU. However, results <0.50 mg/L FEU tends to rule out DVT or PE. Results >0.50 mg/L FEU are not useful in predicting absence or presence of DVT or PE.      [facility] Blood Culture Bottle Set [[id number]] Collected: 10/16/21 0745    Specimen: Blood from Arm, Left Updated: 10/16/21 0845     Extra Tube Hold for add-ons.     Comment: Auto resulted.       COVID PRE-OP / PRE-PROCEDURE SCREENING ORDER (NO ISOLATION) - Swab, Nasal Cavity [[id number]]  (Normal) Collected: 10/16/21 0753    Specimen: Swab from Nasal Cavity Updated: 10/16/21 8746     Narrative:      The following orders were created for panel order COVID PRE-OP / PRE-PROCEDURE SCREENING ORDER (NO ISOLATION) - Swab, Nasal Cavity.  Procedure                               Abnormality         Status                     ---------                               -----------         ------                     COVID-19,[facility] IN-BIGG...[[id number]]  Normal              Final result                 Please view results for these tests on the individual orders.    COVID-19,[facility] IN-HOUSE,Nasal Swab No Transport Media 3-4 HR TAT - Swab, Nasal Cavity [[id number]]  (Normal) Collected: 10/16/21 0753    Specimen: Swab from Nasal Cavity Updated: 10/16/21 0840     COVID19 Not Detected    Narrative:      Fact sheet for providers: https://www.fda.gov/media/904478/download     Fact sheet for patients: https://www.fda.gov/media/848506/download    Test performed by PCR.    Consider negative results in combination with clinical observations, patient history, and epidemiological information.    Troponin [[id number]]  (Normal) Collected: 10/16/21 0937    Specimen: Blood Updated: 10/16/21 1000     Troponin T <0.010 ng/mL     Narrative:      Troponin T Reference Range:  <= 0.03 ng/mL-   Negative for AMI  >0.03 ng/mL-     Abnormal for myocardial necrosis.  Clinicians would have to utilize clinical acumen, EKG, Troponin and serial changes to determine if it is an Acute Myocardial Infarction or myocardial injury due to an underlying chronic condition.       Results may be falsely decreased if patient taking Biotin.            CT Angiogram Chest   Final Result      XR Chest 1 View   Final Result          ED Course  ED Course as of 10/16/21 1226   Sat Oct 16, 2021   1022 I spoke with Dr. Galvez of cardiology a little earlier.  He advised with the patient's EKG looking arrived him if he had 2 sets of enzymes that were negative he would be okay to let patient go home and add Imdur to his regimen.  We do have 2 sets of negative  enzymes and a negative CT scan right now.  The patient is feeling fine at rest.  We will make sure his oxygenation is okay and if it is we will let him go home on some Imdur.  He will be discharged in stable condition.  He says he has an appointment to see his family doctor this week and I told him to be sure to follow-up with Dr. Stiles also. [TR]      ED Course User Index  [TR] Rajat Nix Jr., MD         HEART Score (for prediction of 6-week risk of major adverse cardiac event) reviewed and/or performed as part of the patient evaluation and treatment planning process.  The result associated with this review/performance is: 5      MDM  Number of Diagnoses or Management Options  Coronary artery disease involving native heart, unspecified vessel or lesion type, unspecified whether angina present: established and worsening  Dyspnea, unspecified type: new and requires workup     Amount and/or Complexity of Data Reviewed  Clinical lab tests: ordered and reviewed  Tests in the radiology section of CPT®: reviewed and ordered  Tests in the medicine section of CPT®: reviewed and ordered  Discuss the patient with other providers: yes    Risk of Complications, Morbidity, and/or Mortality  Presenting problems: moderate  Diagnostic procedures: moderate  Management options: moderate    Patient Progress  Patient progress: stable      Final diagnoses:   Dyspnea, unspecified type   Coronary artery disease involving native heart, unspecified vessel or lesion type, unspecified whether angina present          Rajat Nix Jr., MD  10/16/21 3022

## 2021-10-17 LAB
QT INTERVAL: 426 MS
QT INTERVAL: 446 MS
QTC INTERVAL: 399 MS
QTC INTERVAL: 411 MS

## 2021-10-18 NOTE — TELEPHONE ENCOUNTER
Pt called and left a  msg while we were in clinic today.  He had a heart cath with a stent last week 10/14 and was put on Brilinta.  He has experienced extreme sob and cold symptoms with cough and congestion since he started the medication.  I explained to him that the sob was a side effect.  He got a flu shot on 10/8 for which the cold symptoms could be from that.  He wanted me to ask to see what he could do about his side effects.  Please advise.  Juan A Pickard, CMA

## 2021-10-22 ENCOUNTER — OFFICE VISIT (OUTPATIENT)
Dept: FAMILY MEDICINE CLINIC | Facility: CLINIC | Age: 66
End: 2021-10-22

## 2021-10-22 VITALS
SYSTOLIC BLOOD PRESSURE: 120 MMHG | DIASTOLIC BLOOD PRESSURE: 80 MMHG | HEIGHT: 75 IN | WEIGHT: 304 LBS | TEMPERATURE: 97.4 F | RESPIRATION RATE: 20 BRPM | BODY MASS INDEX: 37.8 KG/M2 | OXYGEN SATURATION: 97 % | HEART RATE: 65 BPM

## 2021-10-22 DIAGNOSIS — I20.0 UNSTABLE ANGINA PECTORIS (HCC): Primary | ICD-10-CM

## 2021-10-22 PROCEDURE — 99213 OFFICE O/P EST LOW 20 MIN: CPT | Performed by: FAMILY MEDICINE

## 2021-10-22 NOTE — PATIENT INSTRUCTIONS
Mediterranean Diet  A Mediterranean diet refers to food and lifestyle choices that are based on the traditions of countries located on the [place]. This way of eating has been shown to help prevent certain conditions and improve outcomes for people who have chronic diseases, like kidney disease and heart disease.  What are tips for following this plan?  Lifestyle  · Cook and eat meals together with your family, when possible.  · Drink enough fluid to keep your urine clear or pale yellow.  · Be physically active every day. This includes:  ? Aerobic exercise like running or swimming.  ? Leisure activities like gardening, walking, or housework.  · Get 7-8 hours of sleep each night.  · If recommended by your health care provider, drink red wine in moderation. This means 1 glass a day for nonpregnant women and 2 glasses a day for men. A glass of wine equals 5 oz (150 mL).  Reading food labels    · Check the serving size of packaged foods. For foods such as rice and pasta, the serving size refers to the amount of cooked product, not dry.  · Check the total fat in packaged foods. Avoid foods that have saturated fat or trans fats.  · Check the ingredients list for added sugars, such as corn syrup.    Shopping  · At the grocery store, buy most of your food from the areas near the walls of the store. This includes:  ? Fresh fruits and vegetables (produce).  ? Grains, beans, nuts, and seeds. Some of these may be available in unpackaged forms or large amounts (in bulk).  ? Fresh seafood.  ? Poultry and eggs.  ? Low-fat dairy products.  · Buy whole ingredients instead of prepackaged foods.  · Buy fresh fruits and vegetables in-season from local farmers markets.  · Buy frozen fruits and vegetables in resealable bags.  · If you do not have access to quality fresh seafood, buy precooked frozen shrimp or canned fish, such as tuna, salmon, or sardines.  · Buy small amounts of raw or cooked vegetables, salads, or olives from  the deli or salad bar at your store.  · Stock your pantry so you always have certain foods on hand, such as olive oil, canned tuna, canned tomatoes, rice, pasta, and beans.  Cooking  · Cook foods with extra-virgin olive oil instead of using butter or other vegetable oils.  · Have meat as a side dish, and have vegetables or grains as your main dish. This means having meat in small portions or adding small amounts of meat to foods like pasta or stew.  · Use beans or vegetables instead of meat in common dishes like chili or lasagna.  · [place] with different cooking methods. Try roasting or broiling vegetables instead of steaming or sautéeing them.  · Add frozen vegetables to soups, stews, pasta, or rice.  · Add nuts or seeds for added healthy fat at each meal. You can add these to yogurt, salads, or vegetable dishes.  · Marinate fish or vegetables using olive oil, lemon juice, garlic, and fresh herbs.  Meal planning    · Plan to eat 1 vegetarian meal one day each week. Try to work up to 2 vegetarian meals, if possible.  · Eat seafood 2 or more times a week.  · Have healthy snacks readily available, such as:  ? Vegetable sticks with hummus.  ? Greek yogurt.  ? Fruit and nut trail mix.  · Eat balanced meals throughout the week. This includes:  ? Fruit: 2-3 servings a day  ? Vegetables: 4-5 servings a day  ? Low-fat dairy: 2 servings a day  ? Fish, poultry, or lean meat: 1 serving a day  ? Beans and legumes: 2 or more servings a week  ? Nuts and seeds: 1-2 servings a day  ? Whole grains: 6-8 servings a day  ? Extra-virgin olive oil: 3-4 servings a day  · Limit red meat and sweets to only a few servings a month    What are my food choices?  · Mediterranean diet  ? Recommended  § Grains: Whole-grain pasta. Brown rice. Bulgar wheat. Polenta. Couscous. Whole-wheat bread. Oatmeal. Quinoa.  § Vegetables: Artichokes. Beets. Broccoli. Cabbage. Carrots. Eggplant. Green beans. Chard. Kale. Spinach. Onions. Leeks. Peas. Squash.  Tomatoes. Peppers. Radishes.  § Fruits: Apples. Apricots. Avocado. Berries. Bananas. Cherries. Dates. Figs. Grapes. [place]ore[place]. Melon. Oranges. Peaches. Plums. Pomegranate.  § Meats and other protein foods: Beans. Almonds. Sunflower seeds. Pine nuts. Peanuts. Cod. [place]. Scallops. Shrimp. Tuna. Tilapia. Clams. Oysters. Eggs.  § Dairy: Low-fat milk. Cheese. Greek yogurt.  § Beverages: Water. Red wine. Herbal tea.  § Fats and oils: Extra virgin olive oil. Avocado oil. Grape seed oil.  § Sweets and desserts: Greek yogurt with honey. Baked apples. Poached pears. Trail mix.  § Seasoning and other foods: Basil. Cilantro. Coriander. Cumin. Mint. Parsley. [place]. Rosemary. Tarragon. Garlic. Oregano. Thyme. Pepper. Balsalmic vinegar. Tahini. Hummus. Tomato sauce. Olives. Mushrooms.  ? Limit these  § Grains: Prepackaged pasta or rice dishes. Prepackaged cereal with added sugar.  § Vegetables: Deep fried potatoes (french fries).  § Fruits: Fruit canned in syrup.  § Meats and other protein foods: Beef. Pork. Lamb. Poultry with skin. Hot dogs. [place].  § Dairy: Ice cream. Sour cream. Whole milk.  § Beverages: Juice. Sugar-sweetened soft drinks. Beer. Liquor and spirits.  § Fats and oils: Butter. Canola oil. Vegetable oil. Beef fat (tallow). Lard.  § Sweets and desserts: Cookies. Cakes. Pies. Candy.  § Seasoning and other foods: Mayonnaise. Premade sauces and marinades.  The items listed may not be a complete list. Talk with your dietitian about what dietary choices are right for you.  Summary  · The Mediterranean diet includes both food and lifestyle choices.  · Eat a variety of fresh fruits and vegetables, beans, nuts, seeds, and whole grains.  · Limit the amount of red meat and sweets that you eat.  · Talk with your health care provider about whether it is safe for you to drink red wine in moderation. This means 1 glass a day for nonpregnant women and 2 glasses a day for men. A glass of wine equals 5 oz (150 mL).  This information  is not intended to replace advice given to you by your health care provider. Make sure you discuss any questions you have with your health care provider.  Document Revised: 08/17/2017 Document Reviewed: 08/10/2017  Elsevier Patient Education © 2020 [employer]    Mediterranean Diet  A Mediterranean diet refers to food and lifestyle choices that are based on the traditions of countries located on the [place]. This way of eating has been shown to help prevent certain conditions and improve outcomes for people who have chronic diseases, like kidney disease and heart disease.  What are tips for following this plan?  Lifestyle  · Cook and eat meals together with your family, when possible.  · Drink enough fluid to keep your urine clear or pale yellow.  · Be physically active every day. This includes:  ? Aerobic exercise like running or swimming.  ? Leisure activities like gardening, walking, or housework.  · Get 7-8 hours of sleep each night.  · If recommended by your health care provider, drink red wine in moderation. This means 1 glass a day for nonpregnant women and 2 glasses a day for men. A glass of wine equals 5 oz (150 mL).  Reading food labels    · Check the serving size of packaged foods. For foods such as rice and pasta, the serving size refers to the amount of cooked product, not dry.  · Check the total fat in packaged foods. Avoid foods that have saturated fat or trans fats.  · Check the ingredients list for added sugars, such as corn syrup.    Shopping  · At the grocery store, buy most of your food from the areas near the walls of the store. This includes:  ? Fresh fruits and vegetables (produce).  ? Grains, beans, nuts, and seeds. Some of these may be available in unpackaged forms or large amounts (in bulk).  ? Fresh seafood.  ? Poultry and eggs.  ? Low-fat dairy products.  · Buy whole ingredients instead of prepackaged foods.  · Buy fresh fruits and vegetables in-season from local farmers  markets.  · Buy frozen fruits and vegetables in resealable bags.  · If you do not have access to quality fresh seafood, buy precooked frozen shrimp or canned fish, such as tuna, salmon, or sardines.  · Buy small amounts of raw or cooked vegetables, salads, or olives from the deli or salad bar at your store.  · Stock your pantry so you always have certain foods on hand, such as olive oil, canned tuna, canned tomatoes, rice, pasta, and beans.  Cooking  · Cook foods with extra-virgin olive oil instead of using butter or other vegetable oils.  · Have meat as a side dish, and have vegetables or grains as your main dish. This means having meat in small portions or adding small amounts of meat to foods like pasta or stew.  · Use beans or vegetables instead of meat in common dishes like chili or lasagna.  · [place] with different cooking methods. Try roasting or broiling vegetables instead of steaming or sautéeing them.  · Add frozen vegetables to soups, stews, pasta, or rice.  · Add nuts or seeds for added healthy fat at each meal. You can add these to yogurt, salads, or vegetable dishes.  · Marinate fish or vegetables using olive oil, lemon juice, garlic, and fresh herbs.  Meal planning    · Plan to eat 1 vegetarian meal one day each week. Try to work up to 2 vegetarian meals, if possible.  · Eat seafood 2 or more times a week.  · Have healthy snacks readily available, such as:  ? Vegetable sticks with hummus.  ? Greek yogurt.  ? Fruit and nut trail mix.  · Eat balanced meals throughout the week. This includes:  ? Fruit: 2-3 servings a day  ? Vegetables: 4-5 servings a day  ? Low-fat dairy: 2 servings a day  ? Fish, poultry, or lean meat: 1 serving a day  ? Beans and legumes: 2 or more servings a week  ? Nuts and seeds: 1-2 servings a day  ? Whole grains: 6-8 servings a day  ? Extra-virgin olive oil: 3-4 servings a day  · Limit red meat and sweets to only a few servings a month    What are my food  choices?  · Mediterranean diet  ? Recommended  § Grains: Whole-grain pasta. Brown rice. Bulgar wheat. Polenta. Couscous. Whole-wheat bread. Oatmeal. Quinoa.  § Vegetables: Artichokes. Beets. Broccoli. Cabbage. Carrots. Eggplant. Green beans. Chard. Kale. Spinach. Onions. Leeks. Peas. Squash. Tomatoes. Peppers. Radishes.  § Fruits: Apples. Apricots. Avocado. Berries. Bananas. Cherries. Dates. Figs. Grapes. Doreen. Melon. Oranges. Peaches. Plums. Pomegranate.  § Meats and other protein foods: Beans. Almonds. Sunflower seeds. Pine nuts. Peanuts. Cod. [place]. Scallops. Shrimp. Tuna. Tilapia. Clams. Oysters. Eggs.  § Dairy: Low-fat milk. Cheese. Greek yogurt.  § Beverages: Water. Red wine. Herbal tea.  § Fats and oils: Extra virgin olive oil. Avocado oil. Grape seed oil.  § Sweets and desserts: Greek yogurt with honey. Baked apples. Poached pears. Trail mix.  § Seasoning and other foods: Basil. Cilantro. Coriander. Cumin. Mint. Parsley. [place]. Rosemary. Tarragon. Garlic. Oregano. Thyme. Pepper. Balsalmic vinegar. Tahini. Hummus. Tomato sauce. Olives. Mushrooms.  ? Limit these  § Grains: Prepackaged pasta or rice dishes. Prepackaged cereal with added sugar.  § Vegetables: Deep fried potatoes (french fries).  § Fruits: Fruit canned in syrup.  § Meats and other protein foods: Beef. Pork. Lamb. Poultry with skin. Hot dogs. [place].  § Dairy: Ice cream. Sour cream. Whole milk.  § Beverages: Juice. Sugar-sweetened soft drinks. Beer. Liquor and spirits.  § Fats and oils: Butter. Canola oil. Vegetable oil. Beef fat (tallow). Lard.  § Sweets and desserts: Cookies. Cakes. Pies. Candy.  § Seasoning and other foods: Mayonnaise. Premade sauces and marinades.  The items listed may not be a complete list. Talk with your dietitian about what dietary choices are right for you.  Summary  · The Mediterranean diet includes both food and lifestyle choices.  · Eat a variety of fresh fruits and vegetables, beans, nuts, seeds, and whole  grains.  · Limit the amount of red meat and sweets that you eat.  · Talk with your health care provider about whether it is safe for you to drink red wine in moderation. This means 1 glass a day for nonpregnant women and 2 glasses a day for men. A glass of wine equals 5 oz (150 mL).  This information is not intended to replace advice given to you by your health care provider. Make sure you discuss any questions you have with your health care provider.  Document Revised: 08/17/2017 Document Reviewed: 08/10/2017  Elsevier Patient Education © 2020 [employer]

## 2021-10-22 NOTE — TELEPHONE ENCOUNTER
Pt returned my call.  He does want to try the Effient.  We will send it to [facility].  Told pt to continue the Brilinta until he gets the new medication and continue the asa.  He stated understanding.  Juan A Pickard, CMA

## 2021-10-24 RX ORDER — PRASUGREL 10 MG/1
10 TABLET, FILM COATED ORAL DAILY
Qty: 30 TABLET | Refills: 11 | Status: SHIPPED | OUTPATIENT
Start: 2021-10-24 | End: 2021-10-29 | Stop reason: SDUPTHER

## 2021-10-29 ENCOUNTER — OFFICE VISIT (OUTPATIENT)
Dept: CARDIOLOGY | Facility: CLINIC | Age: 66
End: 2021-10-29

## 2021-10-29 VITALS
HEART RATE: 73 BPM | DIASTOLIC BLOOD PRESSURE: 84 MMHG | WEIGHT: 302.4 LBS | BODY MASS INDEX: 37.6 KG/M2 | OXYGEN SATURATION: 97 % | HEIGHT: 75 IN | SYSTOLIC BLOOD PRESSURE: 122 MMHG

## 2021-10-29 DIAGNOSIS — E78.2 MIXED HYPERLIPIDEMIA: ICD-10-CM

## 2021-10-29 DIAGNOSIS — I25.810 CORONARY ARTERY DISEASE INVOLVING CORONARY BYPASS GRAFT OF NATIVE HEART WITHOUT ANGINA PECTORIS: Primary | ICD-10-CM

## 2021-10-29 DIAGNOSIS — R06.00 DYSPNEA, UNSPECIFIED TYPE: ICD-10-CM

## 2021-10-29 DIAGNOSIS — Z95.5 S/P CORONARY ARTERY STENT PLACEMENT: ICD-10-CM

## 2021-10-29 DIAGNOSIS — E66.01 MORBID OBESITY (HCC): ICD-10-CM

## 2021-10-29 DIAGNOSIS — I10 PRIMARY HYPERTENSION: ICD-10-CM

## 2021-10-29 DIAGNOSIS — I25.82 CORONARY ARTERY CHRONIC TOTAL OCCLUSION: ICD-10-CM

## 2021-10-29 PROCEDURE — 99214 OFFICE O/P EST MOD 30 MIN: CPT | Performed by: NURSE PRACTITIONER

## 2021-10-29 RX ORDER — PRASUGREL 10 MG/1
10 TABLET, FILM COATED ORAL DAILY
Qty: 30 TABLET | Refills: 11 | Status: SHIPPED | OUTPATIENT
Start: 2021-10-29 | End: 2021-12-08

## 2021-10-29 NOTE — TELEPHONE ENCOUNTER
I just contacted the pharmacy and spoke to Raymond.  He stated they never received the script for the patient.

## 2021-10-29 NOTE — PATIENT INSTRUCTIONS
"BMI for Adults  What is BMI?  Body mass index (BMI) is a number that is calculated from a person's weight and height. BMI can help estimate how much of a person's weight is composed of fat. BMI does not measure body fat directly. Rather, it is an alternative to procedures that directly measure body fat, which can be difficult and expensive.  BMI can help identify people who may be at higher risk for certain medical problems.  What are BMI measurements used for?  BMI is used as a screening tool to identify possible weight problems. It helps determine whether a person is obese, overweight, a healthy weight, or underweight.  BMI is useful for:  · Identifying a weight problem that may be related to a medical condition or may increase the risk for medical problems.  · Promoting changes, such as changes in diet and exercise, to help reach a healthy weight. BMI screening can be repeated to see if these changes are working.  How is BMI calculated?  BMI involves measuring your weight in relation to your height. Both height and weight are measured, and the BMI is calculated from those numbers. This can be done either in English ([place] or metric measurements. Note that charts and online BMI calculators are available to help you find your BMI quickly and easily without having to do these calculations yourself.  To calculate your BMI in English ([place] measurements:    1. Measure your weight in pounds (lb).  2. Multiply the number of pounds by 703.  ? For example, for a person who weighs 180 lb, multiply that number by 703, which equals 126,540.  3. Measure your height in inches. Then multiply that number by itself to get a measurement called \"inches squared.\"  ? For example, for a person who is 70 inches tall, the \"inches squared\" measurement is 70 inches x 70 inches, which equals 4,900 inches squared.  4. Divide the total from step 2 (number of lb x 703) by the total from step 3 (inches squared): 126,540 ÷ 4,900 = 25.8. This is " "your BMI.    To calculate your BMI in metric measurements:  1. Measure your weight in kilograms (kg).  2. Measure your height in meters (m). Then multiply that number by itself to get a measurement called \"meters squared.\"  ? For example, for a person who is 1.75 m tall, the \"meters squared\" measurement is 1.75 m x 1.75 m, which is equal to 3.1 meters squared.  3. Divide the number of kilograms (your weight) by the meters squared number. In this example: 70 ÷ 3.1 = 22.6. This is your BMI.  What do the results mean?  BMI charts are used to identify whether you are underweight, normal weight, overweight, or obese. The following guidelines will be used:  · Underweight: BMI less than 18.5.  · Normal weight: BMI between 18.5 and 24.9.  · Overweight: BMI between 25 and 29.9.  · Obese: BMI of 30 or above.  Keep these notes in mind:  · Weight includes both fat and muscle, so someone with a muscular build, such as an athlete, may have a BMI that is higher than 24.9. In cases like these, BMI is not an accurate measure of body fat.  · To determine if excess body fat is the cause of a BMI of 25 or higher, further assessments may need to be done by a health care provider.  · BMI is usually interpreted in the same way for men and women.  Where to find more information  For more information about BMI, including tools to quickly calculate your BMI, go to these websites:  · Centers for Disease Control and Prevention: www.cdc.gov  · American Heart Association: www.heart.org  · National Heart, Lung, and Blood [place]: www.nhlbi.nih.gov  Summary  · Body mass index (BMI) is a number that is calculated from a person's weight and height.  · BMI may help estimate how much of a person's weight is composed of fat. BMI can help identify those who may be at higher risk for certain medical problems.  · BMI can be measured using English measurements or metric measurements.  · BMI charts are used to identify whether you are underweight, normal " weight, overweight, or obese.  This information is not intended to replace advice given to you by your health care provider. Make sure you discuss any questions you have with your health care provider.  Document Revised: 09/09/2020 Document Reviewed: 07/17/2020  Elsevier Patient Education © 2021 [employer].

## 2021-10-29 NOTE — PROGRESS NOTES
"    Subjective:     Encounter Date:10/29/2021      Patient ID: George Wilder is a 66 y.o. male with coronary artery disease with  of RCA and noted disease of LAD and LCX, HTN, and HLD.    Chief Complaint: \"still short of breath but better\"  Coronary Artery Disease  Presents for follow-up visit. Symptoms include shortness of breath. Pertinent negatives include no chest pain, dizziness, leg swelling, palpitations or weight gain. Risk factors include hyperlipidemia. The symptoms have been stable.   Hypertension  This is a chronic problem. The current episode started more than 1 year ago. The problem has been rapidly improving since onset. The problem is controlled. Associated symptoms include shortness of breath. Pertinent negatives include no chest pain, malaise/fatigue, orthopnea, palpitations or PND.   Hyperlipidemia  This is a chronic problem. The current episode started more than 1 year ago. The problem is uncontrolled. Recent lipid tests were reviewed and are high. Associated symptoms include shortness of breath. Pertinent negatives include no chest pain.     Patient presents today for a hospital follow up. Patient is followed for CAD with a  of the RCA and 70% stenosis of the LAD and 60% LCX stenosis that are being treated medically. He was seen in September with complaints of exertional chest tightness. He underwent a nuclear stress test that was noted to be high risk for multi-arterial ischemia. He was then scheduled for an outpatient [facility]. Angiography revealed a hazy area of undetermined significance within the LAD, 50-60% stenosis in the LCX and known  of the distal RCA with collaterals from the left. RFR of the LAD was 0.82 indicating highly significant stenosis. FFR of the LCX lesion was 0.93 indicating non-significant stenosis. The LAD was treated with a 3.5 x 15mm Xience PAUL. He reports the night following his cath he woke up at 0130 with severe dyspnea and coughing. He went to the ER for " "evaluation, at which time, Brilinta was determined to be the cause of his dyspnea. A script for Effient was sent to the pharmacy on 10/24 but he reports he has not started it yet because he did not know a new medication had been prescribed. He was also started on Imdur 30mg daily at the time of his ER discharge. He reports his dyspnea is better but will occasionally have \"coughing fits\" which causes him dyspnea. He denies complaints otherwise.       The following portions of the patient's history were reviewed and updated as appropriate: allergies, current medications, past family history, past medical history, past social history, past surgical history and problem list.    Allergies   Allergen Reactions   • Brilinta [Ticagrelor] Shortness Of Breath     Gasping for air         Current Outpatient Medications:   •  aspirin 81 MG EC tablet, Take 81 mg by mouth Daily., Disp: , Rfl:   •  celecoxib (CeleBREX) 200 MG capsule, Take 200 mg by mouth Daily., Disp: , Rfl:   •  cetirizine (zyrTEC) 10 MG tablet, Take 10 mg by mouth Daily., Disp: , Rfl:   •  Cholecalciferol (VITAMIN D3) 5000 UNITS capsule capsule, Take 5,000 Units by mouth Daily., Disp: , Rfl:   •  cyclobenzaprine (FLEXERIL) 10 MG tablet, Take 10 mg by mouth 3 (Three) Times a Day As Needed., Disp: , Rfl:   •  gabapentin (NEURONTIN) 300 MG capsule, Take 300 mg by mouth 3 (Three) Times a Day., Disp: , Rfl:   •  HYDROcodone-acetaminophen (NORCO)  MG per tablet, Take 1 tablet by mouth 2 (Two) Times a Day. BACK PAIN, Disp: , Rfl:   •  isosorbide mononitrate (IMDUR) 30 MG 24 hr tablet, Take 1 tablet by mouth Daily., Disp: 30 tablet, Rfl: 0  •  metoprolol tartrate (LOPRESSOR) 50 MG tablet, TAKE 1 TABLET BY MOUTH EVERYDAY AT BEDTIME, Disp: 90 tablet, Rfl: 3  •  Multiple Vitamins-Minerals (MULTIVITAMIN WITH MINERALS) tablet tablet, Take 1 tablet by mouth Daily., Disp: , Rfl:   •  nitroglycerin (NITROSTAT) 0.4 MG SL tablet, 1 under the tongue as needed for angina, may " repeat q5mins for up three doses, Disp: 25 tablet, Rfl: 2  •  omeprazole (priLOSEC) 20 MG capsule, Take 20 mg by mouth Daily., Disp: , Rfl:   •  prasugrel (EFFIENT) 10 MG tablet, Take 1 tablet by mouth Daily., Disp: 30 tablet, Rfl: 11  •  Psyllium (DAILY FIBER PO), Take  by mouth., Disp: , Rfl:   •  rosuvastatin (CRESTOR) 20 MG tablet, Take 1 tablet by mouth Daily., Disp: 30 tablet, Rfl: 11  •  fluticasone (FLONASE) 50 MCG/ACT nasal spray, 2 sprays by Each Nare route Daily for 30 days., Disp: 16 mL, Rfl: 11  Past Medical History:   Diagnosis Date   • Allergic rhinitis due to allergen 2017   • Arthritis    • Back pain    • CHF (congestive heart failure) (HCC)    • Chronic ethmoidal sinusitis 2017   • Chronic maxillary sinusitis 2017   • Coronary artery disease 1984    stent LAD, Cx, RCA    • GERD (gastroesophageal reflux disease)    • Heart attack (HCC)     Dr Stiles    • Heartburn    • Hyperlipidemia    • Hypertension    • Myocardial infarction (HCC)    • Pain     back, & Neck        Social History     Socioeconomic History   • Marital status:    Tobacco Use   • Smoking status: Former Smoker     Packs/day: 2.00     Years: 24.00     Pack years: 48.00     Types: Cigarettes     Quit date:      Years since quittin.8   • Smokeless tobacco: Never Used   Substance and Sexual Activity   • Alcohol use: No   • Drug use: No   • Sexual activity: Defer       Review of Systems   Constitutional: Negative for malaise/fatigue, weight gain and weight loss.   Cardiovascular: Negative for chest pain, dyspnea on exertion, irregular heartbeat, leg swelling, near-syncope, orthopnea, palpitations, paroxysmal nocturnal dyspnea and syncope.   Respiratory: Positive for shortness of breath. Negative for cough, sleep disturbances due to breathing, sputum production and wheezing.    Skin: Negative for dry skin, flushing, itching and rash.   Gastrointestinal: Negative for hematemesis and hematochezia.  "  Neurological: Negative for dizziness, light-headedness, loss of balance and weakness.   All other systems reviewed and are negative.         Objective:     Vitals reviewed.   Constitutional:       General: Not in acute distress.     Appearance: Well-developed. Not diaphoretic.   Eyes:      General: No scleral icterus.     Conjunctiva/sclera: Conjunctivae normal.      Pupils: Pupils are equal, round, and reactive to light.   HENT:      Head: Normocephalic.    Mouth/Throat:      Pharynx: No oropharyngeal exudate.   Pulmonary:      Effort: Pulmonary effort is normal. No respiratory distress.      Breath sounds: Normal breath sounds. No wheezing. No rales.   Chest:      Chest wall: Not tender to palpatation.   Cardiovascular:      Normal rate. Regular rhythm.   Pulses:     Intact distal pulses.   Edema:     Peripheral edema absent.   Abdominal:      General: Bowel sounds are normal. There is no distension.      Palpations: Abdomen is soft.      Tenderness: There is no abdominal tenderness.   Musculoskeletal: Normal range of motion.      Cervical back: Normal range of motion and neck supple. Skin:     General: Skin is warm and dry.      Coloration: Skin is not pale.      Findings: No erythema or rash.   Neurological:      Mental Status: Alert and oriented to person, place, and time.      Deep Tendon Reflexes: Reflexes are normal and symmetric.   Psychiatric:         Behavior: Behavior normal.           Procedures  /84   Pulse 73   Ht 190.5 cm (75\")   Wt (!) 137 kg (302 lb 6.4 oz)   SpO2 97%   BMI 37.80 kg/m²     Lab Review:   I have reviewed previous office notes, recent labs and recent cardiac testing.     Cath 10/14/21:  Conclusion:  #1 status post successful drug-eluting stent placement to the LAD  #2 left ventricular ejection fraction 65%  #3  of the distal RCA    Nuclear stress 9/2021:  Interpretation Summary       · Diaphragmatic attenuation artifact is present.  · Myocardial perfusion imaging " indicates a large-sized, moderately severe area of ischemia located in the anterior wall, apex, inferior wall, lateral wall and basal inferior lateral wall.  · Impressions are consistent with a high risk study.     HIGH RISK FOR MULTI-ARTERIAL ISCHEMIA  HYPERDYNAMIC LV CONTRACTILITY    Lab Results   Component Value Date    CHLPL 142 06/09/2021    TRIG 112 06/09/2021    HDL 54 06/09/2021    LDL 68 06/09/2021     Results for orders placed during the hospital encounter of 06/05/17    Adult Stress Echo Only    Interpretation Summary  Low risk for ischemia          Assessment:          Diagnosis Plan   1. Coronary artery disease involving coronary bypass graft of native heart without angina pectoris     2. Coronary artery chronic total occlusion     3. S/P coronary artery stent placement     4. Mixed hyperlipidemia     5. Primary hypertension     6. Morbid obesity (HCC)     7. Dyspnea, unspecified type            Plan:       1. CAD- stable with no further exertional CP since cath. nonsignificant RFR of the LCX per cath. Continue ASA, BB, and statin. Script for Effient should be ready at pharmacy. Start tomorrow after taking Brilinta tonight.   2. - of RCA. stable  3. S/p PAUL- to the proximal LAD on 10/14. Continue DAPT for a minimum of 1 year with no missed doses.   4. HLD- controlled with LDL of 68. Was started on Crestor at hospital d/c. I have discussed the importance of cholesterol control with a LDL goal of <70 as recommended by the AHA.     5. HTN- controlled. Continue current therapy.   6. BMI- Patient's Body mass index is 37.8 kg/m². indicating that he is morbidly obese (BMI > 40 or > 35 with obesity - related health condition). Obesity-related health conditions include the following: hypertension, coronary heart disease and dyslipidemias. Obesity is unchanged. BMI is is above average; BMI management plan is completed. We discussed portion control and increasing exercise.  7. Dyspnea- may have been secondary  to loading dose of Brilinta during cath since his breathing has improved since his ER visit and he remains on Brilinta. However he was also started on Imdur which could be improving his breathing. Informed patient to start Effient and wait 1 week prior to stopping Imdur. If dyspnea returns after stopping Imdur, recommend he resume his Imdur.         Follow up in 3 months with Dr. Stiles or sooner if symptoms worsen.     I spent 30 minutes caring for George on this date of service. This time includes time spent by me in the following activities:preparing for the visit, reviewing tests, obtaining and/or reviewing a separately obtained history, performing a medically appropriate examination and/or evaluation , counseling and educating the patient/family/caregiver, ordering medications, tests, or procedures, documenting information in the medical record, independently interpreting results and communicating that information with the patient/family/caregiver and care coordination    Current outpatient and discharge medications have been reconciled for the patient.  Reviewed by: ISAAC Cobos

## 2021-12-03 RX ORDER — METOPROLOL TARTRATE 50 MG/1
TABLET, FILM COATED ORAL
Qty: 30 TABLET | Refills: 11 | Status: SHIPPED | OUTPATIENT
Start: 2021-12-03 | End: 2022-10-31

## 2021-12-03 NOTE — TELEPHONE ENCOUNTER
Rx Refill Note  Requested Prescriptions     Pending Prescriptions Disp Refills   • metoprolol tartrate (LOPRESSOR) 50 MG tablet [Pharmacy Med Name: METOPROLOL TARTRATE 50 MG TAB] 30 tablet 11     Sig: TAKE 1 TABLET BY MOUTH EVERYDAY AT BEDTIME      Last office visit with prescribing clinician: 10/22/2021      Next office visit with prescribing clinician: Visit date not found            Sabrina Diaz MA  12/03/21, 10:25 CST

## 2021-12-08 ENCOUNTER — OFFICE VISIT (OUTPATIENT)
Dept: FAMILY MEDICINE CLINIC | Facility: CLINIC | Age: 66
End: 2021-12-08

## 2021-12-08 VITALS
OXYGEN SATURATION: 98 % | HEART RATE: 62 BPM | DIASTOLIC BLOOD PRESSURE: 80 MMHG | SYSTOLIC BLOOD PRESSURE: 126 MMHG | WEIGHT: 304.2 LBS | BODY MASS INDEX: 39.04 KG/M2 | HEIGHT: 74 IN | TEMPERATURE: 97.8 F

## 2021-12-08 DIAGNOSIS — Z00.00 ANNUAL PHYSICAL EXAM: Primary | ICD-10-CM

## 2021-12-08 DIAGNOSIS — Z12.5 PROSTATE CANCER SCREENING: ICD-10-CM

## 2021-12-08 DIAGNOSIS — E66.01 MORBID OBESITY (HCC): ICD-10-CM

## 2021-12-08 DIAGNOSIS — I10 PRIMARY HYPERTENSION: ICD-10-CM

## 2021-12-08 DIAGNOSIS — E55.9 VITAMIN D DEFICIENCY: ICD-10-CM

## 2021-12-08 DIAGNOSIS — R53.83 FATIGUE, UNSPECIFIED TYPE: ICD-10-CM

## 2021-12-08 DIAGNOSIS — E78.2 MIXED HYPERLIPIDEMIA: ICD-10-CM

## 2021-12-08 DIAGNOSIS — I25.810 CORONARY ARTERY DISEASE INVOLVING CORONARY BYPASS GRAFT OF NATIVE HEART WITHOUT ANGINA PECTORIS: ICD-10-CM

## 2021-12-08 LAB
BILIRUB BLD-MCNC: NEGATIVE MG/DL
CLARITY, POC: CLEAR
COLOR UR: YELLOW
GLUCOSE UR STRIP-MCNC: NEGATIVE MG/DL
KETONES UR QL: ABNORMAL
LEUKOCYTE EST, POC: NEGATIVE
NITRITE UR-MCNC: NEGATIVE MG/ML
PH UR: 5 [PH] (ref 5–8)
PROT UR STRIP-MCNC: NEGATIVE MG/DL
RBC # UR STRIP: NEGATIVE /UL
SP GR UR: 1.02 (ref 1–1.03)
UROBILINOGEN UR QL: NORMAL

## 2021-12-08 PROCEDURE — 81003 URINALYSIS AUTO W/O SCOPE: CPT | Performed by: NURSE PRACTITIONER

## 2021-12-08 PROCEDURE — 99397 PER PM REEVAL EST PAT 65+ YR: CPT | Performed by: NURSE PRACTITIONER

## 2021-12-08 NOTE — PROGRESS NOTES
CC: annual physical    History:  George Wilder is a 66 y.o. male who presents today for evaluation of the above problems.      Patient reports that he is doing well. He denies any current concerns. BP is well controlled. Had negative cologuard in 2019.  Has already has his flu shot and covid vaccines and booster.       HPI  ROS:  Review of Systems   Constitutional: Negative for fatigue.   Respiratory: Negative for shortness of breath.    Cardiovascular: Negative for chest pain.   Gastrointestinal: Negative for constipation, diarrhea, nausea and vomiting.   Genitourinary: Negative for difficulty urinating.   Neurological: Negative for dizziness, light-headedness and headaches.       Allergies   Allergen Reactions   • Brilinta [Ticagrelor] Shortness Of Breath     Gasping for air       Past Medical History:   Diagnosis Date   • Allergic rhinitis due to allergen 8/8/2017   • Arthritis    • Back pain    • CHF (congestive heart failure) (HCC)    • Chronic ethmoidal sinusitis 5/18/2017   • Chronic maxillary sinusitis 5/18/2017   • Coronary artery disease 1984    stent LAD, Cx, RCA    • GERD (gastroesophageal reflux disease)    • Heart attack (HCC) 1994    Dr Stiles    • Heartburn    • Hyperlipidemia    • Hypertension    • Myocardial infarction (HCC)    • Pain     back, & Neck      Past Surgical History:   Procedure Laterality Date   • ANKLE SURGERY     • BACK SURGERY     • CARDIAC CATHETERIZATION  1984, 1994   • CARDIAC CATHETERIZATION N/A 10/14/2021    Procedure: Left Heart Cath;  Surgeon: Kishore Stiles MD;  Location: [facility] CATH INVASIVE LOCATION;  Service: Cardiology;  Laterality: N/A;   • CHOLECYSTECTOMY  2014    Lararocopic    • ENDOSCOPIC FUNCTIONAL SINUS SURGERY (FESS) N/A 6/12/2017    Procedure: ENDOSCOPIC FUNCTIONAL SINUS SURGERY WITH LEFT MIDDLE MEATAL ANTROSTOMY AND LEFT ETHMOIDECTOMY;  Surgeon: Jona Garibay MD;  Location: [facility] OR;  Service:    • ENDOSCOPIC FUNCTIONAL SINUS SURGERY (FESS) Bilateral  8/10/2020    Procedure: Septoplasty Resection of left sammy bullosa deformity Bilateral maxillary antrostomy and uncinectomy;  Surgeon: Jona Garibay MD;  Location: [facility] OR;  Service: ENT;  Laterality: Bilateral;   • ETHMOIDECTOMY Left 6/12/2017    Procedure: ETHMOIDECTOMY ENDOSCOPIC;  Surgeon: Jona Garibay MD;  Location: [facility] OR;  Service:    • EYE SURGERY      LASIK   • HAND SURGERY     • SINUS SURGERY     • TOTAL HIP ARTHROPLASTY Bilateral      Family History   Problem Relation Age of Onset   • No Known Problems Mother    • Cancer Father    • Lung cancer Father         metastatic adenocarcinoma of lung   • No Known Problems Brother    • Cancer Brother       reports that he quit smoking about 27 years ago. His smoking use included cigarettes. He has a 48.00 pack-year smoking history. He has never used smokeless tobacco. He reports that he does not drink alcohol and does not use drugs.      Current Outpatient Medications:   •  aspirin 81 MG EC tablet, Take 81 mg by mouth Daily., Disp: , Rfl:   •  celecoxib (CeleBREX) 200 MG capsule, Take 200 mg by mouth Daily., Disp: , Rfl:   •  cetirizine (zyrTEC) 10 MG tablet, Take 10 mg by mouth Daily., Disp: , Rfl:   •  Cholecalciferol (VITAMIN D3) 5000 UNITS capsule capsule, Take 5,000 Units by mouth Daily., Disp: , Rfl:   •  cyclobenzaprine (FLEXERIL) 10 MG tablet, Take 10 mg by mouth 3 (Three) Times a Day As Needed., Disp: , Rfl:   •  fluticasone (FLONASE) 50 MCG/ACT nasal spray, 2 sprays by Each Nare route Daily for 30 days., Disp: 16 mL, Rfl: 11  •  gabapentin (NEURONTIN) 300 MG capsule, Take 300 mg by mouth 3 (Three) Times a Day., Disp: , Rfl:   •  HYDROcodone-acetaminophen (NORCO)  MG per tablet, Take 1 tablet by mouth 2 (Two) Times a Day. BACK PAIN, Disp: , Rfl:   •  isosorbide mononitrate (IMDUR) 30 MG 24 hr tablet, Take 1 tablet by mouth Daily., Disp: 30 tablet, Rfl: 0  •  metoprolol tartrate (LOPRESSOR) 50 MG tablet, TAKE 1 TABLET BY MOUTH  "EVERYDAY AT BEDTIME, Disp: 30 tablet, Rfl: 11  •  Multiple Vitamins-Minerals (MULTIVITAMIN WITH MINERALS) tablet tablet, Take 1 tablet by mouth Daily., Disp: , Rfl:   •  nitroglycerin (NITROSTAT) 0.4 MG SL tablet, 1 under the tongue as needed for angina, may repeat q5mins for up three doses, Disp: 25 tablet, Rfl: 2  •  omeprazole (priLOSEC) 20 MG capsule, Take 20 mg by mouth Daily., Disp: , Rfl:   •  Psyllium (DAILY FIBER PO), Take  by mouth., Disp: , Rfl:   •  rosuvastatin (CRESTOR) 20 MG tablet, Take 1 tablet by mouth Daily., Disp: 30 tablet, Rfl: 11    OBJECTIVE:  /80 (BP Location: Left arm, Patient Position: Sitting, Cuff Size: Large Adult)   Pulse 62   Temp 97.8 °F (36.6 °C) (Temporal)   Ht 188 cm (74\")   Wt (!) 138 kg (304 lb 3.2 oz)   SpO2 98%   BMI 39.06 kg/m²    Physical Exam  Vitals reviewed.   Constitutional:       Appearance: He is well-developed.   HENT:      Right Ear: Tympanic membrane, ear canal and external ear normal.      Left Ear: Tympanic membrane, ear canal and external ear normal.   Neck:      Vascular: No carotid bruit.   Cardiovascular:      Rate and Rhythm: Normal rate and regular rhythm.      Heart sounds: Normal heart sounds.   Pulmonary:      Effort: Pulmonary effort is normal.      Breath sounds: Normal breath sounds.   Abdominal:      General: Bowel sounds are normal.      Palpations: Abdomen is soft.   Genitourinary:     Penis: Normal.       Testes: Normal.      Prostate: Normal.      Rectum: External hemorrhoid present.   Neurological:      Mental Status: He is alert and oriented to person, place, and time.   Psychiatric:         Behavior: Behavior normal.         Assessment/Plan    Diagnoses and all orders for this visit:    1. Annual physical exam (Primary)  -     CBC & Differential  -     TSH  -     T4, free  -     Comprehensive Metabolic Panel  -     Lipid Panel  -     POC Urinalysis Dipstick, Multipro  -     PSA Screen  -     Vitamin D 25 Hydroxy    2. Morbid obesity " (HCC)    3. Mixed hyperlipidemia  -     Lipid Panel    4. Primary hypertension  -     Comprehensive Metabolic Panel  -     POC Urinalysis Dipstick, Multipro    5. Coronary artery disease involving coronary bypass graft of native heart without angina pectoris    6. Vitamin D deficiency  -     Vitamin D 25 Hydroxy    7. Prostate cancer screening  -     PSA Screen    8. Fatigue, unspecified type  -     CBC & Differential  -     TSH  -     T4, free    HEALTH MAINTENANCE  Flu and covid vaccines are done. Lipid screening and PSA today.   Colon screening up to date.    An After Visit Summary was printed and given to the patient at discharge.  Return in about 6 months (around 6/8/2022).       Janina Flores, ISAAC 12/8/21    Electronically signed.

## 2021-12-09 LAB
25(OH)D3+25(OH)D2 SERPL-MCNC: 60 NG/ML (ref 30–100)
ALBUMIN SERPL-MCNC: 4.3 G/DL (ref 3.5–5.2)
ALBUMIN/GLOB SERPL: 1.6 G/DL
ALP SERPL-CCNC: 54 U/L (ref 39–117)
ALT SERPL-CCNC: 26 U/L (ref 1–41)
AST SERPL-CCNC: 19 U/L (ref 1–40)
BASOPHILS # BLD AUTO: 0.03 10*3/MM3 (ref 0–0.2)
BASOPHILS NFR BLD AUTO: 0.5 % (ref 0–1.5)
BILIRUB SERPL-MCNC: 0.9 MG/DL (ref 0–1.2)
BUN SERPL-MCNC: 17 MG/DL (ref 8–23)
BUN/CREAT SERPL: 15.2 (ref 7–25)
CALCIUM SERPL-MCNC: 9.5 MG/DL (ref 8.6–10.5)
CHLORIDE SERPL-SCNC: 106 MMOL/L (ref 98–107)
CHOLEST SERPL-MCNC: 136 MG/DL (ref 0–200)
CO2 SERPL-SCNC: 28.1 MMOL/L (ref 22–29)
CREAT SERPL-MCNC: 1.12 MG/DL (ref 0.76–1.27)
EOSINOPHIL # BLD AUTO: 0.17 10*3/MM3 (ref 0–0.4)
EOSINOPHIL NFR BLD AUTO: 2.9 % (ref 0.3–6.2)
ERYTHROCYTE [DISTWIDTH] IN BLOOD BY AUTOMATED COUNT: 12 % (ref 12.3–15.4)
GLOBULIN SER CALC-MCNC: 2.7 GM/DL
GLUCOSE SERPL-MCNC: 105 MG/DL (ref 65–99)
HCT VFR BLD AUTO: 49.9 % (ref 37.5–51)
HDLC SERPL-MCNC: 51 MG/DL (ref 40–60)
HGB BLD-MCNC: 16.5 G/DL (ref 13–17.7)
IMM GRANULOCYTES # BLD AUTO: 0.01 10*3/MM3 (ref 0–0.05)
IMM GRANULOCYTES NFR BLD AUTO: 0.2 % (ref 0–0.5)
LDLC SERPL CALC-MCNC: 61 MG/DL (ref 0–100)
LYMPHOCYTES # BLD AUTO: 1.6 10*3/MM3 (ref 0.7–3.1)
LYMPHOCYTES NFR BLD AUTO: 27.4 % (ref 19.6–45.3)
MCH RBC QN AUTO: 31 PG (ref 26.6–33)
MCHC RBC AUTO-ENTMCNC: 33.1 G/DL (ref 31.5–35.7)
MCV RBC AUTO: 93.8 FL (ref 79–97)
MONOCYTES # BLD AUTO: 0.4 10*3/MM3 (ref 0.1–0.9)
MONOCYTES NFR BLD AUTO: 6.8 % (ref 5–12)
NEUTROPHILS # BLD AUTO: 3.64 10*3/MM3 (ref 1.7–7)
NEUTROPHILS NFR BLD AUTO: 62.2 % (ref 42.7–76)
NRBC BLD AUTO-RTO: 0 /100 WBC (ref 0–0.2)
PLATELET # BLD AUTO: 219 10*3/MM3 (ref 140–450)
POTASSIUM SERPL-SCNC: 4.6 MMOL/L (ref 3.5–5.2)
PROT SERPL-MCNC: 7 G/DL (ref 6–8.5)
PSA SERPL-MCNC: 1.56 NG/ML (ref 0–4)
RBC # BLD AUTO: 5.32 10*6/MM3 (ref 4.14–5.8)
SODIUM SERPL-SCNC: 143 MMOL/L (ref 136–145)
T4 FREE SERPL-MCNC: 1.19 NG/DL (ref 0.93–1.7)
TRIGL SERPL-MCNC: 138 MG/DL (ref 0–150)
TSH SERPL DL<=0.005 MIU/L-ACNC: 1.18 UIU/ML (ref 0.27–4.2)
VLDLC SERPL CALC-MCNC: 24 MG/DL (ref 5–40)
WBC # BLD AUTO: 5.85 10*3/MM3 (ref 3.4–10.8)

## 2021-12-29 ENCOUNTER — TRANSCRIBE ORDERS (OUTPATIENT)
Dept: ADMINISTRATIVE | Facility: HOSPITAL | Age: 66
End: 2021-12-29

## 2021-12-29 ENCOUNTER — HOSPITAL ENCOUNTER (OUTPATIENT)
Dept: GENERAL RADIOLOGY | Facility: HOSPITAL | Age: 66
Discharge: HOME | End: 2021-12-29
Admitting: NURSE PRACTITIONER

## 2021-12-29 DIAGNOSIS — M54.16 LUMBAR RADICULOPATHY: ICD-10-CM

## 2021-12-29 DIAGNOSIS — M54.16 LUMBAR RADICULOPATHY: Primary | ICD-10-CM

## 2021-12-29 PROCEDURE — 72110 X-RAY EXAM L-2 SPINE 4/>VWS: CPT

## 2022-01-10 ENCOUNTER — TELEPHONE (OUTPATIENT)
Dept: FAMILY MEDICINE CLINIC | Facility: CLINIC | Age: 67
End: 2022-01-10

## 2022-01-10 NOTE — TELEPHONE ENCOUNTER
Caller: Traci Wilder     Relationship: [employer]      Best call back number: [phone]     What form or medical record are you requesting: LAB RESULTS     Who is requesting this form or medical record from you: [employer]     How would you like to receive the form or medical records (pick-up, mail, fax): MAIL  If mail, what is the address: [address]  [facility] [address]        Timeframe paperwork needed: ASAP

## 2022-01-13 DIAGNOSIS — J30.89 ALLERGIC RHINITIS DUE TO OTHER ALLERGIC TRIGGER, UNSPECIFIED SEASONALITY: ICD-10-CM

## 2022-01-13 RX ORDER — FLUTICASONE PROPIONATE 50 MCG
2 SPRAY, SUSPENSION (ML) NASAL DAILY
Qty: 16 ML | Refills: 11 | Status: SHIPPED | OUTPATIENT
Start: 2022-01-13 | End: 2023-02-16

## 2022-01-31 ENCOUNTER — OFFICE VISIT (OUTPATIENT)
Dept: CARDIOLOGY | Facility: CLINIC | Age: 67
End: 2022-01-31

## 2022-01-31 VITALS
OXYGEN SATURATION: 98 % | BODY MASS INDEX: 39.66 KG/M2 | HEIGHT: 74 IN | DIASTOLIC BLOOD PRESSURE: 80 MMHG | HEART RATE: 66 BPM | WEIGHT: 309 LBS | SYSTOLIC BLOOD PRESSURE: 140 MMHG

## 2022-01-31 DIAGNOSIS — E78.2 MIXED HYPERLIPIDEMIA: ICD-10-CM

## 2022-01-31 DIAGNOSIS — I10 PRIMARY HYPERTENSION: ICD-10-CM

## 2022-01-31 DIAGNOSIS — I25.700 CORONARY ARTERY DISEASE INVOLVING CORONARY BYPASS GRAFT OF NATIVE HEART WITH UNSTABLE ANGINA PECTORIS: Primary | ICD-10-CM

## 2022-01-31 PROBLEM — I20.0 UNSTABLE ANGINA (HCC): Status: RESOLVED | Noted: 2021-10-14 | Resolved: 2022-01-31

## 2022-01-31 PROCEDURE — 99213 OFFICE O/P EST LOW 20 MIN: CPT | Performed by: INTERNAL MEDICINE

## 2022-01-31 PROCEDURE — 93000 ELECTROCARDIOGRAM COMPLETE: CPT | Performed by: INTERNAL MEDICINE

## 2022-01-31 RX ORDER — PRASUGREL 10 MG/1
10 TABLET, FILM COATED ORAL DAILY
COMMUNITY
Start: 2022-01-20 | End: 2022-09-19

## 2022-01-31 NOTE — PROGRESS NOTES
Referring Provider: Carl Vu MD    Reason for Follow-up Visit: CAD    Subjective .   Chief Complaint:   Chief Complaint   Patient presents with   • Follow-up     3 month fu   • Coronary Artery Disease     s/p cath with stent 10/2021 pt states he is still having problems with tightness in the chest when he walks.  he has to stop and rest.     • Hypertension     pt states bp is staying really good.   • Hyperlipidemia     last lab done 12/8/21 LDL was 61 on Crestor 20       History of present illness:  George Wilder is a 66 y.o. yo male with CAD, s/p CABG in the remote past with recent stent placement to the LAD. He has infrequent angina that he has not had to take any Nitro for.       History  Past Medical History:   Diagnosis Date   • Allergic rhinitis due to allergen 8/8/2017   • Arthritis    • Back pain    • CHF (congestive heart failure) ([facility])    • Chronic ethmoidal sinusitis 5/18/2017   • Chronic maxillary sinusitis 5/18/2017   • Coronary artery disease 1984    stent LAD, Cx, RCA    • GERD (gastroesophageal reflux disease)    • Heart attack ([facility]) 1994    Dr Stiles    • Heartburn    • Hyperlipidemia    • Hypertension    • Myocardial infarction (HCC)    • Pain     back, & Neck    ,   Past Surgical History:   Procedure Laterality Date   • ANKLE SURGERY     • BACK SURGERY     • CARDIAC CATHETERIZATION  1984, 1994   • CARDIAC CATHETERIZATION N/A 10/14/2021    Procedure: Left Heart Cath;  Surgeon: Kishore Stiles MD;  Location: [facility] CATH INVASIVE LOCATION;  Service: Cardiology;  Laterality: N/A;   • CHOLECYSTECTOMY  2014    Lararocopic    • CORONARY STENT PLACEMENT     • ENDOSCOPIC FUNCTIONAL SINUS SURGERY (FESS) N/A 6/12/2017    Procedure: ENDOSCOPIC FUNCTIONAL SINUS SURGERY WITH LEFT MIDDLE MEATAL ANTROSTOMY AND LEFT ETHMOIDECTOMY;  Surgeon: Jona Garibay MD;  Location: [facility] OR;  Service:    • ENDOSCOPIC FUNCTIONAL SINUS SURGERY (FESS) Bilateral 8/10/2020    Procedure: Septoplasty Resection of  left sammy bullosa deformity Bilateral maxillary antrostomy and uncinectomy;  Surgeon: Jona Garibay MD;  Location:  [facility] OR;  Service: ENT;  Laterality: Bilateral;   • ETHMOIDECTOMY Left 2017    Procedure: ETHMOIDECTOMY ENDOSCOPIC;  Surgeon: Jona Garibay MD;  Location:  [facility] OR;  Service:    • EYE SURGERY      LASIK   • HAND SURGERY     • SINUS SURGERY     • TOTAL HIP ARTHROPLASTY Bilateral    ,   Family History   Problem Relation Age of Onset   • No Known Problems Mother    • Cancer Father    • Lung cancer Father         metastatic adenocarcinoma of lung   • No Known Problems Brother    • Cancer Brother    ,   Social History     Tobacco Use   • Smoking status: Former Smoker     Packs/day: 2.00     Years: 24.00     Pack years: 48.00     Types: Cigarettes     Quit date:      Years since quittin.   • Smokeless tobacco: Never Used   Vaping Use   • Vaping Use: Never used   Substance Use Topics   • Alcohol use: Not Currently   • Drug use: Never   ,     Medications  Current Outpatient Medications   Medication Sig Dispense Refill   • aspirin 81 MG EC tablet Take 81 mg by mouth Daily.     • celecoxib (CeleBREX) 200 MG capsule Take 200 mg by mouth Daily.     • cetirizine (zyrTEC) 10 MG tablet Take 10 mg by mouth Daily.     • Cholecalciferol (VITAMIN D3) 5000 UNITS capsule capsule Take 5,000 Units by mouth Daily.     • cyclobenzaprine (FLEXERIL) 10 MG tablet Take 10 mg by mouth 3 (Three) Times a Day As Needed.     • fluticasone (FLONASE) 50 MCG/ACT nasal spray 2 sprays by Each Nare route Daily for 30 days. 16 mL 11   • gabapentin (NEURONTIN) 300 MG capsule Take 300 mg by mouth 3 (Three) Times a Day.     • HYDROcodone-acetaminophen (NORCO)  MG per tablet Take 1 tablet by mouth 2 (Two) Times a Day. BACK PAIN     • isosorbide mononitrate (IMDUR) 30 MG 24 hr tablet Take 1 tablet by mouth Daily. 30 tablet 0   • metoprolol tartrate (LOPRESSOR) 50 MG tablet TAKE 1 TABLET BY MOUTH EVERYDAY AT  "BEDTIME 30 tablet 11   • Multiple Vitamins-Minerals (MULTIVITAMIN WITH MINERALS) tablet tablet Take 1 tablet by mouth Daily.     • nitroglycerin (NITROSTAT) 0.4 MG SL tablet 1 under the tongue as needed for angina, may repeat q5mins for up three doses 25 tablet 2   • omeprazole (priLOSEC) 20 MG capsule Take 20 mg by mouth Daily.     • prasugrel (EFFIENT) 10 MG tablet Take 10 mg by mouth Daily.     • Psyllium (DAILY FIBER PO) Take  by mouth.     • rosuvastatin (CRESTOR) 20 MG tablet Take 1 tablet by mouth Daily. 30 tablet 11     No current facility-administered medications for this visit.       Allergies:  Brilinta [ticagrelor]    Review of Systems  Review of Systems   HENT: Negative for nosebleeds.    Cardiovascular: Positive for chest pain and dyspnea on exertion. Negative for claudication, leg swelling, near-syncope, orthopnea, palpitations, paroxysmal nocturnal dyspnea and syncope.   Respiratory: Negative for hemoptysis and shortness of breath.    Gastrointestinal: Negative for melena.   Genitourinary: Negative for hematuria.       Objective     Physical Exam:  /80   Pulse 66   Ht 188 cm (74\")   Wt (!) 140 kg (309 lb)   SpO2 98%   BMI 39.67 kg/m²   Pulmonary:      Effort: Pulmonary effort is normal.      Breath sounds: Normal breath sounds.   Cardiovascular:      Normal rate. Regular rhythm.      Murmurs: There is no murmur.   Edema:     Peripheral edema absent.         Results Review:    ECG 12 Lead    Date/Time: 1/31/2022 11:04 AM  Performed by: Kishore Stiles MD  Authorized by: Kishore Stiles MD   Comparison: compared with previous ECG   Similar to previous ECG  Rhythm: sinus rhythm  Rate: normal  Conduction: conduction normal  T Waves: T waves normal  QRS axis: normal    Clinical impression: normal ECG            Office Visit on 12/08/2021   Component Date Value Ref Range Status   • WBC 12/08/2021 5.85  3.40 - 10.80 10*3/mm3 Final   • RBC 12/08/2021 5.32  4.14 - 5.80 10*6/mm3 Final   • Hemoglobin " 12/08/2021 16.5  13.0 - 17.7 g/dL Final   • Hematocrit 12/08/2021 49.9  37.5 - 51.0 % Final   • MCV 12/08/2021 93.8  79.0 - 97.0 fL Final   • MCH 12/08/2021 31.0  26.6 - 33.0 pg Final   • MCHC 12/08/2021 33.1  31.5 - 35.7 g/dL Final   • RDW 12/08/2021 12.0* 12.3 - 15.4 % Final   • Platelets 12/08/2021 219  140 - 450 10*3/mm3 Final   • Neutrophil Rel % 12/08/2021 62.2  42.7 - 76.0 % Final   • Lymphocyte Rel % 12/08/2021 27.4  19.6 - 45.3 % Final   • Monocyte Rel % 12/08/2021 6.8  5.0 - 12.0 % Final   • Eosinophil Rel % 12/08/2021 2.9  0.3 - 6.2 % Final   • Basophil Rel % 12/08/2021 0.5  0.0 - 1.5 % Final   • Neutrophils Absolute 12/08/2021 3.64  1.70 - 7.00 10*3/mm3 Final   • Lymphocytes Absolute 12/08/2021 1.60  0.70 - 3.10 10*3/mm3 Final   • Monocytes Absolute 12/08/2021 0.40  0.10 - 0.90 10*3/mm3 Final   • Eosinophils Absolute 12/08/2021 0.17  0.00 - 0.40 10*3/mm3 Final   • Basophils Absolute 12/08/2021 0.03  0.00 - 0.20 10*3/mm3 Final   • Immature Granulocyte Rel % 12/08/2021 0.2  0.0 - 0.5 % Final   • Immature Grans Absolute 12/08/2021 0.01  0.00 - 0.05 10*3/mm3 Final   • nRBC 12/08/2021 0.0  0.0 - 0.2 /100 WBC Final   • TSH 12/08/2021 1.180  0.270 - 4.200 uIU/mL Final   • Free T4 12/08/2021 1.19  0.93 - 1.70 ng/dL Final    Results may be falsely increased if patient taking Biotin.   • Glucose 12/08/2021 105* 65 - 99 mg/dL Final   • BUN 12/08/2021 17  8 - 23 mg/dL Final   • Creatinine 12/08/2021 1.12  0.76 - 1.27 mg/dL Final   • eGFR Non  Am 12/08/2021 66  >60 mL/min/1.73 Final    Comment: GFR Normal >60  Chronic Kidney Disease <60  Kidney Failure <15     • eGFR  Am 12/08/2021 80  >60 mL/min/1.73 Final   • BUN/Creatinine Ratio 12/08/2021 15.2  7.0 - 25.0 Final   • Sodium 12/08/2021 143  136 - 145 mmol/L Final   • Potassium 12/08/2021 4.6  3.5 - 5.2 mmol/L Final   • Chloride 12/08/2021 106  98 - 107 mmol/L Final   • Total CO2 12/08/2021 28.1  22.0 - 29.0 mmol/L Final   • Calcium 12/08/2021 9.5  8.6  - 10.5 mg/dL Final   • Total Protein 12/08/2021 7.0  6.0 - 8.5 g/dL Final   • Albumin 12/08/2021 4.30  3.50 - 5.20 g/dL Final   • Globulin 12/08/2021 2.7  gm/dL Final   • A/G Ratio 12/08/2021 1.6  g/dL Final   • Total Bilirubin 12/08/2021 0.9  0.0 - 1.2 mg/dL Final   • Alkaline Phosphatase 12/08/2021 54  39 - 117 U/L Final   • AST (SGOT) 12/08/2021 19  1 - 40 U/L Final   • ALT (SGPT) 12/08/2021 26  1 - 41 U/L Final   • Total Cholesterol 12/08/2021 136  0 - 200 mg/dL Final    Comment: Cholesterol Reference Ranges  (U.S. Department of Health and Human Services ATP III  Classifications)  Desirable          <200 mg/dL  Borderline High    200-239 mg/dL  High Risk          >240 mg/dL  Triglyceride Reference Ranges  (U.S. Department of Health and Human Services ATP III  Classifications)  Normal           <150 mg/dL  Borderline High  150-199 mg/dL  High             200-499 mg/dL  Very High        >500 mg/dL  HDL Reference Ranges  (U.S. Department of Health and Human Services ATP III  Classifcations)  Low     <40 mg/dl (major risk factor for CHD)  High    >60 mg/dl ('negative' risk factor for CHD)  LDL Reference Ranges  (U.S. Department of Health and Human Services ATP III  Classifcations)  Optimal          <100 mg/dL  Near Optimal     100-129 mg/dL  Borderline High  130-159 mg/dL  High             160-189 mg/dL  Very High        >189 mg/dL     • Triglycerides 12/08/2021 138  0 - 150 mg/dL Final   • HDL Cholesterol 12/08/2021 51  40 - 60 mg/dL Final   • VLDL Cholesterol Bro 12/08/2021 24  5 - 40 mg/dL Final   • LDL Chol Calc ([facility]) 12/08/2021 61  0 - 100 mg/dL Final   • Color 12/08/2021 Yellow  Yellow, Straw, Dark Yellow, Audrey Final   • Clarity, UA 12/08/2021 Clear  Clear Final   • Glucose, UA 12/08/2021 Negative  Negative, 1000 mg/dL (3+) mg/dL Final   • Bilirubin 12/08/2021 Negative  Negative Final   • Ketones, UA 12/08/2021 Trace* Negative Final   • Specific Gravity  12/08/2021 1.025  1.005 - 1.030 Final   • Blood, UA  12/08/2021 Negative  Negative Final   • pH, Urine 12/08/2021 5.0  5.0 - 8.0 Final   • Protein, POC 12/08/2021 Negative  Negative mg/dL Final   • Urobilinogen, UA 12/08/2021 Normal  Normal Final   • Nitrite, UA 12/08/2021 Negative  Negative Final   • Leukocytes 12/08/2021 Negative  Negative Final   • PSA 12/08/2021 1.560  0.000 - 4.000 ng/mL Final   • 25 Hydroxy, Vitamin D 12/08/2021 60.0  30.0 - 100.0 ng/ml Final    Comment: Results may be falsely increased if patient taking Biotin.  Reference Range for Total Vitamin D 25(OH)  Deficiency <20.0 ng/mL  Insufficiency 21-29 ng/mL  Sufficiency  ng/mL  Toxicity >100 ng/ml         Assessment/Plan   Problem List Items Addressed This Visit        Cardiac and Vasculature    Mixed hyperlipidemia - Primary    Current Assessment & Plan     Excellent response to statin         HTN (hypertension)    Current Assessment & Plan     Adequate control         Coronary artery disease involving coronary bypass graft of native heart with unstable angina pectoris (HCC)    Current Assessment & Plan     Has chronic stable angina and is doing much better since his  LAD stent         Relevant Medications    prasugrel (EFFIENT) 10 MG tablet          Medical Complexity  must have 1 out of 3     Moderate Complexity Level 3           1 of the following medical problems:          []One chronic illness with mild exacerbation         [x]Two or more stable chronic illness          []One new problem  []One acute illness with systemic symptoms    Complexity of Data  Reviewed (1 out of the 3 following categories)      Category 1 tests, documents, historian (must have 3 points)       []Review of prior external records  []Review of results of unique tests  [x]Ordering unique tests  []Assessment requires an independent historian    Category 2 Interpretation of tests   []Independent interpretation of test read by another doc    Category 3 Discuss Management/tests  []Discussion with external  physician    Risk of complications and/or morbidity          [x]Prescription Drug Management

## 2022-04-06 ENCOUNTER — TELEPHONE (OUTPATIENT)
Dept: FAMILY MEDICINE CLINIC | Facility: CLINIC | Age: 67
End: 2022-04-06

## 2022-04-06 ENCOUNTER — OFFICE VISIT (OUTPATIENT)
Dept: FAMILY MEDICINE CLINIC | Facility: CLINIC | Age: 67
End: 2022-04-06

## 2022-04-06 VITALS
TEMPERATURE: 97.1 F | WEIGHT: 304 LBS | HEART RATE: 64 BPM | HEIGHT: 74 IN | DIASTOLIC BLOOD PRESSURE: 89 MMHG | SYSTOLIC BLOOD PRESSURE: 147 MMHG | OXYGEN SATURATION: 94 % | BODY MASS INDEX: 39.01 KG/M2 | RESPIRATION RATE: 20 BRPM

## 2022-04-06 DIAGNOSIS — J01.00 SUBACUTE MAXILLARY SINUSITIS: Primary | ICD-10-CM

## 2022-04-06 PROCEDURE — 96372 THER/PROPH/DIAG INJ SC/IM: CPT | Performed by: FAMILY MEDICINE

## 2022-04-06 PROCEDURE — 99213 OFFICE O/P EST LOW 20 MIN: CPT | Performed by: FAMILY MEDICINE

## 2022-04-06 RX ORDER — PROMETHAZINE HYDROCHLORIDE AND CODEINE PHOSPHATE 6.25; 1 MG/5ML; MG/5ML
5 SYRUP ORAL EVERY 4 HOURS PRN
Qty: 240 ML | Refills: 2 | Status: SHIPPED | OUTPATIENT
Start: 2022-04-06 | End: 2022-06-09

## 2022-04-06 RX ORDER — PROMETHAZINE HYDROCHLORIDE AND CODEINE PHOSPHATE 6.25; 1 MG/5ML; MG/5ML
5 SYRUP ORAL EVERY 4 HOURS PRN
Qty: 240 ML | Refills: 2 | Status: SHIPPED | OUTPATIENT
Start: 2022-04-06 | End: 2022-04-06

## 2022-04-06 RX ORDER — PREDNISONE 10 MG/1
TABLET ORAL
Qty: 21 TABLET | Refills: 0 | Status: SHIPPED | OUTPATIENT
Start: 2022-04-06 | End: 2022-06-09

## 2022-04-06 RX ORDER — TRIAMCINOLONE ACETONIDE 40 MG/ML
40 INJECTION, SUSPENSION INTRA-ARTICULAR; INTRAMUSCULAR ONCE
Status: COMPLETED | OUTPATIENT
Start: 2022-04-06 | End: 2022-04-06

## 2022-04-06 RX ORDER — AZITHROMYCIN 250 MG/1
TABLET, FILM COATED ORAL
Qty: 6 TABLET | Refills: 0 | Status: SHIPPED | OUTPATIENT
Start: 2022-04-06 | End: 2022-06-09

## 2022-04-06 RX ADMIN — TRIAMCINOLONE ACETONIDE 40 MG: 40 INJECTION, SUSPENSION INTRA-ARTICULAR; INTRAMUSCULAR at 11:00

## 2022-04-06 NOTE — TELEPHONE ENCOUNTER
Caller: George Wilder    Relationship: Self    Best call back number: [phone]         Who are you requesting to speak with (clinical staff, provider,  specific staff member): CLINICAL STAFF    Do you know the name of the person who called: PATIENT    What was the call regarding:HEADACHE, COUGHING, RUNNY NOSE, CONGESTION    Do you require a callback:YES        [facility]/pharmacy #[id number] - [place], [place] - [address] [place] [address] [phone] [facility] [phone] FX

## 2022-04-06 NOTE — PROGRESS NOTES
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    Respiratory: Positive for cough. Negative for shortness of breath.    Cardiovascular: Negative for chest pain and leg swelling.       Past Medical History:   Diagnosis Date   • Allergic rhinitis due to allergen 8/8/2017   • Arthritis    • Back pain    • CHF (congestive heart failure) ([facility])    • Chronic ethmoidal sinusitis 5/18/2017   • Chronic maxillary sinusitis 5/18/2017   • Coronary artery disease 1984    stent LAD, Cx, RCA    • GERD (gastroesophageal reflux disease)    • Heart attack ([facility]) 1994    Dr Stiles    • Heartburn    • Hyperlipidemia    • Hypertension    • Myocardial infarction (HCC)    • Pain     back, & Neck          Current Outpatient Medications:   •  aspirin 81 MG EC tablet, Take 81 mg by mouth Daily., Disp: , Rfl:   •  celecoxib (CeleBREX) 200 MG capsule, Take 200 mg by mouth Daily., Disp: , Rfl:   •  cetirizine (zyrTEC) 10 MG tablet, Take 10 mg by mouth Daily., Disp: , Rfl:   •  Cholecalciferol (VITAMIN D3) 5000 UNITS capsule capsule, Take 5,000 Units by mouth Daily., Disp: , Rfl:   •  cyclobenzaprine (FLEXERIL) 10 MG tablet, Take 10 mg by mouth 3 (Three) Times a Day As Needed., Disp: , Rfl:   •  gabapentin (NEURONTIN) 300 MG capsule, Take 300 mg by mouth 3 (Three) Times a Day., Disp: , Rfl:   •  HYDROcodone-acetaminophen (NORCO)  MG per tablet, Take 1 tablet " by mouth 2 (Two) Times a Day. BACK PAIN, Disp: , Rfl:   •  isosorbide mononitrate (IMDUR) 30 MG 24 hr tablet, Take 1 tablet by mouth Daily., Disp: 30 tablet, Rfl: 0  •  metoprolol tartrate (LOPRESSOR) 50 MG tablet, TAKE 1 TABLET BY MOUTH EVERYDAY AT BEDTIME, Disp: 30 tablet, Rfl: 11  •  Multiple Vitamins-Minerals (MULTIVITAMIN WITH MINERALS) tablet tablet, Take 1 tablet by mouth Daily., Disp: , Rfl:   •  nitroglycerin (NITROSTAT) 0.4 MG SL tablet, 1 under the tongue as needed for angina, may repeat q5mins for up three doses, Disp: 25 tablet, Rfl: 2  •  omeprazole (priLOSEC) 20 MG capsule, Take 20 mg by mouth Daily., Disp: , Rfl:   •  prasugrel (EFFIENT) 10 MG tablet, Take 10 mg by mouth Daily., Disp: , Rfl:   •  Psyllium (DAILY FIBER PO), Take  by mouth., Disp: , Rfl:   •  rosuvastatin (CRESTOR) 20 MG tablet, Take 1 tablet by mouth Daily., Disp: 30 tablet, Rfl: 11  •  azithromycin (Zithromax Z-Elder) 250 MG tablet, Take 2 tablets the first day, then 1 tablet daily for 4 days., Disp: 6 tablet, Rfl: 0  •  fluticasone (FLONASE) 50 MCG/ACT nasal spray, 2 sprays by Each Nare route Daily for 30 days., Disp: 16 mL, Rfl: 11  •  predniSONE (DELTASONE) 10 MG (21) dose pack, Use as directed on package, Disp: 21 tablet, Rfl: 0    Current Facility-Administered Medications:   •  triamcinolone acetonide (KENALOG-40) injection 40 mg, 40 mg, Intramuscular, Once, Carl Vu MD    Allergies   Allergen Reactions   • Brilinta [Ticagrelor] Shortness Of Breath     Gasping for air         Patient Active Problem List   Diagnosis   • Morbid obesity ([facility])   • Mixed hyperlipidemia   • Lumbar degenerative disc disease   • HTN (hypertension)   • Chronic maxillary sinusitis   • Chronic ethmoidal sinusitis   • S/P sinus surgery   • Allergic rhinitis due to allergen   • Obesity, Class III, BMI 40-49.9 (morbid obesity) ([facility])   • BMI 38.0-38.9,adult   • Chronic back pain   • Other acute recurrent sinusitis   • Lilian bullosa   • Nasal  septal deviation   • Coronary artery chronic total occlusion   • Abnormal nuclear stress test   • Coronary artery disease involving coronary bypass graft of native heart with unstable angina pectoris (HCC)   • S/P coronary artery stent placement       Social History     Socioeconomic History   • Marital status:    Tobacco Use   • Smoking status: Former Smoker     Packs/day: 2.00     Years: 24.00     Pack years: 48.00     Types: Cigarettes     Quit date:      Years since quittin.2   • Smokeless tobacco: Never Used   Vaping Use   • Vaping Use: Never used   Substance and Sexual Activity   • Alcohol use: Not Currently   • Drug use: Never   • Sexual activity: Defer       Past Surgical History:   Procedure Laterality Date   • ANKLE SURGERY     • BACK SURGERY     • CARDIAC CATHETERIZATION  ,    • CARDIAC CATHETERIZATION N/A 10/14/2021    Procedure: Left Heart Cath;  Surgeon: Kishore Stiles MD;  Location: [facility] CATH INVASIVE LOCATION;  Service: Cardiology;  Laterality: N/A;   • CHOLECYSTECTOMY      Lararocopic    • CORONARY STENT PLACEMENT     • ENDOSCOPIC FUNCTIONAL SINUS SURGERY (FESS) N/A 2017    Procedure: ENDOSCOPIC FUNCTIONAL SINUS SURGERY WITH LEFT MIDDLE MEATAL ANTROSTOMY AND LEFT ETHMOIDECTOMY;  Surgeon: Jona Garibay MD;  Location: [facility] OR;  Service:    • ENDOSCOPIC FUNCTIONAL SINUS SURGERY (FESS) Bilateral 8/10/2020    Procedure: Septoplasty Resection of left sammy bullosa deformity Bilateral maxillary antrostomy and uncinectomy;  Surgeon: Jona Garibay MD;  Location: [facility] OR;  Service: ENT;  Laterality: Bilateral;   • ETHMOIDECTOMY Left 2017    Procedure: ETHMOIDECTOMY ENDOSCOPIC;  Surgeon: Jona Garibay MD;  Location: [facility] OR;  Service:    • EYE SURGERY      LASIK   • HAND SURGERY     • SINUS SURGERY     • TOTAL HIP ARTHROPLASTY Bilateral        Physical Exam  Vitals and nursing note reviewed.   Constitutional:       Appearance: He is obese.  "  HENT:      Mouth/Throat:      Mouth: Mucous membranes are moist.      Pharynx: Oropharynx is clear.   Cardiovascular:      Rate and Rhythm: Normal rate and regular rhythm.   Pulmonary:      Effort: Pulmonary effort is normal.      Breath sounds: Normal breath sounds. No wheezing.   Musculoskeletal:      Right lower leg: No edema.      Left lower leg: No edema.   Skin:     General: Skin is warm and dry.   Neurological:      General: No focal deficit present.      Mental Status: He is alert and oriented to person, place, and time.   Psychiatric:         Mood and Affect: Mood normal.         Behavior: Behavior normal.         Thought Content: Thought content normal.         Judgment: Judgment normal.         Vitals:    04/06/22 1051   BP: 147/89   BP Location: Left arm   Patient Position: Sitting   Cuff Size: Large Adult   Pulse: 64   Resp: 20   Temp: 97.1 °F (36.2 °C)   TempSrc: Temporal   SpO2: 94%   Weight: (!) 138 kg (304 lb)   Height: 188 cm (74\")     Patient's Body mass index is 39.03 kg/m². indicating that he is obese (BMI >30). Obesity-related health conditions include the following: hypertension, coronary heart disease and dyslipidemias. Obesity is unchanged. BMI is is above average; BMI management plan is completed. We discussed portion control and increasing exercise..      Diagnoses and all orders for this visit:    1. Subacute maxillary sinusitis (Primary)  -     triamcinolone acetonide (KENALOG-40) injection 40 mg    Other orders  -     predniSONE (DELTASONE) 10 MG (21) dose pack; Use as directed on package  Dispense: 21 tablet; Refill: 0  -     azithromycin (Zithromax Z-Elder) 250 MG tablet; Take 2 tablets the first day, then 1 tablet daily for 4 days.  Dispense: 6 tablet; Refill: 0        Problems Addressed this Visit    None     Visit Diagnoses     Subacute maxillary sinusitis    -  Primary    Relevant Medications    triamcinolone acetonide (KENALOG-40) injection 40 mg      Diagnoses       Codes " Comments    Subacute maxillary sinusitis    -  Primary ICD-10-CM: J01.00  ICD-9-CM: 461.0           Health Maintenance:  Immunization History   Administered Date(s) Administered   • COVID-19 (MODERNA) 1st, 2nd, 3rd Dose Only 03/04/2021, 04/01/2021, 11/16/2021   • Flu Vaccine Intradermal Quad 18-64YR 10/10/2017   • Fluad Quad 65+ 10/15/2020   • Fluzone High-Dose 65+yrs 10/07/2021   • Pneumococcal Conjugate 13-Valent (PCV13) 12/06/2019   • Pneumococcal Polysaccharide (PPSV23) 12/07/2020   • Shingrix 03/30/2018, 10/23/2018   • Tdap 11/18/2016   • flucelvax quad pfs =>4 YRS 10/16/2018, 10/08/2019              Plan above-if cough continues will need a chest x-ray                    Electronically signed by Carl Vu MD 04/06/2022

## 2022-06-09 ENCOUNTER — OFFICE VISIT (OUTPATIENT)
Dept: FAMILY MEDICINE CLINIC | Facility: CLINIC | Age: 67
End: 2022-06-09

## 2022-06-09 VITALS
DIASTOLIC BLOOD PRESSURE: 73 MMHG | OXYGEN SATURATION: 98 % | BODY MASS INDEX: 38.09 KG/M2 | HEIGHT: 74 IN | WEIGHT: 296.8 LBS | HEART RATE: 53 BPM | TEMPERATURE: 98.2 F | SYSTOLIC BLOOD PRESSURE: 137 MMHG

## 2022-06-09 DIAGNOSIS — R73.9 ELEVATED BLOOD SUGAR: ICD-10-CM

## 2022-06-09 DIAGNOSIS — E78.2 MIXED HYPERLIPIDEMIA: Primary | ICD-10-CM

## 2022-06-09 PROBLEM — M19.032 OSTEOARTHRITIS OF LEFT WRIST: Status: ACTIVE | Noted: 2019-05-08

## 2022-06-09 PROBLEM — T81.9XXA COMPLICATION OF SURGICAL PROCEDURE: Status: ACTIVE | Noted: 2022-06-09

## 2022-06-09 PROCEDURE — 99214 OFFICE O/P EST MOD 30 MIN: CPT | Performed by: FAMILY MEDICINE

## 2022-06-09 NOTE — PROGRESS NOTES
Subjective   George Wilder is a 66 y.o. male.     66-year-old male with coronary artery disease hyperlipidemia hypertension had coronary stent placed in October again      The following portions of the patient's history were reviewed and updated as appropriate: allergies, current medications, past family history, past medical history, past social history, past surgical history and problem list.    Review of Systems   Constitutional:        I saw some weight- on Mediterranean diet   Respiratory: Negative for shortness of breath.    Cardiovascular: Negative for chest pain and leg swelling.   Musculoskeletal: Positive for arthralgias and back pain.       Objective   Physical Exam  Vitals and nursing note reviewed.   Constitutional:       Appearance: He is obese.   Cardiovascular:      Rate and Rhythm: Normal rate and regular rhythm.   Pulmonary:      Effort: Pulmonary effort is normal.      Breath sounds: Normal breath sounds.   Musculoskeletal:      Right lower leg: No edema.      Left lower leg: No edema.   Skin:     General: Skin is warm and dry.   Neurological:      General: No focal deficit present.      Mental Status: He is alert and oriented to person, place, and time.   Psychiatric:         Mood and Affect: Mood normal.         Behavior: Behavior normal.         Thought Content: Thought content normal.         Judgment: Judgment normal.         Assessment & Plan   Diagnoses and all orders for this visit:    1. Mixed hyperlipidemia (Primary)  -     Comprehensive Metabolic Panel  -     Lipid Panel    2. Elevated blood sugar  -     Hemoglobin A1c         Plan above-continue exercise Mediterranean diet etc.

## 2022-06-10 DIAGNOSIS — R73.9 ELEVATED BLOOD SUGAR: Primary | ICD-10-CM

## 2022-06-10 LAB
ALBUMIN SERPL-MCNC: 4.1 G/DL (ref 3.8–4.8)
ALBUMIN/GLOB SERPL: 1.8 {RATIO} (ref 1.2–2.2)
ALP SERPL-CCNC: 52 IU/L (ref 44–121)
ALT SERPL-CCNC: 24 IU/L (ref 0–44)
AST SERPL-CCNC: 21 IU/L (ref 0–40)
BILIRUB SERPL-MCNC: 1.4 MG/DL (ref 0–1.2)
BUN SERPL-MCNC: 19 MG/DL (ref 8–27)
BUN/CREAT SERPL: 18 (ref 10–24)
CALCIUM SERPL-MCNC: 8.9 MG/DL (ref 8.6–10.2)
CHLORIDE SERPL-SCNC: 106 MMOL/L (ref 96–106)
CHOLEST SERPL-MCNC: 126 MG/DL (ref 100–199)
CO2 SERPL-SCNC: 21 MMOL/L (ref 20–29)
CREAT SERPL-MCNC: 1.06 MG/DL (ref 0.76–1.27)
EGFRCR SERPLBLD CKD-EPI 2021: 77 ML/MIN/1.73
GLOBULIN SER CALC-MCNC: 2.3 G/DL (ref 1.5–4.5)
GLUCOSE SERPL-MCNC: 105 MG/DL (ref 65–99)
HBA1C MFR BLD: 5.8 % (ref 4.8–5.6)
HDLC SERPL-MCNC: 47 MG/DL
LDLC SERPL CALC-MCNC: 60 MG/DL (ref 0–99)
POTASSIUM SERPL-SCNC: 4.5 MMOL/L (ref 3.5–5.2)
PROT SERPL-MCNC: 6.4 G/DL (ref 6–8.5)
SODIUM SERPL-SCNC: 141 MMOL/L (ref 134–144)
TRIGL SERPL-MCNC: 105 MG/DL (ref 0–149)
VLDLC SERPL CALC-MCNC: 19 MG/DL (ref 5–40)

## 2022-09-09 ENCOUNTER — TRANSCRIBE ORDERS (OUTPATIENT)
Dept: ADMINISTRATIVE | Facility: HOSPITAL | Age: 67
End: 2022-09-09

## 2022-09-09 DIAGNOSIS — M54.16 LUMBAR RADICULOPATHY: Primary | ICD-10-CM

## 2022-09-19 RX ORDER — ROSUVASTATIN CALCIUM 20 MG/1
TABLET, COATED ORAL
Qty: 30 TABLET | Refills: 11 | Status: SHIPPED | OUTPATIENT
Start: 2022-09-19 | End: 2022-10-10 | Stop reason: SDUPTHER

## 2022-09-19 RX ORDER — PRASUGREL 10 MG/1
TABLET, FILM COATED ORAL
Qty: 30 TABLET | Refills: 11 | Status: SHIPPED | OUTPATIENT
Start: 2022-09-19 | End: 2022-10-04

## 2022-10-03 NOTE — TELEPHONE ENCOUNTER
Pts wife called left a  msg stating that pt will have his year anniversary of having the stent on 10/14 and he was under the impression he would be taken off the Effient after a year.  Please advise.  Juan A Pickard, CMA

## 2022-10-04 RX ORDER — PRASUGREL 5 MG/1
10 TABLET, FILM COATED ORAL DAILY
Qty: 30 TABLET | Refills: 11 | Status: SHIPPED | OUTPATIENT
Start: 2022-10-04

## 2022-10-04 NOTE — TELEPHONE ENCOUNTER
Pt and wife informed and they stated understanding.  He will need a new Rx sent in to [facility] for the 5 mg Effient.  Juan A Pickard, CMA

## 2022-10-10 ENCOUNTER — TELEPHONE (OUTPATIENT)
Dept: CARDIOLOGY | Facility: CLINIC | Age: 67
End: 2022-10-10

## 2022-10-10 DIAGNOSIS — E78.5 HYPERLIPIDEMIA LDL GOAL <70: Primary | ICD-10-CM

## 2022-10-10 RX ORDER — ROSUVASTATIN CALCIUM 20 MG/1
20 TABLET, COATED ORAL DAILY
Qty: 30 TABLET | Refills: 11 | OUTPATIENT
Start: 2022-10-10

## 2022-10-10 NOTE — TELEPHONE ENCOUNTER
Pts wife called stating that the Rx for the Crestor  Was not at the pharmacy.  I checked and the Rx was sent in on 9/19/22 so I called his pharmacy and they said it got cancelled on their end.  I gave a verbal refill.  Juan A Pickard, CMA

## 2022-10-31 RX ORDER — METOPROLOL TARTRATE 50 MG/1
TABLET, FILM COATED ORAL
Qty: 30 TABLET | Refills: 11 | Status: SHIPPED | OUTPATIENT
Start: 2022-10-31

## 2022-10-31 NOTE — TELEPHONE ENCOUNTER
Rx Refill Note  Requested Prescriptions     Pending Prescriptions Disp Refills   • metoprolol tartrate (LOPRESSOR) 50 MG tablet [Pharmacy Med Name: METOPROLOL TARTRATE 50 MG TAB] 30 tablet 11     Sig: TAKE 1 TABLET BY MOUTH EVERYDAY AT BEDTIME      Last office visit with prescribing clinician: 6/9/22  Next office visit with prescribing clinician: 12/14/22      {TIP  Please add Last Relevant Lab 9/16/22    Sabrina Diaz MA  10/31/22, 07:36 CDT

## 2022-12-14 ENCOUNTER — OFFICE VISIT (OUTPATIENT)
Dept: FAMILY MEDICINE CLINIC | Facility: CLINIC | Age: 67
End: 2022-12-14

## 2022-12-14 VITALS
TEMPERATURE: 97.7 F | HEART RATE: 59 BPM | RESPIRATION RATE: 16 BRPM | OXYGEN SATURATION: 94 % | BODY MASS INDEX: 37.58 KG/M2 | DIASTOLIC BLOOD PRESSURE: 70 MMHG | HEIGHT: 74 IN | SYSTOLIC BLOOD PRESSURE: 128 MMHG | WEIGHT: 292.8 LBS

## 2022-12-14 DIAGNOSIS — R53.83 FATIGUE, UNSPECIFIED TYPE: ICD-10-CM

## 2022-12-14 DIAGNOSIS — Z12.5 PROSTATE CANCER SCREENING: ICD-10-CM

## 2022-12-14 DIAGNOSIS — E78.2 MIXED HYPERLIPIDEMIA: ICD-10-CM

## 2022-12-14 DIAGNOSIS — R73.9 ELEVATED BLOOD SUGAR: Primary | ICD-10-CM

## 2022-12-14 DIAGNOSIS — E55.9 VITAMIN D DEFICIENCY: ICD-10-CM

## 2022-12-14 DIAGNOSIS — Z00.00 ANNUAL PHYSICAL EXAM: ICD-10-CM

## 2022-12-14 LAB
BILIRUB BLD-MCNC: NEGATIVE MG/DL
CLARITY, POC: CLEAR
COLOR UR: YELLOW
GLUCOSE UR STRIP-MCNC: NEGATIVE MG/DL
KETONES UR QL: NEGATIVE
LEUKOCYTE EST, POC: NEGATIVE
NITRITE UR-MCNC: NEGATIVE MG/ML
PH UR: 5.5 [PH] (ref 5–8)
PROT UR STRIP-MCNC: NEGATIVE MG/DL
RBC # UR STRIP: NEGATIVE /UL
SP GR UR: 1.02 (ref 1–1.03)
UROBILINOGEN UR QL: NORMAL

## 2022-12-14 PROCEDURE — 99397 PER PM REEVAL EST PAT 65+ YR: CPT | Performed by: FAMILY MEDICINE

## 2022-12-14 PROCEDURE — 81003 URINALYSIS AUTO W/O SCOPE: CPT | Performed by: FAMILY MEDICINE

## 2022-12-14 NOTE — PROGRESS NOTES
"George LION Meño    1955    Chief Complaint   Patient presents with   • Annual Exam     Fasting       Vitals:    12/14/22 0816   BP: 128/70   BP Location: Left arm   Patient Position: Sitting   Cuff Size: Large Adult   Pulse: 59   Resp: 16   Temp: 97.7 °F (36.5 °C)   TempSrc: Temporal   SpO2: 94%   Weight: 133 kg (292 lb 12.8 oz)   Height: 188 cm (74\")   PainSc: 0-No pain       67-year-old male with diabetic ischemic heart disease for annual physical      Review of Systems   Respiratory: Negative for shortness of breath.    Cardiovascular: Negative for chest pain and leg swelling.        Sees cardiologist yearly   Gastrointestinal:        Colon screening up-to-date   Endocrine: Negative for polydipsia, polyphagia and polyuria.   Musculoskeletal: Positive for arthralgias and back pain.   All other systems reviewed and are negative.      Past Medical History:   Diagnosis Date   • Allergic rhinitis due to allergen 8/8/2017   • Arthritis    • Back pain    • CHF (congestive heart failure) ([facility])    • Chronic ethmoidal sinusitis 5/18/2017   • Chronic maxillary sinusitis 5/18/2017   • Coronary artery disease 1984    stent LAD, Cx, RCA    • GERD (gastroesophageal reflux disease)    • Heart attack (HCC) 1994    Dr Stiles    • Heartburn    • Hyperlipidemia    • Hypertension    • Myocardial infarction (HCC)    • Pain     back, & Neck          Current Outpatient Medications:   •  aspirin 81 MG EC tablet, Take 81 mg by mouth Daily., Disp: , Rfl:   •  celecoxib (CeleBREX) 200 MG capsule, Take 200 mg by mouth Daily., Disp: , Rfl:   •  cetirizine (zyrTEC) 10 MG tablet, Take 10 mg by mouth Daily., Disp: , Rfl:   •  Cholecalciferol (VITAMIN D3) 5000 UNITS capsule capsule, Take 5,000 Units by mouth Daily., Disp: , Rfl:   •  cyclobenzaprine (FLEXERIL) 10 MG tablet, Take 10 mg by mouth 3 (Three) Times a Day As Needed., Disp: , Rfl:   •  gabapentin (NEURONTIN) 300 MG capsule, Take 300 mg by mouth 3 (Three) Times a Day., Disp: , Rfl:   •  " HYDROcodone-acetaminophen (NORCO)  MG per tablet, Take 1 tablet by mouth 2 (Two) Times a Day. BACK PAIN, Disp: , Rfl:   •  isosorbide mononitrate (IMDUR) 30 MG 24 hr tablet, Take 1 tablet by mouth Daily., Disp: 30 tablet, Rfl: 0  •  metoprolol tartrate (LOPRESSOR) 50 MG tablet, TAKE 1 TABLET BY MOUTH EVERYDAY AT BEDTIME, Disp: 30 tablet, Rfl: 11  •  Multiple Vitamins-Minerals (MULTIVITAMIN WITH MINERALS) tablet tablet, Take 1 tablet by mouth Daily., Disp: , Rfl:   •  nitroglycerin (NITROSTAT) 0.4 MG SL tablet, 1 under the tongue as needed for angina, may repeat q5mins for up three doses, Disp: 25 tablet, Rfl: 2  •  omeprazole (priLOSEC) 20 MG capsule, Take 20 mg by mouth Daily., Disp: , Rfl:   •  prasugrel (EFFIENT) 5 MG tablet, Take 2 tablets by mouth Daily., Disp: 30 tablet, Rfl: 11  •  Psyllium (DAILY FIBER PO), Take  by mouth., Disp: , Rfl:   •  rosuvastatin (CRESTOR) 20 MG tablet, Take 1 tablet by mouth Daily., Disp: 30 tablet, Rfl: 11  •  fluticasone (FLONASE) 50 MCG/ACT nasal spray, 2 sprays by Each Nare route Daily for 30 days., Disp: 16 mL, Rfl: 11    Allergies   Allergen Reactions   • Brilinta [Ticagrelor] Shortness Of Breath     Gasping for air         Patient Active Problem List   Diagnosis   • Morbid obesity ([facility])   • Mixed hyperlipidemia   • Lumbar degenerative disc disease   • HTN (hypertension)   • Chronic maxillary sinusitis   • Chronic ethmoidal sinusitis   • S/P sinus surgery   • Allergic rhinitis due to allergen   • Obesity, Class III, BMI 40-49.9 (morbid obesity) ([facility])   • BMI 38.0-38.9,adult   • Chronic back pain   • Other acute recurrent sinusitis   • Lilian bullosa   • Nasal septal deviation   • Coronary artery chronic total occlusion   • Abnormal nuclear stress test   • Coronary artery disease involving coronary bypass graft of native heart with unstable angina pectoris ([facility])   • S/P coronary artery stent placement   • Complication of surgical procedure   • History of total hip  arthroplasty   • Osteoarthritis of left wrist       Social History     Socioeconomic History   • Marital status:    Tobacco Use   • Smoking status: Former     Packs/day: 2.00     Years: 24.00     Pack years: 48.00     Types: Cigarettes     Quit date:      Years since quittin.9   • Smokeless tobacco: Never   Vaping Use   • Vaping Use: Never used   Substance and Sexual Activity   • Alcohol use: Not Currently   • Drug use: Never   • Sexual activity: Defer       Past Surgical History:   Procedure Laterality Date   • ANKLE SURGERY     • BACK SURGERY     • CARDIAC CATHETERIZATION  1994   • CARDIAC CATHETERIZATION N/A 10/14/2021    Procedure: Left Heart Cath;  Surgeon: Kishore Stiles MD;  Location:  [facility] CATH INVASIVE LOCATION;  Service: Cardiology;  Laterality: N/A;   • CHOLECYSTECTOMY      Lararocopic    • CORONARY STENT PLACEMENT     • ENDOSCOPIC FUNCTIONAL SINUS SURGERY (FESS) N/A 2017    Procedure: ENDOSCOPIC FUNCTIONAL SINUS SURGERY WITH LEFT MIDDLE MEATAL ANTROSTOMY AND LEFT ETHMOIDECTOMY;  Surgeon: Jona Garibay MD;  Location: [facility] OR;  Service:    • ENDOSCOPIC FUNCTIONAL SINUS SURGERY (FESS) Bilateral 8/10/2020    Procedure: Septoplasty Resection of left sammy bullosa deformity Bilateral maxillary antrostomy and uncinectomy;  Surgeon: Jona Garibay MD;  Location: [facility] OR;  Service: ENT;  Laterality: Bilateral;   • ETHMOIDECTOMY Left 2017    Procedure: ETHMOIDECTOMY ENDOSCOPIC;  Surgeon: Jona Garibay MD;  Location: [facility] OR;  Service:    • EYE SURGERY      LASIK   • HAND SURGERY     • SINUS SURGERY     • TOTAL HIP ARTHROPLASTY Bilateral        Physical Exam  Vitals and nursing note reviewed.   Constitutional:       Appearance: He is obese.   HENT:      Right Ear: Tympanic membrane and ear canal normal.      Left Ear: Tympanic membrane and ear canal normal.   Eyes:      Extraocular Movements: Extraocular movements intact.      Pupils: Pupils are equal,  "round, and reactive to light.   Neck:      Vascular: No carotid bruit.   Cardiovascular:      Rate and Rhythm: Normal rate and regular rhythm.      Pulses: Normal pulses.      Heart sounds: Normal heart sounds.   Pulmonary:      Effort: Pulmonary effort is normal.      Breath sounds: Normal breath sounds.   Abdominal:      General: Abdomen is flat.      Palpations: Abdomen is soft. There is no mass.   Genitourinary:     Penis: Normal.       Testes: Normal.      Prostate: Normal.      Comments: No testicular masses inguinal hernia or adenopathy-prostate 2+ no nodules  Musculoskeletal:      Right lower leg: No edema.      Left lower leg: No edema.   Lymphadenopathy:      Cervical: No cervical adenopathy.   Skin:     General: Skin is warm and dry.   Neurological:      General: No focal deficit present.      Mental Status: He is alert and oriented to person, place, and time.   Psychiatric:         Mood and Affect: Mood normal.         Behavior: Behavior normal.         Thought Content: Thought content normal.         Judgment: Judgment normal.         Vitals:    12/14/22 0816   BP: 128/70   BP Location: Left arm   Patient Position: Sitting   Cuff Size: Large Adult   Pulse: 59   Resp: 16   Temp: 97.7 °F (36.5 °C)   TempSrc: Temporal   SpO2: 94%   Weight: 133 kg (292 lb 12.8 oz)   Height: 188 cm (74\")     Class 2 Severe Obesity (BMI >=35 and <=39.9). Obesity-related health conditions include the following: hypertension, coronary heart disease, diabetes mellitus and dyslipidemias. Obesity is improving with lifestyle modifications. BMI is is above average; BMI management plan is completed. We discussed portion control and increasing exercise.      Diagnoses and all orders for this visit:    1. Elevated blood sugar (Primary)  -     Hemoglobin A1c  -     POC Urinalysis Dipstick, Multipro    2. Vitamin D deficiency    3. Mixed hyperlipidemia  -     Comprehensive Metabolic Panel  -     Lipid Panel    4. Fatigue, unspecified " type  -     TSH  -     T4, free  -     CBC & Differential    5. Prostate cancer screening  -     PSA Screen    6. Annual physical exam        Problems Addressed this Visit        Cardiac and Vasculature    Mixed hyperlipidemia    Relevant Orders    Comprehensive Metabolic Panel    Lipid Panel   Other Visit Diagnoses     Elevated blood sugar    -  Primary    Relevant Orders    Hemoglobin A1c    Vitamin D deficiency        Fatigue, unspecified type        Relevant Orders    TSH    T4, free    CBC & Differential    Prostate cancer screening        Relevant Orders    PSA Screen    Annual physical exam          Diagnoses       Codes Comments    Elevated blood sugar    -  Primary ICD-10-CM: R73.9  ICD-9-CM: 790.29     Vitamin D deficiency     ICD-10-CM: E55.9  ICD-9-CM: 268.9     Mixed hyperlipidemia     ICD-10-CM: E78.2  ICD-9-CM: 272.2     Fatigue, unspecified type     ICD-10-CM: R53.83  ICD-9-CM: 780.79     Prostate cancer screening     ICD-10-CM: Z12.5  ICD-9-CM: V76.44     Annual physical exam     ICD-10-CM: Z00.00  ICD-9-CM: V70.0           Health Maintenance:  Immunization History   Administered Date(s) Administered   • COVID-19 (MODERNA) 1st, 2nd, 3rd Dose Only 03/04/2021, 04/01/2021, 11/16/2021   • Flu Vaccine Intradermal Quad 18-64YR 10/10/2017   • Fluad Quad 65+ 10/15/2020   • Fluzone High-Dose 65+yrs 10/07/2021   • Pneumococcal Conjugate 13-Valent (PCV13) 12/06/2019   • Pneumococcal Polysaccharide (PPSV23) 12/07/2020   • Shingrix 03/30/2018, 10/23/2018   • Tdap 11/18/2016   • flucelvax quad pfs =>4 YRS 10/16/2018, 10/08/2019              Plan-up-to-date on immunizations-continue weight reduction-above                  Electronically signed by Carl Vu MD 12/14/2022

## 2022-12-15 LAB
ALBUMIN SERPL-MCNC: 4.7 G/DL (ref 3.5–5.2)
ALBUMIN/GLOB SERPL: 2.4 G/DL
ALP SERPL-CCNC: 55 U/L (ref 39–117)
ALT SERPL-CCNC: 20 U/L (ref 1–41)
AST SERPL-CCNC: 22 U/L (ref 1–40)
BASOPHILS # BLD AUTO: 0.05 10*3/MM3 (ref 0–0.2)
BASOPHILS NFR BLD AUTO: 0.9 % (ref 0–1.5)
BILIRUB SERPL-MCNC: 1.2 MG/DL (ref 0–1.2)
BUN SERPL-MCNC: 23 MG/DL (ref 8–23)
BUN/CREAT SERPL: 19.7 (ref 7–25)
CALCIUM SERPL-MCNC: 9.3 MG/DL (ref 8.6–10.5)
CHLORIDE SERPL-SCNC: 107 MMOL/L (ref 98–107)
CHOLEST SERPL-MCNC: 134 MG/DL (ref 0–200)
CO2 SERPL-SCNC: 27 MMOL/L (ref 22–29)
CREAT SERPL-MCNC: 1.17 MG/DL (ref 0.76–1.27)
EGFRCR SERPLBLD CKD-EPI 2021: 68.3 ML/MIN/1.73
EOSINOPHIL # BLD AUTO: 0.14 10*3/MM3 (ref 0–0.4)
EOSINOPHIL NFR BLD AUTO: 2.5 % (ref 0.3–6.2)
ERYTHROCYTE [DISTWIDTH] IN BLOOD BY AUTOMATED COUNT: 12.3 % (ref 12.3–15.4)
GLOBULIN SER CALC-MCNC: 2 GM/DL
GLUCOSE SERPL-MCNC: 99 MG/DL (ref 65–99)
HBA1C MFR BLD: 5.7 % (ref 4.8–5.6)
HCT VFR BLD AUTO: 49 % (ref 37.5–51)
HDLC SERPL-MCNC: 48 MG/DL (ref 40–60)
HGB BLD-MCNC: 16.3 G/DL (ref 13–17.7)
IMM GRANULOCYTES # BLD AUTO: 0.01 10*3/MM3 (ref 0–0.05)
IMM GRANULOCYTES NFR BLD AUTO: 0.2 % (ref 0–0.5)
LDLC SERPL CALC-MCNC: 65 MG/DL (ref 0–100)
LYMPHOCYTES # BLD AUTO: 1.53 10*3/MM3 (ref 0.7–3.1)
LYMPHOCYTES NFR BLD AUTO: 27.7 % (ref 19.6–45.3)
MCH RBC QN AUTO: 31.8 PG (ref 26.6–33)
MCHC RBC AUTO-ENTMCNC: 33.3 G/DL (ref 31.5–35.7)
MCV RBC AUTO: 95.7 FL (ref 79–97)
MONOCYTES # BLD AUTO: 0.43 10*3/MM3 (ref 0.1–0.9)
MONOCYTES NFR BLD AUTO: 7.8 % (ref 5–12)
NEUTROPHILS # BLD AUTO: 3.37 10*3/MM3 (ref 1.7–7)
NEUTROPHILS NFR BLD AUTO: 60.9 % (ref 42.7–76)
NRBC BLD AUTO-RTO: 0 /100 WBC (ref 0–0.2)
PLATELET # BLD AUTO: 197 10*3/MM3 (ref 140–450)
POTASSIUM SERPL-SCNC: 4.7 MMOL/L (ref 3.5–5.2)
PROT SERPL-MCNC: 6.7 G/DL (ref 6–8.5)
PSA SERPL-MCNC: 1.57 NG/ML (ref 0–4)
RBC # BLD AUTO: 5.12 10*6/MM3 (ref 4.14–5.8)
SODIUM SERPL-SCNC: 141 MMOL/L (ref 136–145)
T4 FREE SERPL-MCNC: 1.21 NG/DL (ref 0.93–1.7)
TRIGL SERPL-MCNC: 119 MG/DL (ref 0–150)
TSH SERPL DL<=0.005 MIU/L-ACNC: 1.74 UIU/ML (ref 0.27–4.2)
VLDLC SERPL CALC-MCNC: 21 MG/DL (ref 5–40)
WBC # BLD AUTO: 5.53 10*3/MM3 (ref 3.4–10.8)

## 2023-02-06 ENCOUNTER — OFFICE VISIT (OUTPATIENT)
Dept: CARDIOLOGY | Facility: CLINIC | Age: 68
End: 2023-02-06
Payer: COMMERCIAL

## 2023-02-06 VITALS
HEIGHT: 74 IN | BODY MASS INDEX: 37.86 KG/M2 | SYSTOLIC BLOOD PRESSURE: 112 MMHG | HEART RATE: 59 BPM | WEIGHT: 295 LBS | DIASTOLIC BLOOD PRESSURE: 68 MMHG | OXYGEN SATURATION: 97 %

## 2023-02-06 DIAGNOSIS — E78.2 MIXED HYPERLIPIDEMIA: ICD-10-CM

## 2023-02-06 DIAGNOSIS — I10 PRIMARY HYPERTENSION: ICD-10-CM

## 2023-02-06 DIAGNOSIS — I25.700 CORONARY ARTERY DISEASE INVOLVING CORONARY BYPASS GRAFT OF NATIVE HEART WITH UNSTABLE ANGINA PECTORIS: Primary | ICD-10-CM

## 2023-02-06 PROCEDURE — 99214 OFFICE O/P EST MOD 30 MIN: CPT | Performed by: INTERNAL MEDICINE

## 2023-02-06 PROCEDURE — 93000 ELECTROCARDIOGRAM COMPLETE: CPT | Performed by: INTERNAL MEDICINE

## 2023-02-06 RX ORDER — PRASUGREL 5 MG/1
5 TABLET, FILM COATED ORAL DAILY
COMMUNITY
End: 2023-03-08 | Stop reason: DRUGHIGH

## 2023-02-06 NOTE — PROGRESS NOTES
Referring Provider: Carl Vu MD    Reason for Follow-up Visit: CAD    Subjective .   Chief Complaint:   Chief Complaint   Patient presents with   • Coronary Artery Disease     Yearly pt states that he stays tired a lot and falls asleep by 3-4 in the afternoon.  He takes a nap at lunch.  Wife said he is still active.  No chest pain or sob.  He has noticed this in the last 2-3 months.   • Hypertension     Pt states he thinks this has been fine.   • Hyperlipidemia     Last lab done 12/14/22 LDL was 65 on Crestor 20 mg   • Surgical Clearance     Pt will have injections in the back next week and wanted to make sure its ok to stop the Effient again.       History of present illness:  George Wilder is a 67 y.o. yo male with CAD, s/p PAUL most recently in 2021. He denies chest pain or SOB.       History  Past Medical History:   Diagnosis Date   • Allergic rhinitis due to allergen 8/8/2017   • Arthritis    • Back pain    • CHF (congestive heart failure) ([facility])    • Chronic ethmoidal sinusitis 5/18/2017   • Chronic maxillary sinusitis 5/18/2017   • Coronary artery disease 1984    stent LAD, Cx, RCA    • GERD (gastroesophageal reflux disease)    • Heart attack (HCC) 1994    Dr Stiles    • Heartburn    • Hyperlipidemia    • Hypertension    • Myocardial infarction (HCC)    • Pain     back, & Neck    ,   Past Surgical History:   Procedure Laterality Date   • ANKLE SURGERY     • BACK SURGERY     • CARDIAC CATHETERIZATION  1984, 1994   • CARDIAC CATHETERIZATION N/A 10/14/2021    Procedure: Left Heart Cath;  Surgeon: Kishore Stiles MD;  Location: [facility] CATH INVASIVE LOCATION;  Service: Cardiology;  Laterality: N/A;   • CHOLECYSTECTOMY  2014    Lararocopic    • CORONARY STENT PLACEMENT     • ENDOSCOPIC FUNCTIONAL SINUS SURGERY (FESS) N/A 6/12/2017    Procedure: ENDOSCOPIC FUNCTIONAL SINUS SURGERY WITH LEFT MIDDLE MEATAL ANTROSTOMY AND LEFT ETHMOIDECTOMY;  Surgeon: Jona Garibay MD;  Location: [facility] OR;  Service:     • ENDOSCOPIC FUNCTIONAL SINUS SURGERY (FESS) Bilateral 8/10/2020    Procedure: Septoplasty Resection of left sammy bullosa deformity Bilateral maxillary antrostomy and uncinectomy;  Surgeon: Jona Garibay MD;  Location: [facility] OR;  Service: ENT;  Laterality: Bilateral;   • ETHMOIDECTOMY Left 2017    Procedure: ETHMOIDECTOMY ENDOSCOPIC;  Surgeon: Jona Garibay MD;  Location: [facility] OR;  Service:    • EYE SURGERY      LASIK   • HAND SURGERY     • SINUS SURGERY     • TOTAL HIP ARTHROPLASTY Bilateral    ,   Family History   Problem Relation Age of Onset   • No Known Problems Mother    • Cancer Father    • Lung cancer Father         metastatic adenocarcinoma of lung   • Stroke Brother    • Cancer Brother    ,   Social History     Tobacco Use   • Smoking status: Former     Packs/day: 2.00     Years: 24.00     Pack years: 48.00     Types: Cigarettes     Quit date:      Years since quittin.1   • Smokeless tobacco: Never   Vaping Use   • Vaping Use: Never used   Substance Use Topics   • Alcohol use: Not Currently   • Drug use: Never   ,     Medications  Current Outpatient Medications   Medication Sig Dispense Refill   • celecoxib (CeleBREX) 200 MG capsule Take 200 mg by mouth Daily.     • cetirizine (zyrTEC) 10 MG tablet Take 10 mg by mouth Daily.     • Cholecalciferol (VITAMIN D3) 5000 UNITS capsule capsule Take 5,000 Units by mouth Daily.     • cyclobenzaprine (FLEXERIL) 10 MG tablet Take 10 mg by mouth 3 (Three) Times a Day As Needed.     • gabapentin (NEURONTIN) 300 MG capsule Take 300 mg by mouth 3 (Three) Times a Day.     • HYDROcodone-acetaminophen (NORCO)  MG per tablet Take 1 tablet by mouth 2 (Two) Times a Day. BACK PAIN     • metoprolol tartrate (LOPRESSOR) 50 MG tablet TAKE 1 TABLET BY MOUTH EVERYDAY AT BEDTIME 30 tablet 11   • Multiple Vitamins-Minerals (MULTIVITAMIN WITH MINERALS) tablet tablet Take 1 tablet by mouth Daily.     • nitroglycerin (NITROSTAT) 0.4 MG SL tablet 1  "under the tongue as needed for angina, may repeat q5mins for up three doses 25 tablet 2   • omeprazole (priLOSEC) 20 MG capsule Take 20 mg by mouth Daily.     • prasugrel (EFFIENT) 5 MG tablet Take 2 tablets by mouth Daily. (Patient taking differently: Take 5 mg by mouth Daily.) 30 tablet 11   • prasugrel (EFFIENT) 5 MG tablet Take 5 mg by mouth Daily.     • Psyllium (DAILY FIBER PO) Take  by mouth.     • rosuvastatin (CRESTOR) 20 MG tablet Take 1 tablet by mouth Daily. 30 tablet 11   • aspirin 81 MG EC tablet Take 81 mg by mouth Daily.     • fluticasone (FLONASE) 50 MCG/ACT nasal spray 2 sprays by Each Nare route Daily for 30 days. 16 mL 11   • isosorbide mononitrate (IMDUR) 30 MG 24 hr tablet Take 1 tablet by mouth Daily. 30 tablet 0     No current facility-administered medications for this visit.       Allergies:  Brilinta [ticagrelor]    Review of Systems  Review of Systems   HENT: Negative for nosebleeds.    Cardiovascular: Negative for chest pain, claudication, dyspnea on exertion, leg swelling, near-syncope, orthopnea, palpitations, paroxysmal nocturnal dyspnea and syncope.   Respiratory: Negative for hemoptysis and shortness of breath.    Gastrointestinal: Negative for melena.   Genitourinary: Negative for hematuria.       Objective     Physical Exam:  /68   Pulse 59   Ht 188 cm (74\")   Wt 134 kg (295 lb)   SpO2 97%   BMI 37.88 kg/m²   Pulmonary:      Effort: Pulmonary effort is normal.      Breath sounds: Normal breath sounds.   Cardiovascular:      Normal rate. Regular rhythm.      Murmurs: There is no murmur.   Edema:     Peripheral edema absent.         Results Review:    ECG 12 Lead    Date/Time: 2/6/2023 9:27 AM  Performed by: Kishore Stiles MD  Authorized by: Kishore Stiles MD   Comparison: compared with previous ECG   Similar to previous ECG  Rhythm: sinus rhythm  Rate: normal  Conduction: conduction normal  ST Segments: ST segments normal  T Waves: T waves normal  QRS axis: " normal    Clinical impression: normal ECG            Office Visit on 12/14/2022   Component Date Value Ref Range Status   • TSH 12/14/2022 1.740  0.270 - 4.200 uIU/mL Final   • Free T4 12/14/2022 1.21  0.93 - 1.70 ng/dL Final    Results may be falsely increased if patient taking Biotin.   • WBC 12/14/2022 5.53  3.40 - 10.80 10*3/mm3 Final   • RBC 12/14/2022 5.12  4.14 - 5.80 10*6/mm3 Final   • Hemoglobin 12/14/2022 16.3  13.0 - 17.7 g/dL Final   • Hematocrit 12/14/2022 49.0  37.5 - 51.0 % Final   • MCV 12/14/2022 95.7  79.0 - 97.0 fL Final   • MCH 12/14/2022 31.8  26.6 - 33.0 pg Final   • MCHC 12/14/2022 33.3  31.5 - 35.7 g/dL Final   • RDW 12/14/2022 12.3  12.3 - 15.4 % Final   • Platelets 12/14/2022 197  140 - 450 10*3/mm3 Final   • Neutrophil Rel % 12/14/2022 60.9  42.7 - 76.0 % Final   • Lymphocyte Rel % 12/14/2022 27.7  19.6 - 45.3 % Final   • Monocyte Rel % 12/14/2022 7.8  5.0 - 12.0 % Final   • Eosinophil Rel % 12/14/2022 2.5  0.3 - 6.2 % Final   • Basophil Rel % 12/14/2022 0.9  0.0 - 1.5 % Final   • Neutrophils Absolute 12/14/2022 3.37  1.70 - 7.00 10*3/mm3 Final   • Lymphocytes Absolute 12/14/2022 1.53  0.70 - 3.10 10*3/mm3 Final   • Monocytes Absolute 12/14/2022 0.43  0.10 - 0.90 10*3/mm3 Final   • Eosinophils Absolute 12/14/2022 0.14  0.00 - 0.40 10*3/mm3 Final   • Basophils Absolute 12/14/2022 0.05  0.00 - 0.20 10*3/mm3 Final   • Immature Granulocyte Rel % 12/14/2022 0.2  0.0 - 0.5 % Final   • Immature Grans Absolute 12/14/2022 0.01  0.00 - 0.05 10*3/mm3 Final   • nRBC 12/14/2022 0.0  0.0 - 0.2 /100 WBC Final   • Glucose 12/14/2022 99  65 - 99 mg/dL Final   • BUN 12/14/2022 23  8 - 23 mg/dL Final   • Creatinine 12/14/2022 1.17  0.76 - 1.27 mg/dL Final   • EGFR Result 12/14/2022 68.3  >60.0 mL/min/1.73 Final    Comment: National Kidney Foundation and American Society of  Nephrology (ASN) Task Force recommended calculation based  on the Chronic Kidney Disease Epidemiology Collaboration  (CKD-EPI) equation  refit without adjustment for race.  GFR Normal >60  Chronic Kidney Disease <60  Kidney Failure <15     • BUN/Creatinine Ratio 12/14/2022 19.7  7.0 - 25.0 Final   • Sodium 12/14/2022 141  136 - 145 mmol/L Final   • Potassium 12/14/2022 4.7  3.5 - 5.2 mmol/L Final   • Chloride 12/14/2022 107  98 - 107 mmol/L Final   • Total CO2 12/14/2022 27.0  22.0 - 29.0 mmol/L Final   • Calcium 12/14/2022 9.3  8.6 - 10.5 mg/dL Final   • Total Protein 12/14/2022 6.7  6.0 - 8.5 g/dL Final   • Albumin 12/14/2022 4.70  3.50 - 5.20 g/dL Final   • Globulin 12/14/2022 2.0  gm/dL Final   • A/G Ratio 12/14/2022 2.4  g/dL Final   • Total Bilirubin 12/14/2022 1.2  0.0 - 1.2 mg/dL Final   • Alkaline Phosphatase 12/14/2022 55  39 - 117 U/L Final   • AST (SGOT) 12/14/2022 22  1 - 40 U/L Final   • ALT (SGPT) 12/14/2022 20  1 - 41 U/L Final   • Hemoglobin A1C 12/14/2022 5.70 (H)  4.80 - 5.60 % Final    Comment: Hemoglobin A1C Ranges:  Increased Risk for Diabetes  5.7% to 6.4%  Diabetes                     >= 6.5%  Diabetic Goal                < 7.0%     • Total Cholesterol 12/14/2022 134  0 - 200 mg/dL Final    Comment: Cholesterol Reference Ranges  (U.S. Department of Health and Human Services ATP III  Classifications)  Desirable          <200 mg/dL  Borderline High    200-239 mg/dL  High Risk          >240 mg/dL  Triglyceride Reference Ranges  (U.S. Department of Health and Human Services ATP III  Classifications)  Normal           <150 mg/dL  Borderline High  150-199 mg/dL  High             200-499 mg/dL  Very High        >500 mg/dL  HDL Reference Ranges  (U.S. Department of Health and Human Services ATP III  Classifications)  Low     <40 mg/dl (major risk factor for CHD)  High    >60 mg/dl ('negative' risk factor for CHD)  LDL Reference Ranges  (U.S. Department of Health and Human Services ATP III  Classifications)  Optimal          <100 mg/dL  Near Optimal     100-129 mg/dL  Borderline High  130-159 mg/dL  High             160-189 mg/dL  Very  High        >189 mg/dL     • Triglycerides 12/14/2022 119  0 - 150 mg/dL Final   • HDL Cholesterol 12/14/2022 48  40 - 60 mg/dL Final   • VLDL Cholesterol Bro 12/14/2022 21  5 - 40 mg/dL Final   • LDL Chol Calc ([facility]) 12/14/2022 65  0 - 100 mg/dL Final   • Color 12/14/2022 Yellow  Yellow, Straw, Dark Yellow, Audrey Final   • Clarity, UA 12/14/2022 Clear  Clear Final   • Glucose, UA 12/14/2022 Negative  Negative mg/dL Final   • Bilirubin 12/14/2022 Negative  Negative Final   • Ketones, UA 12/14/2022 Negative  Negative Final   • Specific Gravity  12/14/2022 1.025  1.005 - 1.030 Final   • Blood, UA 12/14/2022 Negative  Negative Final   • pH, Urine 12/14/2022 5.5  5.0 - 8.0 Final   • Protein, POC 12/14/2022 Negative  Negative mg/dL Final   • Urobilinogen, UA 12/14/2022 Normal  Normal, 0.2 E.U./dL Final   • Nitrite, UA 12/14/2022 Negative  Negative Final   • Leukocytes 12/14/2022 Negative  Negative Final   • PSA 12/14/2022 1.570  0.000 - 4.000 ng/mL Final       Assessment & Plan   Problem List Items Addressed This Visit        Cardiac and Vasculature    Mixed hyperlipidemia    Current Assessment & Plan     Good response to statin         Primary hypertension    Current Assessment & Plan     excellent control         Coronary artery disease involving coronary bypass graft of native heart with unstable angina pectoris (HCC) - Primary    Current Assessment & Plan     The patient denies chest pain, shortness of breath, dyspnea on exertion, orthopnea, PND, edema. There is no evidence of ongoing ischemia           Relevant Medications    prasugrel (EFFIENT) 5 MG tablet    Other Relevant Orders    ECG 12 Lead       Medical Complexity  must have 2 out of 3     Moderate Complexity Level 4           1 of the following medical problems:          []One chronic illness with mild exacerbation         [x]Two or more stable chronic illness          []One new problem  []One acute illness with systemic symptoms    Complexity of Data   Reviewed (1 out of the 3 following categories)      Category 1 tests, documents, historian (must have 3 points)       []Review of prior external records  [x]Review of results of unique tests  []Ordering unique tests  []Assessment requires an independent historian    Category 2 Interpretation of tests   []Independent interpretation of test read by another doc    Category 3 Discuss Management/tests  []Discussion with external physician    Risk of complications and/or morbidity          [x]Prescription Drug Management

## 2023-02-16 ENCOUNTER — OFFICE VISIT (OUTPATIENT)
Dept: FAMILY MEDICINE CLINIC | Facility: CLINIC | Age: 68
End: 2023-02-16
Payer: COMMERCIAL

## 2023-02-16 VITALS
BODY MASS INDEX: 37.22 KG/M2 | DIASTOLIC BLOOD PRESSURE: 87 MMHG | OXYGEN SATURATION: 96 % | TEMPERATURE: 97.5 F | HEART RATE: 83 BPM | HEIGHT: 74 IN | WEIGHT: 290 LBS | SYSTOLIC BLOOD PRESSURE: 122 MMHG

## 2023-02-16 DIAGNOSIS — J30.89 ALLERGIC RHINITIS DUE TO OTHER ALLERGIC TRIGGER, UNSPECIFIED SEASONALITY: ICD-10-CM

## 2023-02-16 DIAGNOSIS — R05.1 ACUTE COUGH: ICD-10-CM

## 2023-02-16 DIAGNOSIS — J06.9 UPPER RESPIRATORY TRACT INFECTION, UNSPECIFIED TYPE: Primary | ICD-10-CM

## 2023-02-16 LAB
EXPIRATION DATE: NORMAL
FLUAV AG UPPER RESP QL IA.RAPID: NOT DETECTED
FLUBV AG UPPER RESP QL IA.RAPID: NOT DETECTED
INTERNAL CONTROL: NORMAL
Lab: NORMAL
SARS-COV-2 AG UPPER RESP QL IA.RAPID: NOT DETECTED

## 2023-02-16 PROCEDURE — 87428 SARSCOV & INF VIR A&B AG IA: CPT | Performed by: NURSE PRACTITIONER

## 2023-02-16 PROCEDURE — 96372 THER/PROPH/DIAG INJ SC/IM: CPT | Performed by: NURSE PRACTITIONER

## 2023-02-16 PROCEDURE — 99213 OFFICE O/P EST LOW 20 MIN: CPT | Performed by: NURSE PRACTITIONER

## 2023-02-16 RX ORDER — GUAIFENESIN 600 MG/1
1200 TABLET, EXTENDED RELEASE ORAL 2 TIMES DAILY
Qty: 28 TABLET | Refills: 0 | Status: SHIPPED | OUTPATIENT
Start: 2023-02-16

## 2023-02-16 RX ORDER — AZELASTINE 1 MG/ML
2 SPRAY, METERED NASAL 2 TIMES DAILY
Qty: 30 ML | Refills: 0 | Status: SHIPPED | OUTPATIENT
Start: 2023-02-16 | End: 2023-02-24

## 2023-02-16 RX ORDER — TRIAMCINOLONE ACETONIDE 40 MG/ML
40 INJECTION, SUSPENSION INTRA-ARTICULAR; INTRAMUSCULAR ONCE
Status: COMPLETED | OUTPATIENT
Start: 2023-02-16 | End: 2023-02-16

## 2023-02-16 RX ORDER — PREDNISONE 10 MG/1
TABLET ORAL
Qty: 21 TABLET | Refills: 0 | Status: SHIPPED | OUTPATIENT
Start: 2023-02-16 | End: 2023-02-24

## 2023-02-16 RX ORDER — FLUTICASONE PROPIONATE 50 MCG
SPRAY, SUSPENSION (ML) NASAL
Qty: 16 ML | Refills: 11 | OUTPATIENT
Start: 2023-02-16

## 2023-02-16 RX ADMIN — TRIAMCINOLONE ACETONIDE 40 MG: 40 INJECTION, SUSPENSION INTRA-ARTICULAR; INTRAMUSCULAR at 09:35

## 2023-02-16 NOTE — PROGRESS NOTES
CC: uri    History:  George Wilder is a 67 y.o. male who presents today for evaluation of the above problems.      Symptoms started last night. Complains of cough, congestion, and some shortness of breath.     HPI  ROS:  Review of Systems   Constitutional: Negative for fever.   HENT: Positive for sore throat.    Respiratory: Positive for cough and shortness of breath.        Allergies   Allergen Reactions   • Brilinta [Ticagrelor] Shortness Of Breath     Gasping for air       Past Medical History:   Diagnosis Date   • Allergic rhinitis due to allergen 8/8/2017   • Arthritis    • Back pain    • CHF (congestive heart failure) ([facility])    • Chronic ethmoidal sinusitis 5/18/2017   • Chronic maxillary sinusitis 5/18/2017   • Coronary artery disease 1984    stent LAD, Cx, RCA    • GERD (gastroesophageal reflux disease)    • Heart attack ([facility]) 1994    Dr Stiles    • Heartburn    • Hyperlipidemia    • Hypertension    • Myocardial infarction ([facility])    • Pain     back, & Neck      Past Surgical History:   Procedure Laterality Date   • ANKLE SURGERY     • BACK SURGERY     • CARDIAC CATHETERIZATION  1984, 1994   • CARDIAC CATHETERIZATION N/A 10/14/2021    Procedure: Left Heart Cath;  Surgeon: Kishore Stiles MD;  Location: [facility] CATH INVASIVE LOCATION;  Service: Cardiology;  Laterality: N/A;   • CHOLECYSTECTOMY  2014    Lararocopic    • CORONARY STENT PLACEMENT     • ENDOSCOPIC FUNCTIONAL SINUS SURGERY (FESS) N/A 6/12/2017    Procedure: ENDOSCOPIC FUNCTIONAL SINUS SURGERY WITH LEFT MIDDLE MEATAL ANTROSTOMY AND LEFT ETHMOIDECTOMY;  Surgeon: Jona Garibay MD;  Location: [facility] OR;  Service:    • ENDOSCOPIC FUNCTIONAL SINUS SURGERY (FESS) Bilateral 8/10/2020    Procedure: Septoplasty Resection of left sammy bullosa deformity Bilateral maxillary antrostomy and uncinectomy;  Surgeon: Jona Garibay MD;  Location: [facility] OR;  Service: ENT;  Laterality: Bilateral;   • ETHMOIDECTOMY Left 6/12/2017    Procedure: ETHMOIDECTOMY  ENDOSCOPIC;  Surgeon: Jona Garibay MD;  Location: [facility];  Service:    • EYE SURGERY      LASIK   • HAND SURGERY     • SINUS SURGERY     • TOTAL HIP ARTHROPLASTY Bilateral      Family History   Problem Relation Age of Onset   • No Known Problems Mother    • Cancer Father    • Lung cancer Father         metastatic adenocarcinoma of lung   • Stroke Brother    • Cancer Brother       reports that he quit smoking about 29 years ago. His smoking use included cigarettes. He has a 48.00 pack-year smoking history. He has never used smokeless tobacco. He reports that he does not currently use alcohol. He reports that he does not use drugs.      Current Outpatient Medications:   •  celecoxib (CeleBREX) 200 MG capsule, Take 200 mg by mouth Daily., Disp: , Rfl:   •  cetirizine (zyrTEC) 10 MG tablet, Take 10 mg by mouth Daily., Disp: , Rfl:   •  Cholecalciferol (VITAMIN D3) 5000 UNITS capsule capsule, Take 5,000 Units by mouth Daily., Disp: , Rfl:   •  cyclobenzaprine (FLEXERIL) 10 MG tablet, Take 10 mg by mouth 3 (Three) Times a Day As Needed., Disp: , Rfl:   •  gabapentin (NEURONTIN) 300 MG capsule, Take 300 mg by mouth 3 (Three) Times a Day., Disp: , Rfl:   •  HYDROcodone-acetaminophen (NORCO)  MG per tablet, Take 1 tablet by mouth 2 (Two) Times a Day. BACK PAIN, Disp: , Rfl:   •  isosorbide mononitrate (IMDUR) 30 MG 24 hr tablet, Take 1 tablet by mouth Daily., Disp: 30 tablet, Rfl: 0  •  metoprolol tartrate (LOPRESSOR) 50 MG tablet, TAKE 1 TABLET BY MOUTH EVERYDAY AT BEDTIME, Disp: 30 tablet, Rfl: 11  •  Multiple Vitamins-Minerals (MULTIVITAMIN WITH MINERALS) tablet tablet, Take 1 tablet by mouth Daily., Disp: , Rfl:   •  nitroglycerin (NITROSTAT) 0.4 MG SL tablet, 1 under the tongue as needed for angina, may repeat q5mins for up three doses, Disp: 25 tablet, Rfl: 2  •  omeprazole (priLOSEC) 20 MG capsule, Take 20 mg by mouth Daily., Disp: , Rfl:   •  prasugrel (EFFIENT) 5 MG tablet, Take 2 tablets by mouth  "Daily. (Patient taking differently: Take 5 mg by mouth Daily.), Disp: 30 tablet, Rfl: 11  •  prasugrel (EFFIENT) 5 MG tablet, Take 5 mg by mouth Daily., Disp: , Rfl:   •  Psyllium (DAILY FIBER PO), Take  by mouth., Disp: , Rfl:   •  rosuvastatin (CRESTOR) 20 MG tablet, Take 1 tablet by mouth Daily., Disp: 30 tablet, Rfl: 11  •  aspirin 81 MG EC tablet, Take 81 mg by mouth Daily., Disp: , Rfl:   •  azelastine (ASTELIN) 0.1 % nasal spray, 2 sprays into the nostril(s) as directed by provider 2 (Two) Times a Day. Use in each nostril as directed, Disp: 30 mL, Rfl: 0  •  guaiFENesin (Mucinex) 600 MG 12 hr tablet, Take 2 tablets by mouth 2 (Two) Times a Day., Disp: 28 tablet, Rfl: 0  •  predniSONE (DELTASONE) 10 MG (21) dose pack, Use as directed on package, Disp: 21 tablet, Rfl: 0    Current Facility-Administered Medications:   •  triamcinolone acetonide (KENALOG-40) injection 40 mg, 40 mg, Intramuscular, Once, Janina Flores, APRN    OBJECTIVE:  /87 (BP Location: Left arm, Patient Position: Sitting, Cuff Size: Large Adult) Comment: 136/87  Pulse 83   Temp 97.5 °F (36.4 °C) (Temporal)   Ht 188 cm (74\")   Wt 132 kg (290 lb)   SpO2 96%   BMI 37.23 kg/m²    Physical Exam  Vitals reviewed.   Constitutional:       Appearance: He is well-developed.   HENT:      Right Ear: Tympanic membrane, ear canal and external ear normal.      Left Ear: Tympanic membrane, ear canal and external ear normal.   Cardiovascular:      Rate and Rhythm: Normal rate and regular rhythm.      Heart sounds: Normal heart sounds.   Pulmonary:      Effort: Pulmonary effort is normal.      Breath sounds: Normal breath sounds.   Neurological:      Mental Status: He is alert and oriented to person, place, and time.   Psychiatric:         Behavior: Behavior normal.         Assessment/Plan    Diagnoses and all orders for this visit:    1. Upper respiratory tract infection, unspecified type (Primary)  -     triamcinolone acetonide (KENALOG-40) " injection 40 mg  -     predniSONE (DELTASONE) 10 MG (21) dose pack; Use as directed on package  Dispense: 21 tablet; Refill: 0  -     azelastine (ASTELIN) 0.1 % nasal spray; 2 sprays into the nostril(s) as directed by provider 2 (Two) Times a Day. Use in each nostril as directed  Dispense: 30 mL; Refill: 0  -     guaiFENesin (Mucinex) 600 MG 12 hr tablet; Take 2 tablets by mouth 2 (Two) Times a Day.  Dispense: 28 tablet; Refill: 0    2. Acute cough  -     POCT SARS-CoV-2 Antigen YONAS + Flu    Contact office on Monday if no improvement for antibiotic.    An After Visit Summary was printed and given to the patient at discharge.  No follow-ups on file.       ISAAC Mendoza 2/16/23    Electronically signed.

## 2023-02-20 LAB
BASOPHILS ABSOLUTE: 0 K/UL (ref 0–0.2)
BASOPHILS RELATIVE PERCENT: 0.1 % (ref 0–1)
C-REACTIVE PROTEIN: <0.3 MG/DL (ref 0–0.5)
EOSINOPHILS ABSOLUTE: 0 K/UL (ref 0–0.6)
EOSINOPHILS RELATIVE PERCENT: 0.1 % (ref 0–5)
HCT VFR BLD CALC: 49.9 % (ref 42–52)
HEMOGLOBIN: 16.5 G/DL (ref 14–18)
IMMATURE GRANULOCYTES #: 0.1 K/UL
LYMPHOCYTES ABSOLUTE: 1.2 K/UL (ref 1.1–4.5)
LYMPHOCYTES RELATIVE PERCENT: 9.2 % (ref 20–40)
MCH RBC QN AUTO: 31.2 PG (ref 27–31)
MCHC RBC AUTO-ENTMCNC: 33.1 G/DL (ref 33–37)
MCV RBC AUTO: 94.3 FL (ref 80–94)
MONOCYTES ABSOLUTE: 0.7 K/UL (ref 0–0.9)
MONOCYTES RELATIVE PERCENT: 5.4 % (ref 0–10)
NEUTROPHILS ABSOLUTE: 11.3 K/UL (ref 1.5–7.5)
NEUTROPHILS RELATIVE PERCENT: 84.7 % (ref 50–65)
PDW BLD-RTO: 12.6 % (ref 11.5–14.5)
PLATELET # BLD: 251 K/UL (ref 130–400)
PMV BLD AUTO: 9.9 FL (ref 9.4–12.4)
RBC # BLD: 5.29 M/UL (ref 4.7–6.1)
SEDIMENTATION RATE, ERYTHROCYTE: 2 MM/HR (ref 0–15)
WBC # BLD: 13.4 K/UL (ref 4.8–10.8)

## 2023-02-24 ENCOUNTER — OFFICE VISIT (OUTPATIENT)
Dept: FAMILY MEDICINE CLINIC | Facility: CLINIC | Age: 68
End: 2023-02-24
Payer: COMMERCIAL

## 2023-02-24 VITALS
TEMPERATURE: 98 F | WEIGHT: 290 LBS | DIASTOLIC BLOOD PRESSURE: 83 MMHG | RESPIRATION RATE: 18 BRPM | HEART RATE: 62 BPM | OXYGEN SATURATION: 97 % | HEIGHT: 74 IN | SYSTOLIC BLOOD PRESSURE: 123 MMHG | BODY MASS INDEX: 37.22 KG/M2

## 2023-02-24 DIAGNOSIS — J40 BRONCHITIS: ICD-10-CM

## 2023-02-24 DIAGNOSIS — J06.9 UPPER RESPIRATORY TRACT INFECTION, UNSPECIFIED TYPE: Primary | ICD-10-CM

## 2023-02-24 LAB
EXPIRATION DATE: NORMAL
EXPIRATION DATE: NORMAL
FLUAV AG UPPER RESP QL IA.RAPID: NOT DETECTED
FLUBV AG UPPER RESP QL IA.RAPID: NOT DETECTED
INTERNAL CONTROL: NORMAL
INTERNAL CONTROL: NORMAL
Lab: NORMAL
Lab: NORMAL
RSV AG SPEC QL: NEGATIVE
SARS-COV-2 AG UPPER RESP QL IA.RAPID: NOT DETECTED

## 2023-02-24 PROCEDURE — 96372 THER/PROPH/DIAG INJ SC/IM: CPT | Performed by: NURSE PRACTITIONER

## 2023-02-24 PROCEDURE — 87428 SARSCOV & INF VIR A&B AG IA: CPT | Performed by: NURSE PRACTITIONER

## 2023-02-24 PROCEDURE — 99213 OFFICE O/P EST LOW 20 MIN: CPT | Performed by: NURSE PRACTITIONER

## 2023-02-24 PROCEDURE — 87807 RSV ASSAY W/OPTIC: CPT | Performed by: NURSE PRACTITIONER

## 2023-02-24 RX ORDER — TRIAMCINOLONE ACETONIDE 40 MG/ML
40 INJECTION, SUSPENSION INTRA-ARTICULAR; INTRAMUSCULAR ONCE
Status: COMPLETED | OUTPATIENT
Start: 2023-02-24 | End: 2023-02-24

## 2023-02-24 RX ORDER — ALBUTEROL SULFATE 1.25 MG/3ML
1 SOLUTION RESPIRATORY (INHALATION) 3 TIMES DAILY PRN
Qty: 30 EACH | Refills: 0 | Status: SHIPPED | OUTPATIENT
Start: 2023-02-24

## 2023-02-24 RX ORDER — AZITHROMYCIN 250 MG/1
TABLET, FILM COATED ORAL
Qty: 6 TABLET | Refills: 0 | Status: SHIPPED | OUTPATIENT
Start: 2023-02-24

## 2023-02-24 RX ADMIN — TRIAMCINOLONE ACETONIDE 40 MG: 40 INJECTION, SUSPENSION INTRA-ARTICULAR; INTRAMUSCULAR at 10:35

## 2023-03-06 NOTE — TELEPHONE ENCOUNTER
Pt called stating that his pharmacy has him taking Effient 5 mg 2 a day but pt thinks he was only suppose to take it 1 a day.  The Rx says take 2 tablets a day (pt takes differently 5 mg a day).  Please clarify how you want him to take this medication.  Juan A Pickard, CMA

## 2023-03-08 NOTE — TELEPHONE ENCOUNTER
Pt informed to take it once a day.  He wants a new rx sent to [facility] to get them to fix it.  Juan A Pickard, CMA

## 2023-03-09 ENCOUNTER — OFFICE VISIT (OUTPATIENT)
Dept: OTOLARYNGOLOGY | Facility: CLINIC | Age: 68
End: 2023-03-09
Payer: COMMERCIAL

## 2023-03-09 ENCOUNTER — HOSPITAL ENCOUNTER (OUTPATIENT)
Dept: PREADMISSION TESTING | Age: 68
Discharge: HOME | End: 2023-03-13
Payer: COMMERCIAL

## 2023-03-09 VITALS
HEIGHT: 74 IN | HEART RATE: 82 BPM | BODY MASS INDEX: 37.22 KG/M2 | DIASTOLIC BLOOD PRESSURE: 78 MMHG | RESPIRATION RATE: 20 BRPM | SYSTOLIC BLOOD PRESSURE: 136 MMHG | TEMPERATURE: 98 F | WEIGHT: 290 LBS

## 2023-03-09 VITALS — WEIGHT: 292 LBS | HEIGHT: 74 IN | BODY MASS INDEX: 37.47 KG/M2

## 2023-03-09 DIAGNOSIS — Z98.890 S/P SINUS SURGERY: Primary | ICD-10-CM

## 2023-03-09 DIAGNOSIS — J31.0 CHRONIC RHINITIS: ICD-10-CM

## 2023-03-09 LAB
ABO/RH: NORMAL
ANION GAP SERPL CALCULATED.3IONS-SCNC: 7 MMOL/L (ref 7–19)
ANTIBODY SCREEN: NORMAL
APTT: 30 SEC (ref 26–36.2)
BASOPHILS ABSOLUTE: 0 K/UL (ref 0–0.2)
BASOPHILS RELATIVE PERCENT: 0.6 % (ref 0–1)
BUN BLDV-MCNC: 23 MG/DL (ref 8–23)
CALCIUM SERPL-MCNC: 9.1 MG/DL (ref 8.8–10.2)
CHLORIDE BLD-SCNC: 106 MMOL/L (ref 98–111)
CO2: 27 MMOL/L (ref 22–29)
CREAT SERPL-MCNC: 1 MG/DL (ref 0.5–1.2)
EOSINOPHILS ABSOLUTE: 0.1 K/UL (ref 0–0.6)
EOSINOPHILS RELATIVE PERCENT: 1.9 % (ref 0–5)
GFR SERPL CREATININE-BSD FRML MDRD: >60 ML/MIN/{1.73_M2}
GLUCOSE BLD-MCNC: 105 MG/DL (ref 74–109)
HCT VFR BLD CALC: 48.5 % (ref 42–52)
HEMOGLOBIN: 16.2 G/DL (ref 14–18)
IMMATURE GRANULOCYTES #: 0 K/UL
INR BLD: 1.07 (ref 0.88–1.18)
LYMPHOCYTES ABSOLUTE: 1.6 K/UL (ref 1.1–4.5)
LYMPHOCYTES RELATIVE PERCENT: 25.5 % (ref 20–40)
MCH RBC QN AUTO: 31.2 PG (ref 27–31)
MCHC RBC AUTO-ENTMCNC: 33.4 G/DL (ref 33–37)
MCV RBC AUTO: 93.4 FL (ref 80–94)
MONOCYTES ABSOLUTE: 0.4 K/UL (ref 0–0.9)
MONOCYTES RELATIVE PERCENT: 5.8 % (ref 0–10)
MRSA SCREEN RT-PCR: NOT DETECTED
NEUTROPHILS ABSOLUTE: 4.2 K/UL (ref 1.5–7.5)
NEUTROPHILS RELATIVE PERCENT: 65.9 % (ref 50–65)
PDW BLD-RTO: 12.8 % (ref 11.5–14.5)
PLATELET # BLD: 181 K/UL (ref 130–400)
PMV BLD AUTO: 10.3 FL (ref 9.4–12.4)
POTASSIUM SERPL-SCNC: 4.5 MMOL/L (ref 3.5–5)
PROTHROMBIN TIME: 13.9 SEC (ref 12–14.6)
RBC # BLD: 5.19 M/UL (ref 4.7–6.1)
SODIUM BLD-SCNC: 140 MMOL/L (ref 136–145)
WBC # BLD: 6.4 K/UL (ref 4.8–10.8)

## 2023-03-09 PROCEDURE — 80048 BASIC METABOLIC PNL TOTAL CA: CPT

## 2023-03-09 PROCEDURE — 86850 RBC ANTIBODY SCREEN: CPT

## 2023-03-09 PROCEDURE — 87641 MR-STAPH DNA AMP PROBE: CPT

## 2023-03-09 PROCEDURE — 85610 PROTHROMBIN TIME: CPT

## 2023-03-09 PROCEDURE — 86900 BLOOD TYPING SEROLOGIC ABO: CPT

## 2023-03-09 PROCEDURE — 85730 THROMBOPLASTIN TIME PARTIAL: CPT

## 2023-03-09 PROCEDURE — 99213 OFFICE O/P EST LOW 20 MIN: CPT | Performed by: NURSE PRACTITIONER

## 2023-03-09 PROCEDURE — 86901 BLOOD TYPING SEROLOGIC RH(D): CPT

## 2023-03-09 PROCEDURE — 85025 COMPLETE CBC W/AUTO DIFF WBC: CPT

## 2023-03-09 RX ORDER — ACETAMINOPHEN/DIPHENHYDRAMINE 500MG-25MG
2 TABLET ORAL NIGHTLY
COMMUNITY

## 2023-03-09 RX ORDER — FLUTICASONE PROPIONATE 50 MCG
2 SPRAY, SUSPENSION (ML) NASAL DAILY
Qty: 16 G | Refills: 11 | Status: SHIPPED | OUTPATIENT
Start: 2023-03-09 | End: 2023-04-08

## 2023-03-09 RX ORDER — POLOXAMER 188/SORBITOL/UREA
0.52 CLEANSER (ML) TOPICAL DAILY
COMMUNITY

## 2023-03-09 RX ORDER — CETIRIZINE HYDROCHLORIDE 10 MG/1
1 TABLET ORAL DAILY
COMMUNITY
End: 2023-03-09

## 2023-03-09 RX ORDER — M-VIT,TX,IRON,MINS/CALC/FOLIC 27MG-0.4MG
1 TABLET ORAL DAILY
COMMUNITY

## 2023-03-09 RX ORDER — PRASUGREL 5 MG/1
5 TABLET, FILM COATED ORAL DAILY
COMMUNITY

## 2023-03-09 RX ORDER — ROSUVASTATIN CALCIUM 20 MG/1
20 TABLET, COATED ORAL NIGHTLY
COMMUNITY

## 2023-03-09 RX ORDER — TAMSULOSIN HYDROCHLORIDE 0.4 MG/1
CAPSULE ORAL
COMMUNITY
Start: 2023-03-02

## 2023-03-09 RX ORDER — ACETAMINOPHEN 160 MG
1 TABLET,DISINTEGRATING ORAL DAILY
COMMUNITY

## 2023-03-09 RX ORDER — HYDROCODONE BITARTRATE AND ACETAMINOPHEN 10; 325 MG/1; MG/1
1 TABLET ORAL 2 TIMES DAILY
COMMUNITY

## 2023-03-09 RX ORDER — PRASUGREL 5 MG/1
5 TABLET, FILM COATED ORAL DAILY
Qty: 30 TABLET | Refills: 11 | Status: SHIPPED | OUTPATIENT
Start: 2023-03-09

## 2023-03-09 RX ORDER — CETIRIZINE HYDROCHLORIDE 10 MG/1
10 TABLET ORAL DAILY
COMMUNITY

## 2023-03-09 RX ORDER — ESOMEPRAZOLE MAGNESIUM 20 MG/1
20 FOR SUSPENSION ORAL NIGHTLY
COMMUNITY

## 2023-03-09 RX ORDER — ESOMEPRAZOLE MAGNESIUM 20 MG/1
20 FOR SUSPENSION ORAL
COMMUNITY

## 2023-03-09 NOTE — DISCHARGE INSTRUCTIONS
PREOPERATIVE GUIDELINES WHEN RECEIVING ANESTHESIA    Do not eat or drink anything after midnight, the night before your surgery. This is extremely important for your safety. (Includes mint/gum)    Take a bath (or shower) the night before your surgery and you may brush your teeth the morning of your surgery. You will be scheduled to arrive at the hospital 2 hours before your surgery, or follow your surgeon's instructions. Dress comfortably. Wear loose clothing that will be easy to remove and comfortable for your trip home. You may wear eyeglasses or contacts but bring your cases with you as they must be remove before your surgery. Hearing aids and dentures will need to be removed before your surgery. Do not wear any jewelry, including body jewelry. All jewelry will need to be removed prior to your surgery. Do not wear fingernail polish or make-up. It is best not to bring any valuables with you. If you are to stay in the hospital overnight, bring your robe, slippers and personal toiletries that you may need. POSTOPERATIVE GUIDELINES AFTER RECEIVING ANESTHESIA    If you are to go home after your surgery, you will need a responsible adult to drive you home. You will not be able to take public transportation after your discharge from the Operative Care Unit unless you are accompanied by a        responsible adult. On returning home, be sure to follow your physician's orders regarding diet, activity and medications. Remember, surgery with general anesthesia or sedation may leave you sleepy, very tired and with a decreased appetite for 12 to 24 hours. If you develop any post-surgical complications or problems, call your surgeon or [facility] Emergency Department ([phone]). The day before surgery you will receive a phone call from the surgery nurse to let you know what time to arrive on the day of surgery.  This call will usually be between 2-4 PM. If you do not receive a phone call by 4 PM the day before your surgery please call [phone] and let them know you have not received an arrival time. If your surgery is on Monday, your call will be on the Friday before your Monday surgery. MEDICATION INSTRUCTIONS PRIOR TO YOUR SURGERY        The morning of surgery: You can take all your usual prescribed medications with a small sip of water. DO NOT TAKE ANY DIABETIC MEDICATIONS the morning of your surgery. DO NOT TAKE ANY SUPPLEMENTS or over the counter medications the morning of  surgery. Joellen Gosselin for the NARES    A script for Bactroban ointment has been call to your pharmacy or was given to you in written form by your surgeon. The guidelines for the ointment use are as follows:    1)  Start using the ointment 7 days before your surgery date    2)  Use the ointment two times a day - morning and night    3)  Place the ointment on a Q-tip and swirl up in your nose making sure you cover completely       the skin just inside of each nostril. Use one end of the Q-tip for each nostril. Chlorhexidine Gluconate 4% Solution    Patient should shower with this soap a minimum of 3 consecutive showers (2 nights before surgery, the night before surgery and the morning of surgery) washing from the neck down (avoiding contact with genitalia). DO NOT [address] YOUR HAIR OR FACE WITH THIS SOAP. When washing with this soap, apply enough to suds up the body thoroughly, turn the water away from your body and allow the soap suds to remain on the body for 2 full minutes, then rinse body completely. After using this soap on the body, please do not apply powders or lotions to your body. After the shower the night before surgery, please dry off with a clean towel, sleep in freshly laundered pj's, and change your bed linen before going to sleep.        [facility] for [facility] Patients-Revised 6-    Visitors for surgery patients are essential for the patient's emotional well-being and care       post operatively. 2.   Visitor Expectations and Limitations        3. One visitor allowed with patients in the preop/postop rooms. 4.  A second visitor may sit in the waiting area. 5.  No children under 13 allowed in the pre-post op areas unless they are the patient. 6.  Two people may be with an underage surgical/procedural patient in preop/postop        room. 7.  If you are admitted to the hospital post operatively, there are NO RESTRICTIONS on       the floor at this time. 8.  If you are admitted to ICU postoperatively, you may have one visitor in the room from        7A-7P. A second visitor may sit in the ICU waiting room. No overnight visitors in         ICU waiting room. You are scheduled to return to the [facility] on Friday 3/17/23 at 1000am  for your COVID test.  Enter at the main level of the [facility], where the granite ball is floating on water, go past the ball and when you come to the information board on the wall across from the elevators, you will see a door with a keypad. The sign next to it will read \"Staff Only\" and will have a red [place] on the sign. You will knock on that door and we will come get you for your test.  We will be expecting you. You must have your COVID test on this day or surgery will be canceled. After having your COVID 19 test, you will need to self isolate until the day of surgery. This means no public gatherings such as Religious attendance, weddings, showers, funerals, etc.  If you need something from the store, you will need someone to pick it up for you. It is very important that you are not around other people prior to your surgery or you could contract COVID 19 between the time you are tested and your surgery date.

## 2023-03-09 NOTE — PROGRESS NOTES
PRIMARY CARE PROVIDER: Carl Vu MD  REFERRING PROVIDER: No ref. provider found    Chief Complaint   Patient presents with   • Follow-up     sinus       Subjective   History of Present Illness:  George Wilder is a  67 y.o. male  who presents for follow up s/p septoplasty, resection of left sammy bullosa, and bilateral maxillary antrostomy and uncinectomy by Dr. Garibay on 8/10/2020.  He has been doing very well until recently.  He complains of a recent flareup of significant nasal drainage with some nasal congestion.  His symptoms have been present for the last month.  He denies any sinus infections.  His symptoms have been previously well controlled with Flonase.      Past History:  Past Medical History:   Diagnosis Date   • Allergic rhinitis due to allergen 8/8/2017   • Arthritis    • Back pain    • CHF (congestive heart failure) ([facility])    • Chronic ethmoidal sinusitis 5/18/2017   • Chronic maxillary sinusitis 5/18/2017   • Coronary artery disease 1984    stent LAD, Cx, RCA    • GERD (gastroesophageal reflux disease)    • Heart attack ([facility]) 1994    Dr Stiles    • Heartburn    • Hyperlipidemia    • Hypertension    • Myocardial infarction ([facility])    • Pain     back, & Neck      Past Surgical History:   Procedure Laterality Date   • ANKLE SURGERY     • BACK SURGERY     • CARDIAC CATHETERIZATION  1984, 1994   • CARDIAC CATHETERIZATION N/A 10/14/2021    Procedure: Left Heart Cath;  Surgeon: Kishore Stiles MD;  [facility] C[facility]ASIVE [facility];  Service: Cardiology;  Laterality: N/A;   • CHOLECYSTECTOMY  2014    Lararocopic    • CORONARY STENT PLACEMENT     • ENDOSCOPIC FUNCTIONAL SINUS SURGERY (FESS) N/A 6/12/2017    Procedure: ENDOSCOPIC FUNCTIONAL SINUS SURGERY WITH LEFT MIDDLE MEATAL ANTROSTOMY AND LEFT ETHMOIDECTOMY;  Surgeon: Jona Garibay MD;  Location[facility] OR;  Service:    • ENDOSCOPIC FUNCTIONAL SINUS SURGERY (FESS) Bilateral 8/10/2020    Procedure: Septoplasty Resection of left sammy  bullosa deformity Bilateral maxillary antrostomy and uncinectomy;  Surgeon: Jona Garibay MD;  Location:  [facility] OR;  Service: ENT;  Laterality: Bilateral;   • ETHMOIDECTOMY Left 2017    Procedure: ETHMOIDECTOMY ENDOSCOPIC;  Surgeon: Jona Garibay MD;  Location:  [facility] OR;  Service:    • EYE SURGERY      LASIK   • HAND SURGERY     • SINUS SURGERY     • TOTAL HIP ARTHROPLASTY Bilateral      Family History   Problem Relation Age of Onset   • No Known Problems Mother    • Cancer Father    • Lung cancer Father         metastatic adenocarcinoma of lung   • Stroke Brother    • Cancer Brother      Social History     Tobacco Use   • Smoking status: Former     Packs/day: 2.00     Years: 24.00     Pack years: 48.00     Types: Cigarettes     Quit date:      Years since quittin.2   • Smokeless tobacco: Never   Vaping Use   • Vaping Use: Never used   Substance Use Topics   • Alcohol use: Not Currently   • Drug use: Never     Allergies:  Brilinta [ticagrelor]    Current Outpatient Medications:   •  albuterol (ACCUNEB) 1.25 MG/3ML nebulizer solution, Take 3 mL by nebulization 3 (Three) Times a Day As Needed for Wheezing or Shortness of Air., Disp: 30 each, Rfl: 0  •  aspirin 81 MG EC tablet, Take 1 tablet by mouth Daily., Disp: , Rfl:   •  azithromycin (Zithromax) 250 MG tablet, Take 2 tablets the first day, then 1 tablet daily for 4 days., Disp: 6 tablet, Rfl: 0  •  celecoxib (CeleBREX) 200 MG capsule, Take 1 capsule by mouth Daily., Disp: , Rfl:   •  cetirizine (zyrTEC) 10 MG tablet, Take 1 tablet by mouth Daily., Disp: , Rfl:   •  Cholecalciferol (VITAMIN D3) 5000 UNITS capsule capsule, Take 1 capsule by mouth Daily., Disp: , Rfl:   •  Cholecalciferol 50 MCG (2000 UT) capsule, Take 1 capsule by mouth Daily., Disp: , Rfl:   •  cyclobenzaprine (FLEXERIL) 10 MG tablet, Take 1 tablet by mouth 3 (Three) Times a Day As Needed., Disp: , Rfl:   •  diphenhydrAMINE-acetaminophen (Tylenol PM Extra Strength)   MG tablet per tablet, Take 2 tablets by mouth Every Night., Disp: , Rfl:   •  esomeprazole (NexIUM) 20 MG packet, Take 20 mg by mouth., Disp: , Rfl:   •  gabapentin (NEURONTIN) 300 MG capsule, Take 1 capsule by mouth 3 (Three) Times a Day., Disp: , Rfl:   •  guaiFENesin (Mucinex) 600 MG 12 hr tablet, Take 2 tablets by mouth 2 (Two) Times a Day., Disp: 28 tablet, Rfl: 0  •  HYDROcodone-acetaminophen (NORCO)  MG per tablet, Take 1 tablet by mouth 2 (Two) Times a Day. BACK PAIN, Disp: , Rfl:   •  isosorbide mononitrate (IMDUR) 30 MG 24 hr tablet, Take 1 tablet by mouth Daily., Disp: 30 tablet, Rfl: 0  •  metoprolol tartrate (LOPRESSOR) 50 MG tablet, TAKE 1 TABLET BY MOUTH EVERYDAY AT BEDTIME, Disp: 30 tablet, Rfl: 11  •  Multiple Vitamins-Minerals (MULTIVITAMIN WITH MINERALS) tablet tablet, Take 1 tablet by mouth Daily., Disp: , Rfl:   •  mupirocin (BACTROBAN) 2 % ointment, APPLY BY TOPICAL ROUTE 2 TIMES EVERY DAY IN EACH NOSTRIL FOR 5 DAYS PRIOR TO SURGERY, Disp: , Rfl:   •  nitroglycerin (NITROSTAT) 0.4 MG SL tablet, 1 under the tongue as needed for angina, may repeat q5mins for up three doses, Disp: 25 tablet, Rfl: 2  •  prasugrel (EFFIENT) 5 MG tablet, Take 2 tablets by mouth Daily. (Patient taking differently: Take 1 tablet by mouth Daily.), Disp: 30 tablet, Rfl: 11  •  prasugrel (EFFIENT) 5 MG tablet, Take 1 tablet by mouth Daily., Disp: 30 tablet, Rfl: 11  •  Psyllium (DAILY FIBER PO), Take  by mouth., Disp: , Rfl:   •  rosuvastatin (CRESTOR) 20 MG tablet, Take 1 tablet by mouth Daily., Disp: 30 tablet, Rfl: 11  •  tamsulosin (FLOMAX) 0.4 MG capsule 24 hr capsule, TAKE 1 CAPSULE 1/2 HOUR AFTER THE SAME MEAL ONCE DAILY FOR 7 DAYS PRIOR TO SURGERY, Disp: , Rfl:   •  fluticasone (FLONASE) 50 MCG/ACT nasal spray, 2 sprays into the nostril(s) as directed by provider Daily for 30 days., Disp: 16 g, Rfl: 11      Objective     Vital Signs:    /78   Pulse 82   Temp 98 °F (36.7 °C)   Resp 20   Ht  "188 cm (74\")   Wt 132 kg (290 lb)   BMI 37.23 kg/m²     Physical Exam:  Physical Exam   Constitutional: He is oriented to person, place, and time. He appears well-developed. He is cooperative. No distress.   HENT:   Head: Normocephalic and atraumatic.   Right Ear: Tympanic membrane, external ear and ear canal normal.   Left Ear: Tympanic membrane, external ear and ear canal normal.   Nose: Mucosal edema and rhinorrhea present. No nasal deformity or septal deviation.   Eyes: Conjunctivae and lids are normal.   Neck: Phonation normal.   Pulmonary/Chest: Effort normal. No stridor.   Neurological: He is alert and oriented to person, place, and time.   Psychiatric: His behavior is normal. Judgment and thought content normal.       Assessment   Assessment:  1. S/P sinus surgery    2. Chronic rhinitis        Plan   Plan:     Restart Flonase.  If no improvement with Flonase, may add Astelin.  He was instructed to call or return should any problems arise prior to next office visit.    New Medications Ordered This Visit   Medications   • fluticasone (FLONASE) 50 MCG/ACT nasal spray     Si sprays into the nostril(s) as directed by provider Daily for 30 days.     Dispense:  16 g     Refill:  11     There are no Patient Instructions on file for this visit.  Return in about 1 year (around 3/9/2024), or if symptoms worsen or fail to improve, for Recheck.    My findings and recommendations were discussed and questions were answered.     Ira Coyle, ISAAC    "

## 2023-03-16 ENCOUNTER — TELEPHONE (OUTPATIENT)
Dept: CARDIOLOGY | Facility: CLINIC | Age: 68
End: 2023-03-16
Payer: COMMERCIAL

## 2023-03-17 ENCOUNTER — HOSPITAL ENCOUNTER (OUTPATIENT)
Dept: PREADMISSION TESTING | Age: 68
Discharge: HOME | End: 2023-03-21
Payer: COMMERCIAL

## 2023-03-17 DIAGNOSIS — R06.09 DOE (DYSPNEA ON EXERTION): Primary | ICD-10-CM

## 2023-03-17 LAB — SARS-COV-2 N GENE RESP QL NAA+PROBE: NOT DETECTED

## 2023-03-17 PROCEDURE — U0003 INFECTIOUS AGENT DETECTION BY NUCLEIC ACID (DNA OR RNA); SEVERE ACUTE RESPIRATORY SYNDROME CORONAVIRUS 2 (SARS-COV-2) (CORONAVIRUS DISEASE [COVID-19]), AMPLIFIED PROBE TECHNIQUE, MAKING USE OF HIGH THROUGHPUT TECHNOLOGIES AS DESCRIBED BY CMS-2020-01-R: HCPCS

## 2023-03-17 PROCEDURE — U0005 INFEC AGEN DETEC AMPLI PROBE: HCPCS

## 2023-03-17 NOTE — TELEPHONE ENCOUNTER
I called and left Nichol a  msg stating that pt is scheduled for a stress test on 4/7 for clearance.  May have to r/s his surgery to accommodate the stress test.  Told her to call back if any questions.  Juan A Pickard, CMA

## 2023-04-07 ENCOUNTER — HOSPITAL ENCOUNTER (OUTPATIENT)
Dept: CARDIOLOGY | Facility: HOSPITAL | Age: 68
Discharge: HOME | End: 2023-04-07
Payer: COMMERCIAL

## 2023-04-07 VITALS
SYSTOLIC BLOOD PRESSURE: 160 MMHG | HEIGHT: 74 IN | DIASTOLIC BLOOD PRESSURE: 77 MMHG | HEART RATE: 44 BPM | BODY MASS INDEX: 37.35 KG/M2 | WEIGHT: 291.01 LBS

## 2023-04-07 DIAGNOSIS — R06.09 DOE (DYSPNEA ON EXERTION): ICD-10-CM

## 2023-04-07 PROCEDURE — A9502 TC99M TETROFOSMIN: HCPCS | Performed by: INTERNAL MEDICINE

## 2023-04-07 PROCEDURE — 93017 CV STRESS TEST TRACING ONLY: CPT

## 2023-04-07 PROCEDURE — 93018 CV STRESS TEST I&R ONLY: CPT | Performed by: INTERNAL MEDICINE

## 2023-04-07 PROCEDURE — 0 TECHNETIUM TETROFOSMIN KIT: Performed by: INTERNAL MEDICINE

## 2023-04-07 PROCEDURE — 78452 HT MUSCLE IMAGE SPECT MULT: CPT | Performed by: INTERNAL MEDICINE

## 2023-04-07 PROCEDURE — 25010000002 REGADENOSON 0.4 MG/5ML SOLUTION: Performed by: HOSPITALIST

## 2023-04-07 PROCEDURE — 78452 HT MUSCLE IMAGE SPECT MULT: CPT

## 2023-04-07 RX ADMIN — REGADENOSON 0.4 MG: 0.08 INJECTION, SOLUTION INTRAVENOUS at 12:12

## 2023-04-07 RX ADMIN — TETROFOSMIN 1 DOSE: 1.38 INJECTION, POWDER, LYOPHILIZED, FOR SOLUTION INTRAVENOUS at 13:39

## 2023-04-07 RX ADMIN — TETROFOSMIN 1 DOSE: 1.38 INJECTION, POWDER, LYOPHILIZED, FOR SOLUTION INTRAVENOUS at 10:52

## 2023-04-10 LAB
BH CV NUCLEAR PRIOR STUDY: 3
BH CV REST NUCLEAR ISOTOPE DOSE: 11.7 MCI
BH CV STRESS BP STAGE 1: NORMAL
BH CV STRESS COMMENTS STAGE 1: NORMAL
BH CV STRESS DOSE REGADENOSON STAGE 1: 0.4
BH CV STRESS DURATION MIN STAGE 1: 0
BH CV STRESS DURATION SEC STAGE 1: 10
BH CV STRESS HR STAGE 1: 67
BH CV STRESS NUCLEAR ISOTOPE DOSE: 36 MCI
BH CV STRESS PROTOCOL 1: NORMAL
BH CV STRESS RECOVERY BP: NORMAL MMHG
BH CV STRESS RECOVERY HR: 57 BPM
BH CV STRESS STAGE 1: 1
LV EF NUC BP: 77 %
MAXIMAL PREDICTED HEART RATE: 153 BPM
PERCENT MAX PREDICTED HR: 43.79 %
STRESS BASELINE BP: NORMAL MMHG
STRESS BASELINE HR: 50 BPM
STRESS PERCENT HR: 52 %
STRESS POST EXERCISE DUR MIN: 0 MIN
STRESS POST EXERCISE DUR SEC: 10 SEC
STRESS POST PEAK BP: NORMAL MMHG
STRESS POST PEAK HR: 67 BPM
STRESS TARGET HR: 130 BPM

## 2023-04-11 ENCOUNTER — TELEPHONE (OUTPATIENT)
Dept: CARDIOLOGY | Facility: CLINIC | Age: 68
End: 2023-04-11
Payer: COMMERCIAL

## 2023-04-11 NOTE — TELEPHONE ENCOUNTER
Pt informed and a clearance will be sent to Dr. Swann so he can get his hip surgery done.  Juan A Pickard, CMA

## 2023-04-11 NOTE — TELEPHONE ENCOUNTER
----- Message from Kishore Stiles MD sent at 4/10/2023  2:33 PM CDT -----  Negative for ischemia. Low risk for surgery if any planned

## 2023-04-11 NOTE — PROGRESS NOTES
George Wilder informed of results.  George Wilder stated understanding. Juan A Pickard, [facility]

## 2023-05-08 ENCOUNTER — HOSPITAL ENCOUNTER (OUTPATIENT)
Dept: PREADMISSION TESTING | Age: 68
Discharge: HOME | DRG: 467 | End: 2023-05-12
Payer: COMMERCIAL

## 2023-05-08 NOTE — DISCHARGE INSTRUCTIONS
[facility] Jessica Howell      Total Hip & Bipolar Replacement  Home Instructions     To prevent blood clots, you have been placed on the following medication:  Resume Effient    Surgical Site Care: Showering is permitted on post op day 2 - Thursday  No submersion in a bath, swimming pool, whirlpool, etc     Home physical therapy 2 days a week and a once a week nurse will be arranged before you get home    Weight Bearing Status:  Full unless you were told otherwise  Use a walker    Precautions  Don't walk for exercise (The longer you are on your feet the more sore you will be)  Stay close to home  You may go for short car rides    Pain Medications  You were given a prescription to fill at your pharmacy  Wean off pain medications as you deem appropriate as long as pain is under control  Take tylenol instead of the pain medicine as you improve                                                                                                                           FEVER of 101.5 or less  Please take a stool softener such as Colace to prevent constipation                   Tylenol x 2                                                                                                                                       Deep breath x 10  Do not drive for two weeks                                                                                    Cough, cough, cough  DO NOT SMOKE, VAPE OR CHEW!!! Recheck in 1 - 11/2 hours    Cold packs  May be used as necessary  Be sure to have a barrier (cloth, clothing, towel) between the site and the ice pack to prevent frostbite    Contact office if  Increased redness, swelling, drainage of any kind, and/or severe pain at surgery site. As well as new onset fevers and or chills. These could signify an infection. Calf tenderness to touch as well as increased swelling or redness.   This

## 2023-05-08 NOTE — DISCHARGE INSTR - DIET
Good nutrition is important when healing from an illness, injury, or surgery. Follow any nutrition recommendations given to you during your hospital stay. If you were given an oral nutrition supplement while in the hospital, continue to take this supplement at home. You can take it with meals, in-between meals, and/or before bedtime. These supplements can be purchased at most local grocery stores, pharmacies, and chain [employer]-stores. If you have any questions about your diet or nutrition, call the hospital and ask for the dietitian.             Low carb / High protein

## 2023-05-09 ENCOUNTER — ANESTHESIA EVENT (OUTPATIENT)
Dept: OPERATING ROOM | Age: 68
DRG: 467 | End: 2023-05-09
Payer: COMMERCIAL

## 2023-05-09 ENCOUNTER — HOSPITAL ENCOUNTER (INPATIENT)
Age: 68
LOS: 2 days | Discharge: HOME | DRG: 467 | End: 2023-05-11
Attending: ORTHOPAEDIC SURGERY | Admitting: ORTHOPAEDIC SURGERY
Payer: COMMERCIAL

## 2023-05-09 ENCOUNTER — APPOINTMENT (OUTPATIENT)
Dept: GENERAL RADIOLOGY | Age: 68
DRG: 467 | End: 2023-05-09
Attending: ORTHOPAEDIC SURGERY
Payer: COMMERCIAL

## 2023-05-09 ENCOUNTER — ANESTHESIA (OUTPATIENT)
Dept: OPERATING ROOM | Age: 68
DRG: 467 | End: 2023-05-09
Payer: COMMERCIAL

## 2023-05-09 DIAGNOSIS — T84.019S FAILED ARTHROPLASTY, SEQUELA: Primary | ICD-10-CM

## 2023-05-09 DIAGNOSIS — M25.352 OTHER INSTABILITY, LEFT HIP: ICD-10-CM

## 2023-05-09 LAB
ABO + RH BLD: NORMAL
BLD GP AB SCN SERPL QL: NORMAL
KOH PREP SPEC: NORMAL

## 2023-05-09 PROCEDURE — 7100000000 HC PACU RECOVERY - FIRST 15 MIN: Performed by: ORTHOPAEDIC SURGERY

## 2023-05-09 PROCEDURE — 2500000003 HC RX 250 WO HCPCS: Performed by: NURSE ANESTHETIST, CERTIFIED REGISTERED

## 2023-05-09 PROCEDURE — 87070 CULTURE OTHR SPECIMN AEROBIC: CPT

## 2023-05-09 PROCEDURE — 3700000001 HC ADD 15 MINUTES (ANESTHESIA): Performed by: ORTHOPAEDIC SURGERY

## 2023-05-09 PROCEDURE — 87075 CULTR BACTERIA EXCEPT BLOOD: CPT

## 2023-05-09 PROCEDURE — 6370000000 HC RX 637 (ALT 250 FOR IP): Performed by: PHYSICIAN ASSISTANT

## 2023-05-09 PROCEDURE — A4217 STERILE WATER/SALINE, 500 ML: HCPCS | Performed by: ORTHOPAEDIC SURGERY

## 2023-05-09 PROCEDURE — C1713 ANCHOR/SCREW BN/BN,TIS/BN: HCPCS | Performed by: ORTHOPAEDIC SURGERY

## 2023-05-09 PROCEDURE — 6360000002 HC RX W HCPCS: Performed by: ORTHOPAEDIC SURGERY

## 2023-05-09 PROCEDURE — 87102 FUNGUS ISOLATION CULTURE: CPT

## 2023-05-09 PROCEDURE — 6370000000 HC RX 637 (ALT 250 FOR IP): Performed by: ORTHOPAEDIC SURGERY

## 2023-05-09 PROCEDURE — 3209999900 FLUORO FOR SURGICAL PROCEDURES

## 2023-05-09 PROCEDURE — 86901 BLOOD TYPING SEROLOGIC RH(D): CPT

## 2023-05-09 PROCEDURE — 0SRE0JA REPLACEMENT OF LEFT HIP JOINT, ACETABULAR SURFACE WITH SYNTHETIC SUBSTITUTE, UNCEMENTED, OPEN APPROACH: ICD-10-PCS | Performed by: ORTHOPAEDIC SURGERY

## 2023-05-09 PROCEDURE — 2709999900 HC NON-CHARGEABLE SUPPLY: Performed by: ORTHOPAEDIC SURGERY

## 2023-05-09 PROCEDURE — 1210000000 HC MED SURG R&B

## 2023-05-09 PROCEDURE — 73502 X-RAY EXAM HIP UNI 2-3 VIEWS: CPT

## 2023-05-09 PROCEDURE — G0378 HOSPITAL OBSERVATION PER HR: HCPCS

## 2023-05-09 PROCEDURE — 2720000010 HC SURG SUPPLY STERILE: Performed by: ORTHOPAEDIC SURGERY

## 2023-05-09 PROCEDURE — 3600000015 HC SURGERY LEVEL 5 ADDTL 15MIN: Performed by: ORTHOPAEDIC SURGERY

## 2023-05-09 PROCEDURE — 36415 COLL VENOUS BLD VENIPUNCTURE: CPT

## 2023-05-09 PROCEDURE — 86850 RBC ANTIBODY SCREEN: CPT

## 2023-05-09 PROCEDURE — 2580000003 HC RX 258: Performed by: ORTHOPAEDIC SURGERY

## 2023-05-09 PROCEDURE — 7100000001 HC PACU RECOVERY - ADDTL 15 MIN: Performed by: ORTHOPAEDIC SURGERY

## 2023-05-09 PROCEDURE — 3700000000 HC ANESTHESIA ATTENDED CARE: Performed by: ORTHOPAEDIC SURGERY

## 2023-05-09 PROCEDURE — C1776 JOINT DEVICE (IMPLANTABLE): HCPCS | Performed by: ORTHOPAEDIC SURGERY

## 2023-05-09 PROCEDURE — 94150 VITAL CAPACITY TEST: CPT

## 2023-05-09 PROCEDURE — 2500000003 HC RX 250 WO HCPCS: Performed by: ORTHOPAEDIC SURGERY

## 2023-05-09 PROCEDURE — 0SPE0JZ REMOVAL OF SYNTHETIC SUBSTITUTE FROM LEFT HIP JOINT, ACETABULAR SURFACE, OPEN APPROACH: ICD-10-PCS | Performed by: ORTHOPAEDIC SURGERY

## 2023-05-09 PROCEDURE — 86900 BLOOD TYPING SEROLOGIC ABO: CPT

## 2023-05-09 PROCEDURE — 87205 SMEAR GRAM STAIN: CPT

## 2023-05-09 PROCEDURE — 87176 TISSUE HOMOGENIZATION CULTR: CPT

## 2023-05-09 PROCEDURE — 3600000005 HC SURGERY LEVEL 5 BASE: Performed by: ORTHOPAEDIC SURGERY

## 2023-05-09 PROCEDURE — 6360000002 HC RX W HCPCS: Performed by: NURSE ANESTHETIST, CERTIFIED REGISTERED

## 2023-05-09 DEVICE — SELF CENTERING BI-POLAR HEAD 28MM ID 40MM OD
Type: IMPLANTABLE DEVICE | Site: HIP | Status: FUNCTIONAL
Brand: SELF CENTERING

## 2023-05-09 DEVICE — BONE SCREW 6.5X40 SELF-TAP: Type: IMPLANTABLE DEVICE | Site: HIP | Status: FUNCTIONAL

## 2023-05-09 DEVICE — IMPLANTABLE DEVICE
Type: IMPLANTABLE DEVICE | Site: HIP | Status: FUNCTIONAL
Brand: G7 OSSEOTI ACETABULAR SHELL

## 2023-05-09 DEVICE — BONE SCREW 6.5X30 SELF-TAP: Type: IMPLANTABLE DEVICE | Site: HIP | Status: FUNCTIONAL

## 2023-05-09 DEVICE — IMPLANTABLE DEVICE: Type: IMPLANTABLE DEVICE | Site: HIP | Status: FUNCTIONAL

## 2023-05-09 RX ORDER — MORPHINE SULFATE 4 MG/ML
4 INJECTION, SOLUTION INTRAMUSCULAR; INTRAVENOUS
Status: DISCONTINUED | OUTPATIENT
Start: 2023-05-09 | End: 2023-05-11 | Stop reason: HOSPADM

## 2023-05-09 RX ORDER — ONDANSETRON 2 MG/ML
INJECTION INTRAMUSCULAR; INTRAVENOUS PRN
Status: DISCONTINUED | OUTPATIENT
Start: 2023-05-09 | End: 2023-05-09 | Stop reason: SDUPTHER

## 2023-05-09 RX ORDER — METOCLOPRAMIDE HYDROCHLORIDE 5 MG/ML
10 INJECTION INTRAMUSCULAR; INTRAVENOUS
Status: DISCONTINUED | OUTPATIENT
Start: 2023-05-09 | End: 2023-05-09 | Stop reason: HOSPADM

## 2023-05-09 RX ORDER — ACETAMINOPHEN 325 MG/1
650 TABLET ORAL EVERY 6 HOURS
Status: DISCONTINUED | OUTPATIENT
Start: 2023-05-09 | End: 2023-05-11 | Stop reason: HOSPADM

## 2023-05-09 RX ORDER — FAMOTIDINE 20 MG/1
20 TABLET, FILM COATED ORAL ONCE
Status: DISCONTINUED | OUTPATIENT
Start: 2023-05-09 | End: 2023-05-09 | Stop reason: HOSPADM

## 2023-05-09 RX ORDER — SODIUM CHLORIDE 0.9 % (FLUSH) 0.9 %
5-40 SYRINGE (ML) INJECTION PRN
Status: DISCONTINUED | OUTPATIENT
Start: 2023-05-09 | End: 2023-05-09 | Stop reason: HOSPADM

## 2023-05-09 RX ORDER — SODIUM CHLORIDE 9 MG/ML
INJECTION, SOLUTION INTRAVENOUS PRN
Status: DISCONTINUED | OUTPATIENT
Start: 2023-05-09 | End: 2023-05-09 | Stop reason: HOSPADM

## 2023-05-09 RX ORDER — PROPOFOL 10 MG/ML
INJECTION, EMULSION INTRAVENOUS PRN
Status: DISCONTINUED | OUTPATIENT
Start: 2023-05-09 | End: 2023-05-09 | Stop reason: SDUPTHER

## 2023-05-09 RX ORDER — ROCURONIUM BROMIDE 10 MG/ML
INJECTION, SOLUTION INTRAVENOUS PRN
Status: DISCONTINUED | OUTPATIENT
Start: 2023-05-09 | End: 2023-05-09 | Stop reason: SDUPTHER

## 2023-05-09 RX ORDER — SODIUM CHLORIDE, SODIUM LACTATE, POTASSIUM CHLORIDE, CALCIUM CHLORIDE 600; 310; 30; 20 MG/100ML; MG/100ML; MG/100ML; MG/100ML
INJECTION, SOLUTION INTRAVENOUS CONTINUOUS
Status: DISCONTINUED | OUTPATIENT
Start: 2023-05-09 | End: 2023-05-09 | Stop reason: HOSPADM

## 2023-05-09 RX ORDER — ROPIVACAINE HYDROCHLORIDE 2 MG/ML
INJECTION, SOLUTION EPIDURAL; INFILTRATION; PERINEURAL PRN
Status: DISCONTINUED | OUTPATIENT
Start: 2023-05-09 | End: 2023-05-09 | Stop reason: ALTCHOICE

## 2023-05-09 RX ORDER — IPRATROPIUM BROMIDE AND ALBUTEROL SULFATE 2.5; .5 MG/3ML; MG/3ML
1 SOLUTION RESPIRATORY (INHALATION)
Status: DISCONTINUED | OUTPATIENT
Start: 2023-05-09 | End: 2023-05-09 | Stop reason: HOSPADM

## 2023-05-09 RX ORDER — SODIUM CHLORIDE 0.9 % (FLUSH) 0.9 %
5-40 SYRINGE (ML) INJECTION EVERY 12 HOURS SCHEDULED
Status: DISCONTINUED | OUTPATIENT
Start: 2023-05-09 | End: 2023-05-11 | Stop reason: HOSPADM

## 2023-05-09 RX ORDER — MORPHINE SULFATE 4 MG/ML
4 INJECTION, SOLUTION INTRAMUSCULAR; INTRAVENOUS EVERY 5 MIN PRN
Status: DISCONTINUED | OUTPATIENT
Start: 2023-05-09 | End: 2023-05-09 | Stop reason: HOSPADM

## 2023-05-09 RX ORDER — OXYCODONE HYDROCHLORIDE 5 MG/1
10 TABLET ORAL EVERY 4 HOURS PRN
Status: DISCONTINUED | OUTPATIENT
Start: 2023-05-09 | End: 2023-05-11 | Stop reason: HOSPADM

## 2023-05-09 RX ORDER — ACETAMINOPHEN 500 MG
1000 TABLET ORAL ONCE
Status: COMPLETED | OUTPATIENT
Start: 2023-05-09 | End: 2023-05-09

## 2023-05-09 RX ORDER — DEXAMETHASONE SODIUM PHOSPHATE 10 MG/ML
8 INJECTION, SOLUTION INTRAMUSCULAR; INTRAVENOUS ONCE
Status: DISCONTINUED | OUTPATIENT
Start: 2023-05-09 | End: 2023-05-09 | Stop reason: HOSPADM

## 2023-05-09 RX ORDER — SODIUM CHLORIDE 0.9 % (FLUSH) 0.9 %
5-40 SYRINGE (ML) INJECTION EVERY 12 HOURS SCHEDULED
Status: DISCONTINUED | OUTPATIENT
Start: 2023-05-09 | End: 2023-05-09 | Stop reason: HOSPADM

## 2023-05-09 RX ORDER — DIPHENHYDRAMINE HYDROCHLORIDE 50 MG/ML
12.5 INJECTION INTRAMUSCULAR; INTRAVENOUS
Status: DISCONTINUED | OUTPATIENT
Start: 2023-05-09 | End: 2023-05-09 | Stop reason: HOSPADM

## 2023-05-09 RX ORDER — MEPERIDINE HYDROCHLORIDE 25 MG/ML
12.5 INJECTION INTRAMUSCULAR; INTRAVENOUS; SUBCUTANEOUS EVERY 5 MIN PRN
Status: DISCONTINUED | OUTPATIENT
Start: 2023-05-09 | End: 2023-05-09 | Stop reason: HOSPADM

## 2023-05-09 RX ORDER — LABETALOL HYDROCHLORIDE 5 MG/ML
10 INJECTION, SOLUTION INTRAVENOUS
Status: DISCONTINUED | OUTPATIENT
Start: 2023-05-09 | End: 2023-05-09 | Stop reason: HOSPADM

## 2023-05-09 RX ORDER — MIDAZOLAM HYDROCHLORIDE 2 MG/2ML
2 INJECTION, SOLUTION INTRAMUSCULAR; INTRAVENOUS
Status: DISCONTINUED | OUTPATIENT
Start: 2023-05-09 | End: 2023-05-09 | Stop reason: HOSPADM

## 2023-05-09 RX ORDER — SCOLOPAMINE TRANSDERMAL SYSTEM 1 MG/1
1 PATCH, EXTENDED RELEASE TRANSDERMAL ONCE
Status: DISCONTINUED | OUTPATIENT
Start: 2023-05-09 | End: 2023-05-09

## 2023-05-09 RX ORDER — MELOXICAM 7.5 MG/1
3.75 TABLET ORAL DAILY
Status: DISCONTINUED | OUTPATIENT
Start: 2023-05-09 | End: 2023-05-10

## 2023-05-09 RX ORDER — CELECOXIB 100 MG/1
100 CAPSULE ORAL ONCE
Status: COMPLETED | OUTPATIENT
Start: 2023-05-09 | End: 2023-05-09

## 2023-05-09 RX ORDER — SODIUM CHLORIDE 0.9 % (FLUSH) 0.9 %
5-40 SYRINGE (ML) INJECTION PRN
Status: DISCONTINUED | OUTPATIENT
Start: 2023-05-09 | End: 2023-05-11 | Stop reason: HOSPADM

## 2023-05-09 RX ORDER — FENTANYL CITRATE 50 UG/ML
INJECTION, SOLUTION INTRAMUSCULAR; INTRAVENOUS PRN
Status: DISCONTINUED | OUTPATIENT
Start: 2023-05-09 | End: 2023-05-09 | Stop reason: SDUPTHER

## 2023-05-09 RX ORDER — LIDOCAINE HYDROCHLORIDE 10 MG/ML
1 INJECTION, SOLUTION EPIDURAL; INFILTRATION; INTRACAUDAL; PERINEURAL
Status: DISCONTINUED | OUTPATIENT
Start: 2023-05-09 | End: 2023-05-09 | Stop reason: HOSPADM

## 2023-05-09 RX ORDER — MORPHINE SULFATE 2 MG/ML
2 INJECTION, SOLUTION INTRAMUSCULAR; INTRAVENOUS EVERY 5 MIN PRN
Status: DISCONTINUED | OUTPATIENT
Start: 2023-05-09 | End: 2023-05-09 | Stop reason: HOSPADM

## 2023-05-09 RX ORDER — OXYCODONE HCL 10 MG/1
10 TABLET, FILM COATED, EXTENDED RELEASE ORAL ONCE
Status: COMPLETED | OUTPATIENT
Start: 2023-05-09 | End: 2023-05-09

## 2023-05-09 RX ORDER — MORPHINE SULFATE 2 MG/ML
2 INJECTION, SOLUTION INTRAMUSCULAR; INTRAVENOUS
Status: DISCONTINUED | OUTPATIENT
Start: 2023-05-09 | End: 2023-05-11 | Stop reason: HOSPADM

## 2023-05-09 RX ORDER — LORAZEPAM 2 MG/ML
0.5 INJECTION INTRAMUSCULAR
Status: DISCONTINUED | OUTPATIENT
Start: 2023-05-09 | End: 2023-05-09 | Stop reason: HOSPADM

## 2023-05-09 RX ORDER — DROPERIDOL 2.5 MG/ML
0.62 INJECTION, SOLUTION INTRAMUSCULAR; INTRAVENOUS
Status: DISCONTINUED | OUTPATIENT
Start: 2023-05-09 | End: 2023-05-09 | Stop reason: HOSPADM

## 2023-05-09 RX ORDER — TOBRAMYCIN 3 MG/ML
1 SOLUTION/ DROPS OPHTHALMIC 2 TIMES DAILY
Status: DISCONTINUED | OUTPATIENT
Start: 2023-05-09 | End: 2023-05-11 | Stop reason: HOSPADM

## 2023-05-09 RX ORDER — LIDOCAINE HYDROCHLORIDE 10 MG/ML
INJECTION, SOLUTION INFILTRATION; PERINEURAL PRN
Status: DISCONTINUED | OUTPATIENT
Start: 2023-05-09 | End: 2023-05-09 | Stop reason: SDUPTHER

## 2023-05-09 RX ORDER — OXYCODONE HYDROCHLORIDE 5 MG/1
5 TABLET ORAL EVERY 4 HOURS PRN
Status: DISCONTINUED | OUTPATIENT
Start: 2023-05-09 | End: 2023-05-11 | Stop reason: HOSPADM

## 2023-05-09 RX ORDER — EPHEDRINE SULFATE 50 MG/ML
INJECTION, SOLUTION INTRAVENOUS PRN
Status: DISCONTINUED | OUTPATIENT
Start: 2023-05-09 | End: 2023-05-09 | Stop reason: SDUPTHER

## 2023-05-09 RX ORDER — TRANEXAMIC ACID 650 MG/1
1950 TABLET ORAL
Status: COMPLETED | OUTPATIENT
Start: 2023-05-09 | End: 2023-05-09

## 2023-05-09 RX ORDER — SODIUM CHLORIDE 9 MG/ML
INJECTION, SOLUTION INTRAVENOUS PRN
Status: DISCONTINUED | OUTPATIENT
Start: 2023-05-09 | End: 2023-05-11 | Stop reason: HOSPADM

## 2023-05-09 RX ADMIN — PROPOFOL 200 MG: 10 INJECTION, EMULSION INTRAVENOUS at 13:33

## 2023-05-09 RX ADMIN — LIDOCAINE HYDROCHLORIDE 50 MG: 10 INJECTION, SOLUTION INFILTRATION; PERINEURAL at 13:33

## 2023-05-09 RX ADMIN — ONDANSETRON 4 MG: 2 INJECTION INTRAMUSCULAR; INTRAVENOUS at 16:29

## 2023-05-09 RX ADMIN — OXYCODONE 10 MG: 5 TABLET ORAL at 18:00

## 2023-05-09 RX ADMIN — FENTANYL CITRATE 50 MCG: 0.05 INJECTION, SOLUTION INTRAMUSCULAR; INTRAVENOUS at 14:57

## 2023-05-09 RX ADMIN — MELOXICAM 3.75 MG: 7.5 TABLET ORAL at 21:23

## 2023-05-09 RX ADMIN — TOBRAMYCIN OPHTHALMIC SOLUTION 1 DROP: 3 SOLUTION/ DROPS OPHTHALMIC at 21:23

## 2023-05-09 RX ADMIN — EPHEDRINE SULFATE 10 MG: 50 INJECTION INTRAMUSCULAR; INTRAVENOUS; SUBCUTANEOUS at 15:31

## 2023-05-09 RX ADMIN — ACETAMINOPHEN 650 MG: 325 TABLET ORAL at 18:00

## 2023-05-09 RX ADMIN — CELECOXIB 100 MG: 100 CAPSULE ORAL at 11:57

## 2023-05-09 RX ADMIN — Medication 3000 MG: at 13:35

## 2023-05-09 RX ADMIN — ACETAMINOPHEN 1000 MG: 500 TABLET ORAL at 11:57

## 2023-05-09 RX ADMIN — FENTANYL CITRATE 50 MCG: 0.05 INJECTION, SOLUTION INTRAMUSCULAR; INTRAVENOUS at 16:11

## 2023-05-09 RX ADMIN — SODIUM CHLORIDE, SODIUM LACTATE, POTASSIUM CHLORIDE, AND CALCIUM CHLORIDE: 600; 310; 30; 20 INJECTION, SOLUTION INTRAVENOUS at 14:57

## 2023-05-09 RX ADMIN — FENTANYL CITRATE 50 MCG: 0.05 INJECTION, SOLUTION INTRAMUSCULAR; INTRAVENOUS at 14:14

## 2023-05-09 RX ADMIN — ROCURONIUM BROMIDE 50 MG: 10 INJECTION, SOLUTION INTRAVENOUS at 13:33

## 2023-05-09 RX ADMIN — EPHEDRINE SULFATE 10 MG: 50 INJECTION INTRAMUSCULAR; INTRAVENOUS; SUBCUTANEOUS at 14:35

## 2023-05-09 RX ADMIN — OXYCODONE HYDROCHLORIDE 10 MG: 10 TABLET, FILM COATED, EXTENDED RELEASE ORAL at 11:57

## 2023-05-09 RX ADMIN — TRANEXAMIC ACID 1950 MG: 650 TABLET ORAL at 11:57

## 2023-05-09 RX ADMIN — FENTANYL CITRATE 50 MCG: 0.05 INJECTION, SOLUTION INTRAMUSCULAR; INTRAVENOUS at 14:00

## 2023-05-09 RX ADMIN — CEFAZOLIN 3000 MG: 10 INJECTION, POWDER, FOR SOLUTION INTRAVENOUS at 21:26

## 2023-05-09 RX ADMIN — SODIUM CHLORIDE, PRESERVATIVE FREE 10 ML: 5 INJECTION INTRAVENOUS at 21:23

## 2023-05-09 RX ADMIN — SODIUM CHLORIDE, SODIUM LACTATE, POTASSIUM CHLORIDE, AND CALCIUM CHLORIDE: 600; 310; 30; 20 INJECTION, SOLUTION INTRAVENOUS at 11:56

## 2023-05-09 RX ADMIN — FENTANYL CITRATE 50 MCG: 0.05 INJECTION, SOLUTION INTRAMUSCULAR; INTRAVENOUS at 13:33

## 2023-05-09 RX ADMIN — ASPIRIN 325 MG: 325 TABLET, COATED ORAL at 21:23

## 2023-05-09 RX ADMIN — EPHEDRINE SULFATE 10 MG: 50 INJECTION INTRAMUSCULAR; INTRAVENOUS; SUBCUTANEOUS at 13:53

## 2023-05-09 RX ADMIN — ROCURONIUM BROMIDE 30 MG: 10 INJECTION, SOLUTION INTRAVENOUS at 14:54

## 2023-05-09 ASSESSMENT — ENCOUNTER SYMPTOMS: SHORTNESS OF BREATH: 0

## 2023-05-09 ASSESSMENT — LIFESTYLE VARIABLES: SMOKING_STATUS: 0

## 2023-05-09 ASSESSMENT — PAIN SCALES - GENERAL
PAINLEVEL_OUTOF10: 5
PAINLEVEL_OUTOF10: 9

## 2023-05-09 NOTE — OP NOTE
OPERATIVE NOTE    Patient:  Shane Hernandez    Date:  5/9/2023    Medical Record Number:  [id number]    Primary Pre-Operative Diagnosis: Anterior left hip pain after hip arthroplasty due to uncovered acetabulum anteriorly. Primary Post-Operative Diagnosis:  Same    Procedure: Left acetabular revision to a size 66 press-fit G7 acetabular component by Vinayak Dennis. 2 screws were used. We also used a 4 mm offset 40 diameter poly-. This was mated up with a +10.528 mm cobalt chrome head from [employer] with a 1214 taper inside of a 40 mm bipolar outer head by [employer]. Implants:   Implant Name Type Inv. Item Serial No.  Lot No. LRB No. Used Action   SHELL ACET SZ I [id number] HIP OSSEOTI [address] G7 - [id number]  SHELL ACET SZ I [id number] HIP OSSEOTI MH G7  [facility] [id number] Left 1 Implanted   BONE SCREW 6.5X30 SELF-TAP - [id number]  BONE SCREW 6.5X30 SELF-TAP  [employer]- [id number] Left 1 Implanted   BONE SCREW 6.5X40 SELF-TAP - [id number]  BONE SCREW 6.5X40 SELF-TAP  [employer]- [id number] Left 1 Implanted   IMPLANT RECORD       1 Implanted       Assistant: Frances Coyne      Anesthesia: General    Estimated Blood Loss: 800 mL    Tourniquet Time: None    Specimens: 3 culture sent    Complications:  None    Findings:  As above      Procedure:  Patient was brought into the operating room and general endotracheal anesthetic was placed. Patient was placed supine on the [place] table. The extremity was prepped with chlorhexidine alcohol and sterilely draped. Old anterior incision was incised with a scalpel. Generous fasciocutaneous flaps were developed down to the fascia. The fascia was incised. The sartorius was elevated medially. The tensor was somewhat atrophied. Placed a cobra retractor on the lateral femoral neck and 1 around the medial femoral neck. Anterior femoral capsulectomy was performed.   Retractors were then placed around the acetabulum further capsulectomy

## 2023-05-09 NOTE — H&P
[facility] [place] ([facility]) Pre-Operative History and Physical    Patient Name: Leyla Lloyd  : 1955        Chief Complaint: Left hip pain  History of Present Illness: This patient has had ongoing pain for several weeks/months that has been unresponsive to conservative care which has included injection, therapy, activity modification and presents now for surgery. Pain is deep in the groin buttock extending to the knee at times. Pain is a severe ache that is worse with walking, standing and getting up and down. Pain is somewhat improved with over the counter medication. Post left total hip arthroplasty few years ago by Dr. Barbara Quevedo in [place]. Is a Richa hip. Saw more than a year ago for iliopsoas bursitis. His cup was a little bit uncovered. We did a iliopsoas tendon release and his symptoms got better for several months and recently come back. Pain is in his groin with lifting his leg. He has had a fluoroscopy guided injection has given him temporary improvement. CT scan shows that the acetabular component is uncovered anteriorly. Past Medical History:       Diagnosis Date    Arthritis     CAD (coronary artery disease)     stent per dr. Clinton Sanchez    GERD (gastroesophageal reflux disease)     Hip pain     Hyperlipidemia     Hypertension     Pain management     Dr. Marga Richey     Past Surgical History:       Procedure Laterality Date    ANKLE SURGERY Right     HARDWARE    BACK SURGERY  2007    BACK SURGERY  2010    BACK SURGERY      CORONARY ANGIOPLASTY WITH STENT PLACEMENT  2019    dr Clinton Sanchez (x 1)    HAND SURGERY Right     HARDWARE    JOINT REPLACEMENT Left 2009    THR    JOINT REPLACEMENT Right 2013    THR    WI SUTR INFRAPATELLAR TDN 2 RCNSTJ W/FSCAL/TDN GRF Left 2018    HIP ILISPOAS TENDON RELEASE performed by Rosaura Hong MD at [address] [place] [address]      SINUS SURGERY         Medications:   Prior to Admission medications    Medication Sig Start Date End Date Taking?  Authorizing Provider

## 2023-05-09 NOTE — ANESTHESIA POSTPROCEDURE EVALUATION
Department of Anesthesiology  Postprocedure Note    Patient: Lemuel Stephens  MRN: [id number]  YOB: 1955  Date of evaluation: 5/9/2023      Procedure Summary     Date: 05/09/23 Room / Location: [address]    Anesthesia Start: 1329 Anesthesia Stop: 1654    Procedure: LEFT HIP ACETABULAR REVISION (Left) Diagnosis:       Other instability, left hip      (Other instability, left hip [M25.352])    Surgeons: Artemio Haas MD Responsible Provider: ARCENIO Wheatley CRNA    Anesthesia Type: general ASA Status: 3          Anesthesia Type: No value filed.     Cr Phase I:      Cr Phase II:        Anesthesia Post Evaluation    Patient location during evaluation: PACU  Patient participation: complete - patient participated  Level of consciousness: sleepy but conscious  Pain score: 0  Airway patency: patent  Nausea & Vomiting: no nausea and no vomiting  Complications: no  Cardiovascular status: hemodynamically stable  Respiratory status: acceptable, spontaneous ventilation and room air  Hydration status: euvolemic

## 2023-05-09 NOTE — ANESTHESIA PRE PROCEDURE
Last Rate Last Admin    scopolamine (TRANSDERM-SCOP) transdermal patch 1 patch  1 patch TransDERmal Once Fariha Rivers MD   1 patch at 05/09/23 1157    dexamethasone (PF) (DECADRON) injection 8 mg  8 mg IntraVENous Once Fariha Rivers MD        lactated ringers IV soln infusion   IntraVENous Continuous Fariha Rivers  mL/hr at 05/09/23 1156 New Bag at 05/09/23 1156    sodium chloride flush 0.9 % injection 5-40 mL  5-40 mL IntraVENous 2 times per day Fariha Rivers MD        sodium chloride flush 0.9 % injection 5-40 mL  5-40 mL IntraVENous PRN Fariha Rivers MD        0.9 % sodium chloride infusion   IntraVENous PRN Fariha Rivers MD        ceFAZolin (ANCEF) 3000 mg in sodium chloride 0.9% 100 mL IVPB  3,000 mg IntraVENous On Call to [address], [place]           Allergies:  No Known Allergies    Problem List:  There is no problem list on file for this patient.       Past Medical History:        Diagnosis Date    Arthritis     CAD (coronary artery disease)     stent per dr. Reji Leahy GERD (gastroesophageal reflux disease)     Hip pain     Hyperlipidemia     Hypertension     Pain management     Dr. Enrique Causey       Past Surgical History:        Procedure Laterality Date    ANKLE SURGERY Right     HARDWARE    BACK SURGERY  2007    BACK SURGERY  2010    BACK SURGERY  2012    CORONARY ANGIOPLASTY WITH STENT PLACEMENT  2019    dr Andrea Mendez (x 1)    HAND SURGERY Right     HARDWARE    JOINT REPLACEMENT Left 2009    THR    JOINT REPLACEMENT Right 2013    THR    DC SUTR INFRAPATELLAR TDN 2 RCNSTJ W/FSCAL/TDN GRF Left 07/19/2018    HIP ILISPOAS TENDON RELEASE performed by Fariha Rivers MD at [address] [place] [address] SEPTOPLASTY      SINUS SURGERY         Social History:    Social History     Tobacco Use    Smoking status: Former     Packs/day: 2.00     Years: 25.00     Pack years: 50.00     Types: Cigarettes     Quit date: 7/16/1994

## 2023-05-10 LAB
ANION GAP SERPL CALCULATED.3IONS-SCNC: 10 MMOL/L (ref 7–19)
BUN SERPL-MCNC: 21 MG/DL (ref 8–23)
CALCIUM SERPL-MCNC: 8.3 MG/DL (ref 8.8–10.2)
CHLORIDE SERPL-SCNC: 105 MMOL/L (ref 98–111)
CO2 SERPL-SCNC: 24 MMOL/L (ref 22–29)
CREAT SERPL-MCNC: 1 MG/DL (ref 0.5–1.2)
GLUCOSE SERPL-MCNC: 120 MG/DL (ref 74–109)
HCT VFR BLD AUTO: 41.4 % (ref 42–52)
HGB BLD-MCNC: 13.8 G/DL (ref 14–18)
POTASSIUM SERPL-SCNC: 4.9 MMOL/L (ref 3.5–5)
SODIUM SERPL-SCNC: 139 MMOL/L (ref 136–145)

## 2023-05-10 PROCEDURE — 97116 GAIT TRAINING THERAPY: CPT

## 2023-05-10 PROCEDURE — 97165 OT EVAL LOW COMPLEX 30 MIN: CPT

## 2023-05-10 PROCEDURE — 97162 PT EVAL MOD COMPLEX 30 MIN: CPT

## 2023-05-10 PROCEDURE — 2580000003 HC RX 258: Performed by: ORTHOPAEDIC SURGERY

## 2023-05-10 PROCEDURE — 97535 SELF CARE MNGMENT TRAINING: CPT

## 2023-05-10 PROCEDURE — 94760 N-INVAS EAR/PLS OXIMETRY 1: CPT

## 2023-05-10 PROCEDURE — 6370000000 HC RX 637 (ALT 250 FOR IP): Performed by: PHYSICIAN ASSISTANT

## 2023-05-10 PROCEDURE — 80048 BASIC METABOLIC PNL TOTAL CA: CPT

## 2023-05-10 PROCEDURE — 36415 COLL VENOUS BLD VENIPUNCTURE: CPT

## 2023-05-10 PROCEDURE — 85018 HEMOGLOBIN: CPT

## 2023-05-10 PROCEDURE — 97530 THERAPEUTIC ACTIVITIES: CPT

## 2023-05-10 PROCEDURE — 85014 HEMATOCRIT: CPT

## 2023-05-10 PROCEDURE — 6360000002 HC RX W HCPCS: Performed by: ORTHOPAEDIC SURGERY

## 2023-05-10 PROCEDURE — 6370000000 HC RX 637 (ALT 250 FOR IP): Performed by: ORTHOPAEDIC SURGERY

## 2023-05-10 PROCEDURE — 1210000000 HC MED SURG R&B

## 2023-05-10 RX ORDER — CETIRIZINE HYDROCHLORIDE 10 MG/1
10 TABLET ORAL DAILY
Status: DISCONTINUED | OUTPATIENT
Start: 2023-05-10 | End: 2023-05-11 | Stop reason: HOSPADM

## 2023-05-10 RX ORDER — DOCUSATE SODIUM 100 MG/1
100 CAPSULE, LIQUID FILLED ORAL 3 TIMES DAILY
Status: DISCONTINUED | OUTPATIENT
Start: 2023-05-10 | End: 2023-05-11 | Stop reason: HOSPADM

## 2023-05-10 RX ORDER — PRASUGREL 10 MG/1
5 TABLET, FILM COATED ORAL DAILY
Status: DISCONTINUED | OUTPATIENT
Start: 2023-05-10 | End: 2023-05-10

## 2023-05-10 RX ORDER — PANTOPRAZOLE SODIUM 40 MG/1
40 TABLET, DELAYED RELEASE ORAL NIGHTLY
Status: DISCONTINUED | OUTPATIENT
Start: 2023-05-10 | End: 2023-05-10

## 2023-05-10 RX ORDER — GABAPENTIN 300 MG/1
300 CAPSULE ORAL 2 TIMES DAILY
Status: DISCONTINUED | OUTPATIENT
Start: 2023-05-10 | End: 2023-05-11 | Stop reason: HOSPADM

## 2023-05-10 RX ORDER — METOPROLOL TARTRATE 50 MG/1
50 TABLET, FILM COATED ORAL NIGHTLY
Status: DISCONTINUED | OUTPATIENT
Start: 2023-05-10 | End: 2023-05-11 | Stop reason: HOSPADM

## 2023-05-10 RX ORDER — VITAMIN B COMPLEX
2000 TABLET ORAL DAILY
Status: DISCONTINUED | OUTPATIENT
Start: 2023-05-10 | End: 2023-05-11 | Stop reason: HOSPADM

## 2023-05-10 RX ORDER — POLYETHYLENE GLYCOL 3350 17 G/17G
17 POWDER, FOR SOLUTION ORAL 2 TIMES DAILY
Status: DISCONTINUED | OUTPATIENT
Start: 2023-05-10 | End: 2023-05-11 | Stop reason: HOSPADM

## 2023-05-10 RX ORDER — ROSUVASTATIN CALCIUM 20 MG/1
20 TABLET, COATED ORAL NIGHTLY
Status: DISCONTINUED | OUTPATIENT
Start: 2023-05-10 | End: 2023-05-11 | Stop reason: HOSPADM

## 2023-05-10 RX ORDER — PRASUGREL 10 MG/1
5 TABLET, FILM COATED ORAL DAILY
Status: DISCONTINUED | OUTPATIENT
Start: 2023-05-10 | End: 2023-05-11 | Stop reason: HOSPADM

## 2023-05-10 RX ORDER — CELECOXIB 200 MG/1
200 CAPSULE ORAL DAILY
Status: DISCONTINUED | OUTPATIENT
Start: 2023-05-10 | End: 2023-05-11 | Stop reason: HOSPADM

## 2023-05-10 RX ORDER — PANTOPRAZOLE SODIUM 40 MG/1
40 TABLET, DELAYED RELEASE ORAL
Status: DISCONTINUED | OUTPATIENT
Start: 2023-05-10 | End: 2023-05-11 | Stop reason: HOSPADM

## 2023-05-10 RX ADMIN — GABAPENTIN 300 MG: 300 CAPSULE ORAL at 22:36

## 2023-05-10 RX ADMIN — OXYCODONE 10 MG: 5 TABLET ORAL at 16:34

## 2023-05-10 RX ADMIN — CELECOXIB 200 MG: 200 CAPSULE ORAL at 07:50

## 2023-05-10 RX ADMIN — DOCUSATE SODIUM 100 MG: 100 CAPSULE, LIQUID FILLED ORAL at 07:51

## 2023-05-10 RX ADMIN — ACETAMINOPHEN 650 MG: 325 TABLET ORAL at 00:06

## 2023-05-10 RX ADMIN — POLYETHYLENE GLYCOL 3350 17 G: 17 POWDER, FOR SOLUTION ORAL at 07:49

## 2023-05-10 RX ADMIN — OXYCODONE 10 MG: 5 TABLET ORAL at 22:36

## 2023-05-10 RX ADMIN — OXYCODONE 10 MG: 5 TABLET ORAL at 11:50

## 2023-05-10 RX ADMIN — GABAPENTIN 300 MG: 300 CAPSULE ORAL at 09:02

## 2023-05-10 RX ADMIN — PRASUGREL 5 MG: 10 TABLET, FILM COATED ORAL at 07:50

## 2023-05-10 RX ADMIN — TOBRAMYCIN OPHTHALMIC SOLUTION 1 DROP: 3 SOLUTION/ DROPS OPHTHALMIC at 22:37

## 2023-05-10 RX ADMIN — CEFAZOLIN 3000 MG: 10 INJECTION, POWDER, FOR SOLUTION INTRAVENOUS at 04:06

## 2023-05-10 RX ADMIN — TOBRAMYCIN OPHTHALMIC SOLUTION 1 DROP: 3 SOLUTION/ DROPS OPHTHALMIC at 08:03

## 2023-05-10 RX ADMIN — Medication 2000 UNITS: at 07:51

## 2023-05-10 RX ADMIN — ACETAMINOPHEN 650 MG: 325 TABLET ORAL at 19:21

## 2023-05-10 RX ADMIN — METOPROLOL TARTRATE 50 MG: 50 TABLET, FILM COATED ORAL at 22:36

## 2023-05-10 RX ADMIN — ASPIRIN 325 MG: 325 TABLET, COATED ORAL at 22:36

## 2023-05-10 RX ADMIN — ACETAMINOPHEN 650 MG: 325 TABLET ORAL at 07:51

## 2023-05-10 RX ADMIN — DOCUSATE SODIUM 100 MG: 100 CAPSULE, LIQUID FILLED ORAL at 22:37

## 2023-05-10 RX ADMIN — OXYCODONE 10 MG: 5 TABLET ORAL at 07:51

## 2023-05-10 RX ADMIN — CETIRIZINE HYDROCHLORIDE 10 MG: 10 TABLET, FILM COATED ORAL at 07:51

## 2023-05-10 RX ADMIN — ROSUVASTATIN CALCIUM 20 MG: 20 TABLET, COATED ORAL at 22:36

## 2023-05-10 RX ADMIN — SODIUM CHLORIDE, PRESERVATIVE FREE 10 ML: 5 INJECTION INTRAVENOUS at 22:38

## 2023-05-10 RX ADMIN — ASPIRIN 325 MG: 325 TABLET, COATED ORAL at 09:02

## 2023-05-10 RX ADMIN — ACETAMINOPHEN 650 MG: 325 TABLET ORAL at 11:46

## 2023-05-10 RX ADMIN — PANTOPRAZOLE SODIUM 40 MG: 40 TABLET, DELAYED RELEASE ORAL at 08:01

## 2023-05-10 RX ADMIN — OXYCODONE 10 MG: 5 TABLET ORAL at 04:06

## 2023-05-10 RX ADMIN — DOCUSATE SODIUM 100 MG: 100 CAPSULE, LIQUID FILLED ORAL at 14:34

## 2023-05-10 ASSESSMENT — PAIN DESCRIPTION - DESCRIPTORS
DESCRIPTORS: DULL;SHARP
DESCRIPTORS: ACHING
DESCRIPTORS: ACHING
DESCRIPTORS: ACHING;SORE
DESCRIPTORS: ACHING
DESCRIPTORS: ACHING
DESCRIPTORS: THROBBING

## 2023-05-10 ASSESSMENT — PAIN DESCRIPTION - ORIENTATION
ORIENTATION: LEFT

## 2023-05-10 ASSESSMENT — PAIN SCALES - GENERAL
PAINLEVEL_OUTOF10: 5
PAINLEVEL_OUTOF10: 7
PAINLEVEL_OUTOF10: 0
PAINLEVEL_OUTOF10: 9
PAINLEVEL_OUTOF10: 6
PAINLEVEL_OUTOF10: 7
PAINLEVEL_OUTOF10: 7
PAINLEVEL_OUTOF10: 5
PAINLEVEL_OUTOF10: 8
PAINLEVEL_OUTOF10: 8

## 2023-05-10 ASSESSMENT — PAIN - FUNCTIONAL ASSESSMENT
PAIN_FUNCTIONAL_ASSESSMENT: PREVENTS OR INTERFERES SOME ACTIVE ACTIVITIES AND ADLS
PAIN_FUNCTIONAL_ASSESSMENT: ACTIVITIES ARE NOT PREVENTED
PAIN_FUNCTIONAL_ASSESSMENT: PREVENTS OR INTERFERES SOME ACTIVE ACTIVITIES AND ADLS

## 2023-05-10 ASSESSMENT — PAIN DESCRIPTION - LOCATION
LOCATION: HIP
LOCATION: HIP;BACK
LOCATION: HIP
LOCATION: HIP

## 2023-05-10 NOTE — ANESTHESIA POST-OP
Anesthesia Post-op Note    Creig Ping    Procedure(s):  LEFT HIP ACETABULAR REVISION    Patient location: [facility] Surgical Floor    Anesthesia type: general    Post-op pain: adequate analgesia    Post-op assessment: up to chair with therapy, utilizing IS, no recall or n/v  Post-op vital signs: stable    Level of consciousness: awake, alert, and oriented    Complications:  none      Yoon OLIVA

## 2023-05-10 NOTE — CARE COORDINATION
Received call from Maryam at [facility]. They are unable to accept due to being out of network with insurance. Abelardo Brooks also out of network with insurance. Referral called to [facility] AND [facility]. Spoke with Heri Mcmahan. They are unable to accept due to staffing. No other [place] agencies in patient's area. Unable to set up [place] services  Electronically signed by Wen Wilson on 5/10/23 at 1:23 PM CDT      Spoke with patient regarding MD orders for [place] services. Pt agreeable and chose [facility]. Referral faxed and PENDING. Please notify [facility] [place] when patient discharges and Fax DC Summary,  DC med list and any new [place] orders. P. [phone]  F. [phone]  The Patient and/or patient representative was provided with a choice of provider and agrees   with the discharge plan. [x] Yes [] No    Freedom of choice list was provided with basic dialogue that supports the patient's individualized plan of care/goals, treatment preferences and shares the quality data associated with the providers.  [x] Yes [] No  Electronically signed by Wen Wilson on 5/10/23 at 10:20 AM CDT

## 2023-05-10 NOTE — CARE COORDINATION
Dede Cooper RN Ortho Navigator  [phone]     Patient would like dme item to be delivered to Room #537. Please call if you have any questions. Electronically signed by Noa Pastor RN on 5/10/2023 at 9:26 AM  Britta Mays  5/9/2023 11:31 AM   Admission  Description: 79year old male Department: [facility] Surg Services     Patient Demographics    Name Patient ID SSN Gender Identity Birth Date   Jerome López [id number] Male [birth year] (79 yrs)     Address Phone Email     [address] [id number] (D)   [phone] (M) [email or web address]       Reg Status PCP Date Last Verified Next Review Date    Verified Saturnino Soto MD  [phone] 05/09/23 06/08/23      Insurance Payors as of 5/10/2023    MEDICARE    Plan: MEDICARE PART A Member: [id number] Effective from: 1/1/2018   Subscriber: Sravanthi MARVIN Subscriber ID: [id number] Guarantor: Daniel Hernandez     BCBS    Plan: [facility] Group: [id number] Member: [id number]   Effective from: 1/1/2018 Subscriber: Mohsen Swain Subscriber ID: [id number]   Guarantor: Sravanthi MARVIN     Patient Contacts    Name Relation Home Work Mobile   [place] Spouse [phone]     Electronically signed by Noa Pastor RN on 5/10/2023 at 9:28 AM

## 2023-05-10 NOTE — CONSULTS
Referring Provider: Dr. Cecil Donahue  Reason for Consultation: Medical management    Patient Care Team:  Zechariah Morgan MD as PCP - General    Chief complaint hip pain    Subjective . History of present illness: The patient presents to the orthopedic service for MERCEDES. They have failed outpatient conservative treatment of NSAIDS, muscle relaxer, physical therapy and opioid pain meds. The pain is affecting their activities of daily living and they have chosen to undergo surgical correction. We have been asked by the attending physician to provide medical consultation in the nany-operative phase. the patient understands our role in their healthcare during this hospitalization. All questions were encouraged and answered to the best of our ability. The postoperative pain is as expected. There are no other participating or relieving factors noted.        REVIEW OF SYSTEMS:    CONSTITUTIONAL:  Negative for anorexia, chills, fevers, night sweats and weight loss  EYES:  negative for eye dryness, icterus and redness  HEENT:   negative for dental problems, epistaxis, facial trauma and thrush  RESPIRATORY:  negative for chest tightness, cough, dyspnea on exertion, pneumonia and sputum  CARDIOVASCULAR: negative for chest pain, dyspnea, exertional chest pressure/discomfort, irregular heart beat, palpitations, paroxysmal nocturnal dyspnea and syncope  GASTROINTESTINAL:  negative for abdominal pain, hematemesis, jaundice, melena and rectal bleeding  MUSCULOSKELETAL:  negative for muscle weakness, myalgias and neck pain, chronic joint pain noted  NEUROLOGICAL:   negative for dizziness, headaches, seizures, speech problems, tremors and vertigo  INTEGUMENT: negative for pruritus, rash, skin color change and skin lesion(s)   A Full 14 point review of systems is negative outside those listed above and in the HPI      History    Past Medical History:   Diagnosis Date    Arthritis     CAD (coronary artery disease)     stent per dr. Lon Lopez

## 2023-05-11 VITALS
WEIGHT: 295 LBS | OXYGEN SATURATION: 93 % | SYSTOLIC BLOOD PRESSURE: 119 MMHG | HEART RATE: 69 BPM | BODY MASS INDEX: 37.86 KG/M2 | HEIGHT: 74 IN | RESPIRATION RATE: 16 BRPM | TEMPERATURE: 98.4 F | DIASTOLIC BLOOD PRESSURE: 66 MMHG

## 2023-05-11 LAB
ANION GAP SERPL CALCULATED.3IONS-SCNC: 9 MMOL/L (ref 7–19)
BUN SERPL-MCNC: 15 MG/DL (ref 8–23)
CALCIUM SERPL-MCNC: 8.7 MG/DL (ref 8.8–10.2)
CHLORIDE SERPL-SCNC: 102 MMOL/L (ref 98–111)
CO2 SERPL-SCNC: 26 MMOL/L (ref 22–29)
CREAT SERPL-MCNC: 0.9 MG/DL (ref 0.5–1.2)
FERRITIN SERPL-MCNC: 365.3 NG/ML (ref 30–400)
FOLATE SERPL-MCNC: 10.7 NG/ML (ref 4.5–32.2)
GLUCOSE SERPL-MCNC: 128 MG/DL (ref 74–109)
HCT VFR BLD AUTO: 36.3 % (ref 42–52)
HGB BLD-MCNC: 12.2 G/DL (ref 14–18)
IRON SATN MFR SERPL: 15 % (ref 14–50)
IRON SERPL-MCNC: 28 UG/DL (ref 59–158)
POTASSIUM SERPL-SCNC: 4.5 MMOL/L (ref 3.5–5)
SODIUM SERPL-SCNC: 137 MMOL/L (ref 136–145)
TIBC SERPL-MCNC: 189 UG/DL (ref 250–400)
VIT B12 SERPL-MCNC: 193 PG/ML (ref 211–946)

## 2023-05-11 PROCEDURE — 83550 IRON BINDING TEST: CPT

## 2023-05-11 PROCEDURE — 97535 SELF CARE MNGMENT TRAINING: CPT

## 2023-05-11 PROCEDURE — 36415 COLL VENOUS BLD VENIPUNCTURE: CPT

## 2023-05-11 PROCEDURE — 80048 BASIC METABOLIC PNL TOTAL CA: CPT

## 2023-05-11 PROCEDURE — 85014 HEMATOCRIT: CPT

## 2023-05-11 PROCEDURE — 83540 ASSAY OF IRON: CPT

## 2023-05-11 PROCEDURE — 82728 ASSAY OF FERRITIN: CPT

## 2023-05-11 PROCEDURE — 6370000000 HC RX 637 (ALT 250 FOR IP): Performed by: PHYSICIAN ASSISTANT

## 2023-05-11 PROCEDURE — 6370000000 HC RX 637 (ALT 250 FOR IP): Performed by: ORTHOPAEDIC SURGERY

## 2023-05-11 PROCEDURE — 97116 GAIT TRAINING THERAPY: CPT

## 2023-05-11 PROCEDURE — 82607 VITAMIN B-12: CPT

## 2023-05-11 PROCEDURE — 82746 ASSAY OF FOLIC ACID SERUM: CPT

## 2023-05-11 PROCEDURE — 94760 N-INVAS EAR/PLS OXIMETRY 1: CPT

## 2023-05-11 PROCEDURE — 2580000003 HC RX 258: Performed by: ORTHOPAEDIC SURGERY

## 2023-05-11 PROCEDURE — 85018 HEMOGLOBIN: CPT

## 2023-05-11 RX ORDER — ASPIRIN 81 MG/1
81 TABLET ORAL 2 TIMES DAILY
Qty: 60 TABLET | Refills: 0 | Status: SHIPPED | OUTPATIENT
Start: 2023-05-11

## 2023-05-11 RX ORDER — OXYCODONE HYDROCHLORIDE 10 MG/1
10 TABLET ORAL EVERY 4 HOURS PRN
Qty: 30 TABLET | Refills: 0 | Status: SHIPPED | OUTPATIENT
Start: 2023-05-11 | End: 2023-05-14

## 2023-05-11 RX ADMIN — Medication 2000 UNITS: at 09:46

## 2023-05-11 RX ADMIN — GABAPENTIN 300 MG: 300 CAPSULE ORAL at 09:46

## 2023-05-11 RX ADMIN — ACETAMINOPHEN 650 MG: 325 TABLET ORAL at 11:48

## 2023-05-11 RX ADMIN — ASPIRIN 325 MG: 325 TABLET, COATED ORAL at 09:46

## 2023-05-11 RX ADMIN — ACETAMINOPHEN 650 MG: 325 TABLET ORAL at 06:16

## 2023-05-11 RX ADMIN — OXYCODONE 10 MG: 5 TABLET ORAL at 11:48

## 2023-05-11 RX ADMIN — SODIUM CHLORIDE, PRESERVATIVE FREE 10 ML: 5 INJECTION INTRAVENOUS at 09:54

## 2023-05-11 RX ADMIN — PANTOPRAZOLE SODIUM 40 MG: 40 TABLET, DELAYED RELEASE ORAL at 06:16

## 2023-05-11 RX ADMIN — CETIRIZINE HYDROCHLORIDE 10 MG: 10 TABLET, FILM COATED ORAL at 09:46

## 2023-05-11 RX ADMIN — OXYCODONE 10 MG: 5 TABLET ORAL at 06:19

## 2023-05-11 RX ADMIN — OXYCODONE 10 MG: 5 TABLET ORAL at 15:39

## 2023-05-11 RX ADMIN — POLYETHYLENE GLYCOL 3350 17 G: 17 POWDER, FOR SOLUTION ORAL at 09:47

## 2023-05-11 RX ADMIN — PRASUGREL 5 MG: 10 TABLET, FILM COATED ORAL at 09:47

## 2023-05-11 RX ADMIN — DOCUSATE SODIUM 100 MG: 100 CAPSULE, LIQUID FILLED ORAL at 09:46

## 2023-05-11 RX ADMIN — CELECOXIB 200 MG: 200 CAPSULE ORAL at 09:46

## 2023-05-11 RX ADMIN — TOBRAMYCIN OPHTHALMIC SOLUTION 1 DROP: 3 SOLUTION/ DROPS OPHTHALMIC at 09:52

## 2023-05-11 ASSESSMENT — PAIN SCALES - GENERAL
PAINLEVEL_OUTOF10: 6
PAINLEVEL_OUTOF10: 8
PAINLEVEL_OUTOF10: 7
PAINLEVEL_OUTOF10: 4
PAINLEVEL_OUTOF10: 4

## 2023-05-11 ASSESSMENT — PAIN DESCRIPTION - LOCATION
LOCATION: HIP
LOCATION: BACK;HIP

## 2023-05-11 ASSESSMENT — ENCOUNTER SYMPTOMS
SHORTNESS OF BREATH: 0
NAUSEA: 0
WHEEZING: 0
VOMITING: 0
STRIDOR: 0
ABDOMINAL PAIN: 0
CHEST TIGHTNESS: 0
COUGH: 0
DIARRHEA: 0
ABDOMINAL DISTENTION: 0

## 2023-05-11 ASSESSMENT — PAIN DESCRIPTION - FREQUENCY: FREQUENCY: CONTINUOUS

## 2023-05-11 ASSESSMENT — PAIN - FUNCTIONAL ASSESSMENT
PAIN_FUNCTIONAL_ASSESSMENT: PREVENTS OR INTERFERES SOME ACTIVE ACTIVITIES AND ADLS

## 2023-05-11 ASSESSMENT — PAIN DESCRIPTION - DESCRIPTORS
DESCRIPTORS: DULL
DESCRIPTORS: DULL
DESCRIPTORS: ACHING
DESCRIPTORS: ACHING

## 2023-05-11 ASSESSMENT — PAIN DESCRIPTION - ORIENTATION
ORIENTATION: LEFT

## 2023-05-11 ASSESSMENT — PAIN DESCRIPTION - PAIN TYPE: TYPE: ACUTE PAIN

## 2023-05-11 NOTE — PROGRESS NOTES
CLINICAL PHARMACY NOTE: MEDS TO BEDS    Total # of Prescriptions Filled: 2   The following medications were delivered to the patient:  Aspirin 81 mg  Oxycodone 10 mg    Additional Documentation:    Handed scripts to patients wife and she paid with credit card.
Daily Progress Note  Katelin Bergman  MRN: [id number] LOS: 2    Admit Date: 2023 6:37 AM    Subjective: Interval History:    Reviewed overnight events and nursing notes. Doing well this morning, he has no new complaints. States his pain is well controlled. Has not had bowel movement yet but is urinating independently and did well with physical therapy yesterday. Review of Systems   Constitutional:  Negative for chills, fatigue and fever. Respiratory:  Negative for cough, chest tightness, shortness of breath, wheezing and stridor. Cardiovascular:  Negative for chest pain, palpitations and leg swelling. Gastrointestinal:  Negative for abdominal distention, abdominal pain, diarrhea, nausea and vomiting. DIET:  ADULT DIET; Regular    Medications:      sodium chloride        polyethylene glycol  17 g Oral BID    docusate sodium  100 mg Oral TID    celecoxib  200 mg Oral Daily    cetirizine  10 mg Oral Daily    Vitamin D  2,000 Units Oral Daily    gabapentin  300 mg Oral BID    metoprolol tartrate  50 mg Oral Nightly    prasugrel  5 mg Oral Daily    rosuvastatin  20 mg Oral Nightly    pantoprazole  40 mg Oral QAM AC    aspirin  325 mg Oral BID    sodium chloride flush  5-40 mL IntraVENous 2 times per day    acetaminophen  650 mg Oral Q6H    tobramycin  1 drop Right Eye BID         Objective:     Vitals: /67   Pulse 63   Temp 97.3 °F (36.3 °C) (Temporal)   Resp 16   Ht 6' 2\" (1.88 m)   Wt 295 lb (133.8 kg)   SpO2 95%   BMI 37.88 kg/m²    Intake/Output Summary (Last 24 hours) at 2023 0637  Last data filed at 2023 0028  Gross per 24 hour   Intake 910 ml   Output 1475 ml   Net -565 ml    Temp (24hrs), Av.8 °F (36.6 °C), Min:97.3 °F (36.3 °C), Max:98.2 °F (36.8 °C)    Glucose:  No results found for: POCGLU  Physical Examination:   Objective:  General Appearance:  Comfortable and well-appearing.     Vital signs: (most recent): Blood pressure 112/67, pulse 63,
Occupational Therapy  Facility/Department: [address] Initial Assessment    Name: Beck Calvert  : 1955  MRN: [id number]  Date of Service: 5/10/2023    Discharge Recommendations:             Patient Diagnosis(es): The encounter diagnosis was Other instability, left hip. Past Medical History:  has a past medical history of Arthritis, CAD (coronary artery disease), GERD (gastroesophageal reflux disease), Hip pain, Hyperlipidemia, Hypertension, and Pain management. Past Surgical History:  has a past surgical history that includes Ankle surgery (Right); joint replacement (Left, ); joint replacement (Right, ); back surgery (); back surgery (); back surgery (); Hand surgery (Right); Septoplasty; pr sutr infrapatellar tdn 2 rcnstj w/fscal/tdn grf (Left, 2018); Coronary angioplasty with stent (); and sinus surgery. Treatment Diagnosis: L hip revision      Assessment   Performance deficits / Impairments: Decreased ADL status; Decreased functional mobility ; Decreased endurance;Decreased balance  Assessment: Will progress as tolerated  Treatment Diagnosis: L hip revision  Prognosis: Good  Decision Making: Low Complexity  REQUIRES OT FOLLOW-UP: Yes  Activity Tolerance  Activity Tolerance: Patient Tolerated treatment well        Plan   Occupational Therapy Plan  Times Per Week: 3-5x/week  Times Per Day:  Once a day     Restrictions  Restrictions/Precautions  Restrictions/Precautions: Weight Bearing (For 1 month, no bending over past mid shin level)  Lower Extremity Weight Bearing Restrictions  Left Lower Extremity Weight Bearing: Weight Bearing As Tolerated    Subjective   General  Chart Reviewed: Yes  Patient assessed for rehabilitation services?: Yes  Family / Caregiver Present: No  Diagnosis: L hip revision  General Comment  Comments: Pt. ready to get up with therapy     Social/Functional History  Social/Functional History  Lives With: Spouse  Home Access:
Occupational Therapy  Facility/Department: [address] Initial Assessment    Name: Argelia Coronado  : 1955  MRN: [id number]  Date of Service: 2023    Discharge Recommendations:             Patient Diagnosis(es): The primary encounter diagnosis was Failed arthroplasty, sequela. A diagnosis of Other instability, left hip was also pertinent to this visit. Past Medical History:  has a past medical history of Arthritis, CAD (coronary artery disease), GERD (gastroesophageal reflux disease), Hip pain, Hyperlipidemia, Hypertension, and Pain management. Past Surgical History:  has a past surgical history that includes Ankle surgery (Right); joint replacement (Left, ); joint replacement (Right, ); back surgery (); back surgery (); back surgery (); Hand surgery (Right); Septoplasty; pr sutr infrapatellar tdn 2 rcnstj w/fscal/tdn grf (Left, 2018); Coronary angioplasty with stent (); sinus surgery; and Total hip arthroplasty (Left, 2023). Treatment Diagnosis: L hip revision      Assessment   Assessment: Pt. able to tfer from bed to toilet back to recliner with SBA, Supervision with toilet hygiene and clothing mgmt. Plan   Occupational Therapy Plan  Times Per Week: 3-5x/week  Times Per Day:  Once a day     Restrictions  Restrictions/Precautions  Restrictions/Precautions: Weight Bearing  Lower Extremity Weight Bearing Restrictions  Left Lower Extremity Weight Bearing: Weight Bearing As Tolerated    Subjective   General  Chart Reviewed: Yes  Patient assessed for rehabilitation services?: Yes  Family / Caregiver Present: No  Diagnosis: L hip revision  General Comment  Comments: Pt. ready to get up with therapy     Social/Functional History  Social/Functional History  Lives With: Spouse  Home Access: Stairs to enter with rails  Home Equipment: Gloria Jena, standard, Cane, Reacher  ADL Assistance: Independent  Ambulation Assistance: Independent  Transfer
Patient and spouse received discharge instructions. They did not have any concerns or questions at this time. Patient received his medications from [place] [id number] (Meds to [facility] [place] [facility]). Patients IV was removed. He tolerated the removal well and the cath was intact. Patient transported to the main entrance where he exited in a private vehicle.
Physical Therapy  Name: Rachel Vance  MRN:  [id number]  Date of service:  5/10/2023       05/10/23 1528   Restrictions/Precautions   Restrictions/Precautions Weight Bearing   Lower Extremity Weight Bearing Restrictions   Left Lower Extremity Weight Bearing Weight Bearing As Tolerated   Subjective   Subjective Pt up in recliner, agrees to walk. Pain Assessment   Pain Assessment 0-10   Pain Level 8   Pain Location Hip   Pain Orientation Left   Pain Descriptors Aching; Sore   Functional Pain Assessment Prevents or interferes some active activities and ADLs   Transfers   Sit to Stand Minimal Assistance   Stand to Sit Contact guard assistance   Ambulation   Surface Level tile   Device Rolling Walker   Assistance Contact guard assistance   Quality of Gait antalgic but steady overall   Gait Deviations Slow Opal;Decreased step length;Decreased step height   Distance 25'   Short Term Goals   Time Frame for Short Term Goals 2 WKS   Short Term Goal 1  FT WITH RW,SBA   Short Term Goal 2 STEPS, CGA   Conditions Requiring Skilled Therapeutic Intervention   Body Structures, Functions, Activity Limitations Requiring Skilled Therapeutic Intervention Decreased functional mobility ; Decreased strength;Decreased safe awareness;Decreased endurance; Increased pain   Assessment Pt limited somewhat by pain this tx, able to stand from recliner and amb short distance with rwx before returning to chair.    Activity Tolerance   Activity Tolerance Patient tolerated treatment well   PT Plan of Care   Wednesday X   Safety Devices   Type of Devices Call light within reach;Gait belt;Left in chair         Electronically signed by Ernesto Ramos PTA on 5/10/2023 at 3:36 PM
Physical Therapy  Name: Rosita Lux  MRN:  [id number]  Date of service:  5/11/2023 05/11/23 1123   Restrictions/Precautions   Restrictions/Precautions Weight Bearing   Lower Extremity Weight Bearing Restrictions   Left Lower Extremity Weight Bearing Weight Bearing As Tolerated   Subjective   Subjective Pt agreed to therapy. Pain Assessment   Pain Assessment 0-10   Pain Level 4   Pain Location Hip   Pain Orientation Left   Pain Descriptors Dull   Functional Pain Assessment Prevents or interferes some active activities and ADLs   Pain Type Acute pain   Pain Frequency Continuous   Non-Pharmaceutical Pain Intervention(s) Ambulation/Increased Activity;Repositioned; Rest   Response to Pain Intervention Patient satisfied   Bed Mobility   Supine to Sit Stand by assistance   Transfers   Sit to Stand Contact guard assistance   Stand to Sit Contact guard assistance   Ambulation   Surface Level tile   Device Rolling Walker   Assistance Contact guard assistance   Quality of Gait antalgic but steady overall   Gait Deviations Slow Opal;Decreased step length;Decreased step height   Distance 50' 10'   Short Term Goals   Time Frame for Short Term Goals 2 WKS   Short Term Goal 1  FT WITH RW,SBA   Short Term Goal 2 STEPS, CGA   Conditions Requiring Skilled Therapeutic Intervention   Body Structures, Functions, Activity Limitations Requiring Skilled Therapeutic Intervention Decreased functional mobility ; Decreased strength;Decreased safe awareness;Decreased endurance; Increased pain   Assessment Pt able to amb with steady gait and no LOB. Tolerated well without much increased pain. Reports he has stairs to get in to home but does not want to practice them, feels he will do fine with them. Assisted up to chair with all needs in reach. Activity Tolerance   Activity Tolerance Patient tolerated treatment well   PT Plan of Care   Thursday X   Safety Devices   Type of Devices Call light within reach; Left in chair
Subjective:     Post-Operative Day: 1 No complaints    Objective:     Patient Vitals for the past 24 hrs:   BP Temp Temp src Pulse Resp SpO2 Height Weight   05/10/23 0657 135/71 97.3 °F (36.3 °C) Temporal 70 16 95 % -- --   05/10/23 0402 (!) 144/73 99 °F (37.2 °C) Temporal 76 18 96 % -- --   05/10/23 0048 120/68 99.1 °F (37.3 °C) Temporal 77 16 93 % -- --   05/09/23 2137 136/61 98.2 °F (36.8 °C) -- 73 18 97 % -- --   05/09/23 1838 130/66 -- Temporal 57 16 96 % -- --   05/09/23 1744 128/73 97.6 °F (36.4 °C) Temporal 62 16 95 % -- --   05/09/23 1735 125/73 -- -- 59 14 95 % -- --   05/09/23 1731 125/73 -- -- 57 18 93 % -- --   05/09/23 1730 123/69 -- -- 58 15 95 % -- --   05/09/23 1725 137/68 98 °F (36.7 °C) Bladder 53 19 100 % -- --   05/09/23 1724 137/68 -- -- 57 13 97 % -- --   05/09/23 1720 135/63 97.8 °F (36.6 °C) Temporal 62 11 93 % -- --   05/09/23 1715 128/66 97.8 °F (36.6 °C) Temporal 55 14 98 % -- --   05/09/23 1710 133/64 -- -- 57 11 97 % -- --   05/09/23 1705 133/68 -- -- 64 16 94 % -- --   05/09/23 1700 (!) 146/83 -- -- 65 20 95 % -- --   05/09/23 1658 -- -- -- 63 -- -- -- --   05/09/23 1656 (!) 146/83 97.2 °F (36.2 °C) Temporal 63 14 95 % -- --   05/09/23 1654 -- 97.5 °F (36.4 °C) -- -- -- -- -- --   05/09/23 1136 (!) 141/82 97.6 °F (36.4 °C) Temporal 61 18 94 % 6' 2\" (1.88 m) 295 lb (133.8 kg)       General: Alert cooperative   Wound: Clean dry intact. Moderate swelling   Neurovascular: Exam normal   DVT Exam: negative         Data Review:  Recent Labs     05/10/23  0131   HGB 13.8*     Recent Labs     05/10/23  0131      K 4.9   CREATININE 1.0   GLUCOSE 120*     No results for input(s): POCGLU in the last 72 hours. XR HIP 2-3 VW W PELVIS LEFT   Final Result   Post left total hip arthroplasty. FLUORO FOR SURGICAL PROCEDURES   Final Result            Assessment:     Status Post left acetabular revision Total Hip Arthroplasty. Doing well postop without complications .    Plan:   wbat  Pain
Subjective:     Post-Operative Day: 2 No complaints    Objective:     Patient Vitals for the past 24 hrs:   BP Temp Temp src Pulse Resp SpO2   05/11/23 0355 112/67 97.3 °F (36.3 °C) Temporal 63 16 95 %   05/11/23 0025 128/63 98.2 °F (36.8 °C) Temporal 71 18 97 %   05/10/23 2236 -- -- -- -- 16 --   05/10/23 2139 (!) 145/65 98.2 °F (36.8 °C) Temporal 84 14 94 %   05/10/23 1048 -- -- -- -- -- 94 %   05/10/23 0806 -- -- -- 70 -- --   05/10/23 0657 135/71 97.3 °F (36.3 °C) Temporal 70 16 95 %         General: Alert cooperative   Wound: Clean dry intact. Moderate swelling   Neurovascular: Exam normal   DVT Exam: negative         Data Review:  Recent Labs     05/10/23  0131 05/11/23  0148   HGB 13.8* 12.2*       Recent Labs     05/11/23  0148      K 4.5   CREATININE 0.9   GLUCOSE 128*       No results for input(s): POCGLU in the last 72 hours. XR HIP 2-3 VW W PELVIS LEFT   Final Result   Post left total hip arthroplasty. FLUORO FOR SURGICAL PROCEDURES   Final Result            Assessment:     Status Post left acetabular revision Total Hip Arthroplasty. Doing well postop without complications .    Plan:   wbat  Pain control  Keep accuchecks under 200  PT/OT  DVT prophylaxis  Ice and elevate  Discharge Home today
Education  Patient Education  Education Given To: Patient  Education Provided: Role of Therapy;Plan of Care;Transfer Training      Therapy Time   Individual Concurrent Group Co-treatment   Time In           Time Out           Minutes                   Jaky Gonzalez PT     Electronically signed by Jaky Gonzalez PT on 5/10/2023 at 9:25 AM

## 2023-05-11 NOTE — DISCHARGE SUMMARY
[facility] [address]  Dr. Bri Easley  Discharge Summary      The Marybeth Mckeon is a 79 y.o. male underwent left acetabular revision for painful total hip replacement procedure without complication. Marybeth Mckeon was admitted to the floor following their recovery in the PACU. Discharge Diagnosis  Left acetabular revision for painful replacement    Current Inpatient Medications    Current Facility-Administered Medications: polyethylene glycol (GLYCOLAX) packet 17 g, 17 g, Oral, BID  docusate sodium (COLACE) capsule 100 mg, 100 mg, Oral, TID  celecoxib (CELEBREX) capsule 200 mg, 200 mg, Oral, Daily  cetirizine (ZYRTEC) tablet 10 mg, 10 mg, Oral, Daily  Vitamin D (CHOLECALCIFEROL) tablet 2,000 Units, 2,000 Units, Oral, Daily  gabapentin (NEURONTIN) capsule 300 mg, 300 mg, Oral, BID  metoprolol tartrate (LOPRESSOR) tablet 50 mg, 50 mg, Oral, Nightly  prasugrel (EFFIENT) tablet 5 mg, 5 mg, Oral, Daily  rosuvastatin (CRESTOR) tablet 20 mg, 20 mg, Oral, Nightly  pantoprazole (PROTONIX) tablet 40 mg, 40 mg, Oral, QAM AC  aspirin EC tablet 325 mg, 325 mg, Oral, BID  sodium chloride flush 0.9 % injection 5-40 mL, 5-40 mL, IntraVENous, 2 times per day  sodium chloride flush 0.9 % injection 5-40 mL, 5-40 mL, IntraVENous, PRN  0.9 % sodium chloride infusion, , IntraVENous, PRN  acetaminophen (TYLENOL) tablet 650 mg, 650 mg, Oral, Q6H  oxyCODONE (ROXICODONE) immediate release tablet 5 mg, 5 mg, Oral, Q4H PRN **OR** oxyCODONE (ROXICODONE) immediate release tablet 10 mg, 10 mg, Oral, Q4H PRN  morphine (PF) injection 2 mg, 2 mg, IntraVENous, Q2H PRN **OR** morphine sulfate (PF) injection 4 mg, 4 mg, IntraVENous, Q2H PRN  tobramycin (TOBREX) 0.3 % ophthalmic solution 1 drop, 1 drop, Right Eye, BID    Post-operatively the patients diet was advanced as tolerated and their incision was checked on POD #1. The incision is was clean, dry and intact with no signs of infection.   The patient remained

## 2023-05-13 LAB
BACTERIA SPEC AEROBE CULT: NORMAL
BACTERIA SPEC ANAEROBE CULT: NORMAL
GRAM STN SPEC: NORMAL

## 2023-05-23 LAB
FUNGUS SPEC CULT: NORMAL
KOH PREP SPEC: NORMAL

## 2023-05-26 ENCOUNTER — OFFICE VISIT (OUTPATIENT)
Dept: FAMILY MEDICINE CLINIC | Facility: CLINIC | Age: 68
End: 2023-05-26
Payer: COMMERCIAL

## 2023-05-26 VITALS
HEIGHT: 74 IN | OXYGEN SATURATION: 94 % | HEART RATE: 63 BPM | WEIGHT: 291.2 LBS | DIASTOLIC BLOOD PRESSURE: 68 MMHG | BODY MASS INDEX: 37.37 KG/M2 | TEMPERATURE: 97.4 F | SYSTOLIC BLOOD PRESSURE: 112 MMHG | RESPIRATION RATE: 18 BRPM

## 2023-05-26 DIAGNOSIS — Z96.642 HISTORY OF LEFT HIP REPLACEMENT: Primary | ICD-10-CM

## 2023-05-26 RX ORDER — PRASUGREL 5 MG/1
5 TABLET, FILM COATED ORAL DAILY
COMMUNITY

## 2023-05-26 RX ORDER — ASPIRIN 81 MG/1
81 TABLET ORAL 2 TIMES DAILY
COMMUNITY

## 2023-05-26 NOTE — PROGRESS NOTES
Transitional Care Follow Up Visit  Subjective     George Wilder is a 67 y.o. male who presents for a transitional care management visit.    Within 48 business hours after discharge our office contacted him via telephone to coordinate his care and needs.      I reviewed and discussed the details of that call along with the discharge summary, hospital problems, inpatient lab results, inpatient diagnostic studies, and consultation reports with George.     Current outpatient and discharge medications have been reconciled for the patient.  Reviewed by: Carl Vu MD           View : No data to display.              Risk for Readmission (LACE) No data recorded    History of Present Illness  67-year-old male with a left hip replacement on 5/ 9     Course During Hospital Stay: Left hip replaced.  Adventism 5/ 9     The following portions of the patient's history were reviewed and updated as appropriate: allergies, current medications, past family history, past medical history, past social history, past surgical history and problem list.    Review of Systems   Respiratory: Negative for shortness of breath.    Cardiovascular: Negative for chest pain and leg swelling.   Musculoskeletal: Positive for arthralgias and back pain.        Left hip replaced       Objective   Physical Exam  Vitals and nursing note reviewed.   Constitutional:       Appearance: Normal appearance. He is obese.   Cardiovascular:      Rate and Rhythm: Normal rate and regular rhythm.   Pulmonary:      Effort: Pulmonary effort is normal.      Breath sounds: Normal breath sounds.   Musculoskeletal:         General: No swelling or tenderness.      Right lower leg: No edema.      Left lower leg: No edema.      Comments: Left hip replacement-wound looks good   Skin:     General: Skin is warm and dry.   Neurological:      General: No focal deficit present.      Mental Status: He is alert and oriented to person, place, and time.   Psychiatric:          Mood and Affect: Mood normal.         Behavior: Behavior normal.         Thought Content: Thought content normal.         Judgment: Judgment normal.         Assessment & Plan   Problems Addressed this Visit    None  Visit Diagnoses     History of left hip replacement    -  Primary      Diagnoses       Codes Comments    History of left hip replacement    -  Primary ICD-10-CM: Z96.642  ICD-9-CM: V43.64           Plan-physical therapy-advised to stop Celebrex and go with Tylenol because of ischemic heart disease

## 2023-05-29 ENCOUNTER — APPOINTMENT (OUTPATIENT)
Dept: GENERAL RADIOLOGY | Age: 68
DRG: 468 | End: 2023-05-29
Payer: COMMERCIAL

## 2023-05-29 ENCOUNTER — HOSPITAL ENCOUNTER (INPATIENT)
Age: 68
LOS: 1 days | Discharge: HOME HEALTH | DRG: 468 | End: 2023-05-31
Attending: EMERGENCY MEDICINE | Admitting: HOSPITALIST
Payer: COMMERCIAL

## 2023-05-29 DIAGNOSIS — Z96.649 DISLOCATION OF HIP PROSTHESIS, INITIAL ENCOUNTER (HCC): ICD-10-CM

## 2023-05-29 DIAGNOSIS — S72.002A CLOSED FRACTURE OF LEFT HIP, INITIAL ENCOUNTER (HCC): ICD-10-CM

## 2023-05-29 DIAGNOSIS — T84.029A DISLOCATION OF HIP PROSTHESIS, INITIAL ENCOUNTER (HCC): ICD-10-CM

## 2023-05-29 DIAGNOSIS — S73.035A ANTERIOR DISLOCATION OF LEFT HIP, INITIAL ENCOUNTER (HCC): Primary | ICD-10-CM

## 2023-05-29 LAB
ALBUMIN SERPL-MCNC: 3.6 G/DL (ref 3.5–5.2)
ALP SERPL-CCNC: 61 U/L (ref 40–130)
ALT SERPL-CCNC: 13 U/L (ref 5–41)
ANION GAP SERPL CALCULATED.3IONS-SCNC: 6 MMOL/L (ref 7–19)
AST SERPL-CCNC: 14 U/L (ref 5–40)
BILIRUB SERPL-MCNC: 0.5 MG/DL (ref 0.2–1.2)
BUN SERPL-MCNC: 19 MG/DL (ref 8–23)
CALCIUM SERPL-MCNC: 8.9 MG/DL (ref 8.8–10.2)
CHLORIDE SERPL-SCNC: 105 MMOL/L (ref 98–111)
CO2 SERPL-SCNC: 28 MMOL/L (ref 22–29)
CREAT SERPL-MCNC: 1.1 MG/DL (ref 0.5–1.2)
ERYTHROCYTE [DISTWIDTH] IN BLOOD BY AUTOMATED COUNT: 12.7 % (ref 11.5–14.5)
GLUCOSE SERPL-MCNC: 115 MG/DL (ref 74–109)
HCT VFR BLD AUTO: 38.2 % (ref 42–52)
HGB BLD-MCNC: 12.5 G/DL (ref 14–18)
MCH RBC QN AUTO: 31.7 PG (ref 27–31)
MCHC RBC AUTO-ENTMCNC: 32.7 G/DL (ref 33–37)
MCV RBC AUTO: 97 FL (ref 80–94)
PLATELET # BLD AUTO: 261 K/UL (ref 130–400)
PMV BLD AUTO: 10.2 FL (ref 9.4–12.4)
POTASSIUM SERPL-SCNC: 4.6 MMOL/L (ref 3.5–5)
PROT SERPL-MCNC: 6 G/DL (ref 6.6–8.7)
RBC # BLD AUTO: 3.94 M/UL (ref 4.7–6.1)
SODIUM SERPL-SCNC: 139 MMOL/L (ref 136–145)
WBC # BLD AUTO: 7 K/UL (ref 4.8–10.8)

## 2023-05-29 PROCEDURE — 73501 X-RAY EXAM HIP UNI 1 VIEW: CPT

## 2023-05-29 PROCEDURE — 82306 VITAMIN D 25 HYDROXY: CPT

## 2023-05-29 PROCEDURE — 99285 EMERGENCY DEPT VISIT HI MDM: CPT

## 2023-05-29 PROCEDURE — 80053 COMPREHEN METABOLIC PANEL: CPT

## 2023-05-29 PROCEDURE — 36415 COLL VENOUS BLD VENIPUNCTURE: CPT

## 2023-05-29 PROCEDURE — 73502 X-RAY EXAM HIP UNI 2-3 VIEWS: CPT

## 2023-05-29 PROCEDURE — 85610 PROTHROMBIN TIME: CPT

## 2023-05-29 PROCEDURE — 85027 COMPLETE CBC AUTOMATED: CPT

## 2023-05-29 PROCEDURE — 85730 THROMBOPLASTIN TIME PARTIAL: CPT

## 2023-05-29 PROCEDURE — 2580000003 HC RX 258: Performed by: EMERGENCY MEDICINE

## 2023-05-29 PROCEDURE — 6360000002 HC RX W HCPCS: Performed by: EMERGENCY MEDICINE

## 2023-05-29 RX ORDER — MORPHINE SULFATE 4 MG/ML
4 INJECTION, SOLUTION INTRAMUSCULAR; INTRAVENOUS ONCE
Status: COMPLETED | OUTPATIENT
Start: 2023-05-29 | End: 2023-05-29

## 2023-05-29 RX ORDER — PROPOFOL 10 MG/ML
0.5 INJECTION, EMULSION INTRAVENOUS ONCE
Status: COMPLETED | OUTPATIENT
Start: 2023-05-29 | End: 2023-05-30

## 2023-05-29 RX ORDER — 0.9 % SODIUM CHLORIDE 0.9 %
500 INTRAVENOUS SOLUTION INTRAVENOUS ONCE
Status: COMPLETED | OUTPATIENT
Start: 2023-05-29 | End: 2023-05-30

## 2023-05-29 RX ADMIN — SODIUM CHLORIDE 500 ML: 9 INJECTION, SOLUTION INTRAVENOUS at 23:22

## 2023-05-29 RX ADMIN — MORPHINE SULFATE 4 MG: 4 INJECTION, SOLUTION INTRAMUSCULAR; INTRAVENOUS at 21:19

## 2023-05-29 ASSESSMENT — PAIN SCALES - GENERAL
PAINLEVEL_OUTOF10: 7
PAINLEVEL_OUTOF10: 9

## 2023-05-29 ASSESSMENT — PAIN DESCRIPTION - LOCATION
LOCATION: HIP
LOCATION: HIP

## 2023-05-29 ASSESSMENT — PAIN - FUNCTIONAL ASSESSMENT: PAIN_FUNCTIONAL_ASSESSMENT: 0-10

## 2023-05-29 ASSESSMENT — ENCOUNTER SYMPTOMS
ABDOMINAL PAIN: 0
SHORTNESS OF BREATH: 0
BACK PAIN: 0
EYE PAIN: 0
VOMITING: 0
DIARRHEA: 0

## 2023-05-29 ASSESSMENT — PAIN DESCRIPTION - ORIENTATION
ORIENTATION: LEFT
ORIENTATION: LEFT

## 2023-05-30 ENCOUNTER — APPOINTMENT (OUTPATIENT)
Dept: GENERAL RADIOLOGY | Age: 68
DRG: 468 | End: 2023-05-30
Payer: COMMERCIAL

## 2023-05-30 ENCOUNTER — ANESTHESIA (OUTPATIENT)
Dept: OPERATING ROOM | Age: 68
DRG: 468 | End: 2023-05-30
Payer: COMMERCIAL

## 2023-05-30 ENCOUNTER — ANESTHESIA EVENT (OUTPATIENT)
Dept: OPERATING ROOM | Age: 68
DRG: 468 | End: 2023-05-30
Payer: COMMERCIAL

## 2023-05-30 PROBLEM — T84.029A DISLOCATION OF HIP PROSTHESIS, INITIAL ENCOUNTER (HCC): Status: ACTIVE | Noted: 2023-05-30

## 2023-05-30 PROBLEM — Z96.649 DISLOCATION OF HIP PROSTHESIS, INITIAL ENCOUNTER (HCC): Status: ACTIVE | Noted: 2023-05-30

## 2023-05-30 LAB
25(OH)D3 SERPL-MCNC: 51.2 NG/ML
ABO + RH BLD: NORMAL
ANION GAP SERPL CALCULATED.3IONS-SCNC: 5 MMOL/L (ref 7–19)
APTT PPP: 30.6 SEC (ref 26–36.2)
BLD GP AB SCN SERPL QL: NORMAL
BUN SERPL-MCNC: 19 MG/DL (ref 8–23)
CALCIUM SERPL-MCNC: 8.5 MG/DL (ref 8.8–10.2)
CHLORIDE SERPL-SCNC: 106 MMOL/L (ref 98–111)
CO2 SERPL-SCNC: 28 MMOL/L (ref 22–29)
CREAT SERPL-MCNC: 1.1 MG/DL (ref 0.5–1.2)
EKG P AXIS: 71 DEGREES
EKG P-R INTERVAL: 154 MS
EKG Q-T INTERVAL: 400 MS
EKG QRS DURATION: 96 MS
EKG QTC CALCULATION (BAZETT): 415 MS
EKG T AXIS: 71 DEGREES
FUNGUS SPEC CULT: NORMAL
GLUCOSE SERPL-MCNC: 126 MG/DL (ref 74–109)
INR PPP: 1.13 (ref 0.88–1.18)
KOH PREP SPEC: NORMAL
POTASSIUM SERPL-SCNC: 4.8 MMOL/L (ref 3.5–5)
PROTHROMBIN TIME: 14.1 SEC (ref 12–14.6)
SODIUM SERPL-SCNC: 139 MMOL/L (ref 136–145)

## 2023-05-30 PROCEDURE — 6360000002 HC RX W HCPCS: Performed by: HOSPITALIST

## 2023-05-30 PROCEDURE — 6360000002 HC RX W HCPCS: Performed by: ORTHOPAEDIC SURGERY

## 2023-05-30 PROCEDURE — 7100000000 HC PACU RECOVERY - FIRST 15 MIN: Performed by: ORTHOPAEDIC SURGERY

## 2023-05-30 PROCEDURE — 0SRB0JZ REPLACEMENT OF LEFT HIP JOINT WITH SYNTHETIC SUBSTITUTE, OPEN APPROACH: ICD-10-PCS | Performed by: ORTHOPAEDIC SURGERY

## 2023-05-30 PROCEDURE — 2500000003 HC RX 250 WO HCPCS: Performed by: ORTHOPAEDIC SURGERY

## 2023-05-30 PROCEDURE — 86900 BLOOD TYPING SEROLOGIC ABO: CPT

## 2023-05-30 PROCEDURE — 3700000000 HC ANESTHESIA ATTENDED CARE: Performed by: ORTHOPAEDIC SURGERY

## 2023-05-30 PROCEDURE — 2580000003 HC RX 258: Performed by: ORTHOPAEDIC SURGERY

## 2023-05-30 PROCEDURE — 6360000002 HC RX W HCPCS: Performed by: NURSE ANESTHETIST, CERTIFIED REGISTERED

## 2023-05-30 PROCEDURE — 6370000000 HC RX 637 (ALT 250 FOR IP): Performed by: ORTHOPAEDIC SURGERY

## 2023-05-30 PROCEDURE — 87102 FUNGUS ISOLATION CULTURE: CPT

## 2023-05-30 PROCEDURE — 2780000010 HC IMPLANT OTHER: Performed by: ORTHOPAEDIC SURGERY

## 2023-05-30 PROCEDURE — 3700000001 HC ADD 15 MINUTES (ANESTHESIA): Performed by: ORTHOPAEDIC SURGERY

## 2023-05-30 PROCEDURE — 0SPB0JZ REMOVAL OF SYNTHETIC SUBSTITUTE FROM LEFT HIP JOINT, OPEN APPROACH: ICD-10-PCS | Performed by: ORTHOPAEDIC SURGERY

## 2023-05-30 PROCEDURE — 7100000001 HC PACU RECOVERY - ADDTL 15 MIN: Performed by: ORTHOPAEDIC SURGERY

## 2023-05-30 PROCEDURE — 94150 VITAL CAPACITY TEST: CPT

## 2023-05-30 PROCEDURE — 2700000000 HC OXYGEN THERAPY PER DAY

## 2023-05-30 PROCEDURE — 93005 ELECTROCARDIOGRAM TRACING: CPT | Performed by: HOSPITALIST

## 2023-05-30 PROCEDURE — 87176 TISSUE HOMOGENIZATION CULTR: CPT

## 2023-05-30 PROCEDURE — 2580000003 HC RX 258: Performed by: NURSE ANESTHETIST, CERTIFIED REGISTERED

## 2023-05-30 PROCEDURE — 3600000005 HC SURGERY LEVEL 5 BASE: Performed by: ORTHOPAEDIC SURGERY

## 2023-05-30 PROCEDURE — 2580000003 HC RX 258: Performed by: HOSPITALIST

## 2023-05-30 PROCEDURE — 86850 RBC ANTIBODY SCREEN: CPT

## 2023-05-30 PROCEDURE — 87075 CULTR BACTERIA EXCEPT BLOOD: CPT

## 2023-05-30 PROCEDURE — 3600000015 HC SURGERY LEVEL 5 ADDTL 15MIN: Performed by: ORTHOPAEDIC SURGERY

## 2023-05-30 PROCEDURE — 94760 N-INVAS EAR/PLS OXIMETRY 1: CPT

## 2023-05-30 PROCEDURE — C1713 ANCHOR/SCREW BN/BN,TIS/BN: HCPCS | Performed by: ORTHOPAEDIC SURGERY

## 2023-05-30 PROCEDURE — 86901 BLOOD TYPING SEROLOGIC RH(D): CPT

## 2023-05-30 PROCEDURE — 87070 CULTURE OTHR SPECIMN AEROBIC: CPT

## 2023-05-30 PROCEDURE — 87205 SMEAR GRAM STAIN: CPT

## 2023-05-30 PROCEDURE — 73502 X-RAY EXAM HIP UNI 2-3 VIEWS: CPT

## 2023-05-30 PROCEDURE — C1776 JOINT DEVICE (IMPLANTABLE): HCPCS | Performed by: ORTHOPAEDIC SURGERY

## 2023-05-30 PROCEDURE — 0SSBXZZ REPOSITION LEFT HIP JOINT, EXTERNAL APPROACH: ICD-10-PCS | Performed by: EMERGENCY MEDICINE

## 2023-05-30 PROCEDURE — 2500000003 HC RX 250 WO HCPCS: Performed by: NURSE ANESTHETIST, CERTIFIED REGISTERED

## 2023-05-30 PROCEDURE — 71045 X-RAY EXAM CHEST 1 VIEW: CPT

## 2023-05-30 PROCEDURE — 80048 BASIC METABOLIC PNL TOTAL CA: CPT

## 2023-05-30 PROCEDURE — 2709999900 HC NON-CHARGEABLE SUPPLY: Performed by: ORTHOPAEDIC SURGERY

## 2023-05-30 PROCEDURE — 1210000000 HC MED SURG R&B

## 2023-05-30 PROCEDURE — 36415 COLL VENOUS BLD VENIPUNCTURE: CPT

## 2023-05-30 PROCEDURE — 6360000002 HC RX W HCPCS: Performed by: EMERGENCY MEDICINE

## 2023-05-30 PROCEDURE — 93010 ELECTROCARDIOGRAM REPORT: CPT | Performed by: INTERNAL MEDICINE

## 2023-05-30 PROCEDURE — A4217 STERILE WATER/SALINE, 500 ML: HCPCS | Performed by: ORTHOPAEDIC SURGERY

## 2023-05-30 PROCEDURE — 6370000000 HC RX 637 (ALT 250 FOR IP): Performed by: HOSPITALIST

## 2023-05-30 DEVICE — IMPLANTABLE DEVICE
Type: IMPLANTABLE DEVICE | Site: HIP | Status: FUNCTIONAL
Brand: G7 FREEDOM ACETABULAR LINER

## 2023-05-30 RX ORDER — M-VIT,TX,IRON,MINS/CALC/FOLIC 27MG-0.4MG
1 TABLET ORAL DAILY
Status: DISCONTINUED | OUTPATIENT
Start: 2023-05-30 | End: 2023-05-31 | Stop reason: HOSPADM

## 2023-05-30 RX ORDER — GABAPENTIN 300 MG/1
300 CAPSULE ORAL 2 TIMES DAILY
Status: DISCONTINUED | OUTPATIENT
Start: 2023-05-30 | End: 2023-05-31 | Stop reason: HOSPADM

## 2023-05-30 RX ORDER — ONDANSETRON 2 MG/ML
INJECTION INTRAMUSCULAR; INTRAVENOUS PRN
Status: DISCONTINUED | OUTPATIENT
Start: 2023-05-30 | End: 2023-05-30 | Stop reason: SDUPTHER

## 2023-05-30 RX ORDER — HYDROMORPHONE HYDROCHLORIDE 1 MG/ML
0.5 INJECTION, SOLUTION INTRAMUSCULAR; INTRAVENOUS; SUBCUTANEOUS EVERY 5 MIN PRN
Status: DISCONTINUED | OUTPATIENT
Start: 2023-05-30 | End: 2023-05-30 | Stop reason: HOSPADM

## 2023-05-30 RX ORDER — PANTOPRAZOLE SODIUM 40 MG/1
40 TABLET, DELAYED RELEASE ORAL NIGHTLY
Status: DISCONTINUED | OUTPATIENT
Start: 2023-05-30 | End: 2023-05-31 | Stop reason: HOSPADM

## 2023-05-30 RX ORDER — POTASSIUM CHLORIDE 20 MEQ/1
40 TABLET, EXTENDED RELEASE ORAL PRN
Status: DISCONTINUED | OUTPATIENT
Start: 2023-05-30 | End: 2023-05-31 | Stop reason: HOSPADM

## 2023-05-30 RX ORDER — ROSUVASTATIN CALCIUM 20 MG/1
20 TABLET, COATED ORAL NIGHTLY
Status: DISCONTINUED | OUTPATIENT
Start: 2023-05-30 | End: 2023-05-31 | Stop reason: HOSPADM

## 2023-05-30 RX ORDER — CALCIUM CARBONATE 200(500)MG
500 TABLET,CHEWABLE ORAL 3 TIMES DAILY PRN
Status: DISCONTINUED | OUTPATIENT
Start: 2023-05-30 | End: 2023-05-31 | Stop reason: HOSPADM

## 2023-05-30 RX ORDER — NALOXONE HYDROCHLORIDE 0.4 MG/ML
0.4 INJECTION, SOLUTION INTRAMUSCULAR; INTRAVENOUS; SUBCUTANEOUS PRN
Status: DISCONTINUED | OUTPATIENT
Start: 2023-05-30 | End: 2023-05-31 | Stop reason: HOSPADM

## 2023-05-30 RX ORDER — POTASSIUM CHLORIDE 7.45 MG/ML
10 INJECTION INTRAVENOUS PRN
Status: DISCONTINUED | OUTPATIENT
Start: 2023-05-30 | End: 2023-05-31 | Stop reason: HOSPADM

## 2023-05-30 RX ORDER — HYDROMORPHONE HYDROCHLORIDE 1 MG/ML
1 INJECTION, SOLUTION INTRAMUSCULAR; INTRAVENOUS; SUBCUTANEOUS ONCE
Status: COMPLETED | OUTPATIENT
Start: 2023-05-30 | End: 2023-05-30

## 2023-05-30 RX ORDER — SODIUM CHLORIDE 0.9 % (FLUSH) 0.9 %
5-40 SYRINGE (ML) INJECTION PRN
Status: DISCONTINUED | OUTPATIENT
Start: 2023-05-30 | End: 2023-05-31 | Stop reason: HOSPADM

## 2023-05-30 RX ORDER — ACETAMINOPHEN 500 MG
1000 TABLET ORAL EVERY 8 HOURS SCHEDULED
Status: DISCONTINUED | OUTPATIENT
Start: 2023-05-30 | End: 2023-05-30

## 2023-05-30 RX ORDER — VITAMIN B COMPLEX
2000 TABLET ORAL DAILY
Status: DISCONTINUED | OUTPATIENT
Start: 2023-05-30 | End: 2023-05-31 | Stop reason: HOSPADM

## 2023-05-30 RX ORDER — MORPHINE SULFATE 4 MG/ML
4 INJECTION, SOLUTION INTRAMUSCULAR; INTRAVENOUS
Status: DISCONTINUED | OUTPATIENT
Start: 2023-05-30 | End: 2023-05-31 | Stop reason: HOSPADM

## 2023-05-30 RX ORDER — ONDANSETRON 4 MG/1
4 TABLET, ORALLY DISINTEGRATING ORAL EVERY 8 HOURS PRN
Status: DISCONTINUED | OUTPATIENT
Start: 2023-05-30 | End: 2023-05-31 | Stop reason: HOSPADM

## 2023-05-30 RX ORDER — MECOBALAMIN 5000 MCG
10 TABLET,DISINTEGRATING ORAL NIGHTLY PRN
Status: DISCONTINUED | OUTPATIENT
Start: 2023-05-30 | End: 2023-05-31 | Stop reason: HOSPADM

## 2023-05-30 RX ORDER — SODIUM CHLORIDE 9 MG/ML
INJECTION, SOLUTION INTRAVENOUS PRN
Status: DISCONTINUED | OUTPATIENT
Start: 2023-05-30 | End: 2023-05-30 | Stop reason: HOSPADM

## 2023-05-30 RX ORDER — SODIUM CHLORIDE 9 MG/ML
INJECTION, SOLUTION INTRAVENOUS PRN
Status: DISCONTINUED | OUTPATIENT
Start: 2023-05-30 | End: 2023-05-31 | Stop reason: HOSPADM

## 2023-05-30 RX ORDER — SODIUM CHLORIDE 0.9 % (FLUSH) 0.9 %
5-40 SYRINGE (ML) INJECTION EVERY 12 HOURS SCHEDULED
Status: DISCONTINUED | OUTPATIENT
Start: 2023-05-30 | End: 2023-05-31 | Stop reason: HOSPADM

## 2023-05-30 RX ORDER — METOPROLOL TARTRATE 50 MG/1
50 TABLET, FILM COATED ORAL NIGHTLY
Status: DISCONTINUED | OUTPATIENT
Start: 2023-05-30 | End: 2023-05-31 | Stop reason: HOSPADM

## 2023-05-30 RX ORDER — SODIUM CHLORIDE 0.9 % (FLUSH) 0.9 %
5-40 SYRINGE (ML) INJECTION EVERY 12 HOURS SCHEDULED
Status: DISCONTINUED | OUTPATIENT
Start: 2023-05-30 | End: 2023-05-30 | Stop reason: HOSPADM

## 2023-05-30 RX ORDER — HYDROMORPHONE HYDROCHLORIDE 1 MG/ML
0.5 INJECTION, SOLUTION INTRAMUSCULAR; INTRAVENOUS; SUBCUTANEOUS EVERY 4 HOURS PRN
Status: DISCONTINUED | OUTPATIENT
Start: 2023-05-30 | End: 2023-05-31 | Stop reason: HOSPADM

## 2023-05-30 RX ORDER — MELOXICAM 7.5 MG/1
3.75 TABLET ORAL DAILY
Status: DISCONTINUED | OUTPATIENT
Start: 2023-05-30 | End: 2023-05-31 | Stop reason: HOSPADM

## 2023-05-30 RX ORDER — HYDROMORPHONE HYDROCHLORIDE 1 MG/ML
0.25 INJECTION, SOLUTION INTRAMUSCULAR; INTRAVENOUS; SUBCUTANEOUS EVERY 5 MIN PRN
Status: DISCONTINUED | OUTPATIENT
Start: 2023-05-30 | End: 2023-05-30 | Stop reason: HOSPADM

## 2023-05-30 RX ORDER — SODIUM CHLORIDE 0.9 % (FLUSH) 0.9 %
5-40 SYRINGE (ML) INJECTION PRN
Status: DISCONTINUED | OUTPATIENT
Start: 2023-05-30 | End: 2023-05-30 | Stop reason: HOSPADM

## 2023-05-30 RX ORDER — MAGNESIUM SULFATE IN WATER 40 MG/ML
2000 INJECTION, SOLUTION INTRAVENOUS PRN
Status: DISCONTINUED | OUTPATIENT
Start: 2023-05-30 | End: 2023-05-31 | Stop reason: HOSPADM

## 2023-05-30 RX ORDER — ACETAMINOPHEN 325 MG/1
650 TABLET ORAL EVERY 6 HOURS
Status: DISCONTINUED | OUTPATIENT
Start: 2023-05-30 | End: 2023-05-31 | Stop reason: HOSPADM

## 2023-05-30 RX ORDER — OXYCODONE HYDROCHLORIDE 5 MG/1
2.5 TABLET ORAL EVERY 4 HOURS PRN
Status: DISCONTINUED | OUTPATIENT
Start: 2023-05-30 | End: 2023-05-30

## 2023-05-30 RX ORDER — CETIRIZINE HYDROCHLORIDE 10 MG/1
10 TABLET ORAL DAILY PRN
Status: DISCONTINUED | OUTPATIENT
Start: 2023-05-30 | End: 2023-05-31 | Stop reason: HOSPADM

## 2023-05-30 RX ORDER — ASPIRIN 81 MG/1
81 TABLET ORAL 2 TIMES DAILY
Status: DISCONTINUED | OUTPATIENT
Start: 2023-05-30 | End: 2023-05-30

## 2023-05-30 RX ORDER — HYDROMORPHONE HYDROCHLORIDE 1 MG/ML
0.25 INJECTION, SOLUTION INTRAMUSCULAR; INTRAVENOUS; SUBCUTANEOUS EVERY 4 HOURS PRN
Status: DISCONTINUED | OUTPATIENT
Start: 2023-05-30 | End: 2023-05-30

## 2023-05-30 RX ORDER — SODIUM CHLORIDE, SODIUM LACTATE, POTASSIUM CHLORIDE, CALCIUM CHLORIDE 600; 310; 30; 20 MG/100ML; MG/100ML; MG/100ML; MG/100ML
INJECTION, SOLUTION INTRAVENOUS CONTINUOUS PRN
Status: DISCONTINUED | OUTPATIENT
Start: 2023-05-30 | End: 2023-05-30 | Stop reason: SDUPTHER

## 2023-05-30 RX ORDER — ROCURONIUM BROMIDE 10 MG/ML
INJECTION, SOLUTION INTRAVENOUS PRN
Status: DISCONTINUED | OUTPATIENT
Start: 2023-05-30 | End: 2023-05-30 | Stop reason: SDUPTHER

## 2023-05-30 RX ORDER — OXYCODONE HYDROCHLORIDE 5 MG/1
10 TABLET ORAL EVERY 4 HOURS PRN
Status: DISCONTINUED | OUTPATIENT
Start: 2023-05-30 | End: 2023-05-30

## 2023-05-30 RX ORDER — MEPERIDINE HYDROCHLORIDE 25 MG/ML
12.5 INJECTION INTRAMUSCULAR; INTRAVENOUS; SUBCUTANEOUS EVERY 5 MIN PRN
Status: DISCONTINUED | OUTPATIENT
Start: 2023-05-30 | End: 2023-05-30 | Stop reason: HOSPADM

## 2023-05-30 RX ORDER — FENTANYL CITRATE 50 UG/ML
INJECTION, SOLUTION INTRAMUSCULAR; INTRAVENOUS PRN
Status: DISCONTINUED | OUTPATIENT
Start: 2023-05-30 | End: 2023-05-30 | Stop reason: SDUPTHER

## 2023-05-30 RX ORDER — PROPOFOL 10 MG/ML
INJECTION, EMULSION INTRAVENOUS PRN
Status: DISCONTINUED | OUTPATIENT
Start: 2023-05-30 | End: 2023-05-30 | Stop reason: SDUPTHER

## 2023-05-30 RX ORDER — LIDOCAINE HYDROCHLORIDE 10 MG/ML
INJECTION, SOLUTION INFILTRATION; PERINEURAL PRN
Status: DISCONTINUED | OUTPATIENT
Start: 2023-05-30 | End: 2023-05-30 | Stop reason: SDUPTHER

## 2023-05-30 RX ORDER — MORPHINE SULFATE 2 MG/ML
2 INJECTION, SOLUTION INTRAMUSCULAR; INTRAVENOUS
Status: DISCONTINUED | OUTPATIENT
Start: 2023-05-30 | End: 2023-05-31 | Stop reason: HOSPADM

## 2023-05-30 RX ORDER — DIPHENHYDRAMINE HYDROCHLORIDE 50 MG/ML
12.5 INJECTION INTRAMUSCULAR; INTRAVENOUS
Status: DISCONTINUED | OUTPATIENT
Start: 2023-05-30 | End: 2023-05-30 | Stop reason: HOSPADM

## 2023-05-30 RX ORDER — PRASUGREL 10 MG/1
5 TABLET, FILM COATED ORAL DAILY
Status: DISCONTINUED | OUTPATIENT
Start: 2023-05-30 | End: 2023-05-31 | Stop reason: HOSPADM

## 2023-05-30 RX ORDER — OXYCODONE HYDROCHLORIDE 5 MG/1
10 TABLET ORAL EVERY 4 HOURS PRN
Status: DISCONTINUED | OUTPATIENT
Start: 2023-05-30 | End: 2023-05-31 | Stop reason: HOSPADM

## 2023-05-30 RX ORDER — ESOMEPRAZOLE MAGNESIUM 20 MG/1
20 FOR SUSPENSION ORAL NIGHTLY
Status: DISCONTINUED | OUTPATIENT
Start: 2023-05-30 | End: 2023-05-30 | Stop reason: CLARIF

## 2023-05-30 RX ORDER — MECOBALAMIN 5000 MCG
10 TABLET,DISINTEGRATING ORAL NIGHTLY
Status: DISCONTINUED | OUTPATIENT
Start: 2023-05-30 | End: 2023-05-31 | Stop reason: HOSPADM

## 2023-05-30 RX ORDER — METOCLOPRAMIDE HYDROCHLORIDE 5 MG/ML
10 INJECTION INTRAMUSCULAR; INTRAVENOUS
Status: DISCONTINUED | OUTPATIENT
Start: 2023-05-30 | End: 2023-05-30 | Stop reason: HOSPADM

## 2023-05-30 RX ORDER — ROPIVACAINE HYDROCHLORIDE 2 MG/ML
INJECTION, SOLUTION EPIDURAL; INFILTRATION; PERINEURAL PRN
Status: DISCONTINUED | OUTPATIENT
Start: 2023-05-30 | End: 2023-05-30 | Stop reason: ALTCHOICE

## 2023-05-30 RX ORDER — POLYETHYLENE GLYCOL 3350 17 G/17G
17 POWDER, FOR SOLUTION ORAL DAILY PRN
Status: DISCONTINUED | OUTPATIENT
Start: 2023-05-30 | End: 2023-05-31 | Stop reason: HOSPADM

## 2023-05-30 RX ORDER — ONDANSETRON 2 MG/ML
4 INJECTION INTRAMUSCULAR; INTRAVENOUS EVERY 6 HOURS PRN
Status: DISCONTINUED | OUTPATIENT
Start: 2023-05-30 | End: 2023-05-31 | Stop reason: HOSPADM

## 2023-05-30 RX ORDER — DEXAMETHASONE SODIUM PHOSPHATE 10 MG/ML
INJECTION, SOLUTION INTRAMUSCULAR; INTRAVENOUS PRN
Status: DISCONTINUED | OUTPATIENT
Start: 2023-05-30 | End: 2023-05-30 | Stop reason: SDUPTHER

## 2023-05-30 RX ORDER — OXYCODONE HYDROCHLORIDE 5 MG/1
5 TABLET ORAL EVERY 4 HOURS PRN
Status: DISCONTINUED | OUTPATIENT
Start: 2023-05-30 | End: 2023-05-31 | Stop reason: HOSPADM

## 2023-05-30 RX ORDER — TRANEXAMIC ACID 100 MG/ML
INJECTION, SOLUTION INTRAVENOUS PRN
Status: DISCONTINUED | OUTPATIENT
Start: 2023-05-30 | End: 2023-05-30 | Stop reason: SDUPTHER

## 2023-05-30 RX ORDER — OXYCODONE HYDROCHLORIDE 5 MG/1
5 TABLET ORAL EVERY 4 HOURS PRN
Status: DISCONTINUED | OUTPATIENT
Start: 2023-05-30 | End: 2023-05-30

## 2023-05-30 RX ORDER — HYDROMORPHONE HYDROCHLORIDE 1 MG/ML
1 INJECTION, SOLUTION INTRAMUSCULAR; INTRAVENOUS; SUBCUTANEOUS EVERY 4 HOURS PRN
Status: DISCONTINUED | OUTPATIENT
Start: 2023-05-30 | End: 2023-05-30

## 2023-05-30 RX ADMIN — SODIUM CHLORIDE, PRESERVATIVE FREE 20 ML: 5 INJECTION INTRAVENOUS at 22:18

## 2023-05-30 RX ADMIN — ROSUVASTATIN CALCIUM 20 MG: 20 TABLET, COATED ORAL at 22:16

## 2023-05-30 RX ADMIN — HYDROMORPHONE HYDROCHLORIDE 0.5 MG: 1 INJECTION, SOLUTION INTRAMUSCULAR; INTRAVENOUS; SUBCUTANEOUS at 22:16

## 2023-05-30 RX ADMIN — ONDANSETRON 4 MG: 2 INJECTION INTRAMUSCULAR; INTRAVENOUS at 14:42

## 2023-05-30 RX ADMIN — HYDROMORPHONE HYDROCHLORIDE 0.5 MG: 1 INJECTION, SOLUTION INTRAMUSCULAR; INTRAVENOUS; SUBCUTANEOUS at 17:39

## 2023-05-30 RX ADMIN — DEXAMETHASONE SODIUM PHOSPHATE 8 MG: 10 INJECTION, SOLUTION INTRAMUSCULAR; INTRAVENOUS at 15:06

## 2023-05-30 RX ADMIN — SODIUM CHLORIDE, PRESERVATIVE FREE 10 ML: 5 INJECTION INTRAVENOUS at 11:03

## 2023-05-30 RX ADMIN — SODIUM CHLORIDE, PRESERVATIVE FREE 10 ML: 5 INJECTION INTRAVENOUS at 22:35

## 2023-05-30 RX ADMIN — TRANEXAMIC ACID 1000 MG: 1 INJECTION, SOLUTION INTRAVENOUS at 15:39

## 2023-05-30 RX ADMIN — FENTANYL CITRATE 100 MCG: 0.05 INJECTION, SOLUTION INTRAMUSCULAR; INTRAVENOUS at 15:18

## 2023-05-30 RX ADMIN — ASPIRIN 325 MG: 325 TABLET, COATED ORAL at 22:16

## 2023-05-30 RX ADMIN — Medication 3000 MG: at 22:34

## 2023-05-30 RX ADMIN — ACETAMINOPHEN 1000 MG: 500 TABLET ORAL at 05:35

## 2023-05-30 RX ADMIN — HYDROMORPHONE HYDROCHLORIDE 0.5 MG: 1 INJECTION, SOLUTION INTRAMUSCULAR; INTRAVENOUS; SUBCUTANEOUS at 17:44

## 2023-05-30 RX ADMIN — Medication 3000 MG: at 14:47

## 2023-05-30 RX ADMIN — Medication 10 MG: at 22:12

## 2023-05-30 RX ADMIN — ONDANSETRON 130 MG: 2 INJECTION INTRAMUSCULAR; INTRAVENOUS at 14:40

## 2023-05-30 RX ADMIN — OXYCODONE 5 MG: 5 TABLET ORAL at 11:00

## 2023-05-30 RX ADMIN — PANTOPRAZOLE SODIUM 40 MG: 40 TABLET, DELAYED RELEASE ORAL at 22:16

## 2023-05-30 RX ADMIN — ROCURONIUM BROMIDE 45 MG: 10 INJECTION, SOLUTION INTRAVENOUS at 14:42

## 2023-05-30 RX ADMIN — METOPROLOL TARTRATE 50 MG: 50 TABLET ORAL at 22:16

## 2023-05-30 RX ADMIN — TRANEXAMIC ACID 1000 MG: 1 INJECTION, SOLUTION INTRAVENOUS at 16:55

## 2023-05-30 RX ADMIN — HYDROMORPHONE HYDROCHLORIDE 1 MG: 1 INJECTION, SOLUTION INTRAMUSCULAR; INTRAVENOUS; SUBCUTANEOUS at 05:33

## 2023-05-30 RX ADMIN — HYDROMORPHONE HYDROCHLORIDE 1 MG: 1 INJECTION, SOLUTION INTRAMUSCULAR; INTRAVENOUS; SUBCUTANEOUS at 00:55

## 2023-05-30 RX ADMIN — SUGAMMADEX 100 MG: 100 INJECTION, SOLUTION INTRAVENOUS at 16:47

## 2023-05-30 RX ADMIN — LIDOCAINE HYDROCHLORIDE 50 MG: 10 INJECTION, SOLUTION INFILTRATION; PERINEURAL at 14:40

## 2023-05-30 RX ADMIN — SODIUM CHLORIDE: 9 INJECTION, SOLUTION INTRAVENOUS at 22:29

## 2023-05-30 RX ADMIN — SODIUM CHLORIDE, SODIUM LACTATE, POTASSIUM CHLORIDE, AND CALCIUM CHLORIDE: 600; 310; 30; 20 INJECTION, SOLUTION INTRAVENOUS at 14:32

## 2023-05-30 RX ADMIN — PROPOFOL 130 MG: 10 INJECTION, EMULSION INTRAVENOUS at 14:40

## 2023-05-30 RX ADMIN — HYDROMORPHONE HYDROCHLORIDE 0.5 MG: 1 INJECTION, SOLUTION INTRAMUSCULAR; INTRAVENOUS; SUBCUTANEOUS at 11:53

## 2023-05-30 RX ADMIN — FENTANYL CITRATE 100 MCG: 0.05 INJECTION, SOLUTION INTRAMUSCULAR; INTRAVENOUS at 14:40

## 2023-05-30 RX ADMIN — GABAPENTIN 300 MG: 300 CAPSULE ORAL at 22:16

## 2023-05-30 RX ADMIN — PROPOFOL 51 MG: 10 INJECTION, EMULSION INTRAVENOUS at 00:31

## 2023-05-30 RX ADMIN — MELOXICAM 3.75 MG: 7.5 TABLET ORAL at 22:13

## 2023-05-30 RX ADMIN — ROCURONIUM BROMIDE 5 MG: 10 INJECTION, SOLUTION INTRAVENOUS at 14:40

## 2023-05-30 RX ADMIN — ACETAMINOPHEN 650 MG: 325 TABLET ORAL at 22:16

## 2023-05-30 ASSESSMENT — ENCOUNTER SYMPTOMS
ABDOMINAL PAIN: 0
CONSTIPATION: 0
NAUSEA: 0
SHORTNESS OF BREATH: 1
DIARRHEA: 0
SHORTNESS OF BREATH: 0

## 2023-05-30 ASSESSMENT — PAIN DESCRIPTION - LOCATION
LOCATION: HIP
LOCATION: HIP;LEG
LOCATION: HIP

## 2023-05-30 ASSESSMENT — PAIN SCALES - GENERAL
PAINLEVEL_OUTOF10: 7
PAINLEVEL_OUTOF10: 8
PAINLEVEL_OUTOF10: 8
PAINLEVEL_OUTOF10: 7
PAINLEVEL_OUTOF10: 5
PAINLEVEL_OUTOF10: 0
PAINLEVEL_OUTOF10: 1
PAINLEVEL_OUTOF10: 6
PAINLEVEL_OUTOF10: 8

## 2023-05-30 ASSESSMENT — PAIN DESCRIPTION - PAIN TYPE
TYPE: ACUTE PAIN
TYPE: SURGICAL PAIN

## 2023-05-30 ASSESSMENT — PAIN DESCRIPTION - ORIENTATION
ORIENTATION: RIGHT
ORIENTATION: LEFT

## 2023-05-30 ASSESSMENT — PAIN DESCRIPTION - FREQUENCY
FREQUENCY: CONTINUOUS
FREQUENCY: CONTINUOUS

## 2023-05-30 ASSESSMENT — PAIN DESCRIPTION - DESCRIPTORS
DESCRIPTORS: ACHING
DESCRIPTORS: ACHING;SHARP;SHOOTING
DESCRIPTORS: ACHING
DESCRIPTORS: ACHING;DISCOMFORT

## 2023-05-30 ASSESSMENT — LIFESTYLE VARIABLES: SMOKING_STATUS: 0

## 2023-05-30 ASSESSMENT — PAIN DESCRIPTION - ONSET
ONSET: AWAKENED FROM SLEEP
ONSET: ON-GOING

## 2023-05-30 ASSESSMENT — PAIN - FUNCTIONAL ASSESSMENT
PAIN_FUNCTIONAL_ASSESSMENT: PREVENTS OR INTERFERES SOME ACTIVE ACTIVITIES AND ADLS
PAIN_FUNCTIONAL_ASSESSMENT: PREVENTS OR INTERFERES SOME ACTIVE ACTIVITIES AND ADLS
PAIN_FUNCTIONAL_ASSESSMENT: PREVENTS OR INTERFERES WITH MANY ACTIVE NOT PASSIVE ACTIVITIES

## 2023-05-30 NOTE — DISCHARGE INSTRUCTIONS
swelling or redness. This could signify a clot. Any rash appears, increased  or new onset nausea/vomiting occur. This may indicate a reaction to a medication. Phone # [phone]  ext [phone]. Leave a message for my assistant Neel Boss. She will return your call promptly  If you have an emergency text me at [phone] and tell me your name and problem  (Dr. Amalia Lal)  Follow up with Surgeon at scheduled appointment time.

## 2023-05-30 NOTE — DISCHARGE INSTR - DIET
Good nutrition is important when healing from an illness, injury, or surgery. Follow any nutrition recommendations given to you during your hospital stay. If you were given an oral nutrition supplement while in the hospital, continue to take this supplement at home. You can take it with meals, in-between meals, and/or before bedtime. These supplements can be purchased at most local grocery stores, pharmacies, and chain [employer]-stores. If you have any questions about your diet or nutrition, call the hospital and ask for the dietitian.              Low carb / High protein

## 2023-05-30 NOTE — CONSULTS
05/30/2023 03:28 AM     05/30/2023 03:28 AM    CO2 28 05/30/2023 03:28 AM    BUN 19 05/30/2023 03:28 AM    CREATININE 1.1 05/30/2023 03:28 AM    CALCIUM 8.5 05/30/2023 03:28 AM    LABGLOM >60 05/30/2023 03:28 AM    GLUCOSE 126 05/30/2023 03:28 AM         Radiology: XR CHEST PORTABLE    Result Date: 5/30/2023  Patient: Carmita Aleahter  Time Out: 03:07Exam(s): XR CXR 1 VIEW EXAM:  XR Chest, 1 ViewCLINICAL HISTORY:   Reason for exam: for OR clearance. TECHNIQUE:  Frontal view of the chest.COMPARISON:  No relevant prior studies available. FINDINGS:  Lungs:  No consolidation. No overt edema. Pleural space:  No pleural effusion. No pneumothorax. Heart:  Unremarkable. No cardiomegaly. No acute cardiopulmonary abnormality. Electronically signed by Craig Ferrer MD on 05-30-23 at [address] [place] [address]    Result Date: 5/9/2023  Radiology exam is complete. No Radiologist dictation. Please follow up with ordering provider. XR HIP 1 VW W PELVIS LEFT    Result Date: 5/30/2023  Patient: Carmita Gutwinsome  Time Out: 01:10Exam(s): XR HIP + PELVIS EXAM:  XR Left Hip With Pelvis When Performed, 2 or 3 ViewsCLINICAL HISTORY:   Reason for exam: fall hip pain post reduction attempt. TECHNIQUE:  Two or three views of the left hip with pelvis when performed. COMPARISON:  No relevant prior studies available. FINDINGS:  Bones/joints:  Superior dislocation of the left hip. Left hip arthroplasty. No visualized fracture. Superior dislocation of the left hip. Electronically signed by Craig Ferrer MD on 05-30-23 at [facility] 2-3 VW W PELVIS LEFT    Result Date: 5/30/2023  Patient: Carmita Gutwinsome  Time Out: 01:34Exam(s): XR HIP + PELVIS EXAM:  XR Left Hip With Pelvis When Performed, 2 or 3 ViewsCLINICAL HISTORY:   Reason for exam: post reduction. TECHNIQUE:  Two or three views of the left hip with pelvis when performed. COMPARISON:  Priors on this and they. FINDINGS:  Bones/joints:   There is persistent superior

## 2023-05-30 NOTE — ED NOTES
Report to Emmanuel Asencio pt going to [facility], [address] [place] [address]  05/30/23 0228

## 2023-05-30 NOTE — CARE COORDINATION
Patient fell and dislocated hip 3 weeks post surgery. 05/30/23 1156   Readmission Assessment   Number of Days since last admission? 8-30 days   Previous Disposition Home with Family   Who is being Interviewed Patient   What was the patient's/caregiver's perception as to why they think they needed to return back to the hospital? Other (Comment)  (fell, dislocated hip)   Did you visit your Primary Care Physician after you left the hospital, before you returned this time? Yes   Did you see a specialist, such as Cardiac, Pulmonary, Orthopedic Physician, etc. after you left the hospital? No   Who advised the patient to return to the hospital? Self-referral   Does the patient report anything that got in the way of taking their medications? No   In our efforts to provide the best possible care to you and others like you, can you think of anything that we could have done to help you after you left the hospital the first time, so that you might not have needed to return so soon?  Other (Comment)  (none)     Electronically signed by Hilda Conway RN, BSN on 5/30/2023 at 11:58 AM

## 2023-05-30 NOTE — SEDATION DOCUMENTATION
50 mg IV propofol push by Dr. Effie Vaughn, Dr. Connor Boas at bedside to try to reduce pt's left hip.

## 2023-05-30 NOTE — OP NOTE
Betadine soak was applied. It was irrigated again. Fascia closed 0 Vicryl opted sutures. Subcu closed with 2-0 Vicryl. Skin closed with 3-0 Vicryl and Prineo. Plan be for anterior hip precautions. Can weight-bear as tolerated.           Electronically signed by Maral Zhou MD on 5/30/2023 at 4:52 PM

## 2023-05-30 NOTE — ANESTHESIA POSTPROCEDURE EVALUATION
Department of Anesthesiology  Postprocedure Note    Patient: Mariola Warren  MRN: [id number]  YOB: 1955  Date of evaluation: 5/30/2023      Procedure Summary     Date: 05/30/23 Room / Location: [address]    Anesthesia Start: 1432 Anesthesia Stop:     Procedure: LEFT HIP ACETABULAR LINER AND FEMORAL HEAD REVISION (Left) Diagnosis:       Closed fracture of left hip, initial encounter ([facility]      (Closed fracture of left hip, initial encounter ([facility] Steven Castorena)    Surgeons: Kirby Mensah MD Responsible Provider: ARCENIO Shelby CRNA    Anesthesia Type: general ASA Status: 3          Anesthesia Type: No value filed.     Cr Phase I: Cr Score: 9    Cr Phase II:        Anesthesia Post Evaluation    Patient location during evaluation: PACU  Patient participation: waiting for patient participation  Level of consciousness: lethargic  Pain score: 0  Airway patency: patent  Nausea & Vomiting: no nausea and no vomiting  Complications: no  Cardiovascular status: hemodynamically stable  Respiratory status: acceptable, face mask and spontaneous ventilation  Hydration status: stable

## 2023-05-30 NOTE — ANESTHESIA PRE PROCEDURE
pain    HLD (hyperlipidemia)     Pain management     Dr. Dalton Thao       Past Surgical History:        Procedure Laterality Date    ANKLE SURGERY Right 1970    HARDWARE   [address]      BACK SURGERY  2010    BACK SURGERY      CORONARY ANGIOPLASTY WITH STENT PLACEMENT  2019    dr Zofia Turner (x 1)    HAND SURGERY Right 2011    HARDWARE    JOINT REPLACEMENT Left 2009    THR    JOINT REPLACEMENT Right 2013    THR    [place] SUTR INFRAPATELLAR TDN 2 RCNSTJ W/FSCAL/TDN GRF Left 2018    HIP ILISPOAS TENDON RELEASE performed by Wen Corral MD at [address] [place] [address]    SINUS SURGERY  2017    TOTAL HIP ARTHROPLASTY Left 2023    LEFT HIP ACETABULAR REVISION performed by Wen Corral MD at [place] [address] History:    Social History     Tobacco Use    Smoking status: Former     Packs/day: 2.00     Years: 25.00     Pack years: 50.00     Types: Cigarettes     Quit date: 1994     Years since quittin.8    Smokeless tobacco: Never   Substance Use Topics    Alcohol use: Not Currently                                Counseling given: Not Answered      Vital Signs (Current): There were no vitals filed for this visit.                                            BP Readings from Last 3 Encounters:   23 (!) 113/56   23 119/66   18 (!) 141/74       NPO Status:                                                                                 BMI:   Wt Readings from Last 3 Encounters:   23 285 lb 6.4 oz (129.5 kg)   23 295 lb (133.8 kg)   23 292 lb (132.5 kg)     There is no height or weight on file to calculate BMI.    CBC:   Lab Results   Component Value Date/Time    WBC 7.0 2023 08:36 PM    RBC 3.94 2023 08:36 PM    HGB 12.5 2023 08:36 PM    HCT 38.2 2023 08:36 PM    MCV 97.0 2023 08:36 PM    RDW 12.7 2023 08:36 PM     2023 08:36 PM       CMP:   Lab Results

## 2023-05-30 NOTE — H&P
Mr Marc Mcclellan has an anterior dislocation of the left  duomobility hip. Plan will be to open reduce and constrain the hip. I explained to the patient/family the patient's diagnosis and operative procedure in detail. They said they understood basically what was wrong and how I planned to fix it. They understand the expected recovery and the risks which include excessive bleeding, infection, reaction to anesthesia, nerve injury, stiffness, fracture, deformity and dislocation. They then signed an operative consent form.
Comments: Supple, trachea appears midline  Cardiovascular:      Rate and Rhythm: Normal rate and regular rhythm. Heart sounds: No murmur heard. No friction rub. No gallop. Pulmonary:      Effort: Pulmonary effort is normal. No respiratory distress. Breath sounds: No stridor. No wheezing, rhonchi or rales. Chest:      Chest wall: No tenderness. Abdominal:      General: Bowel sounds are normal.      Palpations: Abdomen is soft. Tenderness: There is no abdominal tenderness. There is no guarding or rebound. Musculoskeletal:      Comments: No hip rotation or shortening seen, intact muscle stores, slightly increased abdominal fat stores   Skin:     General: Skin is warm. Comments: nondiaphoretic   Neurological:      Mental Status: He is alert and oriented to person, place, and time. Psychiatric:         Mood and Affect: Mood normal.         Behavior: Behavior normal.        LABORATORY DATA:    CBC:   Recent Labs     05/29/23 2036   WBC 7.0   HGB 12.5*   HCT 38.2*        BMP:   Recent Labs     05/29/23 2036      K 4.6      CO2 28   BUN 19   CREATININE 1.1   CALCIUM 8.9     Hepatic Profile:   Recent Labs     05/29/23 2036   AST 14   ALT 13   BILITOT 0.5   ALKPHOS 61     Coag Panel:   Recent Labs     05/29/23 2036   INR 1.13   PROTIME 14.1   APTT 30.6     EKG:   NSR, No ST changes    IMAGING:  XR HIP 1 VW W PELVIS LEFT  Result Date: 5/30/2023  Superior dislocation of the left hip. Electronically signed by Bronson Murphy MD on 05-30-23 at [facility] 2-3 VW W PELVIS LEFT  Result Date: 5/30/2023  Superior dislocation of the left hip. Electronically signed by Bronson Murphy MD on 05-30-23 at [facility]:    Left Femoral Prosthetic Dislocation: Initial Encounter  Admit to [facility]  Narcan PRN patient safety   Tylenol 1g PO Q8h for basal pain control  Oxycodone 2.5-5-10mg PO Mild-Moderate-Severe Pain Scale & Dilaudid IV 0.25-0.5-1mg pain

## 2023-05-30 NOTE — ED PROVIDER NOTES
[facility] DE[facility]  eMERGENCY dEPARTMENT eNCOUnter      Pt Name: Jacki Seen  MRN: [id number]  [place] 1955  Date of evaluation: 5/29/2023  Provider: Car Rosas MD    [address] [place] [address]       Chief Complaint   Patient presents with    Fall    Hip Pain     Pt arrives to ED after fall with c/o LT hip pain after fall at home. Pt has LT hip replacement approx 3 weeks ago. Pt was given 100mcg of fentanyl en route. HISTORY OF PRESENT ILLNESS   (Location/Symptom, Timing/Onset,Context/Setting, Quality, Duration, Modifying Factors, Severity)  Note limiting factors. Jacki Seen is a 79 y.o. male who presents to the emergency department due to fall and left hip pain. Patient underwent left hip replacement approximately 3 weeks ago by Dr. Livan Huff. Has been doing well since. Walking with a walker occasionally. Was walking into the kitchen to get a drink of water and as he went around the corner felt his hip pop and fell. Landed on his left hip. Denies any other pain or injury. Has not been able to get up since he fell and continues to have pain in the left hip. No pain or injury anywhere else. Did not hit his head. No neck or back pain. No numbness or weakness. No pain or injury to the chest or abdomen. No pain in the left leg except for the left hip. Left hip looks shortened slightly on exam.    HPI    NursingNotes were reviewed. REVIEW OF SYSTEMS    (2-9 systems for level 4, 10 or more for level 5)     Review of Systems   Constitutional:  Negative for fever. Eyes:  Negative for pain. Respiratory:  Negative for shortness of breath. Cardiovascular:  Negative for chest pain and palpitations. Gastrointestinal:  Negative for abdominal pain, diarrhea and vomiting. Genitourinary:  Negative for dysuria. Musculoskeletal:  Negative for back pain and neck pain. Skin:  Negative for rash. Neurological:  Negative for syncope, weakness, numbness and headaches.    All other systems

## 2023-05-31 VITALS
HEIGHT: 74 IN | HEART RATE: 78 BPM | OXYGEN SATURATION: 99 % | TEMPERATURE: 97.2 F | BODY MASS INDEX: 36.63 KG/M2 | RESPIRATION RATE: 16 BRPM | WEIGHT: 285.4 LBS | DIASTOLIC BLOOD PRESSURE: 59 MMHG | SYSTOLIC BLOOD PRESSURE: 144 MMHG

## 2023-05-31 LAB
ANION GAP SERPL CALCULATED.3IONS-SCNC: 10 MMOL/L (ref 7–19)
BASOPHILS # BLD: 0 K/UL (ref 0–0.2)
BASOPHILS NFR BLD: 0.1 % (ref 0–1)
BUN SERPL-MCNC: 18 MG/DL (ref 8–23)
CALCIUM SERPL-MCNC: 8.4 MG/DL (ref 8.8–10.2)
CHLORIDE SERPL-SCNC: 103 MMOL/L (ref 98–111)
CO2 SERPL-SCNC: 26 MMOL/L (ref 22–29)
CREAT SERPL-MCNC: 1 MG/DL (ref 0.5–1.2)
EOSINOPHIL # BLD: 0 K/UL (ref 0–0.6)
EOSINOPHIL NFR BLD: 0 % (ref 0–5)
ERYTHROCYTE [DISTWIDTH] IN BLOOD BY AUTOMATED COUNT: 12.5 % (ref 11.5–14.5)
GLUCOSE SERPL-MCNC: 163 MG/DL (ref 74–109)
HCT VFR BLD AUTO: 34.7 % (ref 42–52)
HGB BLD-MCNC: 11.6 G/DL (ref 14–18)
IMM GRANULOCYTES # BLD: 0 K/UL
LYMPHOCYTES # BLD: 0.7 K/UL (ref 1.1–4.5)
LYMPHOCYTES NFR BLD: 6 % (ref 20–40)
MCH RBC QN AUTO: 32.6 PG (ref 27–31)
MCHC RBC AUTO-ENTMCNC: 33.4 G/DL (ref 33–37)
MCV RBC AUTO: 97.5 FL (ref 80–94)
MONOCYTES # BLD: 0.5 K/UL (ref 0–0.9)
MONOCYTES NFR BLD: 4.5 % (ref 0–10)
NEUTROPHILS # BLD: 10.3 K/UL (ref 1.5–7.5)
NEUTS SEG NFR BLD: 89.1 % (ref 50–65)
PLATELET # BLD AUTO: 280 K/UL (ref 130–400)
PMV BLD AUTO: 10.7 FL (ref 9.4–12.4)
POTASSIUM SERPL-SCNC: 5.2 MMOL/L (ref 3.5–5)
RBC # BLD AUTO: 3.56 M/UL (ref 4.7–6.1)
SODIUM SERPL-SCNC: 139 MMOL/L (ref 136–145)
WBC # BLD AUTO: 11.5 K/UL (ref 4.8–10.8)

## 2023-05-31 PROCEDURE — 80048 BASIC METABOLIC PNL TOTAL CA: CPT

## 2023-05-31 PROCEDURE — 6370000000 HC RX 637 (ALT 250 FOR IP): Performed by: ORTHOPAEDIC SURGERY

## 2023-05-31 PROCEDURE — 97116 GAIT TRAINING THERAPY: CPT

## 2023-05-31 PROCEDURE — 97166 OT EVAL MOD COMPLEX 45 MIN: CPT

## 2023-05-31 PROCEDURE — 94760 N-INVAS EAR/PLS OXIMETRY 1: CPT

## 2023-05-31 PROCEDURE — 36415 COLL VENOUS BLD VENIPUNCTURE: CPT

## 2023-05-31 PROCEDURE — 85025 COMPLETE CBC W/AUTO DIFF WBC: CPT

## 2023-05-31 PROCEDURE — 6360000002 HC RX W HCPCS: Performed by: ORTHOPAEDIC SURGERY

## 2023-05-31 PROCEDURE — 97530 THERAPEUTIC ACTIVITIES: CPT

## 2023-05-31 PROCEDURE — 97161 PT EVAL LOW COMPLEX 20 MIN: CPT

## 2023-05-31 PROCEDURE — 97110 THERAPEUTIC EXERCISES: CPT

## 2023-05-31 RX ORDER — OXYCODONE HYDROCHLORIDE 5 MG/1
5 TABLET ORAL EVERY 4 HOURS PRN
Qty: 40 TABLET | Refills: 0 | Status: SHIPPED | OUTPATIENT
Start: 2023-05-31 | End: 2023-06-03

## 2023-05-31 RX ORDER — ASPIRIN 81 MG/1
81 TABLET ORAL 2 TIMES DAILY
Qty: 60 TABLET | Refills: 0 | Status: SHIPPED | OUTPATIENT
Start: 2023-05-31

## 2023-05-31 RX ORDER — IBUPROFEN 400 MG/1
400 TABLET ORAL EVERY 8 HOURS PRN
Qty: 30 TABLET | Refills: 0 | Status: SHIPPED | OUTPATIENT
Start: 2023-05-31

## 2023-05-31 RX ORDER — CEPHALEXIN 500 MG/1
500 CAPSULE ORAL 4 TIMES DAILY
Qty: 28 CAPSULE | Refills: 0 | Status: SHIPPED | OUTPATIENT
Start: 2023-05-31

## 2023-05-31 RX ADMIN — ACETAMINOPHEN 650 MG: 325 TABLET ORAL at 08:14

## 2023-05-31 RX ADMIN — PRASUGREL 5 MG: 10 TABLET, FILM COATED ORAL at 08:15

## 2023-05-31 RX ADMIN — OXYCODONE 10 MG: 5 TABLET ORAL at 12:53

## 2023-05-31 RX ADMIN — OXYCODONE 10 MG: 5 TABLET ORAL at 04:18

## 2023-05-31 RX ADMIN — Medication 2000 UNITS: at 08:14

## 2023-05-31 RX ADMIN — Medication 5000 UNITS: at 08:15

## 2023-05-31 RX ADMIN — MULTIPLE VITAMINS W/ MINERALS TAB 1 TABLET: TAB at 08:16

## 2023-05-31 RX ADMIN — ASPIRIN 325 MG: 325 TABLET, COATED ORAL at 08:14

## 2023-05-31 RX ADMIN — ACETAMINOPHEN 650 MG: 325 TABLET ORAL at 04:18

## 2023-05-31 RX ADMIN — Medication 3000 MG: at 06:13

## 2023-05-31 RX ADMIN — GABAPENTIN 300 MG: 300 CAPSULE ORAL at 08:14

## 2023-05-31 RX ADMIN — MELOXICAM 3.75 MG: 7.5 TABLET ORAL at 08:14

## 2023-05-31 RX ADMIN — OXYCODONE 10 MG: 5 TABLET ORAL at 08:14

## 2023-05-31 ASSESSMENT — PAIN DESCRIPTION - ONSET: ONSET: ON-GOING

## 2023-05-31 ASSESSMENT — PAIN DESCRIPTION - FREQUENCY: FREQUENCY: CONTINUOUS

## 2023-05-31 ASSESSMENT — PAIN DESCRIPTION - PAIN TYPE
TYPE: SURGICAL PAIN
TYPE: ACUTE PAIN;SURGICAL PAIN

## 2023-05-31 ASSESSMENT — PAIN DESCRIPTION - ORIENTATION
ORIENTATION: LEFT
ORIENTATION: LEFT

## 2023-05-31 ASSESSMENT — PAIN SCALES - GENERAL
PAINLEVEL_OUTOF10: 0
PAINLEVEL_OUTOF10: 6
PAINLEVEL_OUTOF10: 0
PAINLEVEL_OUTOF10: 0
PAINLEVEL_OUTOF10: 3
PAINLEVEL_OUTOF10: 7
PAINLEVEL_OUTOF10: 0
PAINLEVEL_OUTOF10: 0

## 2023-05-31 ASSESSMENT — PAIN DESCRIPTION - DESCRIPTORS
DESCRIPTORS: ACHING;SORE
DESCRIPTORS: ACHING

## 2023-05-31 ASSESSMENT — PAIN - FUNCTIONAL ASSESSMENT
PAIN_FUNCTIONAL_ASSESSMENT: PREVENTS OR INTERFERES SOME ACTIVE ACTIVITIES AND ADLS
PAIN_FUNCTIONAL_ASSESSMENT: PREVENTS OR INTERFERES SOME ACTIVE ACTIVITIES AND ADLS

## 2023-05-31 ASSESSMENT — PAIN DESCRIPTION - LOCATION
LOCATION: HIP
LOCATION: HIP

## 2023-05-31 NOTE — PROGRESS NOTES
Edin Palomino arrived to room # [phone]. Presented with: Dislocation of Lhip prosthesis  Mental Status: Patient is oriented, alert, coherent, logical, thought processes intact, and able to concentrate and follow conversation. Vitals:    05/30/23 0302   BP: 138/66   Pulse: 70   Resp: 18   Temp: 97.3 °F (36.3 °C)   SpO2: 98%     Patient safety contract and falls prevention contract reviewed with patient Yes. Oriented Patient and Family to room. Call light within reach. Yes.   Needs, issues or concerns expressed at this time: no.      Electronically signed by Riri Gil RN on 5/30/2023 at 4:04 AM
IV  and tele removed. DC instructions reviewed with patient and spouse. They verbalize understanding. Patient feels well and is eager to dc home.
Occupational Therapy Initial Assessment  Date: 2023   Patient Name: Meka Alvarez  MRN: [id number]     : 1955    Date of Service: 2023    Discharge Recommendations:  24 hour supervision or assist, Patient would benefit from continued therapy after discharge       Assessment   Assessment: OT evaluation completed and tx initiated. Pt may benefit from continued skilled services to address functional deficits. Pt will likely progress with further tx. OT does not anticipate any environmental barriers to D/C home once medically cleared if pt has access to assist for safety. REQUIRES OT FOLLOW-UP: Yes  Activity Tolerance  Activity Tolerance: Patient Tolerated treatment well              Patient Diagnosis(es): The primary encounter diagnosis was Anterior dislocation of left hip, initial encounter ([facility] [address] Diagnoses of Closed fracture of left hip, initial encounter ([facility] and Dislocation of hip prosthesis, initial encounter [facility]) were also pertinent to this visit.     Past Medical History:   Past Medical History:   Diagnosis Date    Arthritis     CAD (coronary artery disease)     stent per dr. Zofia Turner in 2019    GERD (gastroesophageal reflux disease)     Hip pain     chronicleft hip pain    HLD (hyperlipidemia)     Pain management     Dr. Dalton Thao        Past Surgical History:   Past Surgical History:   Procedure Laterality Date    ANKLE SURGERY Right 1970    HARDWARE    BACK SURGERY  2007    BACK SURGERY  2010    BACK SURGERY      CORONARY ANGIOPLASTY WITH STENT PLACEMENT      dr Zofia Turner (x 1)    HAND SURGERY Right     HARDWARE    JOINT REPLACEMENT Left     THR    JOINT REPLACEMENT Right     THR    [place] SUTR INFRAPATELLAR TDN 2 RCNSTJ W/FSCAL/TDN GRF Left 2018    HIP ILISPOAS TENDON RELEASE performed by Wen Corral MD at [address]      SINUS SURGERY  2017    TOTAL HIP ARTHROPLASTY Left 2023    LEFT HIP ACETABULAR REVISION performed by
Subjective:     Post-Operative Day: 1 No complaints    Objective:     Patient Vitals for the past 24 hrs:   BP Temp Temp src Pulse Resp SpO2   05/31/23 0448 -- -- -- -- 16 --   05/31/23 0410 128/71 97.5 °F (36.4 °C) Temporal 64 18 98 %   05/30/23 2355 (!) 143/66 98.6 °F (37 °C) Temporal 83 16 95 %   05/30/23 2246 -- -- -- -- 18 --   05/30/23 2217 -- -- -- 64 -- --   05/30/23 2105 129/60 98.1 °F (36.7 °C) Temporal 79 18 96 %   05/30/23 1947 (!) 144/70 98.4 °F (36.9 °C) Temporal 77 16 96 %   05/30/23 1854 138/88 97.5 °F (36.4 °C) Temporal 80 12 92 %   05/30/23 1822 (!) 154/79 97.3 °F (36.3 °C) Temporal 80 12 96 %   05/30/23 1805 (!) 154/98 -- -- 76 12 94 %   05/30/23 1800 (!) 153/99 -- -- 77 15 95 %   05/30/23 1755 (!) 154/84 97.2 °F (36.2 °C) -- 79 16 96 %   05/30/23 1750 (!) 144/94 -- -- 84 17 93 %   05/30/23 1745 (!) 136/104 -- -- 81 17 99 %   05/30/23 1740 (!) 146/98 -- -- 85 18 97 %   05/30/23 1739 (!) 160/86 -- -- 87 16 96 %   05/30/23 1735 (!) 160/86 -- -- 76 17 98 %   05/30/23 1730 (!) 141/71 -- -- 74 18 97 %   05/30/23 1725 (!) 141/71 97.2 °F (36.2 °C) Temporal 74 18 97 %   05/30/23 1100 -- -- -- -- 16 --   05/30/23 0916 -- -- -- -- -- 95 %       General: Alert cooperative   Wound: Clean dry intact. Moderate swelling   Neurovascular: Exam normal   DVT Exam: negative         Data Review:  Recent Labs     05/29/23 2036 05/31/23 0237   HGB 12.5* 11.6*     Recent Labs     05/29/23 2036 05/30/23 0328 05/31/23 0237      < > 139   K 4.6   < > 5.2*   CREATININE 1.1   < > 1.0   GLUCOSE 115*   < > 163*   INR 1.13  --   --     < > = values in this interval not displayed. No results for input(s): POCGLU in the last 72 hours. XR HIP 2-3 VW W PELVIS LEFT   Final Result   Post left total hip arthroplasty. Interval relocation of the femoral component. XR CHEST PORTABLE   Final Result   No acute cardiopulmonary abnormality. Electronically signed by Cyn Aj MD on 05-30-23 at 0307      XR HIP 2-3 VW
Tolerance   Activity Tolerance Patient tolerated treatment well   PT Plan of Care   Wednesday X   Safety Devices   Type of Devices Bed alarm in place;Call light within reach;Gait belt;Left in bed         Electronically signed by Gera Heard PTA on 5/31/2023 at 2:11 PM
TO STAND INDEP  Short Term Goal 3: AMB.  100' WITH RW SBA  Short Term Goal 4: UP/DOWN 3 STAIRS SBA WITH RAIL  Patient Goals   Patient Goals : [place]       Education  Patient Education  Education Given To: Patient  Education Provided: Role of Therapy;Plan of Care;Transfer Training  Education Provided Comments: USE OF CALL LIGHT, STAFF A, ANT HIP PREC, FWBAT  Education Method: Demonstration;Verbal  Barriers to Learning: None  Education Outcome: Verbalized understanding;Continued education needed      Therapy Time   Individual Concurrent Group Co-treatment   Time In           Time Out           Minutes                   Inga Shankar PT     Electronically signed by Inga Shankar PT on 5/31/2023 at 11:27 AM

## 2023-05-31 NOTE — CARE COORDINATION
Per Sidra Ary is unable to accept due to not being in network with insurance. Per Mcneil Kussmaul [facility] unable to accept due to being out of network with insurance. Per Lauri Gardner unable to accept due to staffing. Unable to set up [facility] services. Notified patient and RN [facility]  Electronically signed by Alex Mckeon on 5/31/23 at 1:07 PM CDT    Spoke with patient regarding MD orders for [facility] services. Pt agreeable and chose [facility]. Spoke with Maryam. Referral faxed and PENDING. Please notify [facility] when patient discharges and Fax DC Summary,  DC med list and any new [facility] orders. P. [phone]  F. [phone]  The Patient and/or patient representative was provided with a choice of provider and agrees   with the discharge plan. [x] Yes [] No    Freedom of choice list was provided with basic dialogue that supports the patient's individualized plan of care/goals, treatment preferences and shares the quality data associated with the providers.  [x] Yes [] No  Electronically signed by Alex Mckeon   on 5/31/23 at 9:45 AM CDT

## 2023-06-03 LAB
BACTERIA SPEC AEROBE CULT: ABNORMAL
BACTERIA SPEC AEROBE CULT: NORMAL
BACTERIA SPEC AEROBE CULT: NORMAL
BACTERIA SPEC ANAEROBE CULT: ABNORMAL
BACTERIA SPEC ANAEROBE CULT: NORMAL
BACTERIA SPEC ANAEROBE CULT: NORMAL
GRAM STN SPEC: ABNORMAL
GRAM STN SPEC: NORMAL
GRAM STN SPEC: NORMAL
ORGANISM: ABNORMAL

## 2023-06-06 LAB
FUNGUS SPEC CULT: NORMAL
KOH PREP SPEC: NORMAL

## 2023-06-14 ENCOUNTER — OFFICE VISIT (OUTPATIENT)
Dept: FAMILY MEDICINE CLINIC | Facility: CLINIC | Age: 68
End: 2023-06-14
Payer: COMMERCIAL

## 2023-06-14 VITALS
HEIGHT: 74 IN | TEMPERATURE: 97.8 F | OXYGEN SATURATION: 96 % | BODY MASS INDEX: 37.96 KG/M2 | HEART RATE: 74 BPM | RESPIRATION RATE: 18 BRPM | WEIGHT: 295.8 LBS | SYSTOLIC BLOOD PRESSURE: 120 MMHG | DIASTOLIC BLOOD PRESSURE: 84 MMHG

## 2023-06-14 DIAGNOSIS — E78.2 MIXED HYPERLIPIDEMIA: ICD-10-CM

## 2023-06-14 DIAGNOSIS — R53.83 FATIGUE, UNSPECIFIED TYPE: ICD-10-CM

## 2023-06-14 DIAGNOSIS — R73.9 ELEVATED BLOOD SUGAR: Primary | ICD-10-CM

## 2023-06-14 DIAGNOSIS — E53.8 B12 DEFICIENCY: ICD-10-CM

## 2023-06-14 RX ORDER — CYANOCOBALAMIN 1000 UG/ML
1000 INJECTION, SOLUTION INTRAMUSCULAR; SUBCUTANEOUS WEEKLY
Status: SHIPPED | OUTPATIENT
Start: 2023-06-14 | End: 2023-07-12

## 2023-06-14 RX ORDER — LANOLIN ALCOHOL/MO/W.PET/CERES
1000 CREAM (GRAM) TOPICAL DAILY
COMMUNITY

## 2023-06-14 RX ADMIN — CYANOCOBALAMIN 1000 MCG: 1000 INJECTION, SOLUTION INTRAMUSCULAR; SUBCUTANEOUS at 09:00

## 2023-06-14 NOTE — PROGRESS NOTES
Answers submitted by the patient for this visit:  Primary Reason for Visit (Submitted on 6/12/2023)  What is the primary reason for your visit?: Other  Other (Submitted on 6/12/2023)  Please describe your symptoms.: check up, and cough since hip surgery  Have you had these symptoms before?: No  How long have you been having these symptoms?: Greater than 2 weeks  67-year-old with history of hyperlipidemia-recent hip replacement and discovery of B12 deficiency

## 2023-06-14 NOTE — PROGRESS NOTES
Subjective   George Wilder is a 67 y.o. male.     History of Present Illness  87-year-old with recent hip replacement and discovery of B12 deficiency    The following portions of the patient's history were reviewed and updated as appropriate: allergies, current medications, past family history, past medical history, past social history, past surgical history, and problem list.    Review of Systems   Respiratory:  Negative for shortness of breath.    Cardiovascular:  Negative for chest pain and leg swelling.   Genitourinary:  Negative for difficulty urinating.   Musculoskeletal:  Positive for arthralgias.     Objective   Physical Exam  Vitals and nursing note reviewed.   Constitutional:       Appearance: He is obese.   Eyes:      Extraocular Movements: Extraocular movements intact.      Pupils: Pupils are equal, round, and reactive to light.   Cardiovascular:      Rate and Rhythm: Normal rate and regular rhythm.   Pulmonary:      Effort: Pulmonary effort is normal.      Breath sounds: Normal breath sounds.   Musculoskeletal:      Right lower leg: No edema.      Left lower leg: No edema.   Skin:     General: Skin is warm and dry.   Neurological:      General: No focal deficit present.      Mental Status: He is alert and oriented to person, place, and time.   Psychiatric:         Mood and Affect: Mood normal.         Thought Content: Thought content normal.         Judgment: Judgment normal.       Assessment & Plan   Diagnoses and all orders for this visit:    1. Elevated blood sugar (Primary)  -     Hemoglobin A1c    2. Mixed hyperlipidemia  -     Comprehensive Metabolic Panel  -     Lipid Panel    3. Fatigue, unspecified type  -     CBC & Differential    4. B12 deficiency  -     cyanocobalamin injection 1,000 mcg           Plan above start B12 supplement both orally and parenterally     Answers submitted by the patient for this visit:  Primary Reason for Visit (Submitted on 6/12/2023)  What is the primary reason for  your visit?: Other  Other (Submitted on 6/12/2023)  Please describe your symptoms.: check up, and cough since hip surgery  Have you had these symptoms before?: No  How long have you been having these symptoms?: Greater than 2 weeks

## 2023-06-15 DIAGNOSIS — R53.83 FATIGUE, UNSPECIFIED TYPE: Primary | ICD-10-CM

## 2023-06-15 LAB
ALBUMIN SERPL-MCNC: 3.9 G/DL (ref 3.5–5.2)
ALBUMIN/GLOB SERPL: 1.6 G/DL
ALP SERPL-CCNC: 65 U/L (ref 39–117)
ALT SERPL-CCNC: 15 U/L (ref 1–41)
AST SERPL-CCNC: 12 U/L (ref 1–40)
BASOPHILS # BLD AUTO: 0.06 10*3/MM3 (ref 0–0.2)
BASOPHILS NFR BLD AUTO: 0.9 % (ref 0–1.5)
BILIRUB SERPL-MCNC: 0.5 MG/DL (ref 0–1.2)
BUN SERPL-MCNC: 15 MG/DL (ref 8–23)
BUN/CREAT SERPL: 16 (ref 7–25)
CALCIUM SERPL-MCNC: 9.6 MG/DL (ref 8.6–10.5)
CHLORIDE SERPL-SCNC: 107 MMOL/L (ref 98–107)
CHOLEST SERPL-MCNC: 133 MG/DL (ref 0–200)
CO2 SERPL-SCNC: 27.6 MMOL/L (ref 22–29)
CREAT SERPL-MCNC: 0.94 MG/DL (ref 0.76–1.27)
EGFRCR SERPLBLD CKD-EPI 2021: 88.9 ML/MIN/1.73
EOSINOPHIL # BLD AUTO: 0.26 10*3/MM3 (ref 0–0.4)
EOSINOPHIL NFR BLD AUTO: 4 % (ref 0.3–6.2)
ERYTHROCYTE [DISTWIDTH] IN BLOOD BY AUTOMATED COUNT: 12 % (ref 12.3–15.4)
GLOBULIN SER CALC-MCNC: 2.4 GM/DL
GLUCOSE SERPL-MCNC: 99 MG/DL (ref 65–99)
HBA1C MFR BLD: 4.6 % (ref 4.8–5.6)
HCT VFR BLD AUTO: 37.4 % (ref 37.5–51)
HDLC SERPL-MCNC: 55 MG/DL (ref 40–60)
HGB BLD-MCNC: 12.1 G/DL (ref 13–17.7)
IMM GRANULOCYTES # BLD AUTO: 0.01 10*3/MM3 (ref 0–0.05)
IMM GRANULOCYTES NFR BLD AUTO: 0.2 % (ref 0–0.5)
LDLC SERPL CALC-MCNC: 64 MG/DL (ref 0–100)
LYMPHOCYTES # BLD AUTO: 1.09 10*3/MM3 (ref 0.7–3.1)
LYMPHOCYTES NFR BLD AUTO: 16.9 % (ref 19.6–45.3)
MCH RBC QN AUTO: 30.9 PG (ref 26.6–33)
MCHC RBC AUTO-ENTMCNC: 32.4 G/DL (ref 31.5–35.7)
MCV RBC AUTO: 95.4 FL (ref 79–97)
MONOCYTES # BLD AUTO: 0.36 10*3/MM3 (ref 0.1–0.9)
MONOCYTES NFR BLD AUTO: 5.6 % (ref 5–12)
NEUTROPHILS # BLD AUTO: 4.68 10*3/MM3 (ref 1.7–7)
NEUTROPHILS NFR BLD AUTO: 72.4 % (ref 42.7–76)
NRBC BLD AUTO-RTO: 0 /100 WBC (ref 0–0.2)
PLATELET # BLD AUTO: 322 10*3/MM3 (ref 140–450)
POTASSIUM SERPL-SCNC: 4.9 MMOL/L (ref 3.5–5.2)
PROT SERPL-MCNC: 6.3 G/DL (ref 6–8.5)
RBC # BLD AUTO: 3.92 10*6/MM3 (ref 4.14–5.8)
SODIUM SERPL-SCNC: 142 MMOL/L (ref 136–145)
TRIGL SERPL-MCNC: 69 MG/DL (ref 0–150)
VLDLC SERPL CALC-MCNC: 14 MG/DL (ref 5–40)
WBC # BLD AUTO: 6.46 10*3/MM3 (ref 3.4–10.8)

## 2023-06-20 LAB
FUNGUS SPEC CULT: NORMAL
KOH PREP SPEC: NORMAL

## 2023-06-27 LAB
FUNGUS SPEC CULT: NORMAL
KOH PREP SPEC: NORMAL

## 2023-07-04 LAB
FUNGUS SPEC CULT: NORMAL
KOH PREP SPEC: NORMAL

## 2023-07-24 ENCOUNTER — TELEPHONE (OUTPATIENT)
Dept: FAMILY MEDICINE CLINIC | Facility: CLINIC | Age: 68
End: 2023-07-24

## 2023-07-24 NOTE — TELEPHONE ENCOUNTER
Caller: George Wilder A    Relationship to patient: Self    Best call back number: [phone]     Patient is needing:  THE PATIENT STATES THAT HE WOULD LIKE TO COME IN FOR A FASTING LAB ON 7/25/23 AND STATES THAT HE WAS TOLD TO CALL PRIOR TO THE VISIT. THE PATIENT STATES THAT HE WOULD LIKE A CALLBACK TO MAKE SURE THAT 7/25/23 IS OK FOR THE  CLINICAL TEAM.

## 2023-07-24 NOTE — TELEPHONE ENCOUNTER
Patient advised that we will not have anyone to draw labs that day but will Wednesday 8 till noon.

## 2023-08-20 ENCOUNTER — HOSPITAL ENCOUNTER (EMERGENCY)
Age: 68
Discharge: HOME | End: 2023-08-20
Attending: EMERGENCY MEDICINE
Payer: MEDICARE

## 2023-08-20 ENCOUNTER — APPOINTMENT (OUTPATIENT)
Dept: GENERAL RADIOLOGY | Age: 68
End: 2023-08-20
Payer: MEDICARE

## 2023-08-20 VITALS
SYSTOLIC BLOOD PRESSURE: 124 MMHG | RESPIRATION RATE: 12 BRPM | TEMPERATURE: 98.1 F | DIASTOLIC BLOOD PRESSURE: 70 MMHG | BODY MASS INDEX: 37.49 KG/M2 | OXYGEN SATURATION: 99 % | HEART RATE: 58 BPM | WEIGHT: 292 LBS

## 2023-08-20 DIAGNOSIS — S73.035A ANTERIOR DISLOCATION OF LEFT HIP, INITIAL ENCOUNTER (HCC): Primary | ICD-10-CM

## 2023-08-20 PROCEDURE — 99285 EMERGENCY DEPT VISIT HI MDM: CPT

## 2023-08-20 PROCEDURE — 99152 MOD SED SAME PHYS/QHP 5/>YRS: CPT

## 2023-08-20 PROCEDURE — 73502 X-RAY EXAM HIP UNI 2-3 VIEWS: CPT

## 2023-08-20 PROCEDURE — 27250 TREAT HIP DISLOCATION: CPT

## 2023-08-20 PROCEDURE — 96374 THER/PROPH/DIAG INJ IV PUSH: CPT

## 2023-08-20 PROCEDURE — 6360000002 HC RX W HCPCS: Performed by: EMERGENCY MEDICINE

## 2023-08-20 PROCEDURE — 99153 MOD SED SAME PHYS/QHP EA: CPT

## 2023-08-20 PROCEDURE — 96375 TX/PRO/DX INJ NEW DRUG ADDON: CPT

## 2023-08-20 RX ORDER — MORPHINE SULFATE 4 MG/ML
4 INJECTION, SOLUTION INTRAMUSCULAR; INTRAVENOUS ONCE
Status: COMPLETED | OUTPATIENT
Start: 2023-08-20 | End: 2023-08-20

## 2023-08-20 RX ORDER — HYDROCODONE BITARTRATE AND ACETAMINOPHEN 7.5; 325 MG/1; MG/1
1 TABLET ORAL EVERY 6 HOURS PRN
Qty: 12 TABLET | Refills: 0 | Status: SHIPPED | OUTPATIENT
Start: 2023-08-20 | End: 2023-08-23

## 2023-08-20 RX ORDER — PROPOFOL 10 MG/ML
80 INJECTION, EMULSION INTRAVENOUS ONCE
Status: DISCONTINUED | OUTPATIENT
Start: 2023-08-20 | End: 2023-08-20

## 2023-08-20 RX ORDER — ONDANSETRON 2 MG/ML
4 INJECTION INTRAMUSCULAR; INTRAVENOUS ONCE
Status: COMPLETED | OUTPATIENT
Start: 2023-08-20 | End: 2023-08-20

## 2023-08-20 RX ADMIN — ONDANSETRON 4 MG: 2 INJECTION INTRAMUSCULAR; INTRAVENOUS at 13:07

## 2023-08-20 RX ADMIN — PROPOFOL 290 MG: 10 INJECTION, EMULSION INTRAVENOUS at 14:37

## 2023-08-20 RX ADMIN — MORPHINE SULFATE 4 MG: 4 INJECTION, SOLUTION INTRAMUSCULAR; INTRAVENOUS at 13:07

## 2023-08-20 ASSESSMENT — PAIN DESCRIPTION - LOCATION
LOCATION: HIP
LOCATION: HIP

## 2023-08-20 ASSESSMENT — PAIN SCALES - GENERAL
PAINLEVEL_OUTOF10: 8
PAINLEVEL_OUTOF10: 3

## 2023-08-20 ASSESSMENT — ENCOUNTER SYMPTOMS
BACK PAIN: 0
COUGH: 0
DIARRHEA: 0
NAUSEA: 0
ABDOMINAL PAIN: 0
SHORTNESS OF BREATH: 0
VOMITING: 0

## 2023-08-20 ASSESSMENT — PAIN DESCRIPTION - ORIENTATION: ORIENTATION: LEFT

## 2023-08-20 ASSESSMENT — PAIN - FUNCTIONAL ASSESSMENT: PAIN_FUNCTIONAL_ASSESSMENT: 0-10

## 2023-08-20 NOTE — ED PROVIDER NOTES
[facility] EMERGENCY DEPT  eMERGENCY dEPARTMENT eNCOUnter      Pt Name: Steph Guzman  MRN: [id number]  [address] [facility] 1955  Date of evaluation: 8/20/2023  Provider: Adam Slater MD    CHIEF COMPLAINT       Chief Complaint   Patient presents with    Hip Pain     Patient states that he was at [employer], just standing there and felt a \"pop\" and his left hip gave out, patient has had  bilateral total hip replacements in the past          HISTORY OF PRESENT ILLNESS   (Location/Symptom, Timing/Onset,Context/Setting, Quality, Duration, Modifying Factors, Severity)  Note limiting factors. Steph Guzman is a 79 y.o. male who presents to the emergency department for left hip pain. Patient was standing at [employer] whenever he felt his left hip pop and he fell to the ground but did not sustain any head trauma or lose consciousness. Unable to ambulate since the fall. Prior history of bilateral total hip prosthesis. Had his left hip initially replaced at the beginning of May but he tells me it spontaneously came apart at the end of May and ultimately had to have it revised by Dr. Crystal Castellanos at the end of May. 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 Today he states that he was truly just standing had not turned or twisted in any odd way. HPI    NursingNotes were reviewed. REVIEW OF SYSTEMS    (2-9 systems for level 4, 10 or more for level 5)     Review of Systems   Constitutional:  Negative for chills and fever. Respiratory:  Negative for cough and shortness of breath. Cardiovascular:  Negative for chest pain and leg swelling. Gastrointestinal:  Negative for abdominal pain, diarrhea, nausea and vomiting. Genitourinary:  Negative for dysuria and frequency. Musculoskeletal:  Positive for gait problem. Negative for back pain and neck pain. Neurological:  Negative for dizziness and headaches. All other systems reviewed and are negative.          PAST MEDICALHISTORY     Past Medical

## 2023-08-20 NOTE — ED NOTES
Consent signed for sedation for left hip dislocation. Pt placed on cardiac monitor. Pt placed on 2L NC. Ambu bag at bedside. Suction set up at bedside.        Claudine Campbell, PRERNA  08/20/23 7622

## 2023-10-04 RX ORDER — ROSUVASTATIN CALCIUM 20 MG/1
TABLET, COATED ORAL
Qty: 30 TABLET | Refills: 11 | Status: SHIPPED | OUTPATIENT
Start: 2023-10-04

## 2023-10-09 ENCOUNTER — CLINICAL SUPPORT (OUTPATIENT)
Dept: FAMILY MEDICINE CLINIC | Facility: CLINIC | Age: 68
End: 2023-10-09
Payer: MEDICARE

## 2023-10-09 ENCOUNTER — FLU SHOT (OUTPATIENT)
Dept: FAMILY MEDICINE CLINIC | Facility: CLINIC | Age: 68
End: 2023-10-09
Payer: MEDICARE

## 2023-10-09 DIAGNOSIS — Z23 NEED FOR INFLUENZA VACCINATION: Primary | ICD-10-CM

## 2023-10-09 DIAGNOSIS — E53.8 B12 DEFICIENCY: Primary | ICD-10-CM

## 2023-10-09 RX ADMIN — CYANOCOBALAMIN 1000 MCG: 1000 INJECTION, SOLUTION INTRAMUSCULAR; SUBCUTANEOUS at 08:45

## 2023-10-09 NOTE — PROGRESS NOTES
Administered 1000 mcg cyanocobalamin to the right deltoid.  Patient tolerated well.  No reaction observed.

## 2023-10-17 NOTE — TELEPHONE ENCOUNTER
I had to change the script because it was still reporting to take 2 a day.  I tried to change it on the med list and epic did not like it.  Juan A Pickard, CMA

## 2023-10-18 RX ORDER — PRASUGREL 5 MG/1
5 TABLET, FILM COATED ORAL DAILY
Qty: 90 TABLET | Refills: 3 | Status: SHIPPED | OUTPATIENT
Start: 2023-10-18

## 2023-10-20 ENCOUNTER — IMMUNIZATION (OUTPATIENT)
Dept: FAMILY MEDICINE CLINIC | Facility: CLINIC | Age: 68
End: 2023-10-20
Payer: MEDICARE

## 2023-10-20 DIAGNOSIS — Z23 NEED FOR COVID-19 VACCINE: Primary | ICD-10-CM

## 2023-10-30 ENCOUNTER — OFFICE VISIT (OUTPATIENT)
Dept: FAMILY MEDICINE CLINIC | Facility: CLINIC | Age: 68
End: 2023-10-30
Payer: MEDICARE

## 2023-10-30 ENCOUNTER — TELEPHONE (OUTPATIENT)
Dept: OTOLARYNGOLOGY | Facility: CLINIC | Age: 68
End: 2023-10-30

## 2023-10-30 VITALS
HEART RATE: 71 BPM | TEMPERATURE: 97.4 F | RESPIRATION RATE: 18 BRPM | HEIGHT: 74 IN | BODY MASS INDEX: 37.86 KG/M2 | OXYGEN SATURATION: 94 % | WEIGHT: 295 LBS | SYSTOLIC BLOOD PRESSURE: 149 MMHG | DIASTOLIC BLOOD PRESSURE: 84 MMHG

## 2023-10-30 DIAGNOSIS — J01.00 SUBACUTE MAXILLARY SINUSITIS: Primary | ICD-10-CM

## 2023-10-30 RX ORDER — PREDNISONE 20 MG/1
TABLET ORAL
Qty: 12 TABLET | Refills: 0 | Status: SHIPPED | OUTPATIENT
Start: 2023-10-30

## 2023-10-30 RX ORDER — TRIAMCINOLONE ACETONIDE 40 MG/ML
40 INJECTION, SUSPENSION INTRA-ARTICULAR; INTRAMUSCULAR ONCE
Status: COMPLETED | OUTPATIENT
Start: 2023-10-30 | End: 2023-10-30

## 2023-10-30 RX ORDER — PEN NEEDLE, DIABETIC 30 GX3/16"
NEEDLE, DISPOSABLE MISCELLANEOUS SEE ADMIN INSTRUCTIONS
COMMUNITY
Start: 2023-10-18

## 2023-10-30 RX ORDER — LIRAGLUTIDE 6 MG/ML
INJECTION SUBCUTANEOUS
COMMUNITY
Start: 2023-10-18

## 2023-10-30 RX ORDER — AZITHROMYCIN 250 MG/1
TABLET, FILM COATED ORAL
Qty: 6 TABLET | Refills: 0 | Status: SHIPPED | OUTPATIENT
Start: 2023-10-30

## 2023-10-30 RX ADMIN — TRIAMCINOLONE ACETONIDE 40 MG: 40 INJECTION, SUSPENSION INTRA-ARTICULAR; INTRAMUSCULAR at 11:32

## 2023-10-30 NOTE — PROGRESS NOTES
Subjective   George Wilder is a 68 y.o. male.     History of Present Illness  2-week history of sinus congestion and cough-been working outside mowing etc.      The following portions of the patient's history were reviewed and updated as appropriate: allergies, current medications, past family history, past medical history, past social history, past surgical history, and problem list.    Review of Systems   Constitutional:         On Victoza-says he is losing weight   Respiratory:  Negative for shortness of breath.    Cardiovascular:  Negative for chest pain and leg swelling.   Musculoskeletal:  Positive for arthralgias.       Objective   Physical Exam  Vitals and nursing note reviewed.   Constitutional:       Appearance: He is obese.   HENT:      Mouth/Throat:      Mouth: Mucous membranes are moist.      Pharynx: Oropharynx is clear.   Eyes:      Extraocular Movements: Extraocular movements intact.      Pupils: Pupils are equal, round, and reactive to light.   Cardiovascular:      Rate and Rhythm: Normal rate and regular rhythm.   Pulmonary:      Effort: Pulmonary effort is normal.      Breath sounds: Normal breath sounds.   Musculoskeletal:      Right lower leg: No edema.      Left lower leg: No edema.   Skin:     General: Skin is warm and dry.   Neurological:      General: No focal deficit present.      Mental Status: He is alert and oriented to person, place, and time.   Psychiatric:         Mood and Affect: Mood normal.         Behavior: Behavior normal.         Thought Content: Thought content normal.         Judgment: Judgment normal.         Assessment & Plan   Diagnoses and all orders for this visit:    1. Subacute maxillary sinusitis (Primary)  -     triamcinolone acetonide (KENALOG-40) injection 40 mg    Other orders  -     predniSONE (DELTASONE) 20 MG tablet; Starting Tuesday morning take 2 pills on Tuesday Wednesday Thursday then 1 pill till medicine gone  Dispense: 12 tablet; Refill: 0  -      azithromycin (Zithromax Z-Elder) 250 MG tablet; Take 2 tablets the first day, then 1 tablet daily for 4 days.  Start today  Dispense: 6 tablet; Refill: 0       Plan above plus Zyrtec and Flonase

## 2023-10-30 NOTE — TELEPHONE ENCOUNTER
Provider: DR BAUTISTA    Caller: WATSON MENDOSA    Relationship to Patient: SELF      Reason for Call: PT HAS TICKLE IN THROAT, AND FEELS LIKE THERE IS SOMETHING THERE THAT HE CANNOT CLEAR.  PT HAS BEEN SCHEDULED 1ST AVAILABLE 12/26, HOWEVER HAS REQUESTED TO BE SEEN SOONER.    PLEASE REVIEW AND CALL PT IF ABLE TO MOVE APPT UP.  PT OK TO SEE APRN.    When was the patient last seen: 3/9/23

## 2023-11-10 ENCOUNTER — HOSPITAL ENCOUNTER (EMERGENCY)
Age: 68
Discharge: HOME | End: 2023-11-10
Attending: EMERGENCY MEDICINE
Payer: MEDICARE

## 2023-11-10 ENCOUNTER — APPOINTMENT (OUTPATIENT)
Dept: GENERAL RADIOLOGY | Age: 68
End: 2023-11-10
Payer: MEDICARE

## 2023-11-10 VITALS
RESPIRATION RATE: 15 BRPM | WEIGHT: 285 LBS | TEMPERATURE: 97.8 F | HEIGHT: 74 IN | SYSTOLIC BLOOD PRESSURE: 149 MMHG | DIASTOLIC BLOOD PRESSURE: 87 MMHG | BODY MASS INDEX: 36.57 KG/M2 | HEART RATE: 70 BPM | OXYGEN SATURATION: 96 %

## 2023-11-10 DIAGNOSIS — T84.021A DISLOCATION OF PROSTHESIS OF LEFT HIP JOINT (HCC): Primary | ICD-10-CM

## 2023-11-10 PROCEDURE — 99285 EMERGENCY DEPT VISIT HI MDM: CPT

## 2023-11-10 PROCEDURE — 73502 X-RAY EXAM HIP UNI 2-3 VIEWS: CPT

## 2023-11-10 PROCEDURE — 2500000003 HC RX 250 WO HCPCS: Performed by: EMERGENCY MEDICINE

## 2023-11-10 PROCEDURE — 6360000002 HC RX W HCPCS: Performed by: EMERGENCY MEDICINE

## 2023-11-10 PROCEDURE — 99152 MOD SED SAME PHYS/QHP 5/>YRS: CPT

## 2023-11-10 PROCEDURE — 99153 MOD SED SAME PHYS/QHP EA: CPT

## 2023-11-10 RX ORDER — KETAMINE HYDROCHLORIDE 100 MG/ML
1 INJECTION INTRAMUSCULAR; INTRAVENOUS ONCE
Status: COMPLETED | OUTPATIENT
Start: 2023-11-10 | End: 2023-11-10

## 2023-11-10 RX ADMIN — KETAMINE HYDROCHLORIDE 130 MG: 100 INJECTION, SOLUTION, CONCENTRATE INTRAMUSCULAR; INTRAVENOUS at 14:32

## 2023-11-10 RX ADMIN — PROPOFOL 129.3 MG: 10 INJECTION, EMULSION INTRAVENOUS at 14:32

## 2023-11-10 ASSESSMENT — ENCOUNTER SYMPTOMS
GASTROINTESTINAL NEGATIVE: 1
RESPIRATORY NEGATIVE: 1
EYES NEGATIVE: 1

## 2023-11-10 ASSESSMENT — PAIN - FUNCTIONAL ASSESSMENT: PAIN_FUNCTIONAL_ASSESSMENT: NONE - DENIES PAIN

## 2023-11-10 NOTE — ED NOTES
LLE external rotation noted. Difficult to palpate pulses in left foot.  MD notified and will doppler     Ghada Clay RN  11/10/23 2252

## 2023-11-13 ENCOUNTER — HOSPITAL ENCOUNTER (OUTPATIENT)
Dept: PREADMISSION TESTING | Age: 68
Discharge: HOME | DRG: 468 | End: 2023-11-17
Payer: MEDICARE

## 2023-11-13 VITALS — BODY MASS INDEX: 36.32 KG/M2 | WEIGHT: 283 LBS | HEIGHT: 74 IN

## 2023-11-13 LAB
ABO/RH: NORMAL
ANION GAP SERPL CALCULATED.3IONS-SCNC: 9 MMOL/L (ref 7–19)
ANTIBODY SCREEN: NORMAL
APTT PPP: 31.2 SEC (ref 26–36.2)
BASOPHILS # BLD: 0.1 K/UL (ref 0–0.2)
BASOPHILS NFR BLD: 0.6 % (ref 0–1)
BLD GP AB SCN SERPL QL: NORMAL
BUN SERPL-MCNC: 21 MG/DL (ref 8–23)
CALCIUM SERPL-MCNC: 9.4 MG/DL (ref 8.8–10.2)
CHLORIDE SERPL-SCNC: 104 MMOL/L (ref 98–111)
CO2 SERPL-SCNC: 25 MMOL/L (ref 22–29)
CREAT SERPL-MCNC: 1 MG/DL (ref 0.5–1.2)
EKG P AXIS: 58 DEGREES
EKG P-R INTERVAL: 160 MS
EKG Q-T INTERVAL: 388 MS
EKG QRS DURATION: 98 MS
EKG QTC CALCULATION (BAZETT): 388 MS
EKG T AXIS: 54 DEGREES
EOSINOPHIL # BLD: 0.3 K/UL (ref 0–0.6)
EOSINOPHIL NFR BLD: 3 % (ref 0–5)
ERYTHROCYTE [DISTWIDTH] IN BLOOD BY AUTOMATED COUNT: 14.5 % (ref 11.5–14.5)
GLUCOSE SERPL-MCNC: 96 MG/DL (ref 74–109)
HCT VFR BLD AUTO: 48.8 % (ref 42–52)
HGB BLD-MCNC: 15.9 G/DL (ref 14–18)
IMM GRANULOCYTES # BLD: 0 K/UL
INR PPP: 1.07 (ref 0.88–1.18)
LYMPHOCYTES # BLD: 1.6 K/UL (ref 1.1–4.5)
LYMPHOCYTES NFR BLD: 19.2 % (ref 20–40)
MCH RBC QN AUTO: 31 PG (ref 27–31)
MCHC RBC AUTO-ENTMCNC: 32.6 G/DL (ref 33–37)
MCV RBC AUTO: 95.1 FL (ref 80–94)
MONOCYTES # BLD: 0.6 K/UL (ref 0–0.9)
MONOCYTES NFR BLD: 7.2 % (ref 0–10)
MRSA DNA SPEC QL NAA+PROBE: NOT DETECTED
NEUTROPHILS # BLD: 5.8 K/UL (ref 1.5–7.5)
NEUTS SEG NFR BLD: 69.6 % (ref 50–65)
PLATELET # BLD AUTO: 228 K/UL (ref 130–400)
PMV BLD AUTO: 10.2 FL (ref 9.4–12.4)
POTASSIUM SERPL-SCNC: 5 MMOL/L (ref 3.5–5)
PROTHROMBIN TIME: 13.6 SEC (ref 12–14.6)
RBC # BLD AUTO: 5.13 M/UL (ref 4.7–6.1)
SODIUM SERPL-SCNC: 138 MMOL/L (ref 136–145)
WBC # BLD AUTO: 8.3 K/UL (ref 4.8–10.8)

## 2023-11-13 PROCEDURE — 85025 COMPLETE CBC W/AUTO DIFF WBC: CPT

## 2023-11-13 PROCEDURE — 85730 THROMBOPLASTIN TIME PARTIAL: CPT

## 2023-11-13 PROCEDURE — 86850 RBC ANTIBODY SCREEN: CPT

## 2023-11-13 PROCEDURE — 80048 BASIC METABOLIC PNL TOTAL CA: CPT

## 2023-11-13 PROCEDURE — 86901 BLOOD TYPING SEROLOGIC RH(D): CPT

## 2023-11-13 PROCEDURE — 86900 BLOOD TYPING SEROLOGIC ABO: CPT

## 2023-11-13 PROCEDURE — 87641 MR-STAPH DNA AMP PROBE: CPT

## 2023-11-13 PROCEDURE — 85610 PROTHROMBIN TIME: CPT

## 2023-11-13 NOTE — DISCHARGE INSTRUCTIONS
The day before surgery you will receive a phone call from the surgery nurse to let you know what time to arrive on the day of surgery. This call will usually be between 2-4 PM. If you do not receive a phone call by 4 PM the day before your surgery please call [phone] and let them know you have not received an arrival time. If your surgery is on Monday, your call will be on the Friday before your Monday surgery. Ephriam Homer for the [facility]    A script for Bactroban ointment has been call to your pharmacy or was given to you in written form by your surgeon. The guidelines for the ointment use are as follows:    1)  Start using the ointment 7 days before your surgery date    2)  Use the ointment two times a day - morning and night    3)  Place the ointment on a Q-tip and swirl up in your nose making sure you cover completely       the skin just inside of each nostril. Use one end of the Q-tip for each nostril. Chlorhexidine Gluconate 4% Solution    Patient should shower with this soap a minimum of 3 consecutive showers (2 nights before surgery, the night before surgery and the morning of surgery) washing from the neck down (avoiding contact with genitalia). DO  [address] [facility] [address] YOUR HAIR OR FACE WITH THIS SOAP. When washing with this soap, apply enough to suds up the body thoroughly, turn the water away from your body and allow the soap suds to remain on the body for 2 full minutes, then rinse body completely. After using this soap on the body, please do not apply powders or lotions to your body. After the shower the night before surgery, please dry off with a new towel, sleep in new freshly laundered pj's, and change your bed linen before going to sleep. The morning of surgery, you may take all your prescribed medications with a sip of water. Any exceptions to this would be listed below: Effient and aspirin per prescribing doctor.     PREOPERATIVE GUIDELINES WHEN RECEIVING ANESTHESIA    Do not eat

## 2023-11-13 NOTE — DISCHARGE INSTR - DIET
Good nutrition is important when healing from an illness, injury, or surgery. Follow any nutrition recommendations given to you during your hospital stay. If you were given an oral nutrition supplement while in the hospital, continue to take this supplement at home. You can take it with meals, in-between meals, and/or before bedtime. These supplements can be purchased at most local grocery stores, pharmacies, and chain [employer]-stores. If you have any questions about your diet or nutrition, call the hospital and ask for the dietitian.              Low carb / High protein --- for 2 weeks

## 2023-11-14 ENCOUNTER — HOSPITAL ENCOUNTER (INPATIENT)
Age: 68
LOS: 1 days | Discharge: HOME HEALTH | DRG: 467 | End: 2023-11-15
Attending: ORTHOPAEDIC SURGERY | Admitting: ORTHOPAEDIC SURGERY
Payer: MEDICARE

## 2023-11-14 ENCOUNTER — ANESTHESIA EVENT (OUTPATIENT)
Dept: OPERATING ROOM | Age: 68
DRG: 468 | End: 2023-11-14
Payer: MEDICARE

## 2023-11-14 ENCOUNTER — APPOINTMENT (OUTPATIENT)
Dept: GENERAL RADIOLOGY | Age: 68
DRG: 467 | End: 2023-11-14
Attending: ORTHOPAEDIC SURGERY
Payer: MEDICARE

## 2023-11-14 ENCOUNTER — ANESTHESIA (OUTPATIENT)
Dept: OPERATING ROOM | Age: 68
DRG: 468 | End: 2023-11-14
Payer: MEDICARE

## 2023-11-14 DIAGNOSIS — T84.84XA PAIN DUE TO HIP JOINT PROSTHESIS, INITIAL ENCOUNTER (HCC): Primary | ICD-10-CM

## 2023-11-14 DIAGNOSIS — T84.84XA PAIN DUE TO INTERNAL ORTHOPEDIC PROSTHETIC DEVICES, IMPLANTS AND GRAFTS, INITIAL ENCOUNTER (HCC): ICD-10-CM

## 2023-11-14 DIAGNOSIS — Z96.649 PAIN DUE TO HIP JOINT PROSTHESIS, INITIAL ENCOUNTER (HCC): Primary | ICD-10-CM

## 2023-11-14 LAB
ABO + RH BLD: NORMAL
BLD GP AB SCN SERPL QL: NORMAL
KOH PREP SPEC: NORMAL

## 2023-11-14 PROCEDURE — 3600000015 HC SURGERY LEVEL 5 ADDTL 15MIN: Performed by: ORTHOPAEDIC SURGERY

## 2023-11-14 PROCEDURE — 36415 COLL VENOUS BLD VENIPUNCTURE: CPT

## 2023-11-14 PROCEDURE — 6360000002 HC RX W HCPCS: Performed by: ANESTHESIOLOGY

## 2023-11-14 PROCEDURE — 87075 CULTR BACTERIA EXCEPT BLOOD: CPT

## 2023-11-14 PROCEDURE — 86900 BLOOD TYPING SEROLOGIC ABO: CPT

## 2023-11-14 PROCEDURE — 2500000003 HC RX 250 WO HCPCS: Performed by: NURSE ANESTHETIST, CERTIFIED REGISTERED

## 2023-11-14 PROCEDURE — A4217 STERILE WATER/SALINE, 500 ML: HCPCS | Performed by: ORTHOPAEDIC SURGERY

## 2023-11-14 PROCEDURE — 6360000002 HC RX W HCPCS: Performed by: NURSE ANESTHETIST, CERTIFIED REGISTERED

## 2023-11-14 PROCEDURE — 73502 X-RAY EXAM HIP UNI 2-3 VIEWS: CPT

## 2023-11-14 PROCEDURE — 0SRB01Z REPLACEMENT OF LEFT HIP JOINT WITH METAL SYNTHETIC SUBSTITUTE, OPEN APPROACH: ICD-10-PCS | Performed by: ORTHOPAEDIC SURGERY

## 2023-11-14 PROCEDURE — G0378 HOSPITAL OBSERVATION PER HR: HCPCS

## 2023-11-14 PROCEDURE — 3700000001 HC ADD 15 MINUTES (ANESTHESIA): Performed by: ORTHOPAEDIC SURGERY

## 2023-11-14 PROCEDURE — 3600000005 HC SURGERY LEVEL 5 BASE: Performed by: ORTHOPAEDIC SURGERY

## 2023-11-14 PROCEDURE — 2500000003 HC RX 250 WO HCPCS: Performed by: ORTHOPAEDIC SURGERY

## 2023-11-14 PROCEDURE — 6360000002 HC RX W HCPCS: Performed by: ORTHOPAEDIC SURGERY

## 2023-11-14 PROCEDURE — 6370000000 HC RX 637 (ALT 250 FOR IP): Performed by: ANESTHESIOLOGY

## 2023-11-14 PROCEDURE — 6370000000 HC RX 637 (ALT 250 FOR IP): Performed by: ORTHOPAEDIC SURGERY

## 2023-11-14 PROCEDURE — 87205 SMEAR GRAM STAIN: CPT

## 2023-11-14 PROCEDURE — 87070 CULTURE OTHR SPECIMN AEROBIC: CPT

## 2023-11-14 PROCEDURE — 86850 RBC ANTIBODY SCREEN: CPT

## 2023-11-14 PROCEDURE — 0SUB09Z SUPPLEMENT LEFT HIP JOINT WITH LINER, OPEN APPROACH: ICD-10-PCS | Performed by: ORTHOPAEDIC SURGERY

## 2023-11-14 PROCEDURE — 2709999900 HC NON-CHARGEABLE SUPPLY: Performed by: ORTHOPAEDIC SURGERY

## 2023-11-14 PROCEDURE — 87176 TISSUE HOMOGENIZATION CULTR: CPT

## 2023-11-14 PROCEDURE — 86901 BLOOD TYPING SEROLOGIC RH(D): CPT

## 2023-11-14 PROCEDURE — 0SPB0JZ REMOVAL OF SYNTHETIC SUBSTITUTE FROM LEFT HIP JOINT, OPEN APPROACH: ICD-10-PCS | Performed by: ORTHOPAEDIC SURGERY

## 2023-11-14 PROCEDURE — 94150 VITAL CAPACITY TEST: CPT

## 2023-11-14 PROCEDURE — 7100000001 HC PACU RECOVERY - ADDTL 15 MIN: Performed by: ORTHOPAEDIC SURGERY

## 2023-11-14 PROCEDURE — C1776 JOINT DEVICE (IMPLANTABLE): HCPCS | Performed by: ORTHOPAEDIC SURGERY

## 2023-11-14 PROCEDURE — C1713 ANCHOR/SCREW BN/BN,TIS/BN: HCPCS | Performed by: ORTHOPAEDIC SURGERY

## 2023-11-14 PROCEDURE — 3700000000 HC ANESTHESIA ATTENDED CARE: Performed by: ORTHOPAEDIC SURGERY

## 2023-11-14 PROCEDURE — 87102 FUNGUS ISOLATION CULTURE: CPT

## 2023-11-14 PROCEDURE — 2580000003 HC RX 258: Performed by: ORTHOPAEDIC SURGERY

## 2023-11-14 PROCEDURE — 7100000000 HC PACU RECOVERY - FIRST 15 MIN: Performed by: ORTHOPAEDIC SURGERY

## 2023-11-14 PROCEDURE — 2580000003 HC RX 258: Performed by: NURSE ANESTHETIST, CERTIFIED REGISTERED

## 2023-11-14 PROCEDURE — 0SPB09Z REMOVAL OF LINER FROM LEFT HIP JOINT, OPEN APPROACH: ICD-10-PCS | Performed by: ORTHOPAEDIC SURGERY

## 2023-11-14 DEVICE — IMPLANTABLE DEVICE: Type: IMPLANTABLE DEVICE | Site: HIP | Status: FUNCTIONAL

## 2023-11-14 RX ORDER — DIPHENHYDRAMINE HYDROCHLORIDE 50 MG/ML
12.5 INJECTION INTRAMUSCULAR; INTRAVENOUS
Status: DISCONTINUED | OUTPATIENT
Start: 2023-11-14 | End: 2023-11-14 | Stop reason: HOSPADM

## 2023-11-14 RX ORDER — DEXAMETHASONE SODIUM PHOSPHATE 10 MG/ML
INJECTION, SOLUTION INTRAMUSCULAR; INTRAVENOUS PRN
Status: DISCONTINUED | OUTPATIENT
Start: 2023-11-14 | End: 2023-11-14 | Stop reason: SDUPTHER

## 2023-11-14 RX ORDER — DEXAMETHASONE SODIUM PHOSPHATE 10 MG/ML
8 INJECTION, SOLUTION INTRAMUSCULAR; INTRAVENOUS ONCE
Status: DISCONTINUED | OUTPATIENT
Start: 2023-11-14 | End: 2023-11-14 | Stop reason: HOSPADM

## 2023-11-14 RX ORDER — SODIUM CHLORIDE 0.9 % (FLUSH) 0.9 %
5-40 SYRINGE (ML) INJECTION EVERY 12 HOURS SCHEDULED
Status: DISCONTINUED | OUTPATIENT
Start: 2023-11-14 | End: 2023-11-14 | Stop reason: HOSPADM

## 2023-11-14 RX ORDER — SODIUM CHLORIDE 0.9 % (FLUSH) 0.9 %
5-40 SYRINGE (ML) INJECTION PRN
Status: DISCONTINUED | OUTPATIENT
Start: 2023-11-14 | End: 2023-11-14 | Stop reason: HOSPADM

## 2023-11-14 RX ORDER — PROPOFOL 10 MG/ML
INJECTION, EMULSION INTRAVENOUS PRN
Status: DISCONTINUED | OUTPATIENT
Start: 2023-11-14 | End: 2023-11-14 | Stop reason: SDUPTHER

## 2023-11-14 RX ORDER — HYDRALAZINE HYDROCHLORIDE 20 MG/ML
10 INJECTION INTRAMUSCULAR; INTRAVENOUS
Status: DISCONTINUED | OUTPATIENT
Start: 2023-11-14 | End: 2023-11-14 | Stop reason: HOSPADM

## 2023-11-14 RX ORDER — SODIUM CHLORIDE 9 MG/ML
INJECTION, SOLUTION INTRAVENOUS PRN
Status: DISCONTINUED | OUTPATIENT
Start: 2023-11-14 | End: 2023-11-14 | Stop reason: HOSPADM

## 2023-11-14 RX ORDER — OXYCODONE HYDROCHLORIDE 5 MG/1
5 TABLET ORAL EVERY 4 HOURS PRN
Status: DISCONTINUED | OUTPATIENT
Start: 2023-11-14 | End: 2023-11-15 | Stop reason: HOSPADM

## 2023-11-14 RX ORDER — HYDROMORPHONE HYDROCHLORIDE 1 MG/ML
0.5 INJECTION, SOLUTION INTRAMUSCULAR; INTRAVENOUS; SUBCUTANEOUS EVERY 5 MIN PRN
Status: DISCONTINUED | OUTPATIENT
Start: 2023-11-14 | End: 2023-11-14 | Stop reason: HOSPADM

## 2023-11-14 RX ORDER — TRANEXAMIC ACID 650 MG/1
1950 TABLET ORAL
Status: COMPLETED | OUTPATIENT
Start: 2023-11-14 | End: 2023-11-14

## 2023-11-14 RX ORDER — MORPHINE SULFATE 4 MG/ML
4 INJECTION, SOLUTION INTRAMUSCULAR; INTRAVENOUS
Status: DISCONTINUED | OUTPATIENT
Start: 2023-11-14 | End: 2023-11-15 | Stop reason: HOSPADM

## 2023-11-14 RX ORDER — ONDANSETRON 2 MG/ML
INJECTION INTRAMUSCULAR; INTRAVENOUS PRN
Status: DISCONTINUED | OUTPATIENT
Start: 2023-11-14 | End: 2023-11-14 | Stop reason: SDUPTHER

## 2023-11-14 RX ORDER — OXYCODONE HYDROCHLORIDE 10 MG/1
10 TABLET ORAL EVERY 4 HOURS PRN
Status: DISCONTINUED | OUTPATIENT
Start: 2023-11-14 | End: 2023-11-15 | Stop reason: HOSPADM

## 2023-11-14 RX ORDER — FAMOTIDINE 20 MG/1
20 TABLET, FILM COATED ORAL ONCE
Status: COMPLETED | OUTPATIENT
Start: 2023-11-14 | End: 2023-11-14

## 2023-11-14 RX ORDER — METOCLOPRAMIDE HYDROCHLORIDE 5 MG/ML
10 INJECTION INTRAMUSCULAR; INTRAVENOUS
Status: DISCONTINUED | OUTPATIENT
Start: 2023-11-14 | End: 2023-11-14 | Stop reason: HOSPADM

## 2023-11-14 RX ORDER — MORPHINE SULFATE 2 MG/ML
2 INJECTION, SOLUTION INTRAMUSCULAR; INTRAVENOUS
Status: DISCONTINUED | OUTPATIENT
Start: 2023-11-14 | End: 2023-11-15 | Stop reason: HOSPADM

## 2023-11-14 RX ORDER — HYDROMORPHONE HYDROCHLORIDE 1 MG/ML
0.25 INJECTION, SOLUTION INTRAMUSCULAR; INTRAVENOUS; SUBCUTANEOUS EVERY 5 MIN PRN
Status: DISCONTINUED | OUTPATIENT
Start: 2023-11-14 | End: 2023-11-14 | Stop reason: HOSPADM

## 2023-11-14 RX ORDER — MELOXICAM 7.5 MG/1
3.75 TABLET ORAL DAILY
Status: DISCONTINUED | OUTPATIENT
Start: 2023-11-14 | End: 2023-11-15 | Stop reason: HOSPADM

## 2023-11-14 RX ORDER — IPRATROPIUM BROMIDE AND ALBUTEROL SULFATE 2.5; .5 MG/3ML; MG/3ML
1 SOLUTION RESPIRATORY (INHALATION)
Status: DISCONTINUED | OUTPATIENT
Start: 2023-11-14 | End: 2023-11-14 | Stop reason: HOSPADM

## 2023-11-14 RX ORDER — SODIUM CHLORIDE, SODIUM LACTATE, POTASSIUM CHLORIDE, CALCIUM CHLORIDE 600; 310; 30; 20 MG/100ML; MG/100ML; MG/100ML; MG/100ML
INJECTION, SOLUTION INTRAVENOUS CONTINUOUS PRN
Status: DISCONTINUED | OUTPATIENT
Start: 2023-11-14 | End: 2023-11-14 | Stop reason: SDUPTHER

## 2023-11-14 RX ORDER — SODIUM CHLORIDE, SODIUM LACTATE, POTASSIUM CHLORIDE, CALCIUM CHLORIDE 600; 310; 30; 20 MG/100ML; MG/100ML; MG/100ML; MG/100ML
INJECTION, SOLUTION INTRAVENOUS CONTINUOUS
Status: DISCONTINUED | OUTPATIENT
Start: 2023-11-14 | End: 2023-11-14 | Stop reason: HOSPADM

## 2023-11-14 RX ORDER — ACETAMINOPHEN 500 MG
1000 TABLET ORAL ONCE
Status: COMPLETED | OUTPATIENT
Start: 2023-11-14 | End: 2023-11-14

## 2023-11-14 RX ORDER — FENTANYL CITRATE 50 UG/ML
INJECTION, SOLUTION INTRAMUSCULAR; INTRAVENOUS PRN
Status: DISCONTINUED | OUTPATIENT
Start: 2023-11-14 | End: 2023-11-14 | Stop reason: SDUPTHER

## 2023-11-14 RX ORDER — SODIUM CHLORIDE 9 MG/ML
INJECTION, SOLUTION INTRAVENOUS PRN
Status: DISCONTINUED | OUTPATIENT
Start: 2023-11-14 | End: 2023-11-15 | Stop reason: HOSPADM

## 2023-11-14 RX ORDER — SODIUM CHLORIDE 0.9 % (FLUSH) 0.9 %
5-40 SYRINGE (ML) INJECTION PRN
Status: DISCONTINUED | OUTPATIENT
Start: 2023-11-14 | End: 2023-11-15 | Stop reason: HOSPADM

## 2023-11-14 RX ORDER — ROCURONIUM BROMIDE 10 MG/ML
INJECTION, SOLUTION INTRAVENOUS PRN
Status: DISCONTINUED | OUTPATIENT
Start: 2023-11-14 | End: 2023-11-14 | Stop reason: SDUPTHER

## 2023-11-14 RX ORDER — PROCHLORPERAZINE EDISYLATE 5 MG/ML
5 INJECTION INTRAMUSCULAR; INTRAVENOUS
Status: DISCONTINUED | OUTPATIENT
Start: 2023-11-14 | End: 2023-11-14 | Stop reason: HOSPADM

## 2023-11-14 RX ORDER — SODIUM CHLORIDE 9 MG/ML
INJECTION, SOLUTION INTRAVENOUS CONTINUOUS PRN
Status: DISCONTINUED | OUTPATIENT
Start: 2023-11-14 | End: 2023-11-14 | Stop reason: SDUPTHER

## 2023-11-14 RX ORDER — LIDOCAINE HYDROCHLORIDE 10 MG/ML
1 INJECTION, SOLUTION EPIDURAL; INFILTRATION; INTRACAUDAL; PERINEURAL
Status: DISCONTINUED | OUTPATIENT
Start: 2023-11-14 | End: 2023-11-14 | Stop reason: HOSPADM

## 2023-11-14 RX ORDER — OXYCODONE HCL 10 MG/1
10 TABLET, FILM COATED, EXTENDED RELEASE ORAL ONCE
Status: COMPLETED | OUTPATIENT
Start: 2023-11-14 | End: 2023-11-14

## 2023-11-14 RX ORDER — ASPIRIN 325 MG
325 TABLET, DELAYED RELEASE (ENTERIC COATED) ORAL 2 TIMES DAILY
Status: DISCONTINUED | OUTPATIENT
Start: 2023-11-14 | End: 2023-11-15 | Stop reason: HOSPADM

## 2023-11-14 RX ORDER — ACETAMINOPHEN 325 MG/1
650 TABLET ORAL EVERY 6 HOURS
Status: DISCONTINUED | OUTPATIENT
Start: 2023-11-14 | End: 2023-11-15 | Stop reason: HOSPADM

## 2023-11-14 RX ORDER — ROPIVACAINE HYDROCHLORIDE 2 MG/ML
INJECTION, SOLUTION EPIDURAL; INFILTRATION; PERINEURAL PRN
Status: DISCONTINUED | OUTPATIENT
Start: 2023-11-14 | End: 2023-11-14 | Stop reason: ALTCHOICE

## 2023-11-14 RX ORDER — CELECOXIB 100 MG/1
100 CAPSULE ORAL ONCE
Status: COMPLETED | OUTPATIENT
Start: 2023-11-14 | End: 2023-11-14

## 2023-11-14 RX ORDER — SCOLOPAMINE TRANSDERMAL SYSTEM 1 MG/1
1 PATCH, EXTENDED RELEASE TRANSDERMAL ONCE
Status: DISCONTINUED | OUTPATIENT
Start: 2023-11-14 | End: 2023-11-14 | Stop reason: HOSPADM

## 2023-11-14 RX ORDER — ONDANSETRON 2 MG/ML
4 INJECTION INTRAMUSCULAR; INTRAVENOUS
Status: DISCONTINUED | OUTPATIENT
Start: 2023-11-14 | End: 2023-11-14 | Stop reason: HOSPADM

## 2023-11-14 RX ORDER — FENTANYL CITRATE 50 UG/ML
25 INJECTION, SOLUTION INTRAMUSCULAR; INTRAVENOUS EVERY 5 MIN PRN
Status: DISCONTINUED | OUTPATIENT
Start: 2023-11-14 | End: 2023-11-14 | Stop reason: HOSPADM

## 2023-11-14 RX ORDER — MIDAZOLAM HYDROCHLORIDE 1 MG/ML
INJECTION INTRAMUSCULAR; INTRAVENOUS PRN
Status: DISCONTINUED | OUTPATIENT
Start: 2023-11-14 | End: 2023-11-14 | Stop reason: SDUPTHER

## 2023-11-14 RX ORDER — LABETALOL HYDROCHLORIDE 5 MG/ML
10 INJECTION, SOLUTION INTRAVENOUS
Status: DISCONTINUED | OUTPATIENT
Start: 2023-11-14 | End: 2023-11-14 | Stop reason: HOSPADM

## 2023-11-14 RX ORDER — SODIUM CHLORIDE 0.9 % (FLUSH) 0.9 %
5-40 SYRINGE (ML) INJECTION EVERY 12 HOURS SCHEDULED
Status: DISCONTINUED | OUTPATIENT
Start: 2023-11-14 | End: 2023-11-15 | Stop reason: HOSPADM

## 2023-11-14 RX ADMIN — SODIUM CHLORIDE: 9 INJECTION, SOLUTION INTRAVENOUS at 16:21

## 2023-11-14 RX ADMIN — OXYCODONE HYDROCHLORIDE 10 MG: 10 TABLET, FILM COATED, EXTENDED RELEASE ORAL at 13:35

## 2023-11-14 RX ADMIN — ONDANSETRON 4 MG: 2 INJECTION INTRAMUSCULAR; INTRAVENOUS at 16:53

## 2023-11-14 RX ADMIN — MIDAZOLAM 2 MG: 1 INJECTION INTRAMUSCULAR; INTRAVENOUS at 14:46

## 2023-11-14 RX ADMIN — SODIUM CHLORIDE, SODIUM LACTATE, POTASSIUM CHLORIDE, AND CALCIUM CHLORIDE: 600; 310; 30; 20 INJECTION, SOLUTION INTRAVENOUS at 14:46

## 2023-11-14 RX ADMIN — ACETAMINOPHEN 1000 MG: 500 TABLET ORAL at 13:35

## 2023-11-14 RX ADMIN — PROPOFOL 200 MG: 10 INJECTION, EMULSION INTRAVENOUS at 14:50

## 2023-11-14 RX ADMIN — FAMOTIDINE 20 MG: 20 TABLET ORAL at 13:35

## 2023-11-14 RX ADMIN — ACETAMINOPHEN 650 MG: 325 TABLET ORAL at 18:20

## 2023-11-14 RX ADMIN — SODIUM CHLORIDE, POTASSIUM CHLORIDE, SODIUM LACTATE AND CALCIUM CHLORIDE: 600; 310; 30; 20 INJECTION, SOLUTION INTRAVENOUS at 13:15

## 2023-11-14 RX ADMIN — Medication 3000 MG: at 14:55

## 2023-11-14 RX ADMIN — Medication 3000 MG: at 21:27

## 2023-11-14 RX ADMIN — TRANEXAMIC ACID 1950 MG: 650 TABLET ORAL at 13:35

## 2023-11-14 RX ADMIN — ASPIRIN 325 MG: 325 TABLET, COATED ORAL at 21:27

## 2023-11-14 RX ADMIN — HYDROMORPHONE HYDROCHLORIDE 0.5 MG: 1 INJECTION, SOLUTION INTRAMUSCULAR; INTRAVENOUS; SUBCUTANEOUS at 16:53

## 2023-11-14 RX ADMIN — ROCURONIUM BROMIDE 100 MG: 10 INJECTION, SOLUTION INTRAVENOUS at 14:53

## 2023-11-14 RX ADMIN — HYDROMORPHONE HYDROCHLORIDE 0.25 MG: 1 INJECTION, SOLUTION INTRAMUSCULAR; INTRAVENOUS; SUBCUTANEOUS at 17:08

## 2023-11-14 RX ADMIN — HYDROMORPHONE HYDROCHLORIDE 0.5 MG: 1 INJECTION, SOLUTION INTRAMUSCULAR; INTRAVENOUS; SUBCUTANEOUS at 16:40

## 2023-11-14 RX ADMIN — CELECOXIB 100 MG: 100 CAPSULE ORAL at 13:35

## 2023-11-14 RX ADMIN — FENTANYL CITRATE 150 MCG: 0.05 INJECTION, SOLUTION INTRAMUSCULAR; INTRAVENOUS at 14:50

## 2023-11-14 RX ADMIN — PHENYLEPHRINE HYDROCHLORIDE 100 MCG: 10 INJECTION INTRAVENOUS at 16:22

## 2023-11-14 RX ADMIN — DEXAMETHASONE SODIUM PHOSPHATE 10 MG: 10 INJECTION, SOLUTION INTRAMUSCULAR; INTRAVENOUS at 14:55

## 2023-11-14 RX ADMIN — SUGAMMADEX 500 MG: 100 INJECTION, SOLUTION INTRAVENOUS at 16:53

## 2023-11-14 RX ADMIN — SODIUM CHLORIDE, PRESERVATIVE FREE 10 ML: 5 INJECTION INTRAVENOUS at 21:27

## 2023-11-14 RX ADMIN — SODIUM CHLORIDE, SODIUM LACTATE, POTASSIUM CHLORIDE, AND CALCIUM CHLORIDE: 600; 310; 30; 20 INJECTION, SOLUTION INTRAVENOUS at 15:46

## 2023-11-14 RX ADMIN — MELOXICAM 3.75 MG: 7.5 TABLET ORAL at 18:20

## 2023-11-14 ASSESSMENT — ENCOUNTER SYMPTOMS: SHORTNESS OF BREATH: 0

## 2023-11-14 ASSESSMENT — PAIN SCALES - GENERAL
PAINLEVEL_OUTOF10: 5
PAINLEVEL_OUTOF10: 3

## 2023-11-14 ASSESSMENT — PAIN DESCRIPTION - LOCATION
LOCATION: HIP
LOCATION: HIP

## 2023-11-14 ASSESSMENT — PAIN - FUNCTIONAL ASSESSMENT: PAIN_FUNCTIONAL_ASSESSMENT: NONE - DENIES PAIN

## 2023-11-14 ASSESSMENT — PAIN DESCRIPTION - ORIENTATION
ORIENTATION: LEFT
ORIENTATION: LEFT

## 2023-11-14 ASSESSMENT — LIFESTYLE VARIABLES: SMOKING_STATUS: 0

## 2023-11-14 NOTE — H&P
[facility] ([facility]) Pre-Operative History and Physical    Patient Name: Altagracia Crew  : 1955        Chief Complaint: Left hip recurrent dislocation  History of Present Illness: 55-year-old male status post left hip arthroplasty a few years ago. We did an acetabular revision in May due to anterior hip pain from an exposed acetabular shell causing iliopsoas bursitis. This was done May 9, 2023. We placed a poor man's dual mobility hip at the time of that procedure. On May 30 became back for recurrent instability and placed a constrained hip with a +9 head. On  he dislocated his left hip. This was while standing. Happened again on . And again just a few days ago. Each time it was able to be closed reduced in the ER. In between episodes he feels pretty good. Just does not trust the hip.     Past Medical History:       Diagnosis Date    Arthritis     CAD (coronary artery disease)     stent per dr. Gabino Islas in 2019    GERD (gastroesophageal reflux disease)     Hip pain     chronicleft hip pain    HLD (hyperlipidemia)     Pain management     Dr. Jenaro Prado    Vitamin B deficiency      Past Surgical History:       Procedure Laterality Date    ANKLE SURGERY Right 1970    HARDWARE    BACK SURGERY  2007    BACK SURGERY  2010    BACK SURGERY  2012    CORONARY ANGIOPLASTY WITH STENT PLACEMENT  2019    dr Gabino Islas (x 1)    HAND SURGERY Right 2011    HARDWARE    JOINT REPLACEMENT Left 2009    THR    JOINT REPLACEMENT Right 2013    THR    GA SUTR INFRAPATELLAR TDN 2 RCNSTJ W/FSCAL/TDN GRF Left 2018    HIP ILISPOAS TENDON RELEASE performed by Angelia Nelson MD at [facility] Left 2023    LEFT HIP ACETABULAR LINER AND FEMORAL HEAD REVISION performed by Angelia Nelson MD at [address] [place] [address]    SINUS SURGERY  2017    TOTAL HIP ARTHROPLASTY Left 2023    LEFT HIP ACETABULAR REVISION performed by Angelia Nelson

## 2023-11-14 NOTE — ANESTHESIA PRE PROCEDURE
Department of Anesthesiology  Preprocedure Note       Name:  Ivan Lwa   Age:  76 y.o.  :  1955                                          MRN:  [id number]         Date:  2023      Surgeon: Lincoln Mcardle):  Hannah Suarez MD    Procedure: Procedure(s):  LEFT HIP REVISION WITH POSSIBLE FEMORAL REVISION    Medications prior to admission:   Prior to Admission medications    Medication Sig Start Date End Date Taking? Authorizing Provider   aspirin EC 81 MG EC tablet Take 1 tablet by mouth 2 times daily  Patient taking differently: Take 1 tablet by mouth daily 23   John Sands MD   prasugrel (EFFIENT) 5 MG TABS Take 1 tablet by mouth daily Indications: Coronary Artery Stent Present    ProviderLillian MD   rosuvastatin (CRESTOR) 20 MG tablet Take 1 tablet by mouth nightly    Lillian Morales MD   esomeprazole Magnesium (NEXIUM) 20 MG PACK Take 1 packet by mouth nightly Indications: Reflux Gastritis    Lillian Morales MD   Cholecalciferol (VITAMIN D3) 50 MCG ( UT) CAPS Take 1 capsule by mouth daily    Lillian Morales MD   Multiple Vitamins-Minerals (THERAPEUTIC MULTIVITAMIN-MINERALS) tablet Take 1 tablet by mouth daily    Lillian Morales MD   cetirizine (ZYRTEC) 10 MG tablet Take 1 tablet by mouth daily    Lillian Morales MD   psyllium ([employer] DAILY FIBER) 0.52 g capsule Take 1 capsule by mouth daily    Lillian Morales MD   diphenhydrAMINE-APAP, sleep, (TYLENOL PM EXTRA STRENGTH)  MG tablet Take 2 tablets by mouth nightly    Lillian Morales MD   gabapentin (NEURONTIN) 300 MG capsule Take 1 capsule by mouth 2 times daily.     Lillian Morales MD   celecoxib (CELEBREX) 200 MG capsule Take 1 capsule by mouth daily    Lillian Morales MD   metoprolol tartrate (LOPRESSOR) 50 MG tablet Take 1 tablet by mouth nightly    Lillian Morales MD       Current medications:    No current facility-administered medications for this

## 2023-11-14 NOTE — OP NOTE
OPERATIVE NOTE    Patient:  Faustino Manzano    Date:  11/14/2023    Medical Record Number:  [id number]    Primary Pre-Operative Diagnosis: Unstable left total hip arthroplasty after constraining the hip in May 2023. Primary Post-Operative Diagnosis:  Same    Procedure: Left hip conversion of constrained hip to a dual mobility hip using a 15.5 mm DePuy head with a 54 mm dual mobility assembly  Implants:   Implant Name Type Inv. Item Serial No.  Lot No. LRB No. Used Action   HEAD FEM [id number] +15.5MM STD [facility] 12/14 TAPR ART EZ - [id number]  HEAD FEM [id number] +15.5MM STD M[facility] 12/14 TAPR ARTC EZ  [employer]- [id number] Left 1 Implanted   BEARING ACET OD54MM ID28MM HIP E1 ACT [facility] - [id number]  BEARING ACET OD54MM ID28MM HIP E1 ACT ARTC  [employer]- [id number] Left 1 Implanted   G7 DUAL MOBILITY LINER 54MM I - [id number]  G7 DUAL MOBILITY LINER 54MM I  [employer]- [id number] Left 1 Implanted       Assistant: Cher Howe      Anesthesia: General    Estimated Blood Loss: 500 mL      Specimens: 3 culture sent    Complications:  None    Findings:  As above  Indications: Patient had a left hip arthroplasty several years ago in [facility]. Developed iliopsoas bursitis a few years later. Did an iliopsoas tendon release and he got better for about a year. His pain returned. CT showed his acetabulum implant was uncovered anteriorly. In May of this year I revised his acetabulum and anteverted the top a little more so that it was covered well anteriorly. We used a pseudo dual mobility hip system. It was basically a 40 bipolar inside of a offset poly. This became unstable. At the end of May we converted this to a constrained system. He did okay until September and started to dislocate. He is dislocated 3 times. He presents now for revision of the left hip. My thoughts are that we may be able to go to a true dual mobility head with a longer neck.   He has a removed with 1/4 inch osteotome and a mallet. The acetabular component was well fixed in reasonable position. A dual mobility trial liner was placed that would except a 54 mm head. We placed a 54+15.5 dual mobility trial onto the stem. The hip was reduced. The hip was stable with 90 degrees of external rotation and an anterior pole on the neck. The hip was stable to about 40 degrees of extension anterior pole and the femoral neck. It would purchase just a little bit it was stable. The hip was dislocated the trial was removed and the actual implants inserted using standard technique. C-arm was brought in to verify roughly equal leg lengths equal offset on both hips. I should point out that with the maximized pelvis view that the acetabular component on the left hip was well positioned appropriately anteverted. Had about 40 degrees of abduction. Wound was irrigated with Betadine solution and thoroughly irrigated with pulse lavage irrigation. Tissues inject quarter percent Naropin. Fascia closed 0 Vicryl interrupted sutures. Subcu closed with 2-0 Vicryl. Skin closed with 3-0 Vicryl and Prineo. Patient was awakened extubated and transferred covering stable condition plan be for anterior hip precautions.           Electronically signed by Edith Nielson MD on 11/14/2023 at 4:35 PM

## 2023-11-14 NOTE — ANESTHESIA POSTPROCEDURE EVALUATION
Department of Anesthesiology  Postprocedure Note    Patient: Liu Bhatti  MRN: [id number]  YOB: 1955  Date of evaluation: 11/14/2023      Procedure Summary     Date: 11/14/23 Room / Location: [address]    Anesthesia Start: 8812 Anesthesia Stop: 1703    Procedure: LEFT HIP REVISION (Left) Diagnosis:       Pain due to internal orthopedic prosthetic devices, implants and grafts, initial encounter ([address])      (Pain due to internal orthopedic prosthetic devices, implants and grafts, initial encounter [facility]) [O77.62OO])    Surgeons: Kathrin Cranker, MD Responsible Provider: ARCENIO Valladares CRNA    Anesthesia Type: general ASA Status: 3          Anesthesia Type: No value filed.     Cr Phase I: Cr Score: 9    Cr Phase II:        Anesthesia Post Evaluation    Patient location during evaluation: PACU  Patient participation: complete - patient participated  Level of consciousness: sleepy but conscious  Pain score: 0  Airway patency: patent  Nausea & Vomiting: no nausea and no vomiting  Complications: no  Cardiovascular status: hemodynamically stable  Respiratory status: acceptable and spontaneous ventilation  Hydration status: euvolemic  Pain management: adequate

## 2023-11-15 VITALS
TEMPERATURE: 97.7 F | OXYGEN SATURATION: 99 % | HEIGHT: 74 IN | WEIGHT: 283 LBS | SYSTOLIC BLOOD PRESSURE: 123 MMHG | DIASTOLIC BLOOD PRESSURE: 60 MMHG | RESPIRATION RATE: 20 BRPM | BODY MASS INDEX: 36.32 KG/M2 | HEART RATE: 73 BPM

## 2023-11-15 PROBLEM — Z96.649 PAIN DUE TO HIP JOINT PROSTHESIS, INITIAL ENCOUNTER (HCC): Status: ACTIVE | Noted: 2023-11-15

## 2023-11-15 PROBLEM — T84.84XA PAIN DUE TO HIP JOINT PROSTHESIS, INITIAL ENCOUNTER (HCC): Status: ACTIVE | Noted: 2023-11-15

## 2023-11-15 LAB
ANION GAP SERPL CALCULATED.3IONS-SCNC: 8 MMOL/L (ref 7–19)
BUN SERPL-MCNC: 18 MG/DL (ref 8–23)
CALCIUM SERPL-MCNC: 8.8 MG/DL (ref 8.8–10.2)
CHLORIDE SERPL-SCNC: 108 MMOL/L (ref 98–111)
CO2 SERPL-SCNC: 26 MMOL/L (ref 22–29)
CREAT SERPL-MCNC: 0.9 MG/DL (ref 0.5–1.2)
GLUCOSE SERPL-MCNC: 154 MG/DL (ref 74–109)
HCT VFR BLD AUTO: 42 % (ref 42–52)
HGB BLD-MCNC: 13.8 G/DL (ref 14–18)
POTASSIUM SERPL-SCNC: 4.8 MMOL/L (ref 3.5–5)
SODIUM SERPL-SCNC: 142 MMOL/L (ref 136–145)

## 2023-11-15 PROCEDURE — 1210000000 HC MED SURG R&B

## 2023-11-15 PROCEDURE — 6370000000 HC RX 637 (ALT 250 FOR IP): Performed by: PHYSICIAN ASSISTANT

## 2023-11-15 PROCEDURE — 97165 OT EVAL LOW COMPLEX 30 MIN: CPT

## 2023-11-15 PROCEDURE — 36415 COLL VENOUS BLD VENIPUNCTURE: CPT

## 2023-11-15 PROCEDURE — 80048 BASIC METABOLIC PNL TOTAL CA: CPT

## 2023-11-15 PROCEDURE — 97530 THERAPEUTIC ACTIVITIES: CPT

## 2023-11-15 PROCEDURE — 97161 PT EVAL LOW COMPLEX 20 MIN: CPT

## 2023-11-15 PROCEDURE — 97116 GAIT TRAINING THERAPY: CPT

## 2023-11-15 PROCEDURE — 6360000002 HC RX W HCPCS: Performed by: ORTHOPAEDIC SURGERY

## 2023-11-15 PROCEDURE — 85018 HEMOGLOBIN: CPT

## 2023-11-15 PROCEDURE — 6370000000 HC RX 637 (ALT 250 FOR IP): Performed by: ORTHOPAEDIC SURGERY

## 2023-11-15 PROCEDURE — 85014 HEMATOCRIT: CPT

## 2023-11-15 PROCEDURE — 2580000003 HC RX 258: Performed by: ORTHOPAEDIC SURGERY

## 2023-11-15 RX ORDER — OXYCODONE HYDROCHLORIDE 5 MG/1
5 TABLET ORAL EVERY 4 HOURS PRN
Qty: 42 TABLET | Refills: 0 | Status: SHIPPED | OUTPATIENT
Start: 2023-11-15 | End: 2023-11-22

## 2023-11-15 RX ORDER — POLYETHYLENE GLYCOL 3350 17 G/17G
17 POWDER, FOR SOLUTION ORAL DAILY
Status: DISCONTINUED | OUTPATIENT
Start: 2023-11-15 | End: 2023-11-15 | Stop reason: HOSPADM

## 2023-11-15 RX ORDER — CLONIDINE HYDROCHLORIDE 0.1 MG/1
0.1 TABLET ORAL EVERY 4 HOURS PRN
Status: DISCONTINUED | OUTPATIENT
Start: 2023-11-15 | End: 2023-11-15 | Stop reason: HOSPADM

## 2023-11-15 RX ADMIN — ACETAMINOPHEN 650 MG: 325 TABLET ORAL at 05:55

## 2023-11-15 RX ADMIN — ASPIRIN 325 MG: 325 TABLET, COATED ORAL at 10:22

## 2023-11-15 RX ADMIN — SODIUM CHLORIDE, PRESERVATIVE FREE 10 ML: 5 INJECTION INTRAVENOUS at 10:24

## 2023-11-15 RX ADMIN — POLYETHYLENE GLYCOL 3350 17 G: 17 POWDER, FOR SOLUTION ORAL at 10:22

## 2023-11-15 RX ADMIN — ACETAMINOPHEN 650 MG: 325 TABLET ORAL at 11:51

## 2023-11-15 RX ADMIN — Medication 3000 MG: at 05:55

## 2023-11-15 RX ADMIN — MELOXICAM 3.75 MG: 7.5 TABLET ORAL at 10:22

## 2023-11-15 ASSESSMENT — PAIN SCALES - GENERAL: PAINLEVEL_OUTOF10: 2

## 2023-11-15 NOTE — PROGRESS NOTES
Physical Therapy  Facility/Department: [facility] SURG SERVICES  Physical Therapy Initial Assessment    Name: Dolores Hameed  : 1955  MRN: [id number]  Date of Service: 11/15/2023    Discharge Recommendations:  Continue to assess pending progress, 24 hour supervision or assist          Patient Diagnosis(es): The primary encounter diagnosis was Pain due to hip joint prosthesis, initial encounter ([address]). A diagnosis of Pain due to internal orthopedic prosthetic devices, implants and grafts, initial encounter [facility]) was also pertinent to this visit. Past Medical History:  has a past medical history of Arthritis, CAD (coronary artery disease), GERD (gastroesophageal reflux disease), Hip pain, HLD (hyperlipidemia), Pain management, and Vitamin B deficiency. Past Surgical History:  has a past surgical history that includes Ankle surgery (Right, ); joint replacement (Left, ); joint replacement (Right, ); back surgery (); back surgery (); back surgery (); Hand surgery (Right, ); Septoplasty (); pr sutr infrapatellar tdn 2 rcnstj w/fscal/tdn grf (Left, 2018); Coronary angioplasty with stent (); sinus surgery (); Total hip arthroplasty (Left, 2023); sinus surgery (); and Revision total hip arthroplasty (Left, 2023). Assessment   Body Structures, Functions, Activity Limitations Requiring Skilled Therapeutic Intervention: Decreased functional mobility ; Decreased ADL status; Decreased strength;Decreased balance  Assessment: Pt ABLE TO AMB IN ROOM WITH ASSIST. WILL BE SAFE TO RETURN HOME WITH SPOUSE.   Requires PT Follow-Up: Yes  Activity Tolerance  Activity Tolerance: Patient tolerated treatment well     Plan   Physical Therapy Plan  General Plan: 5-7 times per week  Current Treatment Recommendations: Strengthening, Balance training, ROM, Functional mobility training, Transfer training, Gait training, Stair training, Safety education & training, Patient/Caregiver

## 2023-11-15 NOTE — CONSULTS
Referring Provider: Dr. Denzel Moses  Reason for Consultation: Medical management    Patient Care Team:  Federico Levi MD as PCP - General    Chief complaint left hip pain    Subjective . History of present illness: The patient presents to the orthopedic service for [facility]. They have failed outpatient conservative treatment of NSAIDS, muscle relaxer, physical therapy and opioid pain meds. The pain is affecting their activities of daily living and they have chosen to undergo surgical correction. We have been asked by the attending physician to provide medical consultation in the nany-operative phase. the patient understands our role in their healthcare during this hospitalization. All questions were encouraged and answered to the best of our ability. The postoperative pain is as expected. There are no other participating or relieving factors noted.        REVIEW OF SYSTEMS:    CONSTITUTIONAL:  Negative for anorexia, chills, fevers, night sweats and weight loss  EYES:  negative for eye dryness, icterus and redness  HEENT:   negative for dental problems, epistaxis, facial trauma and thrush  RESPIRATORY:  negative for chest tightness, cough, dyspnea on exertion, pneumonia and sputum  CARDIOVASCULAR: negative for chest pain, dyspnea, exertional chest pressure/discomfort, irregular heart beat, palpitations, paroxysmal nocturnal dyspnea and syncope  GASTROINTESTINAL:  negative for abdominal pain, hematemesis, jaundice, melena and rectal bleeding  MUSCULOSKELETAL:  negative for muscle weakness, myalgias and neck pain, chronic joint pain noted  NEUROLOGICAL:   negative for dizziness, headaches, seizures, speech problems, tremors and vertigo  INTEGUMENT: negative for pruritus, rash, skin color change and skin lesion(s)   A Full 14 point review of systems is negative outside those listed above and in the HPI      History    Past Medical History:   Diagnosis Date    Arthritis     CAD (coronary artery disease)     stent per

## 2023-11-15 NOTE — CARE COORDINATION
IMM Letter given to patient. Reviewed, discussed, answered questions, and signed by patient. Signed copy placed in patient's chart.      11/15/23 1232   IMM Letter   IMM Letter given to Patient/Family/Significant other/Guardian/POA/by: given to patient by Edwardo Dance RN BSN    IMM Letter date given: 11/15/23   IMM Letter time given: 1040     Electronically signed by Edwardo Dance, RN, BSN on 11/15/2023 at 12:32 PM

## 2023-11-15 NOTE — CARE COORDINATION
Spoke with patient regarding MD orders for [facility] services. Pt agreeable and chose [facility]. Referral faxed and accepted, per Nicholas Chavez. Please notify [facility] when patient discharges and Fax DC Summary,  DC med list and any new [facility] orders. P. [phone]  F. [phone]  The Patient and/or patient representative was provided with a choice of provider and agrees   with the discharge plan. [x] Yes [] No    Freedom of choice list was provided with basic dialogue that supports the patient's individualized plan of care/goals, treatment preferences and shares the quality data associated with the providers.  [x] Yes [] No  Electronically signed by Lynn Carrillo on 11/15/23 at 10:35 AM CST

## 2023-11-15 NOTE — PROGRESS NOTES
Occupational Therapy  Facility/Department: [facility] [address] [facility] Initial Assessment    Name: Liu Bhatti  : 1955  MRN: [id number]  Date of Service: 11/15/2023    Discharge Recommendations:  Patient would benefit from continued therapy after discharge          Patient Diagnosis(es): The primary encounter diagnosis was Pain due to hip joint prosthesis, initial encounter ([address]). A diagnosis of Pain due to internal orthopedic prosthetic devices, implants and grafts, initial encounter [facility]) was also pertinent to this visit. Past Medical History:  has a past medical history of Arthritis, CAD (coronary artery disease), GERD (gastroesophageal reflux disease), Hip pain, HLD (hyperlipidemia), Pain management, and Vitamin B deficiency. Past Surgical History:  has a past surgical history that includes Ankle surgery (Right, ); joint replacement (Left, ); joint replacement (Right, ); back surgery (); back surgery (); back surgery (); Hand surgery (Right, ); Septoplasty (); pr sutr infrapatellar tdn 2 rcnstj w/fscal/tdn grf (Left, 2018); Coronary angioplasty with stent (); sinus surgery (); Total hip arthroplasty (Left, 2023); sinus surgery (); and Revision total hip arthroplasty (Left, 2023). Treatment Diagnosis: Left hip conversion      Assessment   Performance deficits / Impairments: Decreased functional mobility ; Decreased ADL status  Assessment: Patient educated on anterior hip precautions, patient stood EOB and transferred in room with RW  Treatment Diagnosis: Left hip conversion  Prognosis: Good  Decision Making: Low Complexity  REQUIRES OT FOLLOW-UP: Yes  Activity Tolerance  Activity Tolerance: Patient Tolerated treatment well        Plan   Occupational Therapy Plan  Times Per Week: 3-5  Times Per Day:  Once a day     Restrictions  Restrictions/Precautions  Restrictions/Precautions: Fall Risk, Weight Bearing  Lower Extremity Weight Short Term Goals: 1 week  Short Term Goal 1: Light ambulatory ADLS Supervision with RW  Short Term Goal 2: Toileting tasks Supervision  Short Term Goal 3: LB dressing Min A with AE PRN  Long Term Goals  Long Term Goal 1: Return to PLOF       Therapy Time   Individual Concurrent Group Co-treatment   Time In           Time Out           Minutes                   [employer]

## 2023-11-16 ENCOUNTER — READMISSION MANAGEMENT (OUTPATIENT)
Dept: CALL CENTER | Facility: HOSPITAL | Age: 68
End: 2023-11-16
Payer: MEDICARE

## 2023-11-16 NOTE — OUTREACH NOTE
Prep Survey      Flowsheet Row Responses   [facility] facility patient discharged from? Non-BH   Is LACE score < 7 ? Non-BH Discharge   Eligibility [facility]   Date of Admission 11/14/23   Date of Discharge 11/15/23   Discharge Disposition Home or Self Care   Discharge diagnosis LEFT HIP REVISION   Does the patient have one of the following disease processes/diagnoses(primary or secondary)? Total Joint Replacement   Prep survey completed? Yes            Chasity Gaitan Registered Nurse

## 2023-11-17 ENCOUNTER — TRANSITIONAL CARE MANAGEMENT TELEPHONE ENCOUNTER (OUTPATIENT)
Dept: CALL CENTER | Facility: HOSPITAL | Age: 68
End: 2023-11-17
Payer: MEDICARE

## 2023-11-17 NOTE — OUTREACH NOTE
Call Center TCM Note      Flowsheet Row Responses   [facility] patient discharged from? Non-BH   Does the patient have one of the following disease processes/diagnoses(primary or secondary)? Total Joint Replacement   TCM attempt successful? No   Unsuccessful attempts Attempt 1            Rosio Spence RN    11/17/2023, 10:39 CST

## 2023-11-17 NOTE — OUTREACH NOTE
Call Center TCM Note      Flowsheet Row Responses   [facility] patient discharged from? Non-  [[facility]]   Does the patient have one of the following disease processes/diagnoses(primary or secondary)? Total Joint Replacement   TCM attempt successful? Yes   Call start time 1157   Call end time 1159   Discharge diagnosis LEFT HIP REVISION   Person spoke with today (if not patient) and relationship patient   Meds reviewed with patient/caregiver? Yes   Comments At this time patient wants to fu with surgeon only   Does the patient have an appointment with their PCP within 7-14 days of discharge? Other   Nursing Interventions Routed TCM call to PCP office, Patient desires to follow up with specialty only   Has home health visited the patient within 72 hours of discharge? N/A   Psychosocial issues? No   Did the patient receive a copy of their discharge instructions? Yes   Nursing interventions Reviewed instructions with patient   What is the patient's perception of their health status since discharge? Improving   Is the patient/caregiver able to teach back signs and symptoms related to disease process for when to call PCP? Yes   Is the patient/caregiver able to teach back signs and symptoms related to disease process for when to call 911? Yes   Is the patient/caregiver able to teach back the hierarchy of who to call/visit for symptoms/problems? PCP, Specialist, Home health nurse, Urgent Care, ED, 911 Yes   TCM call completed? Yes   Wrap up additional comments Patient reports doing very well since being DC from [facility]. He had a L HIP REVISION. pain is tolerable. Patient has HH coming. No concerns or questions noted.   Call end time 1159   Would this patient benefit from a Referral to [facility] Social Work? No   Is the patient interested in additional calls from an ambulatory ? No            Rosio Spence RN    11/17/2023, 11:59 CST

## 2023-11-18 LAB
BACTERIA SPEC AEROBE CULT: NORMAL
BACTERIA SPEC ANAEROBE CULT: NORMAL
GRAM STN SPEC: NORMAL

## 2023-11-21 ENCOUNTER — OFFICE VISIT (OUTPATIENT)
Dept: FAMILY MEDICINE CLINIC | Facility: CLINIC | Age: 68
End: 2023-11-21
Payer: MEDICARE

## 2023-11-21 VITALS
WEIGHT: 280.2 LBS | DIASTOLIC BLOOD PRESSURE: 74 MMHG | SYSTOLIC BLOOD PRESSURE: 113 MMHG | TEMPERATURE: 98.2 F | HEART RATE: 73 BPM | BODY MASS INDEX: 35.96 KG/M2 | OXYGEN SATURATION: 98 % | HEIGHT: 74 IN

## 2023-11-21 DIAGNOSIS — M25.552 PAIN OF LEFT HIP: Primary | ICD-10-CM

## 2023-11-21 NOTE — PROGRESS NOTES
Transitional Care Follow Up Visit  Subjective     George Wilder is a 68 y.o. male who presents for a transitional care management visit.    Within 48 business hours after discharge our office contacted him via telephone to coordinate his care and needs.      I reviewed and discussed the details of that call along with the discharge summary, hospital problems, inpatient lab results, inpatient diagnostic studies, and consultation reports with George.     Current outpatient and discharge medications have been reconciled for the patient.  Reviewed by: Carl Vu MD          11/16/2023    11:54 AM   Date of TCM Phone Call   [facility]   Date of Admission 11/14/2023   Date of Discharge 11/15/2023   Discharge Disposition Home or Self Care     Risk for Readmission (LACE) No data recorded    History of Present Illness  68-year-old male with left hip prosthesis dislocation on November 10-surgical repair and discharged on November 13, 2022     Course During Hospital Stay: Patient hospitalized at [facility] for above-mentioned repair of left hip prosthetic dislocation     The following portions of the patient's history were reviewed and updated as appropriate: allergies, current medications, past family history, past medical history, past social history, past surgical history, and problem list.    Review of Systems   Respiratory:  Negative for shortness of breath.    Cardiovascular:  Negative for chest pain and leg swelling.   Musculoskeletal:  Positive for arthralgias.        Recent incision left hip for relocation and repair       Objective   Physical Exam  Vitals and nursing note reviewed.   Constitutional:       Appearance: He is obese.   Eyes:      Extraocular Movements: Extraocular movements intact.      Pupils: Pupils are equal, round, and reactive to light.   Cardiovascular:      Rate and Rhythm: Normal rate and regular rhythm.   Pulmonary:      Effort: Pulmonary effort is normal.      Breath  sounds: Normal breath sounds.   Musculoskeletal:         General: Swelling present.      Right lower leg: No edema.      Left lower leg: No edema.      Comments: Left hip incision-slight weeping no evidence of infection-redness related to allergic type reaction   Skin:     General: Skin is warm and dry.   Neurological:      General: No focal deficit present.      Mental Status: He is alert and oriented to person, place, and time.   Psychiatric:         Mood and Affect: Mood normal.         Behavior: Behavior normal.         Thought Content: Thought content normal.         Judgment: Judgment normal.         Assessment & Plan   Problems Addressed this Visit    None  Visit Diagnoses       Pain of left hip    -  Primary          Diagnoses         Codes Comments    Pain of left hip    -  Primary ICD-10-CM: M25.552  ICD-9-CM: 719.45             Plan  Physical therapy weight reduction exercises follow-up with surgeon

## 2023-11-28 LAB
FUNGUS SPEC CULT: NORMAL
KOH PREP SPEC: NORMAL

## 2023-11-30 NOTE — PROGRESS NOTES
YOB: 1955  Location: [facility]  Location Address: [address] [place] [address], [facility], [address] [place] [address]  Location Phone: [phone]    Chief Complaint   Patient presents with    Cough    throat clearing       History of Present Illness  George Wilder is a 68 y.o. male.  George Wilder is here for evaluation of ENT complaints. The patient has had problems with globus sensation and cough. The patient has had mild to moderate symptoms. The symptoms have been present for the last year. He has a history of reflux in which he is treated with nexium daily in the morning. He admits intermittent hoarseness. He denies dysphagia. He is a former smoker.    Past Medical History:   Diagnosis Date    Allergic rhinitis due to allergen 2017    Arthritis     Asthma sometimes    Back pain     CHF (congestive heart failure)     Chronic ethmoidal sinusitis 2017    Chronic maxillary sinusitis 2017    Coronary artery disease     stent LAD, Cx, RCA     GERD (gastroesophageal reflux disease)     Heart attack     Dr Stiles     Heartburn     HL (hearing loss)     Hyperlipidemia     Hypertension     Myocardial infarction     Pain     back, & Neck        Past Surgical History:   Procedure Laterality Date    ANKLE SURGERY      BACK SURGERY      CARDIAC CATHETERIZATION  ,     CARDIAC CATHETERIZATION N/A 10/14/2021    Procedure: Left Heart Cath;  Surgeon: Kishore Stiles MD;  Location: [facility] CATH INVASIVE LOCATION;  Service: Cardiology;  Laterality: N/A;    CHOLECYSTECTOMY      Lararocopic     COLONOSCOPY      CORONARY STENT PLACEMENT      ENDOSCOPIC FUNCTIONAL SINUS SURGERY (FESS) N/A 2017    Procedure: ENDOSCOPIC FUNCTIONAL SINUS SURGERY WITH LEFT MIDDLE MEATAL ANTROSTOMY AND LEFT ETHMOIDECTOMY;  Surgeon: Jona Garibay MD;  Location: [facility] OR;  Service:     ENDOSCOPIC FUNCTIONAL SINUS SURGERY (FESS) Bilateral 08/10/2020    Procedure: Septoplasty Resection of left sammy bullosa  deformity Bilateral maxillary antrostomy and uncinectomy;  Surgeon: Jona Garibay MD;  Location:  [facility] OR;  Service: ENT;  Laterality: Bilateral;    ETHMOIDECTOMY Left 06/12/2017    Procedure: ETHMOIDECTOMY ENDOSCOPIC;  Surgeon: Jona Garibay MD;  Location:  [facility] OR;  Service:     EYE SURGERY      LASIK    HAND SURGERY      JOINT REPLACEMENT  both hips, left recent May2023    SINUS SURGERY      TOTAL HIP ARTHROPLASTY Bilateral     TOTAL HIP ARTHROPLASTY REVISION Left 11/14/2023    Dr. Swann       Outpatient Medications Marked as Taking for the 12/5/23 encounter (Office Visit) with Jona Garibay MD   Medication Sig Dispense Refill    celecoxib (CeleBREX) 200 MG capsule Take 1 capsule by mouth Daily.      cetirizine (zyrTEC) 10 MG tablet Take 1 tablet by mouth Daily.      Cholecalciferol (VITAMIN D3) 5000 UNITS capsule capsule Take 1 capsule by mouth Daily.      diphenhydrAMINE-acetaminophen (Tylenol PM Extra Strength)  MG tablet per tablet Take 2 tablets by mouth Every Night.      fluticasone (FLONASE) 50 MCG/ACT nasal spray 2 sprays into the nostril(s) as directed by provider Daily for 30 days. 16 g 11    gabapentin (NEURONTIN) 300 MG capsule Take 1 capsule by mouth 3 (Three) Times a Day.      HYDROcodone-acetaminophen (NORCO)  MG per tablet Take 1 tablet by mouth 2 (Two) Times a Day. BACK PAIN      Insulin Pen Needle (Pen Needles) 31G X 8 MM misc See Admin Instructions.      metoprolol tartrate (LOPRESSOR) 50 MG tablet TAKE 1 TABLET BY MOUTH EVERYDAY AT BEDTIME 30 tablet 11    Multiple Vitamins-Minerals (MULTIVITAMIN WITH MINERALS) tablet tablet Take 1 tablet by mouth Daily.      nitroglycerin (NITROSTAT) 0.4 MG SL tablet 1 under the tongue as needed for angina, may repeat q5mins for up three doses 25 tablet 2    prasugrel (EFFIENT) 5 MG tablet Take 1 tablet by mouth Daily. 90 tablet 3    Psyllium (DAILY FIBER PO) Take  by mouth.      rosuvastatin (CRESTOR) 20 MG tablet TAKE 1  TABLET BY MOUTH EVERY DAY 30 tablet 11    Victoza 18 MG/3ML solution pen-injector injection inject 1.8 MG SUBCUTANEOUSLY ONCE daily      [DISCONTINUED] esomeprazole (NexIUM) 20 MG packet Take 20 mg by mouth.       Current Facility-Administered Medications for the 23 encounter (Office Visit) with Jona Garibay MD   Medication Dose Route Frequency Provider Last Rate Last Admin    cyanocobalamin injection 1,000 mcg  1,000 mcg Intramuscular Q3 Months Carl Vu MD   1,000 mcg at 10/09/23 0845       Brilinta [ticagrelor]    Family History   Problem Relation Age of Onset    Arthritis Mother     Rheum arthritis Mother     Cancer Father     Lung cancer Father         metastatic adenocarcinoma of lung    Stroke Brother     Cancer Brother     Asthma Brother     Stroke Brother     Cancer Brother        Social History     Socioeconomic History    Marital status:    Tobacco Use    Smoking status: Former     Packs/day: 1.50     Years: 29.00     Additional pack years: 0.00     Total pack years: 43.50     Types: Cigarettes     Quit date: 1994     Years since quittin.9    Smokeless tobacco: Never    Tobacco comments:     quit date was date of heart attack   Vaping Use    Vaping Use: Never used   Substance and Sexual Activity    Alcohol use: Not Currently    Drug use: No    Sexual activity: Defer       Review of Systems   Constitutional: Negative.    HENT:  Negative for trouble swallowing and voice change.         Admits globus sensation   Respiratory: Negative.         Vitals:    23 1311   BP: 147/73   Pulse: 68   Resp: 16   Temp: 98.4 °F (36.9 °C)       Body mass index is 36.46 kg/m².    Objective     Physical Exam  Vitals reviewed.   Constitutional:       Appearance: Normal appearance. He is obese.   HENT:      Head: Normocephalic.      Right Ear: Tympanic membrane, ear canal and external ear normal.      Left Ear: Tympanic membrane, ear canal and external ear normal.      Nose: Nose  normal.      Mouth/Throat:      Lips: Pink.      Mouth: Mucous membranes are moist.      Comments: See endoscopy  Neurological:      Mental Status: He is alert.   Psychiatric:         Behavior: Behavior is cooperative.         Assessment & Plan   Diagnoses and all orders for this visit:    1. Laryngopharyngeal reflux (LPR) (Primary)    2. Globus sensation    3. Chronic cough  -     XR Chest 2 View; Future    Other orders  -     esomeprazole (nexIUM) 20 MG capsule; Take 1 capsule by mouth 2 (Two) Times a Day for 30 days.  Dispense: 60 capsule; Refill: 3      * Surgery not found *  Orders Placed This Encounter   Procedures    XR Chest 2 View     Standing Status:   Future     Standing Expiration Date:   12/5/2024     Order Specific Question:   Reason for Exam:     Answer:   chronic cough     Order Specific Question:   Release to patient     Answer:   Routine Release [[id number]]     Take nexium before breakfast and dinner  Reflux precautions discussed at length  Eliminate milk/dairy  Return for problems    Dr. Garibay examined and discussed care with patient and agrees with treatment plan.     Return in about 3 months (around 3/5/2024) for Recheck.       Patient Instructions   Take nexium before breakfast and dinner  Reflux precautions discussed at length  Eliminate milk/dairy  Return for problems    Gastroesophageal Reflux Disease (Laryngopharyngeal Reflux), Adult  Gastroesophageal reflux disease (GERD) and/or Laryngopharyngeal Reflux, (LPR) happens when acid from your stomach flows up into the esophagus and/or throat and voicebox or larynx. When acid comes in contact with the these organs, the acid can cause soreness (inflammation). Over time, GERD may create small holes (ulcers) in the lining of the esophagus and may lead to the development of hoarseness, difficulty swallowing,   feeling of something stuck in the throat, increased mucous or drainage and even predispose to the development of malignancies,  (cancer).    CAUSES   Increased body weight. This puts pressure on the stomach, making acid rise from the stomach into the esophagus.  Smoking. This increases acid production in the stomach.  Drinking alcohol. This causes decreased pressure in the lower esophageal sphincter (valve or ring of muscle between the esophagus and stomach), allowing acid from the stomach into the esophagus.  Late evening meals and a full stomach. This increases pressure and acid production in the stomach.  A malformed lower esophageal sphincter  Diet which can include avoidance of gluten and dairy products  Age  SYMPTOMS   Burning pain in the lower part of the mid-chest behind the breastbone and in the mid-stomach area. This may occur twice a week or more often.  Trouble swallowing.  Sore throat.  Dry cough.  Asthma-like symptoms including chest tightness, shortness of breath, or wheezing.  Globus sensation-something stuck in the throat/fullness  Hoarseness  DIAGNOSIS   Your caregiver may be able to diagnose GERD based on your symptoms. In some cases, X-rays and other tests may be done to check for complications or to check the condition of your stomach and esophagus.  You may need to see another doctor.  TREATMENT   Over-the-counter or prescription medicines to help decrease acid production.   Dietary and behavioral modifications or changes may be also recommended.  HOME CARE INSTRUCTIONS   Change the factors that you can control. Ask your caregiver for guidance concerning weight loss, quitting smoking, and alcohol consumption.  Avoid foods and drinks that make your symptoms worse, and MAY include such as:  Caffeine or alcoholic drinks.  Chocolate.  Gluten containing foods  Dairy  Peppermint or mint flavorings.  Garlic and onions.  Spicy foods.  Citrus fruits, such as oranges, richardson, or limes.  Tomato-based foods such as sauce, chili, salsa, and pizza.  Fried and fatty foods.  Avoid lying down for the 3 hours prior to your bedtime or  prior to taking a nap.  Eat small, frequent meals instead of large meals.  Wear loose-fitting clothing. Do not wear anything tight around your waist that causes pressure on your stomach.  Raise the head of your bed 6 to 8 inches with wood blocks to help you sleep. Extra pillows will not help.  Only take over-the-counter or prescription medicines for pain, discomfort, or fever as directed by your caregiver.  Do not take aspirin, ibuprofen, or other nonsteroidal anti-inflammatory drugs if possible (NSAIDs).  SEEK IMMEDIATE MEDICAL CARE IF:   You have pain in your arms, neck, jaw, teeth, or back.  Your pain increases or changes in intensity or duration.  You develop nausea, vomiting, or sweating (diaphoresis).  You develop shortness of breath, or you faint.  Your vomit is green, yellow, black, or looks like coffee grounds or blood.  Your stool is red, bloody, or black.  These symptoms could be signs of other problems, such as heart disease, gastric bleeding, or esophageal bleeding.  MAKE SURE YOU:   Understand these instructions.  Will watch your condition.  Will get help right away if you are not doing well or get worse.     This information is not intended to replace advice given to you by your physician. Make sure you discuss any questions you have with your health care provider.     Modified by Jona Garibay MD, FACS 9/8/2016.  Document Released: 09/27/2006 Document Revised: 01/08/2016 Document Reviewed: 04/13/2016  [email or web address] Interactive Patient Education ©2016 [employer]

## 2023-12-05 ENCOUNTER — HOSPITAL ENCOUNTER (OUTPATIENT)
Dept: GENERAL RADIOLOGY | Facility: HOSPITAL | Age: 68
Discharge: HOME | End: 2023-12-05
Admitting: NURSE PRACTITIONER
Payer: MEDICARE

## 2023-12-05 ENCOUNTER — OFFICE VISIT (OUTPATIENT)
Dept: OTOLARYNGOLOGY | Facility: CLINIC | Age: 68
End: 2023-12-05
Payer: MEDICARE

## 2023-12-05 VITALS
BODY MASS INDEX: 36.45 KG/M2 | HEART RATE: 68 BPM | SYSTOLIC BLOOD PRESSURE: 147 MMHG | HEIGHT: 74 IN | RESPIRATION RATE: 16 BRPM | TEMPERATURE: 98.4 F | WEIGHT: 284 LBS | DIASTOLIC BLOOD PRESSURE: 73 MMHG

## 2023-12-05 DIAGNOSIS — R05.3 CHRONIC COUGH: ICD-10-CM

## 2023-12-05 DIAGNOSIS — K21.9 LARYNGOPHARYNGEAL REFLUX (LPR): Primary | ICD-10-CM

## 2023-12-05 DIAGNOSIS — R09.A2 GLOBUS SENSATION: ICD-10-CM

## 2023-12-05 LAB
FUNGUS SPEC CULT: NORMAL
KOH PREP SPEC: NORMAL

## 2023-12-05 PROCEDURE — 71046 X-RAY EXAM CHEST 2 VIEWS: CPT

## 2023-12-05 NOTE — PATIENT INSTRUCTIONS
Take nexium before breakfast and dinner  Reflux precautions discussed at length  Eliminate milk/dairy  Return for problems    Gastroesophageal Reflux Disease (Laryngopharyngeal Reflux), Adult  Gastroesophageal reflux disease (GERD) and/or Laryngopharyngeal Reflux, (LPR) happens when acid from your stomach flows up into the esophagus and/or throat and voicebox or larynx. When acid comes in contact with the these organs, the acid can cause soreness (inflammation). Over time, GERD may create small holes (ulcers) in the lining of the esophagus and may lead to the development of hoarseness, difficulty swallowing,   feeling of something stuck in the throat, increased mucous or drainage and even predispose to the development of malignancies, (cancer).    CAUSES   Increased body weight. This puts pressure on the stomach, making acid rise from the stomach into the esophagus.  Smoking. This increases acid production in the stomach.  Drinking alcohol. This causes decreased pressure in the lower esophageal sphincter (valve or ring of muscle between the esophagus and stomach), allowing acid from the stomach into the esophagus.  Late evening meals and a full stomach. This increases pressure and acid production in the stomach.  A malformed lower esophageal sphincter  Diet which can include avoidance of gluten and dairy products  Age  SYMPTOMS   Burning pain in the lower part of the mid-chest behind the breastbone and in the mid-stomach area. This may occur twice a week or more often.  Trouble swallowing.  Sore throat.  Dry cough.  Asthma-like symptoms including chest tightness, shortness of breath, or wheezing.  Globus sensation-something stuck in the throat/fullness  Hoarseness  DIAGNOSIS   Your caregiver may be able to diagnose GERD based on your symptoms. In some cases, X-rays and other tests may be done to check for complications or to check the condition of your stomach and esophagus.  You may need to see another  doctor.  TREATMENT   Over-the-counter or prescription medicines to help decrease acid production.   Dietary and behavioral modifications or changes may be also recommended.  HOME CARE INSTRUCTIONS   Change the factors that you can control. Ask your caregiver for guidance concerning weight loss, quitting smoking, and alcohol consumption.  Avoid foods and drinks that make your symptoms worse, and MAY include such as:  Caffeine or alcoholic drinks.  Chocolate.  Gluten containing foods  Dairy  Peppermint or mint flavorings.  Garlic and onions.  Spicy foods.  Citrus fruits, such as oranges, richardson, or limes.  Tomato-based foods such as sauce, chili, salsa, and pizza.  Fried and fatty foods.  Avoid lying down for the 3 hours prior to your bedtime or prior to taking a nap.  Eat small, frequent meals instead of large meals.  Wear loose-fitting clothing. Do not wear anything tight around your waist that causes pressure on your stomach.  Raise the head of your bed 6 to 8 inches with wood blocks to help you sleep. Extra pillows will not help.  Only take over-the-counter or prescription medicines for pain, discomfort, or fever as directed by your caregiver.  Do not take aspirin, ibuprofen, or other nonsteroidal anti-inflammatory drugs if possible (NSAIDs).  SEEK IMMEDIATE MEDICAL CARE IF:   You have pain in your arms, neck, jaw, teeth, or back.  Your pain increases or changes in intensity or duration.  You develop nausea, vomiting, or sweating (diaphoresis).  You develop shortness of breath, or you faint.  Your vomit is green, yellow, black, or looks like coffee grounds or blood.  Your stool is red, bloody, or black.  These symptoms could be signs of other problems, such as heart disease, gastric bleeding, or esophageal bleeding.  MAKE SURE YOU:   Understand these instructions.  Will watch your condition.  Will get help right away if you are not doing well or get worse.     This information is not intended to replace advice  given to you by your physician. Make sure you discuss any questions you have with your health care provider.     Modified by Jona Garibay MD, FACS 9/8/2016.  Document Released: 09/27/2006 Document Revised: 01/08/2016 Document Reviewed: 04/13/2016  [employer] Interactive Patient Education ©2016 [employer]

## 2023-12-05 NOTE — PROGRESS NOTES
OPERATIVE NOTE:  George LION Meño    DATE OF PROCEDURE: 12/5/2023    PROCEDURE:   Flexible Fiberoptic Laryngoscopy    ANESTHESIA:  None    REASON FOR PROCEDURE:  Procedure was recommended for persistant hoarseness and globus sensation  Risks, benefits and alternatives were discussed.      DETAILS of OPERATION:  The patient was seated in the exam chair.  A flexible fiberoptic laryngoscopy was performed through the oral cavity.  The scope was introduced into the oral cavity and directed to the level of the glottis, examining the structures of the oropharynx, base of tongue, vallecula, supraglottic larynx, glottic larynx, and hypopharynx.      FINDINGS:  Mucosal surfaces:   The mucosal surfaces demonstrated normal mucosa surfaces with moderate inflammation    Base of tongue:  The base of tongue was found to have no mass or lesion.    Epiglottis:  The epiglottis was found to have no mass or lesion.    Aryepiglottic fold:  The AE folds were found to have no mass or lesion.    False Vocal Fold:  The false cords were found to have no mass or lesion.    True Vocal Cord:  The true vocal cords were found to have no mass or lesion. Both true vocal cords adduct and abduct normally    Arytenoid:   The arytenoids were found to have moderate inter-arytenoid edema with mild erythema.    Hypopharynx:  The hypopharynx was found to have no mass or lesion.    The patient tolerated procedure well.

## 2023-12-06 ENCOUNTER — TELEPHONE (OUTPATIENT)
Dept: OTOLARYNGOLOGY | Facility: CLINIC | Age: 68
End: 2023-12-06

## 2023-12-06 NOTE — TELEPHONE ENCOUNTER
The [facility] received a fax that requires your attention. The document has been indexed to the patient’s chart for your review.      Reason for sending: RCVD REQUEST FOR ALTERNATIVE MEDICATION, REQUIRES PROVIDER REVIEW AND RESPONSE    Documents Description: EXT MED REC-[facility]-12.05.23    Name of Sender: [facility]    Date Indexed: 12.06.23    Notes (if needed):

## 2023-12-07 RX ORDER — PANTOPRAZOLE SODIUM 20 MG/1
20 TABLET, DELAYED RELEASE ORAL 2 TIMES DAILY
Qty: 60 TABLET | Refills: 3 | Status: SHIPPED | OUTPATIENT
Start: 2023-12-07

## 2023-12-12 LAB
FUNGUS SPEC CULT: NORMAL
KOH PREP SPEC: NORMAL

## 2023-12-20 ENCOUNTER — OFFICE VISIT (OUTPATIENT)
Dept: FAMILY MEDICINE CLINIC | Facility: CLINIC | Age: 68
End: 2023-12-20
Payer: MEDICARE

## 2023-12-20 VITALS
OXYGEN SATURATION: 98 % | TEMPERATURE: 97.6 F | RESPIRATION RATE: 18 BRPM | HEART RATE: 78 BPM | DIASTOLIC BLOOD PRESSURE: 58 MMHG | WEIGHT: 286.2 LBS | SYSTOLIC BLOOD PRESSURE: 120 MMHG | BODY MASS INDEX: 36.73 KG/M2 | HEIGHT: 74 IN

## 2023-12-20 DIAGNOSIS — Z00.00 MEDICARE WELCOME EXAM: ICD-10-CM

## 2023-12-20 DIAGNOSIS — Z12.11 SCREENING FOR COLORECTAL CANCER: ICD-10-CM

## 2023-12-20 DIAGNOSIS — E78.2 MIXED HYPERLIPIDEMIA: ICD-10-CM

## 2023-12-20 DIAGNOSIS — Z12.12 SCREENING FOR COLORECTAL CANCER: ICD-10-CM

## 2023-12-20 DIAGNOSIS — Z12.5 PROSTATE CANCER SCREENING: ICD-10-CM

## 2023-12-20 DIAGNOSIS — E55.9 VITAMIN D DEFICIENCY: ICD-10-CM

## 2023-12-20 DIAGNOSIS — R53.83 FATIGUE, UNSPECIFIED TYPE: Primary | ICD-10-CM

## 2023-12-20 DIAGNOSIS — R73.9 ELEVATED BLOOD SUGAR: ICD-10-CM

## 2023-12-20 DIAGNOSIS — E53.8 B12 DEFICIENCY: ICD-10-CM

## 2023-12-20 NOTE — PATIENT INSTRUCTIONS
Medicare Wellness  Personal Prevention Plan of Service     Date of Office Visit:    Encounter Provider:  Carl Vu MD  Place of Service:  [facility]  Patient Name: George Wilder  :  1955    As part of the Medicare Wellness portion of your visit today, we are providing you with this personalized preventive plan of services (PPPS). This plan is based upon recommendations of the [place] Preventive Services Task Force (USPSTF) and the Advisory Committee on Immunization Practices (ACIP).    This lists the preventive care services that should be considered, and provides dates of when you are due. Items listed as completed are up-to-date and do not require any further intervention.    Health Maintenance   Topic Date Due   • ANNUAL WELLNESS VISIT  2021   • BMI FOLLOWUP  2023   • LIPID PANEL  2024   • TDAP/TD VACCINES (2 - Td or Tdap) 2026   • COLORECTAL CANCER SCREENING  2027   • HEPATITIS C SCREENING  Completed   • COVID-19 Vaccine  Completed   • INFLUENZA VACCINE  Completed   • Pneumococcal Vaccine 65+  Completed   • AAA SCREEN (ONE-TIME)  Completed   • ZOSTER VACCINE  Completed       No orders of the defined types were placed in this encounter.      No follow-ups on file.

## 2023-12-20 NOTE — PROGRESS NOTES
The ABCs of the Annual Wellness Visit  [place] to Medicare Visit    Subjective       George Wildre is a 68 y.o. male who presents for a  Welcome to Medicare Visit.    The following portions of the patient's history were reviewed and   updated as appropriate: allergies, current medications, past family history, past medical history, past social history, past surgical history, and problem list.     Review of Systems   Respiratory:  Negative for shortness of breath.    Cardiovascular:  Negative for chest pain and leg swelling.        Sees cardiologist regularly   Gastrointestinal:         Cologuard ordered   Genitourinary: Negative.    Musculoskeletal:  Positive for arthralgias.        Recent dislocation left hip   All other systems reviewed and are negative.      Compared to one year ago, the patient feels his physical   health is worse.    Compared to one year ago, the patient feels his mental   health is worse.    Recent Hospitalizations:  He was admitted in November to [facility] for dislocated left hip-required surgery      Current Medical Providers:  Patient Care Team:  Carl Vu MD as PCP - General (Family Medicine)  Jona Garibay MD as Consulting Physician (Otolaryngology)  NAA Swann MD as Surgeon (Orthopedic Surgery)    Outpatient Medications Prior to Visit   Medication Sig Dispense Refill    celecoxib (CeleBREX) 200 MG capsule Take 1 capsule by mouth Daily.      cetirizine (zyrTEC) 10 MG tablet Take 1 tablet by mouth Daily.      Cholecalciferol (VITAMIN D3) 5000 UNITS capsule capsule Take 1 capsule by mouth Daily.      cyclobenzaprine (FLEXERIL) 10 MG tablet Take 1 tablet by mouth 3 (Three) Times a Day As Needed.      diphenhydrAMINE-acetaminophen (Tylenol PM Extra Strength)  MG tablet per tablet Take 2 tablets by mouth Every Night.      fluticasone (FLONASE) 50 MCG/ACT nasal spray 2 sprays into the nostril(s) as directed by provider Daily for 30 days. 16 g 11     gabapentin (NEURONTIN) 300 MG capsule Take 1 capsule by mouth 3 (Three) Times a Day.      HYDROcodone-acetaminophen (NORCO)  MG per tablet Take 1 tablet by mouth 2 (Two) Times a Day. BACK PAIN      Insulin Pen Needle (Pen Needles) 31G X 8 MM misc See Admin Instructions.      metoprolol tartrate (LOPRESSOR) 50 MG tablet TAKE 1 TABLET BY MOUTH EVERYDAY AT BEDTIME 30 tablet 11    Multiple Vitamins-Minerals (MULTIVITAMIN WITH MINERALS) tablet tablet Take 1 tablet by mouth Daily.      nitroglycerin (NITROSTAT) 0.4 MG SL tablet 1 under the tongue as needed for angina, may repeat q5mins for up three doses 25 tablet 2    pantoprazole (Protonix) 20 MG EC tablet Take 1 tablet by mouth 2 (Two) Times a Day. 60 tablet 3    prasugrel (EFFIENT) 5 MG tablet Take 1 tablet by mouth Daily. 90 tablet 3    Psyllium (DAILY FIBER PO) Take  by mouth.      rosuvastatin (CRESTOR) 20 MG tablet TAKE 1 TABLET BY MOUTH EVERY DAY 30 tablet 11    Victoza 18 MG/3ML solution pen-injector injection inject 1.8 MG SUBCUTANEOUSLY ONCE daily      albuterol (ACCUNEB) 1.25 MG/3ML nebulizer solution Take 3 mL by nebulization 3 (Three) Times a Day As Needed for Wheezing or Shortness of Air. (Patient not taking: Reported on 11/21/2023) 30 each 0    aspirin 81 MG EC tablet Take 1 tablet by mouth 2 (Two) Times a Day. (Patient not taking: Reported on 12/5/2023)       Facility-Administered Medications Prior to Visit   Medication Dose Route Frequency Provider Last Rate Last Admin    cyanocobalamin injection 1,000 mcg  1,000 mcg Intramuscular Q3 Months Carl Vu MD   1,000 mcg at 10/09/23 0845       Opioid medication/s are on active medication list.  and I have evaluated his active treatment plan and pain score trends (see table).  Vitals:    12/20/23 0806   PainSc: 0-No pain     I have reviewed the chart for potential of high risk medication and harmful drug interactions in the elderly.          Aspirin is not on active medication list.   "Aspirin use is not indicated based on review of current medical condition/s. Risk of harm outweighs potential benefits.  .    Patient Active Problem List   Diagnosis    Morbid obesity    Mixed hyperlipidemia    Lumbar degenerative disc disease    Primary hypertension    Chronic maxillary sinusitis    Chronic ethmoidal sinusitis    S/P sinus surgery    Allergic rhinitis due to allergen    Obesity, Class III, BMI 40-49.9 (morbid obesity)    BMI 38.0-38.9,adult    Chronic back pain    Other acute recurrent sinusitis    Lilian bullosa    Nasal septal deviation    Coronary artery chronic total occlusion    Abnormal nuclear stress test    Coronary artery disease involving coronary bypass graft of native heart with unstable angina pectoris    S/P coronary artery stent placement    Complication of surgical procedure    History of total hip arthroplasty    Osteoarthritis of left wrist     Advance Care Planning  Advance Directive is on file.  ACP discussion was declined by the patient. Patient has an advance directive in EMR which is still valid.        Objective   Vitals:    12/20/23 0806   BP: 120/58   BP Location: Left arm   Patient Position: Sitting   Cuff Size: Adult   Pulse: 78   Resp: 18   Temp: 97.6 °F (36.4 °C)   TempSrc: Temporal   SpO2: 98%   Weight: 130 kg (286 lb 3.2 oz)   Height: 188 cm (74\")   PainSc: 0-No pain     Estimated body mass index is 36.75 kg/m² as calculated from the following:    Height as of this encounter: 188 cm (74\").    Weight as of this encounter: 130 kg (286 lb 3.2 oz).    Class 2 Severe Obesity (BMI >=35 and <=39.9). Obesity-related health conditions include the following: hypertension, coronary heart disease, dyslipidemias, and osteoarthritis. Obesity is unchanged. BMI is is above average; BMI management plan is completed. We discussed portion control and increasing exercise.      Physical Exam  Vitals and nursing note reviewed.   Constitutional:       Appearance: He is obese.   HENT:      " Right Ear: Tympanic membrane and ear canal normal.      Left Ear: Tympanic membrane and ear canal normal.      Mouth/Throat:      Mouth: Mucous membranes are moist.   Eyes:      Extraocular Movements: Extraocular movements intact.      Pupils: Pupils are equal, round, and reactive to light.   Neck:      Vascular: No carotid bruit.   Cardiovascular:      Rate and Rhythm: Normal rate and regular rhythm.      Pulses: Normal pulses.      Heart sounds: Normal heart sounds.   Pulmonary:      Effort: Pulmonary effort is normal.      Breath sounds: Normal breath sounds.   Abdominal:      General: Abdomen is flat.      Palpations: Abdomen is soft. There is no mass.   Genitourinary:     Penis: Normal.       Testes: Normal.      Comments: No testicular masses inguinal hernia or adenopathy prostate 2+ no nodules  Musculoskeletal:         General: Swelling present.      Right lower leg: No edema.      Left lower leg: No edema.      Comments: Seroma left hip large baseball sized -orthopedics observation   Lymphadenopathy:      Cervical: No cervical adenopathy.   Skin:     General: Skin is warm and dry.   Neurological:      General: No focal deficit present.      Mental Status: He is alert and oriented to person, place, and time.   Psychiatric:         Mood and Affect: Mood normal.         Behavior: Behavior normal.         Thought Content: Thought content normal.         Judgment: Judgment normal.         Does the patient have evidence of cognitive impairment?   No         Procedures       HEALTH RISK ASSESSMENT    Smoking Status:  Social History     Tobacco Use   Smoking Status Former    Packs/day: 1.50    Years: 29.00    Additional pack years: 0.00    Total pack years: 43.50    Types: Cigarettes    Quit date: 1994    Years since quittin.9   Smokeless Tobacco Never   Tobacco Comments    quit date was date of heart attack       Alcohol Consumption:  Social History     Substance and Sexual Activity   Alcohol Use Not  Currently       Fall Risk Screen:  SAHILADI Fall Risk Assessment was completed, and patient is at LOW risk for falls.Assessment completed on:2023    Depression Screen:       2023     8:09 AM   PHQ-2/PHQ-9 Depression Screening   Little Interest or Pleasure in Doing Things 0-->not at all   Feeling Down, Depressed or Hopeless 0-->not at all   PHQ-9: Brief Depression Severity Measure Score 0       Health Habits and Functional and Cognitive Screenin/20/2023     8:12 AM   Functional & Cognitive Status   Do you have difficulty preparing food and eating? No   Do you have difficulty bathing yourself, getting dressed or grooming yourself? No   Do you have difficulty using the toilet? No   Do you have difficulty moving around from place to place? No   Do you have trouble with steps or getting out of a bed or a chair? No   Current Diet Well Balanced Diet   Dental Exam Up to date   Eye Exam Up to date   Exercise (times per week) 0 times per week   Current Exercises Include No Regular Exercise   Do you need help using the phone?  No   Are you deaf or do you have serious difficulty hearing?  Yes   Do you need help to go to places out of walking distance? No   Do you need help shopping? No   Do you need help preparing meals?  No   Do you need help with housework?  No   Do you need help with laundry? No   Do you need help taking your medications? No   Do you need help managing money? No   Do you ever drive or ride in a car without wearing a seat belt? No   Have you felt unusual stress, anger or loneliness in the last month? No   Who do you live with? Spouse   If you need help, do you have trouble finding someone available to you? No   Have you been bothered in the last four weeks by sexual problems? No   Do you have difficulty concentrating, remembering or making decisions? No       Visual Acuity:    Vision Screening    Right eye Left eye Both eyes   Without correction 20/40 20/25 20/20   With correction           Age-appropriate Screening Schedule:  Refer to the list below for future screening recommendations based on patient's age, sex and/or medical conditions. Orders for these recommended tests are listed in the plan section. The patient has been provided with a written plan.    Health Maintenance   Topic Date Due    BMI FOLLOWUP  12/14/2023    LIPID PANEL  06/14/2024    ANNUAL WELLNESS VISIT  12/20/2024    TDAP/TD VACCINES (2 - Td or Tdap) 11/18/2026    COLORECTAL CANCER SCREENING  06/01/2027    HEPATITIS C SCREENING  Completed    COVID-19 Vaccine  Completed    INFLUENZA VACCINE  Completed    Pneumococcal Vaccine 65+  Completed    AAA SCREEN (ONE-TIME)  Completed    ZOSTER VACCINE  Completed        CMS Preventative Services Quick Reference  Risk Factors Identified During Encounter    Fall Risk-High or Moderate: Information on Fall Prevention Shared in After Visit Summary  The above risks/problems have been discussed with the patient.  Pertinent information has been shared with the patient in the After Visit Summary.  Follow up plans and orders are seen below in the Assessment/Plan Section.    Diagnoses and all orders for this visit:    1. Fatigue, unspecified type (Primary)  -     TSH  -     T4, free  -     CBC & Differential  -     POC Urinalysis Dipstick, Multipro    2. Elevated blood sugar  -     Hemoglobin A1c    3. Mixed hyperlipidemia  -     Comprehensive Metabolic Panel  -     Lipid Panel    4. Prostate cancer screening  -     PSA Screen    5. Vitamin D deficiency  -     Vitamin D,25-Hydroxy    6. B12 deficiency  -     Vitamin B12  -     Folate    7. Screening for colorectal cancer  -     Cologuard - Stool, Per Rectum; Future    8. Medicare welcome exam    Immunizations up-to-date, sees cardiologist regularly    Follow Up:   Initial Medicare Visit in one year  Return in about 6 months (around 6/20/2024), or if symptoms worsen or fail to improve.    An After Visit Summary and PPPS were made available to the  patient.    Ed MD Horace

## 2023-12-21 DIAGNOSIS — E78.2 MIXED HYPERLIPIDEMIA: Primary | ICD-10-CM

## 2023-12-21 LAB
25(OH)D3+25(OH)D2 SERPL-MCNC: 95.3 NG/ML (ref 30–100)
ALBUMIN SERPL-MCNC: 4.5 G/DL (ref 3.5–5.2)
ALBUMIN/GLOB SERPL: 2 G/DL
ALP SERPL-CCNC: 67 U/L (ref 39–117)
ALT SERPL-CCNC: 23 U/L (ref 1–41)
AST SERPL-CCNC: 22 U/L (ref 1–40)
BASOPHILS # BLD AUTO: 0.04 10*3/MM3 (ref 0–0.2)
BASOPHILS NFR BLD AUTO: 0.7 % (ref 0–1.5)
BILIRUB SERPL-MCNC: 1.1 MG/DL (ref 0–1.2)
BUN SERPL-MCNC: 17 MG/DL (ref 8–23)
BUN/CREAT SERPL: 17.7 (ref 7–25)
CALCIUM SERPL-MCNC: 9.6 MG/DL (ref 8.6–10.5)
CHLORIDE SERPL-SCNC: 104 MMOL/L (ref 98–107)
CHOLEST SERPL-MCNC: 149 MG/DL (ref 0–200)
CO2 SERPL-SCNC: 25.5 MMOL/L (ref 22–29)
CREAT SERPL-MCNC: 0.96 MG/DL (ref 0.76–1.27)
EGFRCR SERPLBLD CKD-EPI 2021: 86.1 ML/MIN/1.73
EOSINOPHIL # BLD AUTO: 0.31 10*3/MM3 (ref 0–0.4)
EOSINOPHIL NFR BLD AUTO: 5.2 % (ref 0.3–6.2)
ERYTHROCYTE [DISTWIDTH] IN BLOOD BY AUTOMATED COUNT: 12.3 % (ref 12.3–15.4)
FOLATE SERPL-MCNC: 18.7 NG/ML (ref 4.78–24.2)
GLOBULIN SER CALC-MCNC: 2.3 GM/DL
GLUCOSE SERPL-MCNC: 97 MG/DL (ref 65–99)
HBA1C MFR BLD: 5 % (ref 4.8–5.6)
HCT VFR BLD AUTO: 45 % (ref 37.5–51)
HDLC SERPL-MCNC: 54 MG/DL (ref 40–60)
HGB BLD-MCNC: 15.5 G/DL (ref 13–17.7)
IMM GRANULOCYTES # BLD AUTO: 0.02 10*3/MM3 (ref 0–0.05)
IMM GRANULOCYTES NFR BLD AUTO: 0.3 % (ref 0–0.5)
LDLC SERPL CALC-MCNC: 77 MG/DL (ref 0–100)
LYMPHOCYTES # BLD AUTO: 1.43 10*3/MM3 (ref 0.7–3.1)
LYMPHOCYTES NFR BLD AUTO: 23.9 % (ref 19.6–45.3)
MCH RBC QN AUTO: 31.6 PG (ref 26.6–33)
MCHC RBC AUTO-ENTMCNC: 34.4 G/DL (ref 31.5–35.7)
MCV RBC AUTO: 91.8 FL (ref 79–97)
MONOCYTES # BLD AUTO: 0.37 10*3/MM3 (ref 0.1–0.9)
MONOCYTES NFR BLD AUTO: 6.2 % (ref 5–12)
NEUTROPHILS # BLD AUTO: 3.82 10*3/MM3 (ref 1.7–7)
NEUTROPHILS NFR BLD AUTO: 63.7 % (ref 42.7–76)
NRBC BLD AUTO-RTO: 0 /100 WBC (ref 0–0.2)
PLATELET # BLD AUTO: 215 10*3/MM3 (ref 140–450)
POTASSIUM SERPL-SCNC: 4.5 MMOL/L (ref 3.5–5.2)
PROT SERPL-MCNC: 6.8 G/DL (ref 6–8.5)
PSA SERPL-MCNC: 1.34 NG/ML (ref 0–4)
RBC # BLD AUTO: 4.9 10*6/MM3 (ref 4.14–5.8)
SODIUM SERPL-SCNC: 139 MMOL/L (ref 136–145)
T4 FREE SERPL-MCNC: 1.36 NG/DL (ref 0.93–1.7)
TRIGL SERPL-MCNC: 99 MG/DL (ref 0–150)
TSH SERPL DL<=0.005 MIU/L-ACNC: 1.38 UIU/ML (ref 0.27–4.2)
VIT B12 SERPL-MCNC: 380 PG/ML (ref 211–946)
VLDLC SERPL CALC-MCNC: 18 MG/DL (ref 5–40)
WBC # BLD AUTO: 5.99 10*3/MM3 (ref 3.4–10.8)

## 2024-01-09 ENCOUNTER — TELEPHONE (OUTPATIENT)
Dept: OTOLARYNGOLOGY | Facility: CLINIC | Age: 69
End: 2024-01-09

## 2024-01-09 NOTE — TELEPHONE ENCOUNTER
The [facility] received a fax that requires your attention. The document has been indexed to the patient’s chart for your review.      Reason for sending: PROVIDER NOTIFICATION    Documents Description: EMERGENCY SUPPLY GIVEN NOTIFICATION    Name of Sender: [employer]    Date Indexed: 01/08/2024    Notes (if needed): PANTOPRAZOLE SOD DR 20 MG TAB

## 2024-01-12 ENCOUNTER — CLINICAL SUPPORT (OUTPATIENT)
Dept: FAMILY MEDICINE CLINIC | Facility: CLINIC | Age: 69
End: 2024-01-12
Payer: MEDICARE

## 2024-01-12 DIAGNOSIS — E53.8 DEFICIENCY OF VITAMIN B12: Primary | ICD-10-CM

## 2024-01-12 RX ORDER — CYANOCOBALAMIN 1000 UG/ML
1000 INJECTION, SOLUTION INTRAMUSCULAR; SUBCUTANEOUS
Status: SHIPPED | OUTPATIENT
Start: 2024-01-12

## 2024-01-12 RX ADMIN — CYANOCOBALAMIN 1000 MCG: 1000 INJECTION, SOLUTION INTRAMUSCULAR; SUBCUTANEOUS at 12:24

## 2024-01-12 NOTE — PROGRESS NOTES
Patient presented to office for b12 injection. Given in left Deltoid. Patient waited in lobby 15 minutes and tolerated well.

## 2024-01-29 ENCOUNTER — OFFICE VISIT (OUTPATIENT)
Dept: CARDIOLOGY | Facility: CLINIC | Age: 69
End: 2024-01-29
Payer: MEDICARE

## 2024-01-29 VITALS
BODY MASS INDEX: 36.7 KG/M2 | OXYGEN SATURATION: 98 % | HEART RATE: 63 BPM | HEIGHT: 74 IN | WEIGHT: 286 LBS | DIASTOLIC BLOOD PRESSURE: 82 MMHG | SYSTOLIC BLOOD PRESSURE: 120 MMHG

## 2024-01-29 DIAGNOSIS — I10 PRIMARY HYPERTENSION: ICD-10-CM

## 2024-01-29 DIAGNOSIS — I25.700 CORONARY ARTERY DISEASE INVOLVING CORONARY BYPASS GRAFT OF NATIVE HEART WITH UNSTABLE ANGINA PECTORIS: Primary | ICD-10-CM

## 2024-01-29 DIAGNOSIS — E78.2 MIXED HYPERLIPIDEMIA: ICD-10-CM

## 2024-01-29 PROCEDURE — 3079F DIAST BP 80-89 MM HG: CPT | Performed by: INTERNAL MEDICINE

## 2024-01-29 PROCEDURE — 1160F RVW MEDS BY RX/DR IN RCRD: CPT | Performed by: INTERNAL MEDICINE

## 2024-01-29 PROCEDURE — 99214 OFFICE O/P EST MOD 30 MIN: CPT | Performed by: INTERNAL MEDICINE

## 2024-01-29 PROCEDURE — 3074F SYST BP LT 130 MM HG: CPT | Performed by: INTERNAL MEDICINE

## 2024-01-29 PROCEDURE — 93000 ELECTROCARDIOGRAM COMPLETE: CPT | Performed by: INTERNAL MEDICINE

## 2024-01-29 PROCEDURE — 1159F MED LIST DOCD IN RCRD: CPT | Performed by: INTERNAL MEDICINE

## 2024-01-29 RX ORDER — PANTOPRAZOLE SODIUM 20 MG/1
20 TABLET, DELAYED RELEASE ORAL 2 TIMES DAILY
Qty: 180 TABLET | Refills: 1 | Status: SHIPPED | OUTPATIENT
Start: 2024-01-29 | End: 2024-01-30

## 2024-01-29 NOTE — PROGRESS NOTES
Referring Provider: Carl Vu MD    Reason for Follow-up Visit: CASD    Subjective .   Chief Complaint:   Chief Complaint   Patient presents with    Coronary Artery Disease     Yearly pt states he is doing well with no issues of symptoms.    Hypertension     Pt states this is good.    Hyperlipidemia     Last lab done 12/2023 LDL was 77 on Crestor 40 mg       History of present illness:  George Wilder is a 68 y.o. yo male with CAD, s/p PAUL in the past and most recently last yr in for routine follow up. He denies any chest pain or SOB       History  Past Medical History:   Diagnosis Date    Allergic rhinitis due to allergen 08/08/2017    Arthritis     Asthma sometimes    Back pain     CHF (congestive heart failure)     Chronic ethmoidal sinusitis 05/18/2017    Chronic maxillary sinusitis 05/18/2017    Coronary artery disease 1984    stent LAD, Cx, RCA     GERD (gastroesophageal reflux disease)     Heart attack 1994    Dr Stiles     Heartburn     HL (hearing loss)     Hyperlipidemia     Hypertension     Myocardial infarction     Pain     back, & Neck    ,   Past Surgical History:   Procedure Laterality Date    ANKLE SURGERY      BACK SURGERY      CARDIAC CATHETERIZATION  1984, 1994    CARDIAC CATHETERIZATION N/A 10/14/2021    Procedure: Left Heart Cath;  Surgeon: Kishore Stiles MD;  Location: [facility] CATH INVASIVE LOCATION;  Service: Cardiology;  Laterality: N/A;    CHOLECYSTECTOMY  2014    Lararocopic     COLONOSCOPY      CORONARY STENT PLACEMENT      ENDOSCOPIC FUNCTIONAL SINUS SURGERY (FESS) N/A 06/12/2017    Procedure: ENDOSCOPIC FUNCTIONAL SINUS SURGERY WITH LEFT MIDDLE MEATAL ANTROSTOMY AND LEFT ETHMOIDECTOMY;  Surgeon: Jona Garibay MD;  Location: [facility] OR;  Service:     ENDOSCOPIC FUNCTIONAL SINUS SURGERY (FESS) Bilateral 08/10/2020    Procedure: Septoplasty Resection of left sammy bullosa deformity Bilateral maxillary antrostomy and uncinectomy;  Surgeon: Jona Garibay MD;  Location:   [facility] OR;  Service: ENT;  Laterality: Bilateral;    ETHMOIDECTOMY Left 2017    Procedure: ETHMOIDECTOMY ENDOSCOPIC;  Surgeon: Jona Garibay MD;  Location:  [facility] OR;  Service:     EYE SURGERY      LASIK    HAND SURGERY      JOINT REPLACEMENT  both hips, left recent 2023    SINUS SURGERY      TOTAL HIP ARTHROPLASTY Bilateral     TOTAL HIP ARTHROPLASTY REVISION Left 2023    Dr. Swann   ,   Family History   Problem Relation Age of Onset    Arthritis Mother     Rheum arthritis Mother     Cancer Father     Lung cancer Father         metastatic adenocarcinoma of lung    Stroke Brother     Cancer Brother     Asthma Brother     Stroke Brother     Cancer Brother    ,   Social History     Tobacco Use    Smoking status: Former     Packs/day: 1.50     Years: 29.00     Additional pack years: 0.00     Total pack years: 43.50     Types: Cigarettes     Quit date: 1994     Years since quittin.0    Smokeless tobacco: Never    Tobacco comments:     quit date was date of heart attack   Vaping Use    Vaping Use: Never used   Substance Use Topics    Alcohol use: Not Currently    Drug use: Never   ,     Medications  Current Outpatient Medications   Medication Sig Dispense Refill    celecoxib (CeleBREX) 200 MG capsule Take 1 capsule by mouth Daily.      cetirizine (zyrTEC) 10 MG tablet Take 1 tablet by mouth Daily.      Cholecalciferol (VITAMIN D3) 5000 UNITS capsule capsule Take 1 capsule by mouth 3 (Three) Times a Week. Monday, Wednesday, Friday      cyclobenzaprine (FLEXERIL) 10 MG tablet Take 1 tablet by mouth 3 (Three) Times a Day As Needed.      diphenhydrAMINE-acetaminophen (Tylenol PM Extra Strength)  MG tablet per tablet Take 2 tablets by mouth Every Night.      fluticasone (FLONASE) 50 MCG/ACT nasal spray 2 sprays into the nostril(s) as directed by provider Daily for 30 days. 16 g 11    gabapentin (NEURONTIN) 300 MG capsule Take 1 capsule by mouth 3 (Three) Times a Day.       HYDROcodone-acetaminophen (NORCO)  MG per tablet Take 1 tablet by mouth 2 (Two) Times a Day. BACK PAIN      Insulin Pen Needle (Pen Needles) 31G X 8 MM misc See Admin Instructions.      metoprolol tartrate (LOPRESSOR) 50 MG tablet TAKE 1 TABLET BY MOUTH EVERYDAY AT BEDTIME 30 tablet 11    Multiple Vitamins-Minerals (MULTIVITAMIN WITH MINERALS) tablet tablet Take 1 tablet by mouth Daily.      nitroglycerin (NITROSTAT) 0.4 MG SL tablet 1 under the tongue as needed for angina, may repeat q5mins for up three doses 25 tablet 2    pantoprazole (Protonix) 20 MG EC tablet Take 1 tablet by mouth 2 (Two) Times a Day. 60 tablet 3    prasugrel (EFFIENT) 5 MG tablet Take 1 tablet by mouth Daily. 90 tablet 3    Psyllium (DAILY FIBER PO) Take  by mouth.      rosuvastatin (CRESTOR) 20 MG tablet TAKE 1 TABLET BY MOUTH EVERY DAY (Patient taking differently: Take 2 tablets by mouth Every Night.) 30 tablet 11    Victoza 18 MG/3ML solution pen-injector injection inject 1.8 MG SUBCUTANEOUSLY ONCE daily      vitamin B-12 (CYANOCOBALAMIN) 1000 MCG tablet Take 1 tablet by mouth Daily.       Current Facility-Administered Medications   Medication Dose Route Frequency Provider Last Rate Last Admin    cyanocobalamin injection 1,000 mcg  1,000 mcg Intramuscular Q3 Months Carl Vu MD   1,000 mcg at 10/09/23 0845    cyanocobalamin injection 1,000 mcg  1,000 mcg Intramuscular Q28 Days Carl Vu MD   1,000 mcg at 01/12/24 1224       Allergies:  Brilinta [ticagrelor]    Review of Systems  Review of Systems   HENT:  Negative for nosebleeds.    Cardiovascular:  Negative for chest pain, claudication, dyspnea on exertion, leg swelling, near-syncope, orthopnea, palpitations, paroxysmal nocturnal dyspnea and syncope.   Respiratory:  Negative for hemoptysis and shortness of breath.    Gastrointestinal:  Negative for melena.   Genitourinary:  Negative for hematuria.       Objective     Physical Exam:  /82   Pulse 63   " Ht 188 cm (74\")   Wt 130 kg (286 lb)   SpO2 98%   BMI 36.72 kg/m²   Pulmonary:      Effort: Pulmonary effort is normal.      Breath sounds: Normal breath sounds.   Cardiovascular:      Normal rate. Regular rhythm.      Murmurs: There is no murmur.   Edema:     Peripheral edema absent.         Results Review:    ECG 12 Lead    Date/Time: 1/29/2024 9:34 AM  Performed by: Kishore Stiles MD    Authorized by: Kishore Stiles MD  Comparison: compared with previous ECG   Similar to previous ECG  Rhythm: sinus rhythm  Rate: normal  Conduction: conduction normal  ST Segments: ST segments normal  T Waves: T waves normal  QRS axis: normal    Clinical impression: normal ECG          Results Encounter on 12/21/2023   Component Date Value Ref Range Status    Cologuard 01/03/2024 Negative  Negative Final    Comment:   NEGATIVE TEST RESULT. A negative Cologuard result indicates a low likelihood that a colorectal cancer (CRC) or advanced adenoma (adenomatous polyps with more advanced pre-malignant features)  is present. The chance that a person with a negative Cologuard test has a colorectal cancer is less than 1 in 1500 (negative predictive value >99.9%) or has an  advanced adenoma is less than  5.3% (negative predictive value 94.7%). These data are based on a prospective cross-sectional study of 10,000 individuals at average risk for colorectal cancer who were screened with both Cologuard and colonoscopy. (Apryl Parker al, N Engl J Med 2014;370(14):0669-9417) The normal value (reference range) for this assay is negative.    COLOGUARD RE-SCREENING RECOMMENDATION: Periodic colorectal cancer screening is an important part of preventive healthcare for asymptomatic individuals at average risk for colorectal cancer.  Following a negative Cologuard result, the American Cancer Society and U.S.                            Multi-Society Task Force screening guidelines recommend a Cologuard re-screening interval of 3 years. "   References: American Cancer Society Guideline for Colorectal Cancer Screening: https://www.cancer.org/cancer/colon-rectal-cancer/nyorddjfv-nyajcxnwo-exuybqm/acs-recommendations.html.; Gregorio DK, Lilibeth HAGAN, Clay CARDK, Colorectal Cancer Screening: Recommendations for Physicians and Patients from the U.S. Multi-Society Task Force on Colorectal Cancer Screening , Am J Gastroenterology 2017; 112:6585-3222.    TEST DESCRIPTION: Composite algorithmic analysis of stool DNA-biomarkers with hemoglobin immunoassay.   Quantitative values of individual biomarkers are not reportable and are not associated with individual biomarker result reference ranges. Cologuard is intended for colorectal cancer screening of adults of either sex, 45 years or older, who are at average-risk for colorectal cancer (CRC). Cologuard has been approved for use by the U.S. FDA. The performance of Cologuard was                            established in a cross sectional study of average-risk adults aged 50-84. Cologuard performance in patients ages 45 to 49 years was estimated by sub-group analysis of near-age groups. Colonoscopies performed for a positive result may find as the most clinically significant lesion: colorectal cancer [4.0%], advanced adenoma (including sessile serrated polyps greater than or equal to 1cm diameter) [20%] or non- advanced adenoma [31%]; or no colorectal neoplasia [45%]. These estimates are derived from a prospective cross-sectional screening study of 10,000 individuals at average risk for colorectal cancer who were screened with both Cologuard and colonoscopy. (Apryl FAM et al, N Engl J Med 2014;370(14):7258-0237.) Cologuard may produce a false negative or false positive result (no colorectal cancer or precancerous polyp present at colonoscopy follow up). A negative Cologuard test result does not guarantee the absence of CRC or advanced adenoma (pre-cancer). The current Cologuard                            screening  interval is every 3 years. (American Cancer Society and U.S. Multi-Society Task Force). Cologuard performance data in a 10,000 patient pivotal study using colonoscopy as the reference method can be accessed at the following location: www.[employer]/results. Additional description of the Cologuard test process, warnings and precautions can be found at www.[employer].DataArt.         Assessment & Plan   Problem List Items Addressed This Visit          Cardiac and Vasculature    Mixed hyperlipidemia - Primary    Current Assessment & Plan     Not to goal but close. Will repeat his lipid profile and if not trending in the right direction, he may need to start a PCSK9i or inclisoran         Relevant Orders    Lipid Panel    Primary hypertension    Current Assessment & Plan     Good control         Coronary artery disease involving coronary bypass graft of native heart with unstable angina pectoris    Current Assessment & Plan     No obvious ischemia            Medical Complexity  must have 2 out of 3     Moderate Complexity Level 4           1 of the following medical problems:          []One chronic illness with mild exacerbation         [x]Two or more stable chronic illness          []One new problem  []One acute illness with systemic symptoms    Complexity of Data  Reviewed (1 out of the 3 following categories)      Category 1 tests, documents, historian (must have 3 points)       []Review of prior external records  [x]Review of results of unique tests  [x]Ordering unique tests  []Assessment requires an independent historian    Category 2 Interpretation of tests   []Independent interpretation of test read by another doc    Category 3 Discuss Management/tests  []Discussion with external physician    Risk of complications and/or morbidity          [x]Prescription Drug Management

## 2024-01-29 NOTE — ASSESSMENT & PLAN NOTE
Not to goal but close. Will repeat his lipid profile and if not trending in the right direction, he may need to start a PCSK9i or inclisoran

## 2024-01-29 NOTE — TELEPHONE ENCOUNTER
The [facility] received a fax that requires your attention. The document has been indexed to the patient's chart for your review.    Reason for sendin DAY PRESCRIPTION REQUEST FOR PANTOPRAZOLE    Documents Description: 90 DAY RX REQ_[facility]_24    Name of Sender: [facility]    Date Indexed: 24

## 2024-01-30 ENCOUNTER — TELEPHONE (OUTPATIENT)
Dept: OTOLARYNGOLOGY | Facility: CLINIC | Age: 69
End: 2024-01-30
Payer: MEDICARE

## 2024-01-30 NOTE — TELEPHONE ENCOUNTER
Provider: ISAAC ARENAS    The [facility] received a fax that requires your attention. The document has been indexed to the patient's chart for your review.     Reason for sending: REVIEW     Documents Description: ALTERNATIVE RX REQUEST     Name of Sender: [facility]     Date Indexed: 01/30/24    Notes (if needed): INDEXED INTO CHART AS EXT MED RECS 01/29/24.

## 2024-01-31 ENCOUNTER — TELEPHONE (OUTPATIENT)
Dept: OTOLARYNGOLOGY | Facility: CLINIC | Age: 69
End: 2024-01-31

## 2024-01-31 NOTE — TELEPHONE ENCOUNTER
Provider: ISAAC ARENAS    The [facility] received a fax that requires your attention. The document has been indexed to the patient's chart for your review.     Reason for sending: REVIEW     Documents Description: ALTERNATIVE RX REQUEST     Name of Sender: [facility]     Date Indexed: 01/31/24    Notes (if needed): INDEXED INTO CHART AS EXT MED RECS 1/30/24.

## 2024-02-03 LAB
AMBIG ABBREV LP DEFAULT: NORMAL
CHOLEST SERPL-MCNC: 123 MG/DL (ref 100–199)
HDLC SERPL-MCNC: 53 MG/DL
LDLC SERPL CALC-MCNC: 52 MG/DL (ref 0–99)
TRIGL SERPL-MCNC: 95 MG/DL (ref 0–149)
VLDLC SERPL CALC-MCNC: 18 MG/DL (ref 5–40)

## 2024-02-07 ENCOUNTER — TELEPHONE (OUTPATIENT)
Dept: CARDIOLOGY | Facility: CLINIC | Age: 69
End: 2024-02-07
Payer: MEDICARE

## 2024-02-07 NOTE — TELEPHONE ENCOUNTER
----- Message from ISAAC Cobos sent at 2/7/2024 10:24 AM CST -----  Please let patient know his cholesterol is controlled. Continue current medications.

## 2024-02-14 ENCOUNTER — OFFICE VISIT (OUTPATIENT)
Dept: OTOLARYNGOLOGY | Facility: CLINIC | Age: 69
End: 2024-02-14
Payer: MEDICARE

## 2024-02-14 VITALS
TEMPERATURE: 98.1 F | WEIGHT: 280 LBS | SYSTOLIC BLOOD PRESSURE: 130 MMHG | DIASTOLIC BLOOD PRESSURE: 74 MMHG | BODY MASS INDEX: 35.94 KG/M2 | HEIGHT: 74 IN | RESPIRATION RATE: 16 BRPM | HEART RATE: 110 BPM

## 2024-02-14 DIAGNOSIS — R05.3 CHRONIC COUGH: ICD-10-CM

## 2024-02-14 DIAGNOSIS — R09.A2 GLOBUS SENSATION: ICD-10-CM

## 2024-02-14 DIAGNOSIS — K21.9 LARYNGOPHARYNGEAL REFLUX (LPR): Primary | ICD-10-CM

## 2024-02-14 RX ORDER — NICOTINE POLACRILEX 4 MG/1
20 GUM, CHEWING ORAL 2 TIMES DAILY
Qty: 60 EACH | Refills: 3 | Status: SHIPPED | OUTPATIENT
Start: 2024-02-14 | End: 2024-03-15

## 2024-02-22 DIAGNOSIS — R73.03 PREDIABETES: Primary | ICD-10-CM

## 2024-02-22 RX ORDER — LIRAGLUTIDE 6 MG/ML
2.4 INJECTION SUBCUTANEOUS DAILY
Qty: 12 ML | Refills: 3 | Status: SHIPPED | OUTPATIENT
Start: 2024-02-22

## 2024-02-22 NOTE — TELEPHONE ENCOUNTER
Fax received requesting alternative request d/t non formulary.  Suggested 5mcg dose pen inj.  Scanned in media      [facility]

## 2024-03-19 DIAGNOSIS — I10 PRIMARY HYPERTENSION: Primary | ICD-10-CM

## 2024-03-19 RX ORDER — METOPROLOL TARTRATE 50 MG/1
50 TABLET, FILM COATED ORAL
Qty: 90 TABLET | Refills: 3 | Status: SHIPPED | OUTPATIENT
Start: 2024-03-19

## 2024-03-19 NOTE — TELEPHONE ENCOUNTER
Rx Refill Note  Requested Prescriptions     Pending Prescriptions Disp Refills    metoprolol tartrate (LOPRESSOR) 50 MG tablet 90 tablet 3     Sig: Take 1 tablet by mouth every night at bedtime.      Last office visit with prescribing clinician: 12/20/2023   Last telemedicine visit with prescribing clinician: Visit date not found   Next office visit with prescribing clinician: 6/20/2024                         Would you like a call back once the refill request has been completed: [] Yes [] No    If the office needs to give you a call back, can they leave a voicemail: [] Yes [] No    Dante Blake Rep  03/19/24, 09:25 CDT

## 2024-03-25 RX ORDER — FLUTICASONE PROPIONATE 50 MCG
2 SPRAY, SUSPENSION (ML) NASAL DAILY
Qty: 48 ML | Refills: 3 | Status: SHIPPED | OUTPATIENT
Start: 2024-03-25 | End: 2024-04-24

## 2024-04-12 ENCOUNTER — CLINICAL SUPPORT (OUTPATIENT)
Dept: FAMILY MEDICINE CLINIC | Facility: CLINIC | Age: 69
End: 2024-04-12
Payer: MEDICARE

## 2024-04-12 DIAGNOSIS — E53.8 B12 DEFICIENCY: Primary | ICD-10-CM

## 2024-04-12 PROCEDURE — 96372 THER/PROPH/DIAG INJ SC/IM: CPT | Performed by: NURSE PRACTITIONER

## 2024-04-12 RX ADMIN — CYANOCOBALAMIN 1000 MCG: 1000 INJECTION, SOLUTION INTRAMUSCULAR; SUBCUTANEOUS at 10:09

## 2024-04-12 NOTE — PROGRESS NOTES
Cyanocobalamin 1,000mcg/ml given IM left deltoid.  Patient tolerated well and should return in 3 months for next shot.

## 2024-04-23 RX ORDER — PRASUGREL 5 MG/1
5 TABLET, FILM COATED ORAL DAILY
Qty: 90 TABLET | Refills: 3 | Status: SHIPPED | OUTPATIENT
Start: 2024-04-23

## 2024-06-06 RX ORDER — OMEPRAZOLE 20 MG/1
20 CAPSULE, DELAYED RELEASE ORAL 2 TIMES DAILY
Qty: 60 CAPSULE | Refills: 3 | OUTPATIENT
Start: 2024-06-06

## 2024-06-07 DIAGNOSIS — R09.A2 GLOBUS SENSATION: ICD-10-CM

## 2024-06-07 DIAGNOSIS — K21.9 LARYNGOPHARYNGEAL REFLUX (LPR): Primary | ICD-10-CM

## 2024-06-07 RX ORDER — NICOTINE POLACRILEX 4 MG/1
20 GUM, CHEWING ORAL 2 TIMES DAILY
Qty: 84 EACH | Refills: 2 | Status: SHIPPED | OUTPATIENT
Start: 2024-06-07

## 2024-06-10 ENCOUNTER — OFFICE VISIT (OUTPATIENT)
Dept: FAMILY MEDICINE CLINIC | Facility: CLINIC | Age: 69
End: 2024-06-10
Payer: MEDICARE

## 2024-06-10 VITALS
BODY MASS INDEX: 35.99 KG/M2 | SYSTOLIC BLOOD PRESSURE: 114 MMHG | TEMPERATURE: 98.3 F | HEART RATE: 64 BPM | WEIGHT: 280.4 LBS | DIASTOLIC BLOOD PRESSURE: 68 MMHG | HEIGHT: 74 IN | OXYGEN SATURATION: 94 %

## 2024-06-10 DIAGNOSIS — J01.00 ACUTE NON-RECURRENT MAXILLARY SINUSITIS: Primary | ICD-10-CM

## 2024-06-10 PROCEDURE — 1160F RVW MEDS BY RX/DR IN RCRD: CPT | Performed by: NURSE PRACTITIONER

## 2024-06-10 PROCEDURE — 3074F SYST BP LT 130 MM HG: CPT | Performed by: NURSE PRACTITIONER

## 2024-06-10 PROCEDURE — 99213 OFFICE O/P EST LOW 20 MIN: CPT | Performed by: NURSE PRACTITIONER

## 2024-06-10 PROCEDURE — 1125F AMNT PAIN NOTED PAIN PRSNT: CPT | Performed by: NURSE PRACTITIONER

## 2024-06-10 PROCEDURE — 1159F MED LIST DOCD IN RCRD: CPT | Performed by: NURSE PRACTITIONER

## 2024-06-10 PROCEDURE — 96372 THER/PROPH/DIAG INJ SC/IM: CPT | Performed by: NURSE PRACTITIONER

## 2024-06-10 PROCEDURE — 3078F DIAST BP <80 MM HG: CPT | Performed by: NURSE PRACTITIONER

## 2024-06-10 RX ORDER — PREDNISONE 10 MG/1
TABLET ORAL
Qty: 21 TABLET | Refills: 0 | Status: SHIPPED | OUTPATIENT
Start: 2024-06-10

## 2024-06-10 RX ORDER — DEXTROMETHORPHAN HYDROBROMIDE AND PROMETHAZINE HYDROCHLORIDE 15; 6.25 MG/5ML; MG/5ML
5 SYRUP ORAL 4 TIMES DAILY PRN
Qty: 180 ML | Refills: 0 | Status: SHIPPED | OUTPATIENT
Start: 2024-06-10

## 2024-06-10 RX ORDER — AZITHROMYCIN 250 MG/1
TABLET, FILM COATED ORAL
Qty: 6 TABLET | Refills: 0 | Status: SHIPPED | OUTPATIENT
Start: 2024-06-10

## 2024-06-10 RX ORDER — TRIAMCINOLONE ACETONIDE 40 MG/ML
40 INJECTION, SUSPENSION INTRA-ARTICULAR; INTRAMUSCULAR ONCE
Status: COMPLETED | OUTPATIENT
Start: 2024-06-10 | End: 2024-06-10

## 2024-06-10 RX ADMIN — TRIAMCINOLONE ACETONIDE 40 MG: 40 INJECTION, SUSPENSION INTRA-ARTICULAR; INTRAMUSCULAR at 09:24

## 2024-06-12 ENCOUNTER — TELEPHONE (OUTPATIENT)
Dept: OTOLARYNGOLOGY | Facility: CLINIC | Age: 69
End: 2024-06-12
Payer: MEDICARE

## 2024-06-19 ENCOUNTER — TELEPHONE (OUTPATIENT)
Dept: OTOLARYNGOLOGY | Facility: CLINIC | Age: 69
End: 2024-06-19

## 2024-06-19 NOTE — TELEPHONE ENCOUNTER
Provider: ISAAC ARENAS    The [facility] received a fax that requires your attention. The document has been indexed to the patient's chart for your review.     Reason for sending: REVIEW / SIGNATURE     Documents Description: VERIFICATION OF DRUG REQUEST      Name of Sender: [facility]     Date Indexed:06/19/24    Notes (if needed): INDEXED INTO CHART AS EXT MED RECS 06/13/24.

## 2024-06-20 ENCOUNTER — OFFICE VISIT (OUTPATIENT)
Dept: FAMILY MEDICINE CLINIC | Facility: CLINIC | Age: 69
End: 2024-06-20
Payer: MEDICARE

## 2024-06-20 VITALS
WEIGHT: 275.4 LBS | HEART RATE: 57 BPM | SYSTOLIC BLOOD PRESSURE: 128 MMHG | DIASTOLIC BLOOD PRESSURE: 72 MMHG | BODY MASS INDEX: 35.34 KG/M2 | TEMPERATURE: 98 F | HEIGHT: 74 IN | RESPIRATION RATE: 16 BRPM | OXYGEN SATURATION: 96 %

## 2024-06-20 DIAGNOSIS — J01.00 ACUTE NON-RECURRENT MAXILLARY SINUSITIS: ICD-10-CM

## 2024-06-20 DIAGNOSIS — E78.2 MIXED HYPERLIPIDEMIA: Primary | ICD-10-CM

## 2024-06-20 PROCEDURE — 1126F AMNT PAIN NOTED NONE PRSNT: CPT | Performed by: FAMILY MEDICINE

## 2024-06-20 PROCEDURE — 3074F SYST BP LT 130 MM HG: CPT | Performed by: FAMILY MEDICINE

## 2024-06-20 PROCEDURE — 99213 OFFICE O/P EST LOW 20 MIN: CPT | Performed by: FAMILY MEDICINE

## 2024-06-20 PROCEDURE — 1159F MED LIST DOCD IN RCRD: CPT | Performed by: FAMILY MEDICINE

## 2024-06-20 PROCEDURE — 1160F RVW MEDS BY RX/DR IN RCRD: CPT | Performed by: FAMILY MEDICINE

## 2024-06-20 PROCEDURE — 3078F DIAST BP <80 MM HG: CPT | Performed by: FAMILY MEDICINE

## 2024-06-20 RX ORDER — DEXTROMETHORPHAN HYDROBROMIDE AND PROMETHAZINE HYDROCHLORIDE 15; 6.25 MG/5ML; MG/5ML
5 SYRUP ORAL 4 TIMES DAILY PRN
Qty: 180 ML | Refills: 0 | Status: SHIPPED | OUTPATIENT
Start: 2024-06-20

## 2024-06-20 NOTE — PROGRESS NOTES
Subjective   George Wilder is a 68 y.o. male.     History of Present Illness  68-year-old male follow-up hyperlipidemia DJD      The following portions of the patient's history were reviewed and updated as appropriate: allergies, current medications, past family history, past medical history, past social history, past surgical history, and problem list.    Review of Systems   Respiratory:  Negative for shortness of breath.    Cardiovascular:  Negative for chest pain and leg swelling.   Musculoskeletal:  Positive for arthralgias.        History of hip dislocation       Objective   Physical Exam  Vitals and nursing note reviewed.   Constitutional:       Appearance: Normal appearance.   Eyes:      Extraocular Movements: Extraocular movements intact.      Pupils: Pupils are equal, round, and reactive to light.   Cardiovascular:      Rate and Rhythm: Normal rate and regular rhythm.   Pulmonary:      Effort: Pulmonary effort is normal.      Breath sounds: Normal breath sounds.   Musculoskeletal:      Right lower leg: No edema.      Left lower leg: No edema.   Neurological:      General: No focal deficit present.      Mental Status: He is alert and oriented to person, place, and time.   Psychiatric:         Mood and Affect: Mood normal.         Behavior: Behavior normal.         Thought Content: Thought content normal.         Judgment: Judgment normal.         Assessment & Plan   Diagnoses and all orders for this visit:    1. Mixed hyperlipidemia (Primary)  -     Comprehensive Metabolic Panel  -     Lipid Panel    2. Acute non-recurrent maxillary sinusitis  -     promethazine-dextromethorphan (PROMETHAZINE-DM) 6.25-15 MG/5ML syrup; Take 5 mL by mouth 4 (Four) Times a Day As Needed for Cough.  Dispense: 180 mL; Refill: 0         Plan above-off semaglutide's because of insurance coverage         Answers submitted by the patient for this visit:  Primary Reason for Visit (Submitted on 6/19/2024)  What is the primary reason  for your visit?: Other  Other (Submitted on 6/19/2024)  Please describe your symptoms.: Yearly check up and blood work  Have you had these symptoms before?: No  How long have you been having these symptoms?: 1-4 days  Please list any medications you are currently taking for this condition.: Check up  Please describe any probable cause for these symptoms. : Check up

## 2024-06-21 LAB
ALBUMIN SERPL-MCNC: 4.1 G/DL (ref 3.5–5.2)
ALBUMIN/GLOB SERPL: 1.9 G/DL
ALP SERPL-CCNC: 68 U/L (ref 39–117)
ALT SERPL-CCNC: 25 U/L (ref 1–41)
AST SERPL-CCNC: 22 U/L (ref 1–40)
BILIRUB SERPL-MCNC: 0.9 MG/DL (ref 0–1.2)
BUN SERPL-MCNC: 20 MG/DL (ref 8–23)
BUN/CREAT SERPL: 21.3 (ref 7–25)
CALCIUM SERPL-MCNC: 8.9 MG/DL (ref 8.6–10.5)
CHLORIDE SERPL-SCNC: 104 MMOL/L (ref 98–107)
CHOLEST SERPL-MCNC: 125 MG/DL (ref 0–200)
CO2 SERPL-SCNC: 24.1 MMOL/L (ref 22–29)
CREAT SERPL-MCNC: 0.94 MG/DL (ref 0.76–1.27)
EGFRCR SERPLBLD CKD-EPI 2021: 88.3 ML/MIN/1.73
GLOBULIN SER CALC-MCNC: 2.2 GM/DL
GLUCOSE SERPL-MCNC: 97 MG/DL (ref 65–99)
HDLC SERPL-MCNC: 45 MG/DL (ref 40–60)
LDLC SERPL CALC-MCNC: 60 MG/DL (ref 0–100)
POTASSIUM SERPL-SCNC: 4.5 MMOL/L (ref 3.5–5.2)
PROT SERPL-MCNC: 6.3 G/DL (ref 6–8.5)
SODIUM SERPL-SCNC: 138 MMOL/L (ref 136–145)
TRIGL SERPL-MCNC: 107 MG/DL (ref 0–150)
VLDLC SERPL CALC-MCNC: 20 MG/DL (ref 5–40)

## 2024-06-24 ENCOUNTER — TELEPHONE (OUTPATIENT)
Dept: FAMILY MEDICINE CLINIC | Facility: CLINIC | Age: 69
End: 2024-06-24
Payer: MEDICARE

## 2024-06-24 DIAGNOSIS — R73.03 PREDIABETES: Primary | ICD-10-CM

## 2024-06-24 NOTE — TELEPHONE ENCOUNTER
I have just talked with [facility] and they will send you the PA paperwork order for Ozempic for weight loss for George.   They stressed you much fill out the questioner completely and diagnosis and chart notes.  I told them you ask us to see what would be covered for him, due to his back and hip issues.   I told them you said weight loss is highly recommended to help with the back and hips.       I am just relaying what they have told me so you know to be expecting this.    Ozempic was the weight loss drug they would cover for him, not Wegovy.    Thank you,  Traci   Any questions please call [phone] or George at [phone]

## 2024-06-24 NOTE — TELEPHONE ENCOUNTER
Rx Refill Note  Requested Prescriptions     Pending Prescriptions Disp Refills    Semaglutide,0.25 or 0.5MG/DOS, (OZEMPIC) 2 MG/1.5ML solution pen-injector 1.5 mL 0     Sig: Inject 0.25 mg under the skin into the appropriate area as directed 1 (One) Time Per Week.      Last office visit with prescribing clinician: 6/20/2024   Last telemedicine visit with prescribing clinician: Visit date not found   Next office visit with prescribing clinician: 1/8/2025                         Would you like a call back once the refill request has been completed: [] Yes [] No    If the office needs to give you a call back, can they leave a voicemail: [] Yes [] No    Sabrina Lucero MA  06/24/24, 13:39 CDT

## 2024-07-02 RX ORDER — ROSUVASTATIN CALCIUM 20 MG/1
40 TABLET, COATED ORAL NIGHTLY
Qty: 180 TABLET | Refills: 3 | Status: SHIPPED | OUTPATIENT
Start: 2024-07-02

## 2024-07-09 ENCOUNTER — CLINICAL SUPPORT (OUTPATIENT)
Dept: FAMILY MEDICINE CLINIC | Facility: CLINIC | Age: 69
End: 2024-07-09
Payer: MEDICARE

## 2024-07-09 DIAGNOSIS — E53.8 B12 DEFICIENCY: Primary | ICD-10-CM

## 2024-07-09 PROCEDURE — 96372 THER/PROPH/DIAG INJ SC/IM: CPT | Performed by: NURSE PRACTITIONER

## 2024-07-09 RX ADMIN — CYANOCOBALAMIN 1000 MCG: 1000 INJECTION, SOLUTION INTRAMUSCULAR; SUBCUTANEOUS at 08:35

## 2024-07-09 NOTE — PROGRESS NOTES
Administered 1000 mcg cyanocobalamin IM to the left deltoid.  Patient tolerated well.  No reaction observed.      Patient will return in 3 months for next injection.

## 2024-08-12 NOTE — INTERVAL H&P NOTE
H&P reviewed. The patient was examined and there are no changes to the H&P.      
cane/dentures/eyeglasses

## 2024-08-14 ENCOUNTER — OFFICE VISIT (OUTPATIENT)
Dept: OTOLARYNGOLOGY | Facility: CLINIC | Age: 69
End: 2024-08-14
Payer: MEDICARE

## 2024-08-14 VITALS
HEART RATE: 65 BPM | DIASTOLIC BLOOD PRESSURE: 64 MMHG | BODY MASS INDEX: 35.04 KG/M2 | TEMPERATURE: 98 F | SYSTOLIC BLOOD PRESSURE: 139 MMHG | HEIGHT: 74 IN | WEIGHT: 273 LBS

## 2024-08-14 DIAGNOSIS — R09.A2 GLOBUS SENSATION: ICD-10-CM

## 2024-08-14 DIAGNOSIS — K21.9 LARYNGOPHARYNGEAL REFLUX (LPR): ICD-10-CM

## 2024-08-14 RX ORDER — FLUTICASONE PROPIONATE 50 MCG
2 SPRAY, SUSPENSION (ML) NASAL DAILY
Qty: 48 ML | Refills: 11 | Status: SHIPPED | OUTPATIENT
Start: 2024-08-14 | End: 2024-09-13

## 2024-08-14 RX ORDER — NICOTINE POLACRILEX 4 MG/1
20 GUM, CHEWING ORAL 2 TIMES DAILY
Qty: 60 EACH | Refills: 6 | Status: SHIPPED | OUTPATIENT
Start: 2024-08-14 | End: 2024-09-13

## 2024-08-14 NOTE — PATIENT INSTRUCTIONS
Gastroesophageal Reflux Disease (Laryngopharyngeal Reflux), Adult  Gastroesophageal reflux disease (GERD) and/or Laryngopharyngeal Reflux, (LPR) happens when acid from your stomach flows up into the esophagus and/or throat and voicebox or larynx. When acid comes in contact with the these organs, the acid can cause soreness (inflammation). Over time, GERD may create small holes (ulcers) in the lining of the esophagus and may lead to the development of hoarseness, difficulty swallowing,   feeling of something stuck in the throat, increased mucous or drainage and even predispose to the development of malignancies, (cancer).    CAUSES   Increased body weight. This puts pressure on the stomach, making acid rise from the stomach into the esophagus.  Smoking. This increases acid production in the stomach.  Drinking alcohol. This causes decreased pressure in the lower esophageal sphincter (valve or ring of muscle between the esophagus and stomach), allowing acid from the stomach into the esophagus.  Late evening meals and a full stomach. This increases pressure and acid production in the stomach.  A malformed lower esophageal sphincter  Diet which can include avoidance of gluten and dairy products  Age  SYMPTOMS   Burning pain in the lower part of the mid-chest behind the breastbone and in the mid-stomach area. This may occur twice a week or more often.  Trouble swallowing.  Sore throat.  Dry cough.  Asthma-like symptoms including chest tightness, shortness of breath, or wheezing.  Globus sensation-something stuck in the throat/fullness  Hoarseness  DIAGNOSIS   Your caregiver may be able to diagnose GERD based on your symptoms. In some cases, X-rays and other tests may be done to check for complications or to check the condition of your stomach and esophagus.  You may need to see another doctor.  TREATMENT   Over-the-counter or prescription medicines to help decrease acid production.   Dietary and behavioral modifications  or changes may be also recommended.  HOME CARE INSTRUCTIONS   Change the factors that you can control. Ask your caregiver for guidance concerning weight loss, quitting smoking, and alcohol consumption.  Avoid foods and drinks that make your symptoms worse, and MAY include such as:  Caffeine or alcoholic drinks.  Chocolate.  Gluten containing foods  Dairy  Peppermint or mint flavorings.  Garlic and onions.  Spicy foods.  Citrus fruits, such as oranges, richardson, or limes.  Tomato-based foods such as sauce, chili, salsa, and pizza.  Fried and fatty foods.  Avoid lying down for the 3 hours prior to your bedtime or prior to taking a nap.  Eat small, frequent meals instead of large meals.  Wear loose-fitting clothing. Do not wear anything tight around your waist that causes pressure on your stomach.  Raise the head of your bed 6 to 8 inches with wood blocks to help you sleep. Extra pillows will not help.  Only take over-the-counter or prescription medicines for pain, discomfort, or fever as directed by your caregiver.  Do not take aspirin, ibuprofen, or other nonsteroidal anti-inflammatory drugs if possible (NSAIDs).  SEEK IMMEDIATE MEDICAL CARE IF:   You have pain in your arms, neck, jaw, teeth, or back.  Your pain increases or changes in intensity or duration.  You develop nausea, vomiting, or sweating (diaphoresis).  You develop shortness of breath, or you faint.  Your vomit is green, yellow, black, or looks like coffee grounds or blood.  Your stool is red, bloody, or black.  These symptoms could be signs of other problems, such as heart disease, gastric bleeding, or esophageal bleeding.  MAKE SURE YOU:   Understand these instructions.  Will watch your condition.  Will get help right away if you are not doing well or get worse.     This information is not intended to replace advice given to you by your physician. Make sure you discuss any questions you have with your health care provider.     Modified by Jona Garibay,  MD, ANTOINETTE 2016.  Document Released: 2006 Document Revised: 2016 Document Reviewed: 2016  [employer] Interactive Patient Education  [employer]     CONTACT INFORMATION:  The main office phone number is [phone]. For emergencies after hours and on weekends, this number will convert over to our answering service and the on call provider will answer. Please try to keep non emergent phone calls/ questions to office hours 9am-5pm Monday through Friday.      Wayin  As an alternative, you can sign up and use the Epic MyChart system for more direct and quicker access for non emergent questions/ problems.  [employer] allows you to send messages to your doctor, view your test results, renew your prescriptions, schedule appointments, and more. To sign up, go to [employer] and click on the Sign Up Now link in the New User? box. Enter your Wayin Activation Code exactly as it appears below along with the last four digits of your Social Security Number and your Date of Birth () to complete the sign-up process. If you do not sign up before the expiration date, you must request a new code.     Wayin Activation Code: Activation code not generated  Current Wayin Status: Active     If you have questions, you can email [email or web address] or call [phone] to talk to our Wayin staff. Remember, Wayin is NOT to be used for urgent needs. For medical emergencies, dial 911.     IF YOU SMOKE OR USE TOBACCO PLEASE READ THE FOLLOWING:  Why is smoking bad for me?  Smoking increases the risk of heart disease, lung disease, vascular disease, stroke, and cancer. If you smoke, STOP!        IF YOU SMOKE OR USE TOBACCO PLEASE READ THE FOLLOWING:  Why is smoking bad for me?  Smoking increases the risk of heart disease, lung disease, vascular disease, stroke, and cancer. If you smoke, STOP!     For more information:  Quit Now  [place]  1-800-QUIT-NOW  https://[place].quitlogix.org/en-[place]/

## 2024-08-14 NOTE — PROGRESS NOTES
YOB: 1955  Location: [facility]  Location Address: [address] [place] [address], [facility], [address] [place] [address]  Location Phone: [phone]    Chief Complaint   Patient presents with    LPR       History of Present Illness  George Wilder is a 68 y.o. male.  George Wilder is here for follow up of ENT complaints. The patient has had problems with globus sensation and cough. He has been taking omeprazole twice daily before breakfast and dinner. He states that this is controlling symptoms.  He is also using Flonase for allergies.        Past Medical History:   Diagnosis Date    Allergic rhinitis due to allergen 2017    Arthritis     Asthma sometimes    Back pain     CHF (congestive heart failure)     Chronic ethmoidal sinusitis 2017    Chronic maxillary sinusitis 2017    Coronary artery disease     stent LAD, Cx, RCA     GERD (gastroesophageal reflux disease)     Heart attack     Dr Stiles     Heartburn     HL (hearing loss)     Hyperlipidemia     Hypertension     Myocardial infarction     Pain     back, & Neck        Past Surgical History:   Procedure Laterality Date    ANKLE SURGERY      BACK SURGERY      CARDIAC CATHETERIZATION  ,     CARDIAC CATHETERIZATION N/A 10/14/2021    Procedure: Left Heart Cath;  Surgeon: Kishore Stiles MD;  Location: [facility] CATH INVASIVE LOCATION;  Service: Cardiology;  Laterality: N/A;    CHOLECYSTECTOMY      Lararocopic     COLONOSCOPY      CORONARY STENT PLACEMENT      ENDOSCOPIC FUNCTIONAL SINUS SURGERY (FESS) N/A 2017    Procedure: ENDOSCOPIC FUNCTIONAL SINUS SURGERY WITH LEFT MIDDLE MEATAL ANTROSTOMY AND LEFT ETHMOIDECTOMY;  Surgeon: Jona Garibay MD;  Location: [facility] OR;  Service:     ENDOSCOPIC FUNCTIONAL SINUS SURGERY (FESS) Bilateral 08/10/2020    Procedure: Septoplasty Resection of left sammy bullosa deformity Bilateral maxillary antrostomy and uncinectomy;  Surgeon: Jona Garibay MD;  Location: [facility] OR;   Service: ENT;  Laterality: Bilateral;    ETHMOIDECTOMY Left 2017    Procedure: ETHMOIDECTOMY ENDOSCOPIC;  Surgeon: Jona Garibay MD;  Location: [facility];  Service:     EYE SURGERY      LASIK    HAND SURGERY      JOINT REPLACEMENT  both hips, left recent 2023    SINUS SURGERY      TOTAL HIP ARTHROPLASTY Bilateral     TOTAL HIP ARTHROPLASTY REVISION Left 2023    Dr. Swann       Outpatient Medications Marked as Taking for the 24 encounter (Office Visit) with Mario Melara APRN   Medication Sig Dispense Refill    fluticasone (FLONASE) 50 MCG/ACT nasal spray 2 sprays into the nostril(s) as directed by provider Daily for 30 days. 48 mL 11    Magnesium 400 MG capsule       Omeprazole 20 MG tablet delayed-release Take 20 mg by mouth 2 (Two) Times a Day for 30 days. 60 each 6     Current Facility-Administered Medications for the 24 encounter (Office Visit) with Mario Melara APRN   Medication Dose Route Frequency Provider Last Rate Last Admin    cyanocobalamin injection 1,000 mcg  1,000 mcg Intramuscular Q3 Months Carl Vu MD   1,000 mcg at 24 0835       Brilinta [ticagrelor]    Family History   Problem Relation Age of Onset    Arthritis Mother     Rheum arthritis Mother     Cancer Father     Lung cancer Father         metastatic adenocarcinoma of lung    Stroke Brother     Cancer Brother     Asthma Brother     Stroke Brother     Cancer Brother        Social History     Socioeconomic History    Marital status:    Tobacco Use    Smoking status: Former     Current packs/day: 0.00     Average packs/day: 1.5 packs/day for 29.0 years (43.5 ttl pk-yrs)     Types: Cigarettes     Start date: 1965     Quit date: 1994     Years since quittin.6    Smokeless tobacco: Never    Tobacco comments:     quit date was date of heart attack   Vaping Use    Vaping status: Never Used   Substance and Sexual Activity    Alcohol use: Not Currently    Drug use: Never     Sexual activity: Defer       Review of Systems   Constitutional: Negative.    HENT: Negative.     Neurological: Negative.        Vitals:    08/14/24 0946   BP: 139/64   Pulse: 65   Temp: 98 °F (36.7 °C)       Body mass index is 35.05 kg/m².    Objective     Physical Exam  Vitals reviewed.   Constitutional:       Appearance: Normal appearance. He is obese.   HENT:      Head: Normocephalic.      Right Ear: Tympanic membrane, ear canal and external ear normal.      Left Ear: Tympanic membrane, ear canal and external ear normal.      Nose: Nose normal.      Mouth/Throat:      Lips: Pink.      Mouth: Mucous membranes are moist.      Pharynx: Oropharynx is clear. Uvula midline.   Musculoskeletal:      Cervical back: Full passive range of motion without pain.   Neurological:      Mental Status: He is alert.   Psychiatric:         Behavior: Behavior is cooperative.         Assessment & Plan   Diagnoses and all orders for this visit:    1. Laryngopharyngeal reflux (LPR)  -     Omeprazole 20 MG tablet delayed-release; Take 20 mg by mouth 2 (Two) Times a Day for 30 days.  Dispense: 60 each; Refill: 6    2. Globus sensation  -     Omeprazole 20 MG tablet delayed-release; Take 20 mg by mouth 2 (Two) Times a Day for 30 days.  Dispense: 60 each; Refill: 6    Other orders  -     fluticasone (FLONASE) 50 MCG/ACT nasal spray; 2 sprays into the nostril(s) as directed by provider Daily for 30 days.  Dispense: 48 mL; Refill: 11      * Surgery not found *  No orders of the defined types were placed in this encounter.    Omeprazole twice daily before breakfast and dinner  Flonase  Return for problems    Return in about 1 year (around 8/14/2025) for Recheck.       Patient Instructions   Gastroesophageal Reflux Disease (Laryngopharyngeal Reflux), Adult  Gastroesophageal reflux disease (GERD) and/or Laryngopharyngeal Reflux, (LPR) happens when acid from your stomach flows up into the esophagus and/or throat and voicebox or larynx. When acid  comes in contact with the these organs, the acid can cause soreness (inflammation). Over time, GERD may create small holes (ulcers) in the lining of the esophagus and may lead to the development of hoarseness, difficulty swallowing,   feeling of something stuck in the throat, increased mucous or drainage and even predispose to the development of malignancies, (cancer).    CAUSES   Increased body weight. This puts pressure on the stomach, making acid rise from the stomach into the esophagus.  Smoking. This increases acid production in the stomach.  Drinking alcohol. This causes decreased pressure in the lower esophageal sphincter (valve or ring of muscle between the esophagus and stomach), allowing acid from the stomach into the esophagus.  Late evening meals and a full stomach. This increases pressure and acid production in the stomach.  A malformed lower esophageal sphincter  Diet which can include avoidance of gluten and dairy products  Age  SYMPTOMS   Burning pain in the lower part of the mid-chest behind the breastbone and in the mid-stomach area. This may occur twice a week or more often.  Trouble swallowing.  Sore throat.  Dry cough.  Asthma-like symptoms including chest tightness, shortness of breath, or wheezing.  Globus sensation-something stuck in the throat/fullness  Hoarseness  DIAGNOSIS   Your caregiver may be able to diagnose GERD based on your symptoms. In some cases, X-rays and other tests may be done to check for complications or to check the condition of your stomach and esophagus.  You may need to see another doctor.  TREATMENT   Over-the-counter or prescription medicines to help decrease acid production.   Dietary and behavioral modifications or changes may be also recommended.  HOME CARE INSTRUCTIONS   Change the factors that you can control. Ask your caregiver for guidance concerning weight loss, quitting smoking, and alcohol consumption.  Avoid foods and drinks that make your symptoms worse,  and MAY include such as:  Caffeine or alcoholic drinks.  Chocolate.  Gluten containing foods  Dairy  Peppermint or mint flavorings.  Garlic and onions.  Spicy foods.  Citrus fruits, such as oranges, richardson, or limes.  Tomato-based foods such as sauce, chili, salsa, and pizza.  Fried and fatty foods.  Avoid lying down for the 3 hours prior to your bedtime or prior to taking a nap.  Eat small, frequent meals instead of large meals.  Wear loose-fitting clothing. Do not wear anything tight around your waist that causes pressure on your stomach.  Raise the head of your bed 6 to 8 inches with wood blocks to help you sleep. Extra pillows will not help.  Only take over-the-counter or prescription medicines for pain, discomfort, or fever as directed by your caregiver.  Do not take aspirin, ibuprofen, or other nonsteroidal anti-inflammatory drugs if possible (NSAIDs).  SEEK IMMEDIATE MEDICAL CARE IF:   You have pain in your arms, neck, jaw, teeth, or back.  Your pain increases or changes in intensity or duration.  You develop nausea, vomiting, or sweating (diaphoresis).  You develop shortness of breath, or you faint.  Your vomit is green, yellow, black, or looks like coffee grounds or blood.  Your stool is red, bloody, or black.  These symptoms could be signs of other problems, such as heart disease, gastric bleeding, or esophageal bleeding.  MAKE SURE YOU:   Understand these instructions.  Will watch your condition.  Will get help right away if you are not doing well or get worse.     This information is not intended to replace advice given to you by your physician. Make sure you discuss any questions you have with your health care provider.     Modified by Jona Garibay MD, FACS 9/8/2016.  Document Released: 09/27/2006 Document Revised: 01/08/2016 Document Reviewed: 04/13/2016  [employer] Interactive Patient Education ©2016 [employer]     CONTACT INFORMATION:  The main office phone number is [phone]. For emergencies  after hours and on weekends, this number will convert over to our answering service and the on call provider will answer. Please try to keep non emergent phone calls/ questions to office hours 9am-5pm Monday through Friday.      Ancestry  As an alternative, you can sign up and use the Epic MyChart system for more direct and quicker access for non emergent questions/ problems.  [employer] allows you to send messages to your doctor, view your test results, renew your prescriptions, schedule appointments, and more. To sign up, go to [employer] and click on the Sign Up Now link in the New User? box. Enter your Ancestry Activation Code exactly as it appears below along with the last four digits of your Social Security Number and your Date of Birth () to complete the sign-up process. If you do not sign up before the expiration date, you must request a new code.     Ancestry Activation Code: Activation code not generated  Current Ancestry Status: Active     If you have questions, you can email [email or web address] or call [phone] to talk to our Ancestry staff. Remember, Ancestry is NOT to be used for urgent needs. For medical emergencies, dial 911.     IF YOU SMOKE OR USE TOBACCO PLEASE READ THE FOLLOWING:  Why is smoking bad for me?  Smoking increases the risk of heart disease, lung disease, vascular disease, stroke, and cancer. If you smoke, STOP!        IF YOU SMOKE OR USE TOBACCO PLEASE READ THE FOLLOWING:  Why is smoking bad for me?  Smoking increases the risk of heart disease, lung disease, vascular disease, stroke, and cancer. If you smoke, STOP!     For more information:  Quit Now [place]  -QUIT-NOW  [email or web address]

## 2024-09-13 DIAGNOSIS — I10 PRIMARY HYPERTENSION: ICD-10-CM

## 2024-09-13 RX ORDER — METOPROLOL TARTRATE 50 MG
50 TABLET ORAL
Qty: 90 TABLET | Refills: 3 | Status: SHIPPED | OUTPATIENT
Start: 2024-09-13

## 2024-09-13 NOTE — TELEPHONE ENCOUNTER
Rx Refill Note  Requested Prescriptions     Pending Prescriptions Disp Refills    metoprolol tartrate (LOPRESSOR) 50 MG tablet 90 tablet 3     Sig: Take 1 tablet by mouth every night at bedtime.      Last office visit with prescribing clinician: 6/20/2024   Last telemedicine visit with prescribing clinician: Visit date not found   Next office visit with prescribing clinician: 1/8/2025                         Would you like a call back once the refill request has been completed: [] Yes [] No    If the office needs to give you a call back, can they leave a voicemail: [] Yes [] No    Dante Blake Rep  09/13/24, 15:22 CDT

## 2024-09-16 ENCOUNTER — TELEPHONE (OUTPATIENT)
Dept: OTOLARYNGOLOGY | Facility: CLINIC | Age: 69
End: 2024-09-16
Payer: MEDICARE

## 2024-09-16 DIAGNOSIS — J01.81 OTHER ACUTE RECURRENT SINUSITIS: ICD-10-CM

## 2024-09-16 DIAGNOSIS — J34.2 NASAL SEPTAL DEVIATION: Primary | ICD-10-CM

## 2024-09-16 RX ORDER — FLUTICASONE PROPIONATE 50 MCG
2 SPRAY, SUSPENSION (ML) NASAL DAILY
Qty: 16 G | Refills: 2 | Status: SHIPPED | OUTPATIENT
Start: 2024-09-16 | End: 2024-10-16

## 2024-09-16 RX ORDER — ROSUVASTATIN CALCIUM 20 MG/1
40 TABLET, COATED ORAL NIGHTLY
Qty: 180 TABLET | Refills: 3 | Status: SHIPPED | OUTPATIENT
Start: 2024-09-16

## 2024-09-25 DIAGNOSIS — K21.9 LARYNGOPHARYNGEAL REFLUX (LPR): Primary | ICD-10-CM

## 2024-09-25 DIAGNOSIS — R09.A2 GLOBUS SENSATION: ICD-10-CM

## 2024-09-25 DIAGNOSIS — K21.9 LARYNGOPHARYNGEAL REFLUX (LPR): ICD-10-CM

## 2024-09-25 RX ORDER — NICOTINE POLACRILEX 4 MG/1
20 GUM, CHEWING ORAL 2 TIMES DAILY
Qty: 60 EACH | Refills: 4 | Status: SHIPPED | OUTPATIENT
Start: 2024-09-25

## 2024-09-25 RX ORDER — ROSUVASTATIN CALCIUM 20 MG/1
40 TABLET, COATED ORAL NIGHTLY
Qty: 180 TABLET | Refills: 3 | Status: SHIPPED | OUTPATIENT
Start: 2024-09-25 | End: 2024-09-26 | Stop reason: SDUPTHER

## 2024-09-26 RX ORDER — ROSUVASTATIN CALCIUM 40 MG/1
40 TABLET, COATED ORAL NIGHTLY
Qty: 90 TABLET | Refills: 3 | Status: SHIPPED | OUTPATIENT
Start: 2024-09-26

## 2024-09-30 ENCOUNTER — IMMUNIZATION (OUTPATIENT)
Dept: FAMILY MEDICINE CLINIC | Facility: CLINIC | Age: 69
End: 2024-09-30
Payer: MEDICARE

## 2024-09-30 ENCOUNTER — FLU SHOT (OUTPATIENT)
Dept: FAMILY MEDICINE CLINIC | Facility: CLINIC | Age: 69
End: 2024-09-30
Payer: MEDICARE

## 2024-09-30 ENCOUNTER — CLINICAL SUPPORT (OUTPATIENT)
Dept: FAMILY MEDICINE CLINIC | Facility: CLINIC | Age: 69
End: 2024-09-30
Payer: MEDICARE

## 2024-09-30 DIAGNOSIS — Z23 NEED FOR COVID-19 VACCINE: Primary | ICD-10-CM

## 2024-09-30 DIAGNOSIS — Z23 NEED FOR INFLUENZA VACCINATION: Primary | ICD-10-CM

## 2024-09-30 DIAGNOSIS — E53.8 B12 DEFICIENCY: Primary | ICD-10-CM

## 2024-09-30 PROCEDURE — 90662 IIV NO PRSV INCREASED AG IM: CPT | Performed by: NURSE PRACTITIONER

## 2024-09-30 PROCEDURE — G0008 ADMIN INFLUENZA VIRUS VAC: HCPCS | Performed by: NURSE PRACTITIONER

## 2024-09-30 PROCEDURE — 96372 THER/PROPH/DIAG INJ SC/IM: CPT | Performed by: FAMILY MEDICINE

## 2024-09-30 PROCEDURE — 90480 ADMN SARSCOV2 VAC 1/ONLY CMP: CPT | Performed by: NURSE PRACTITIONER

## 2024-09-30 PROCEDURE — 91320 SARSCV2 VAC 30MCG TRS-SUC IM: CPT | Performed by: NURSE PRACTITIONER

## 2024-09-30 RX ADMIN — CYANOCOBALAMIN 1000 MCG: 1000 INJECTION, SOLUTION INTRAMUSCULAR; SUBCUTANEOUS at 10:07

## 2024-09-30 NOTE — PROGRESS NOTES
Cyanocobalamin 1,000mcg/ml given IM Right deltoid.  Patient tolerated well and should return in 3 months for next injection.

## 2024-11-12 ENCOUNTER — OFFICE VISIT (OUTPATIENT)
Dept: FAMILY MEDICINE CLINIC | Facility: CLINIC | Age: 69
End: 2024-11-12
Payer: MEDICARE

## 2024-11-12 VITALS
OXYGEN SATURATION: 98 % | BODY MASS INDEX: 35.16 KG/M2 | DIASTOLIC BLOOD PRESSURE: 78 MMHG | WEIGHT: 274 LBS | TEMPERATURE: 97.6 F | HEIGHT: 74 IN | HEART RATE: 66 BPM | RESPIRATION RATE: 18 BRPM | SYSTOLIC BLOOD PRESSURE: 120 MMHG

## 2024-11-12 DIAGNOSIS — M25.561 ACUTE PAIN OF RIGHT KNEE: Primary | ICD-10-CM

## 2024-11-12 PROCEDURE — 1159F MED LIST DOCD IN RCRD: CPT | Performed by: FAMILY MEDICINE

## 2024-11-12 PROCEDURE — 1125F AMNT PAIN NOTED PAIN PRSNT: CPT | Performed by: FAMILY MEDICINE

## 2024-11-12 PROCEDURE — 3074F SYST BP LT 130 MM HG: CPT | Performed by: FAMILY MEDICINE

## 2024-11-12 PROCEDURE — 99213 OFFICE O/P EST LOW 20 MIN: CPT | Performed by: FAMILY MEDICINE

## 2024-11-12 PROCEDURE — 3078F DIAST BP <80 MM HG: CPT | Performed by: FAMILY MEDICINE

## 2024-11-12 PROCEDURE — 1160F RVW MEDS BY RX/DR IN RCRD: CPT | Performed by: FAMILY MEDICINE

## 2024-11-12 RX ORDER — PREDNISONE 20 MG/1
TABLET ORAL
Qty: 20 TABLET | Refills: 0 | Status: SHIPPED | OUTPATIENT
Start: 2024-11-12

## 2024-11-12 RX ORDER — TRIAMCINOLONE ACETONIDE 40 MG/ML
40 INJECTION, SUSPENSION INTRA-ARTICULAR; INTRAMUSCULAR ONCE
Status: SHIPPED | OUTPATIENT
Start: 2024-11-12

## 2024-11-12 NOTE — PROGRESS NOTES
Subjective   George Wilder is a 69 y.o. male.     History of Present Illness  69-year-old male with right knee pain      The following portions of the patient's history were reviewed and updated as appropriate: allergies, current medications, past family history, past medical history, past social history, past surgical history, and problem list.  See history    Review of Systems   Respiratory:  Negative for shortness of breath.    Cardiovascular:  Negative for chest pain.   Musculoskeletal:  Positive for arthralgias.        Increasing right knee pain-positional-does not hurt to walk       Objective   Physical Exam  Vitals and nursing note reviewed.   Constitutional:       Appearance: He is obese.   Eyes:      Pupils: Pupils are equal, round, and reactive to light.   Pulmonary:      Effort: Pulmonary effort is normal.   Musculoskeletal:         General: Swelling and tenderness present.      Right lower leg: No edema.      Left lower leg: No edema.      Comments: Medial meniscal tenderness and swelling right knee   Neurological:      General: No focal deficit present.      Mental Status: He is alert and oriented to person, place, and time.   Psychiatric:         Mood and Affect: Mood normal.         Behavior: Behavior normal.         Thought Content: Thought content normal.         Judgment: Judgment normal.         Assessment & Plan   Diagnoses and all orders for this visit:    1. Acute pain of right knee (Primary)  -     triamcinolone acetonide (KENALOG-40) injection 40 mg    Other orders  -     predniSONE (DELTASONE) 20 MG tablet; Starting Wednesday morning take 2 pills on Wednesday Thursday Friday then 1 pill till medicine gone  Dispense: 20 tablet; Refill: 0         Plan above-May need arthroscopy         Answers submitted by the patient for this visit:  Other (Submitted on 11/11/2024)  Please describe your symptoms.: knee pain   and cough  Have you had these symptoms before?: Yes  How long have you been having  these symptoms?: Greater than 2 weeks  Please describe any probable cause for these symptoms. : knee has hurt for years (2005) but has gotten worse and it's hard to drive to [place] at times, much less anywhere else.  Primary Reason for Visit (Submitted on 11/11/2024)  What is the primary reason for your visit?: Problem Not Listed

## 2024-11-26 ENCOUNTER — OFFICE VISIT (OUTPATIENT)
Dept: FAMILY MEDICINE CLINIC | Facility: CLINIC | Age: 69
End: 2024-11-26
Payer: MEDICARE

## 2024-11-26 VITALS
HEIGHT: 74 IN | TEMPERATURE: 97.4 F | HEART RATE: 78 BPM | WEIGHT: 275 LBS | OXYGEN SATURATION: 98 % | SYSTOLIC BLOOD PRESSURE: 132 MMHG | RESPIRATION RATE: 18 BRPM | DIASTOLIC BLOOD PRESSURE: 78 MMHG | BODY MASS INDEX: 35.29 KG/M2

## 2024-11-26 DIAGNOSIS — G89.29 CHRONIC PAIN OF RIGHT KNEE: Primary | ICD-10-CM

## 2024-11-26 DIAGNOSIS — M25.561 CHRONIC PAIN OF RIGHT KNEE: Primary | ICD-10-CM

## 2024-11-26 PROCEDURE — 3075F SYST BP GE 130 - 139MM HG: CPT | Performed by: FAMILY MEDICINE

## 2024-11-26 PROCEDURE — 3078F DIAST BP <80 MM HG: CPT | Performed by: FAMILY MEDICINE

## 2024-11-26 PROCEDURE — 1125F AMNT PAIN NOTED PAIN PRSNT: CPT | Performed by: FAMILY MEDICINE

## 2024-11-26 PROCEDURE — 96372 THER/PROPH/DIAG INJ SC/IM: CPT | Performed by: FAMILY MEDICINE

## 2024-11-26 RX ORDER — TRIAMCINOLONE ACETONIDE 40 MG/ML
40 INJECTION, SUSPENSION INTRA-ARTICULAR; INTRAMUSCULAR ONCE
Status: COMPLETED | OUTPATIENT
Start: 2024-11-26 | End: 2024-11-26

## 2024-11-26 RX ADMIN — TRIAMCINOLONE ACETONIDE 40 MG: 40 INJECTION, SUSPENSION INTRA-ARTICULAR; INTRAMUSCULAR at 16:08

## 2025-01-08 ENCOUNTER — OFFICE VISIT (OUTPATIENT)
Dept: FAMILY MEDICINE CLINIC | Facility: CLINIC | Age: 70
End: 2025-01-08
Payer: MEDICARE

## 2025-01-08 VITALS
TEMPERATURE: 97.9 F | HEIGHT: 74 IN | DIASTOLIC BLOOD PRESSURE: 76 MMHG | SYSTOLIC BLOOD PRESSURE: 128 MMHG | HEART RATE: 56 BPM | BODY MASS INDEX: 35.16 KG/M2 | RESPIRATION RATE: 18 BRPM | OXYGEN SATURATION: 94 % | WEIGHT: 274 LBS

## 2025-01-08 DIAGNOSIS — Z00.00 MEDICARE ANNUAL WELLNESS VISIT, SUBSEQUENT: ICD-10-CM

## 2025-01-08 DIAGNOSIS — Z12.5 PROSTATE CANCER SCREENING: ICD-10-CM

## 2025-01-08 DIAGNOSIS — R73.03 PREDIABETES: ICD-10-CM

## 2025-01-08 DIAGNOSIS — E53.8 B12 DEFICIENCY: Primary | ICD-10-CM

## 2025-01-08 DIAGNOSIS — E78.2 MIXED HYPERLIPIDEMIA: ICD-10-CM

## 2025-01-08 DIAGNOSIS — R53.83 FATIGUE, UNSPECIFIED TYPE: ICD-10-CM

## 2025-01-08 LAB
BILIRUB BLD-MCNC: NEGATIVE MG/DL
CLARITY, POC: CLEAR
COLOR UR: YELLOW
GLUCOSE UR STRIP-MCNC: NEGATIVE MG/DL
KETONES UR QL: NEGATIVE
LEUKOCYTE EST, POC: NEGATIVE
NITRITE UR-MCNC: NEGATIVE MG/ML
PH UR: 6 [PH] (ref 5–8)
PROT UR STRIP-MCNC: NEGATIVE MG/DL
RBC # UR STRIP: NEGATIVE /UL
SP GR UR: 1.01 (ref 1–1.03)
UROBILINOGEN UR QL: NORMAL

## 2025-01-08 PROCEDURE — 3074F SYST BP LT 130 MM HG: CPT | Performed by: FAMILY MEDICINE

## 2025-01-08 PROCEDURE — G0102 PROSTATE CA SCREENING; DRE: HCPCS | Performed by: FAMILY MEDICINE

## 2025-01-08 PROCEDURE — 1160F RVW MEDS BY RX/DR IN RCRD: CPT | Performed by: FAMILY MEDICINE

## 2025-01-08 PROCEDURE — 1159F MED LIST DOCD IN RCRD: CPT | Performed by: FAMILY MEDICINE

## 2025-01-08 PROCEDURE — 3078F DIAST BP <80 MM HG: CPT | Performed by: FAMILY MEDICINE

## 2025-01-08 PROCEDURE — G0439 PPPS, SUBSEQ VISIT: HCPCS | Performed by: FAMILY MEDICINE

## 2025-01-08 PROCEDURE — 1170F FXNL STATUS ASSESSED: CPT | Performed by: FAMILY MEDICINE

## 2025-01-08 PROCEDURE — 96372 THER/PROPH/DIAG INJ SC/IM: CPT | Performed by: FAMILY MEDICINE

## 2025-01-08 PROCEDURE — 81003 URINALYSIS AUTO W/O SCOPE: CPT | Performed by: FAMILY MEDICINE

## 2025-01-08 PROCEDURE — 1126F AMNT PAIN NOTED NONE PRSNT: CPT | Performed by: FAMILY MEDICINE

## 2025-01-08 RX ADMIN — CYANOCOBALAMIN 1000 MCG: 1000 INJECTION, SOLUTION INTRAMUSCULAR; SUBCUTANEOUS at 12:21

## 2025-01-08 NOTE — PROGRESS NOTES
Subjective   The ABCs of the Annual Wellness Visit  Medicare Wellness Visit      George Wilder is a 69 y.o. patient who presents for a Medicare Wellness Visit.    The following portions of the patient's history were reviewed and   updated as appropriate: allergies, current medications, past family history, past medical history, past social history, past surgical history, and problem list.    Compared to one year ago, the patient's physical   health is the same.  Compared to one year ago, the patient's mental   health is the same.    Recent Hospitalizations:  He was not admitted to the hospital during the last year.     Current Medical Providers:  Patient Care Team:  Carl Vu MD as PCP - General (Family Medicine)  Jona Garibay MD as Consulting Physician (Otolaryngology)  NAA Swann MD as Surgeon (Orthopedic Surgery)    Outpatient Medications Prior to Visit   Medication Sig Dispense Refill    celecoxib (CeleBREX) 200 MG capsule Take 1 capsule by mouth Daily.      cetirizine (zyrTEC) 10 MG tablet Take 1 tablet by mouth Daily.      Cholecalciferol (VITAMIN D3) 5000 UNITS capsule capsule Take 1 capsule by mouth 3 (Three) Times a Week. Monday, Wednesday, Friday      cyclobenzaprine (FLEXERIL) 10 MG tablet Take 1 tablet by mouth 3 (Three) Times a Day As Needed.      diphenhydrAMINE-acetaminophen (Tylenol PM Extra Strength)  MG tablet per tablet Take 2 tablets by mouth Every Night.      esomeprazole (nexIUM) 20 MG capsule Take 1 capsule by mouth 2 (Two) Times a Day. 180 capsule 1    gabapentin (NEURONTIN) 300 MG capsule Take 1 capsule by mouth 3 (Three) Times a Day.      HYDROcodone-acetaminophen (NORCO)  MG per tablet Take 1 tablet by mouth 2 (Two) Times a Day. BACK PAIN      Magnesium 400 MG capsule       metoprolol tartrate (LOPRESSOR) 50 MG tablet Take 1 tablet by mouth every night at bedtime. 90 tablet 3    Multiple Vitamins-Minerals (MULTIVITAMIN WITH MINERALS) tablet tablet  Take 1 tablet by mouth Daily.      nitroglycerin (NITROSTAT) 0.4 MG SL tablet 1 under the tongue as needed for angina, may repeat q5mins for up three doses 25 tablet 2    Omeprazole 20 MG tablet delayed-release Take 20 mg by mouth 2 (Two) Times a Day. 60 each 4    prasugrel (EFFIENT) 5 MG tablet Take 1 tablet by mouth Daily. 90 tablet 3    Psyllium (DAILY FIBER PO) Take  by mouth.      rosuvastatin (CRESTOR) 40 MG tablet Take 1 tablet by mouth Every Night. 90 tablet 3    fluticasone (FLONASE) 50 MCG/ACT nasal spray 2 sprays into the nostril(s) as directed by provider Daily for 30 days. 16 g 2    predniSONE (DELTASONE) 20 MG tablet Starting Wednesday morning take 2 pills on Wednesday Thursday Friday then 1 pill till medicine gone 20 tablet 0    vitamin B-12 (CYANOCOBALAMIN) 1000 MCG tablet Take 1 tablet by mouth Daily.       Facility-Administered Medications Prior to Visit   Medication Dose Route Frequency Provider Last Rate Last Admin    cyanocobalamin injection 1,000 mcg  1,000 mcg Intramuscular Q3 Months Carl Vu MD   1,000 mcg at 09/30/24 1007    triamcinolone acetonide (KENALOG-40) injection 40 mg  40 mg Intramuscular Once Carl Vu MD         Opioid medication/s are on active medication list.  and I have evaluated his active treatment plan and pain score trends (see table).  Vitals:    01/08/25 0903   PainSc: 0-No pain     I have reviewed the chart for potential of high risk medication and harmful drug interactions in the elderly.        Aspirin is not on active medication list.  Aspirin use is not indicated based on review of current medical condition/s. Risk of harm outweighs potential benefits.  .    Patient Active Problem List   Diagnosis    Morbid obesity    Mixed hyperlipidemia    Lumbar degenerative disc disease    Primary hypertension    Chronic maxillary sinusitis    Chronic ethmoidal sinusitis    S/P sinus surgery    Allergic rhinitis due to allergen    Obesity, Class III,  "BMI 40-49.9 (morbid obesity)    BMI 38.0-38.9,adult    Chronic back pain    Other acute recurrent sinusitis    Lilian bullosa    Nasal septal deviation    Coronary artery chronic total occlusion    Abnormal nuclear stress test    Coronary artery disease involving coronary bypass graft of native heart with unstable angina pectoris    S/P coronary artery stent placement    Complication of surgical procedure    History of total hip arthroplasty    Osteoarthritis of left wrist     Advance Care Planning Advance Directive is on file.  ACP discussion was declined by the patient. Patient has an advance directive in EMR which is still valid.             Objective   Vitals:    25 0903   BP: 128/76   BP Location: Left arm   Patient Position: Sitting   Cuff Size: Adult   Pulse: 56   Resp: 18   Temp: 97.9 °F (36.6 °C)   TempSrc: Temporal   SpO2: 94%   Weight: 124 kg (274 lb)   Height: 188 cm (74\")   PainSc: 0-No pain       Estimated body mass index is 35.18 kg/m² as calculated from the following:    Height as of this encounter: 188 cm (74\").    Weight as of this encounter: 124 kg (274 lb).    Class 2 Severe Obesity (BMI >=35 and <=39.9). Obesity-related health conditions include the following: hypertension, coronary heart disease, and osteoarthritis. Obesity is unchanged. BMI is is above average; BMI management plan is completed. We discussed portion control and increasing exercise.           Does the patient have evidence of cognitive impairment? No                                                                                                Health  Risk Assessment    Smoking Status:  Social History     Tobacco Use   Smoking Status Former    Current packs/day: 0.00    Average packs/day: 1.5 packs/day for 29.0 years (43.5 ttl pk-yrs)    Types: Cigarettes    Start date: 1965    Quit date: 1994    Years since quittin.0   Smokeless Tobacco Never   Tobacco Comments    quit date was date of heart attack "     Alcohol Consumption:  Social History     Substance and Sexual Activity   Alcohol Use Not Currently       Fall Risk Screen  STEADI Fall Risk Assessment was completed, and patient is at MODERATE risk for falls. Assessment completed on:2025    Depression Screening   Little interest or pleasure in doing things? Not at all   Feeling down, depressed, or hopeless? Not at all   PHQ-2 Total Score 0      Health Habits and Functional and Cognitive Screenin/8/2025     9:06 AM   Functional & Cognitive Status   Do you have difficulty preparing food and eating? No   Do you have difficulty bathing yourself, getting dressed or grooming yourself? No   Do you have difficulty using the toilet? No   Do you have difficulty moving around from place to place? No   Do you have trouble with steps or getting out of a bed or a chair? No   Current Diet Well Balanced Diet   Dental Exam Up to date   Eye Exam Up to date   Exercise (times per week) 3 times per week   Current Exercises Include No Regular Exercise;Yard Work   Do you need help using the phone?  No   Are you deaf or do you have serious difficulty hearing?  No   Do you need help to go to places out of walking distance? No   Do you need help shopping? No   Do you need help preparing meals?  No   Do you need help with housework?  No   Do you need help with laundry? No   Do you need help taking your medications? No   Do you need help managing money? No   Do you ever drive or ride in a car without wearing a seat belt? No   Have you felt unusual stress, anger or loneliness in the last month? No   Who do you live with? Spouse   If you need help, do you have trouble finding someone available to you? No   Have you been bothered in the last four weeks by sexual problems? No   Do you have difficulty concentrating, remembering or making decisions? No           Age-appropriate Screening Schedule:  Refer to the list below for future screening recommendations based on patient's age,  sex and/or medical conditions. Orders for these recommended tests are listed in the plan section. The patient has been provided with a written plan.    Health Maintenance List  Health Maintenance   Topic Date Due    LIPID PANEL  06/20/2025    ANNUAL WELLNESS VISIT  01/08/2026    BMI FOLLOWUP  01/08/2026    TDAP/TD VACCINES (2 - Td or Tdap) 11/18/2026    COLORECTAL CANCER SCREENING  01/03/2027    HEPATITIS C SCREENING  Completed    COVID-19 Vaccine  Completed    INFLUENZA VACCINE  Completed    Pneumococcal Vaccine 65+  Completed    AAA SCREEN ONCE  Completed    ZOSTER VACCINE  Completed    LUNG CANCER SCREENING  Discontinued                                                                                                                                                CMS Preventative Services Quick Reference  Risk Factors Identified During Encounter  Fall Risk-High or Moderate: Information on Fall Prevention Shared in After Visit Summary    The above risks/problems have been discussed with the patient.  Pertinent information has been shared with the patient in the After Visit Summary.  An After Visit Summary and PPPS were made available to the patient.    Follow Up:   Next Medicare Wellness visit to be scheduled in 1 year.         Additional E&M Note during same encounter follows:  Patient has additional, significant, and separately identifiable condition(s)/problem(s) that require work above and beyond the Medicare Wellness Visit     Chief Complaint  Medicare Wellness-subsequent (Fasting)    Subjective   69-year-old male for wellness exam      George is also being seen today for an annual adult preventative physical exam.     Review of Systems   Respiratory:  Negative for shortness of breath.    Cardiovascular:  Negative for chest pain and leg swelling.        History of coronary artery disease   Musculoskeletal:  Positive for arthralgias.        History of  prosthetic  hip dislocations   Hematological:         B12  "deficiency   All other systems reviewed and are negative.             Objective   Vital Signs:  /76 (BP Location: Left arm, Patient Position: Sitting, Cuff Size: Adult)   Pulse 56   Temp 97.9 °F (36.6 °C) (Temporal)   Resp 18   Ht 188 cm (74\")   Wt 124 kg (274 lb)   SpO2 94%   BMI 35.18 kg/m²   Physical Exam  Vitals and nursing note reviewed.   Constitutional:       Appearance: He is obese.   HENT:      Right Ear: Tympanic membrane and ear canal normal.      Left Ear: Tympanic membrane and ear canal normal.      Mouth/Throat:      Mouth: Mucous membranes are moist.   Eyes:      Extraocular Movements: Extraocular movements intact.      Pupils: Pupils are equal, round, and reactive to light.   Neck:      Vascular: No carotid bruit.   Cardiovascular:      Rate and Rhythm: Normal rate and regular rhythm.      Pulses: Normal pulses.      Heart sounds: Normal heart sounds.   Pulmonary:      Effort: Pulmonary effort is normal.      Breath sounds: Normal breath sounds.   Abdominal:      General: Abdomen is flat. There is no distension.      Palpations: Abdomen is soft. There is no mass.      Tenderness: There is no abdominal tenderness.   Genitourinary:     Penis: Normal.       Testes: Normal.      Prostate: Normal.      Comments: No testicular masses inguinal hernia or adenopathy-prostate 2+ nodules  Musculoskeletal:      Right lower leg: No edema.      Left lower leg: No edema.   Lymphadenopathy:      Cervical: No cervical adenopathy.   Skin:     General: Skin is warm and dry.   Neurological:      General: No focal deficit present.      Mental Status: He is alert and oriented to person, place, and time.   Psychiatric:         Mood and Affect: Mood normal.         Behavior: Behavior normal.         Thought Content: Thought content normal.         Judgment: Judgment normal.         The following data was reviewed by: Carl Vu MD on 01/08/2025:              Assessment and Plan            B12 " deficiency    Orders:    T4, free    Vitamin B12    Folate    Prediabetes    Orders:    T4, free    Hemoglobin A1c    POC Urinalysis Dipstick, Multipro    Mixed hyperlipidemia       Orders:    T4, free    Comprehensive Metabolic Panel    Lipid Panel    Fatigue, unspecified type    Orders:    TSH    T4, free    CBC & Differential    Prostate cancer screening    Orders:    T4, free    PSA Screen    Medicare annual wellness visit, subsequent  Obesity-patient not interested in addressing and noncompliant for recommendations     Plan above-RSV vaccine recommended-continue hip rehab exercises to prevent dislocation          Follow Up   Return in about 6 months (around 7/8/2025), or if symptoms worsen or fail to improve.  Patient was given instructions and counseling regarding his condition or for health maintenance advice. Please see specific information pulled into the AVS if appropriate.

## 2025-01-08 NOTE — PATIENT INSTRUCTIONS
What is the Mediterranean Diet?  The Mediterranean Diet is a way of eating that emphasizes plant-based foods and healthy fats. You focus on overall eating patterns rather than following strict formulas or calculations.    In general, you’ll eat:    Lots of vegetables, fruit, beans, lentils and nuts.  A good amount of whole grains, like whole-wheat bread and brown rice.  Plenty of extra virgin olive oil (EVOO) as a source of healthy fat.  A good amount of fish, especially fish rich in omega-3 fatty acids.  A moderate amount of natural cheese and yogurt.  Little or no red meat, choosing poultry, fish or beans instead of red meat.  Little or no sweets, sugary drinks or butter.  A moderate amount of wine with meals (but if you don’t already drink, don’t start).  This is how people ate in certain Mediterranean countries in the mid-20th century. Researchers have linked these eating patterns with a reduced risk of coronary artery disease (CAD). Today, healthcare providers recommend this eating plan if you have risk factors for heart disease or to support other aspects of your health.    A dietitian can help you modify your approach as needed based on your medical history, underlying conditions, allergies and preferences.        What are the benefits of the Mediterranean Diet?  The mediterranean diet allows you to focus on overall eating patterns rather than following strict formulas or calculations.  The Mediterranean Diet has many benefits, including:    Lowering your risk of cardiovascular disease, including a heart attack or stroke.  Supporting a body weight that’s healthy for you.  Supporting healthy blood sugar levels, blood pressure and cholesterol.  Lowering your risk of metabolic syndrome.  Supporting a healthy balance of gut microbiota (bacteria and other microorganisms) in your digestive system.  Lowering your risk for certain types of cancer.  Slowing the decline of brain function as you age.  Helping you live  longer.        The Mediterranean Diet has these benefits because it:    Limits saturated fat and trans fat. You need some saturated fat, but only in small amounts. Eating too much saturated fat can raise your LDL (bad) cholesterol. A high LDL raises your risk of plaque buildup in your arteries (atherosclerosis). Trans fat has no health benefits. Both of these “unhealthy fats” can cause inflammation.  Encourages healthy unsaturated fats, including omega-3 fatty acids. Unsaturated fats promote healthy cholesterol levels, support brain health and combat inflammation. Plus, a diet high in unsaturated fats and low in saturated fat promotes healthy blood sugar levels.  Limits sodium. Eating foods high in sodium can raise your blood pressure, putting you at a greater risk for a heart attack or stroke.  Limits refined carbohydrates, including sugar. Foods high in refined carbs can cause your blood sugar to spike. Refined carbs also give you excess calories without much nutritional benefit. For example, such foods often have little or no fiber.  Favors foods high in fiber and antioxidants. These nutrients help reduce inflammation throughout your body. Fiber also helps keep waste moving through your large intestine and helps maintain healthy blood sugar levels. Antioxidants protect you against cancer by warding off free radicals.  The Mediterranean Diet includes many different nutrients that work together to help your body. There’s no single food or ingredient responsible for the Mediterranean Diet’s benefits. Instead, the diet is healthy for you because of the combination of nutrients it provides.    Think of a choir with many people singing. One voice alone might carry part of the tune, but you need all the voices to come together to achieve the full effect. Similarly, the Mediterranean Diet works by giving you an ideal blend of nutrients that harmonize to support your health.    Mediterranean Diet food list  The  Mediterranean Diet encourages you to eat plenty of some foods (like whole grains and vegetables) while limiting others. If you’re planning a grocery store trip, you might wonder which foods to buy. Here are some examples of foods to eat often with the Mediterranean Diet.       Medicare Wellness  Personal Prevention Plan of Service     Date of Office Visit:    Encounter Provider:  Carl Vu MD  Place of Service:  [facility]  Patient Name: George Wilder  :  1955    As part of the Medicare Wellness portion of your visit today, we are providing you with this personalized preventive plan of services (PPPS). This plan is based upon recommendations of the [place] Preventive Services Task Force (USPSTF) and the Advisory Committee on Immunization Practices (ACIP).    This lists the preventive care services that should be considered, and provides dates of when you are due. Items listed as completed are up-to-date and do not require any further intervention.    Health Maintenance   Topic Date Due    ANNUAL WELLNESS VISIT  2024    BMI FOLLOWUP  2024    LIPID PANEL  2025    TDAP/TD VACCINES (2 - Td or Tdap) 2026    COLORECTAL CANCER SCREENING  2027    HEPATITIS C SCREENING  Completed    COVID-19 Vaccine  Completed    INFLUENZA VACCINE  Completed    Pneumococcal Vaccine 65+  Completed    AAA SCREEN ONCE  Completed    ZOSTER VACCINE  Completed    LUNG CANCER SCREENING  Discontinued       No orders of the defined types were placed in this encounter.      No follow-ups on file.

## 2025-01-09 LAB
ALBUMIN SERPL-MCNC: 4.1 G/DL (ref 3.5–5.2)
ALBUMIN/GLOB SERPL: 1.6 G/DL
ALP SERPL-CCNC: 56 U/L (ref 39–117)
ALT SERPL-CCNC: 27 U/L (ref 1–41)
AST SERPL-CCNC: 24 U/L (ref 1–40)
BASOPHILS # BLD AUTO: 0.03 10*3/MM3 (ref 0–0.2)
BASOPHILS NFR BLD AUTO: 0.5 % (ref 0–1.5)
BILIRUB SERPL-MCNC: 1.2 MG/DL (ref 0–1.2)
BUN SERPL-MCNC: 17 MG/DL (ref 8–23)
BUN/CREAT SERPL: 14 (ref 7–25)
CALCIUM SERPL-MCNC: 9.8 MG/DL (ref 8.6–10.5)
CHLORIDE SERPL-SCNC: 104 MMOL/L (ref 98–107)
CHOLEST SERPL-MCNC: 133 MG/DL (ref 0–200)
CO2 SERPL-SCNC: 30.3 MMOL/L (ref 22–29)
CREAT SERPL-MCNC: 1.21 MG/DL (ref 0.76–1.27)
EGFRCR SERPLBLD CKD-EPI 2021: 64.8 ML/MIN/1.73
EOSINOPHIL # BLD AUTO: 0.12 10*3/MM3 (ref 0–0.4)
EOSINOPHIL NFR BLD AUTO: 2 % (ref 0.3–6.2)
ERYTHROCYTE [DISTWIDTH] IN BLOOD BY AUTOMATED COUNT: 12.5 % (ref 12.3–15.4)
FOLATE SERPL-MCNC: >20 NG/ML (ref 4.78–24.2)
GLOBULIN SER CALC-MCNC: 2.6 GM/DL
GLUCOSE SERPL-MCNC: 101 MG/DL (ref 65–99)
HBA1C MFR BLD: 5.7 % (ref 4.8–5.6)
HCT VFR BLD AUTO: 47.9 % (ref 37.5–51)
HDLC SERPL-MCNC: 56 MG/DL (ref 40–60)
HGB BLD-MCNC: 16.3 G/DL (ref 13–17.7)
IMM GRANULOCYTES # BLD AUTO: 0.01 10*3/MM3 (ref 0–0.05)
IMM GRANULOCYTES NFR BLD AUTO: 0.2 % (ref 0–0.5)
LDLC SERPL CALC-MCNC: 61 MG/DL (ref 0–100)
LYMPHOCYTES # BLD AUTO: 1.41 10*3/MM3 (ref 0.7–3.1)
LYMPHOCYTES NFR BLD AUTO: 23.7 % (ref 19.6–45.3)
MCH RBC QN AUTO: 32.3 PG (ref 26.6–33)
MCHC RBC AUTO-ENTMCNC: 34 G/DL (ref 31.5–35.7)
MCV RBC AUTO: 94.9 FL (ref 79–97)
MONOCYTES # BLD AUTO: 0.4 10*3/MM3 (ref 0.1–0.9)
MONOCYTES NFR BLD AUTO: 6.7 % (ref 5–12)
NEUTROPHILS # BLD AUTO: 3.98 10*3/MM3 (ref 1.7–7)
NEUTROPHILS NFR BLD AUTO: 66.9 % (ref 42.7–76)
NRBC BLD AUTO-RTO: 0 /100 WBC (ref 0–0.2)
PLATELET # BLD AUTO: 224 10*3/MM3 (ref 140–450)
POTASSIUM SERPL-SCNC: 4.8 MMOL/L (ref 3.5–5.2)
PROT SERPL-MCNC: 6.7 G/DL (ref 6–8.5)
PSA SERPL-MCNC: 1.42 NG/ML (ref 0–4)
RBC # BLD AUTO: 5.05 10*6/MM3 (ref 4.14–5.8)
SODIUM SERPL-SCNC: 141 MMOL/L (ref 136–145)
T4 FREE SERPL-MCNC: 1.39 NG/DL (ref 0.92–1.68)
TRIGL SERPL-MCNC: 82 MG/DL (ref 0–150)
TSH SERPL DL<=0.005 MIU/L-ACNC: 1.68 UIU/ML (ref 0.27–4.2)
VIT B12 SERPL-MCNC: >2000 PG/ML (ref 211–946)
VLDLC SERPL CALC-MCNC: 16 MG/DL (ref 5–40)
WBC # BLD AUTO: 5.95 10*3/MM3 (ref 3.4–10.8)

## 2025-02-03 ENCOUNTER — OFFICE VISIT (OUTPATIENT)
Dept: CARDIOLOGY | Facility: CLINIC | Age: 70
End: 2025-02-03
Payer: MEDICARE

## 2025-02-03 VITALS — HEART RATE: 54 BPM | DIASTOLIC BLOOD PRESSURE: 64 MMHG | OXYGEN SATURATION: 99 % | SYSTOLIC BLOOD PRESSURE: 122 MMHG

## 2025-02-03 DIAGNOSIS — Z98.61 CAD S/P PERCUTANEOUS CORONARY ANGIOPLASTY: ICD-10-CM

## 2025-02-03 DIAGNOSIS — E78.2 MIXED HYPERLIPIDEMIA: ICD-10-CM

## 2025-02-03 DIAGNOSIS — I10 PRIMARY HYPERTENSION: Primary | ICD-10-CM

## 2025-02-03 DIAGNOSIS — I20.89 STABLE ANGINA: ICD-10-CM

## 2025-02-03 DIAGNOSIS — Z95.5 S/P CORONARY ARTERY STENT PLACEMENT: ICD-10-CM

## 2025-02-03 DIAGNOSIS — R06.09 DOE (DYSPNEA ON EXERTION): ICD-10-CM

## 2025-02-03 DIAGNOSIS — E66.01 MORBID OBESITY: ICD-10-CM

## 2025-02-03 DIAGNOSIS — I25.118 CORONARY ARTERY DISEASE OF NATIVE ARTERY OF NATIVE HEART WITH STABLE ANGINA PECTORIS: ICD-10-CM

## 2025-02-03 DIAGNOSIS — I25.82 CORONARY ARTERY CHRONIC TOTAL OCCLUSION: ICD-10-CM

## 2025-02-03 DIAGNOSIS — I25.10 CAD S/P PERCUTANEOUS CORONARY ANGIOPLASTY: ICD-10-CM

## 2025-02-03 RX ORDER — QUINIDINE SULFATE 200 MG
TABLET ORAL
COMMUNITY

## 2025-02-03 RX ORDER — METOPROLOL TARTRATE 50 MG
25 TABLET ORAL
Qty: 90 TABLET | Refills: 3 | Status: SHIPPED | OUTPATIENT
Start: 2025-02-03

## 2025-02-03 NOTE — PROGRESS NOTES
[facility] - CARDIOLOGY  New Patient Initial Outpatient Evaulation    Primary Care Physician: Carl Vu MD    Subjective     Chief Complaint   Patient presents with    Coronary Artery Disease     1 YR FU (FORD)        History of Present Illness  History of Present Illness  The patient presents for evaluation of bradycardia.    He reports a persistent low heart rate, managed with Lopressor 50 mg nightly. He does not experience dizziness or lightheadedness but has been experiencing increased fatigue and excessive sleepiness recently. His last consultation with Dr. Stiles was in 01/2024. He has a history of stent placement in the left anterior descending artery ( maker) approximately 3 to 4 years ago, following his first myocardial infarction in 1994, which resulted in complete occlusion of one artery and significant stenosis in two others. Prior to the stent placement, he experienced similar symptoms of rapid fatigue and excessive sleepiness. He also reports an episode of chest pain, described as a sudden, unusual sensation over the heart area. He is not currently on baby aspirin. He experiences mild dyspnea on exertion, particularly during ambulation, but does not report any episodes of dizziness, lightheadedness, palpitations, or irregular heartbeats.    MEDICATIONS  Lopressor, Effient, Crestor    Review of Systems   Constitutional: Positive for malaise/fatigue. Negative for diaphoresis and fever.   HENT:  Negative for congestion.    Eyes:  Negative for vision loss in left eye and vision loss in right eye.   Cardiovascular:  Positive for chest pain and dyspnea on exertion. Negative for claudication, irregular heartbeat, leg swelling, orthopnea, palpitations and syncope.   Respiratory:  Negative for cough, shortness of breath and wheezing.    Hematologic/Lymphatic: Negative for adenopathy.   Skin:  Negative for rash.   Musculoskeletal:  Negative for joint pain and joint swelling.   Gastrointestinal:   Negative for abdominal pain, diarrhea, nausea and vomiting.   Neurological:  Positive for weakness. Negative for excessive daytime sleepiness, dizziness, focal weakness, light-headedness and numbness.   Psychiatric/Behavioral:  Negative for depression. The patient does not have insomnia.         Otherwise complete ROS reviewed and negative except as mentioned in the HPI.      Past Medical History:   Past Medical History:   Diagnosis Date    Allergic rhinitis due to allergen 08/08/2017    Arthritis     Asthma sometimes    Back pain     CHF (congestive heart failure)     Chronic ethmoidal sinusitis 05/18/2017    Chronic maxillary sinusitis 05/18/2017    Coronary artery disease 1984    stent LAD, Cx, RCA     GERD (gastroesophageal reflux disease)     Heart attack 1994    Dr Stiles     Heartburn     HL (hearing loss)     Hyperlipidemia     Hypertension     Myocardial infarction     Pain     back, & Neck        Past Surgical History:  Past Surgical History:   Procedure Laterality Date    ANKLE SURGERY      BACK SURGERY      CARDIAC CATHETERIZATION  1984, 1994    CARDIAC CATHETERIZATION N/A 10/14/2021    Procedure: Left Heart Cath;  Surgeon: Kishore Stiles MD;  Location: [facility] CATH INVASIVE LOCATION;  Service: Cardiology;  Laterality: N/A;    CHOLECYSTECTOMY  2014    Lararocopic     COLONOSCOPY      CORONARY STENT PLACEMENT      ENDOSCOPIC FUNCTIONAL SINUS SURGERY (FESS) N/A 06/12/2017    Procedure: ENDOSCOPIC FUNCTIONAL SINUS SURGERY WITH LEFT MIDDLE MEATAL ANTROSTOMY AND LEFT ETHMOIDECTOMY;  Surgeon: Jona Garibay MD;  Location: [facility] OR;  Service:     ENDOSCOPIC FUNCTIONAL SINUS SURGERY (FESS) Bilateral 08/10/2020    Procedure: Septoplasty Resection of left sammy bullosa deformity Bilateral maxillary antrostomy and uncinectomy;  Surgeon: Jona Garibay MD;  Location: [facility] OR;  Service: ENT;  Laterality: Bilateral;    ETHMOIDECTOMY Left 06/12/2017    Procedure: ETHMOIDECTOMY ENDOSCOPIC;  Surgeon: Jona  Jatinder Garibay MD;  Location: [facility] OR;  Service:     EYE SURGERY      LASIK    HAND SURGERY      JOINT REPLACEMENT  both hips, left recent May2023    SINUS SURGERY      TOTAL HIP ARTHROPLASTY Bilateral     TOTAL HIP ARTHROPLASTY REVISION Left 11/14/2023    Dr. Swann       Family History: family history includes Arthritis in his mother; Asthma in his brother; Cancer in his brother, brother, and father; Lung cancer in his father; Rheum arthritis in his mother; Stroke in his brother and brother.    Social History:  reports that he quit smoking about 31 years ago. His smoking use included cigarettes. He started smoking about 60 years ago. He has a 43.5 pack-year smoking history. He has never used smokeless tobacco. He reports that he does not currently use alcohol. He reports that he does not use drugs.    Medications:  Prior to Admission medications    Medication Sig Start Date End Date Taking? Authorizing Provider   celecoxib (CeleBREX) 200 MG capsule Take 1 capsule by mouth Daily.   Yes Bassam Ponce MD   cetirizine (zyrTEC) 10 MG tablet Take 1 tablet by mouth Daily.   Yes Bassam Ponce MD   Cholecalciferol (VITAMIN D3) 5000 UNITS capsule capsule Take 1 capsule by mouth 3 (Three) Times a Week. Monday, Wednesday, Friday   Yes Bassam Ponce MD   cyclobenzaprine (FLEXERIL) 10 MG tablet Take 1 tablet by mouth 3 (Three) Times a Day As Needed. 3/14/21  Yes Bassam Ponce MD   diphenhydrAMINE-acetaminophen (Tylenol PM Extra Strength)  MG tablet per tablet Take 2 tablets by mouth Every Night.   Yes Bassam Ponce MD   esomeprazole (nexIUM) 20 MG capsule Take 1 capsule by mouth 2 (Two) Times a Day. 1/30/24  Yes Mario Melara APRN   gabapentin (NEURONTIN) 300 MG capsule Take 1 capsule by mouth 3 (Three) Times a Day.   Yes Bassam Ponce MD   HYDROcodone-acetaminophen (NORCO)  MG per tablet Take 1 tablet by mouth 2 (Two) Times a Day. BACK PAIN   Yes Kris  Bassam, MD   Magnesium 400 MG capsule  6/15/24  Yes Provider, MD Bassam   metoprolol tartrate (LOPRESSOR) 50 MG tablet Take 0.5 tablets by mouth every night at bedtime. 2/3/25  Yes Shaquille Hewitt, DO   Multiple Vitamins-Minerals (MULTIVITAMIN WITH MINERALS) tablet tablet Take 1 tablet by mouth Daily.   Yes Provider, MD Bassam   multivitamin with minerals (Vitamin D3 Complete) tablet tablet    Yes Provider, MD Bassam   nitroglycerin (NITROSTAT) 0.4 MG SL tablet 1 under the tongue as needed for angina, may repeat q5mins for up three doses 9/17/21  Yes Giovanna Márquez APRN   Omeprazole 20 MG tablet delayed-release Take 20 mg by mouth 2 (Two) Times a Day. 9/25/24  Yes Mario Melara APRN   prasugrel (EFFIENT) 5 MG tablet Take 1 tablet by mouth Daily. 4/23/24  Yes Giovanna Márquez APRN   Psyllium (DAILY FIBER PO) Take  by mouth.   Yes Provider, MD Bassam   rosuvastatin (CRESTOR) 40 MG tablet Take 1 tablet by mouth Every Night. 9/26/24  Yes Giovanna Márquez APRN   metoprolol tartrate (LOPRESSOR) 50 MG tablet Take 1 tablet by mouth every night at bedtime. 9/13/24 2/3/25 Yes Alejandrina Serrano APRN   fluticasone (FLONASE) 50 MCG/ACT nasal spray 2 sprays into the nostril(s) as directed by provider Daily for 30 days. 9/16/24 10/16/24  Mario Melara APRN     Allergies:  Allergies   Allergen Reactions    Brilinta [Ticagrelor] Shortness Of Breath     Gasping for air         Objective     Vital Signs: /64   Pulse 54   SpO2 99%     Vitals and nursing note reviewed.   Constitutional:       Appearance: Normal and healthy appearance. Well-developed and not in distress.   Eyes:      Extraocular Movements: Extraocular movements intact.      Pupils: Pupils are equal, round, and reactive to light.   HENT:      Head: Normocephalic and atraumatic.    Mouth/Throat:      Pharynx: Oropharynx is clear.   Neck:      Vascular: JVD normal.      Trachea: Trachea normal.   Pulmonary:      Effort:  Pulmonary effort is normal.      Breath sounds: Normal breath sounds. No wheezing. No rhonchi. No rales.   Cardiovascular:      PMI at left midclavicular line. Bradycardia present. Regular rhythm. Normal S1. Normal S2.       Murmurs: There is no murmur.      No gallop.  No click. No rub.   Pulses:     Dorsalis pedis: 2+ bilaterally.     Posterior tibial: 2+ bilaterally.  Abdominal:      General: Bowel sounds are normal.      Palpations: Abdomen is soft.      Tenderness: There is no abdominal tenderness.   Musculoskeletal: Normal range of motion.      Cervical back: Normal range of motion and neck supple. Skin:     General: Skin is warm and dry.      Capillary Refill: Capillary refill takes less than 2 seconds.   Feet:      Right foot:      Skin integrity: Skin integrity normal.      Left foot:      Skin integrity: Skin integrity normal.   Neurological:      Mental Status: Alert and oriented to person, place and time.      Sensory: Sensation is intact.      Motor: Motor function is intact.      Coordination: Coordination is intact.   Psychiatric:         Speech: Speech normal.         Behavior: Behavior is cooperative.       Physical Exam  Vital Signs  Heart rate is in the 40s.    Results Reviewed:      ECG 12 Lead    Date/Time: 2/3/2025 9:59 AM  Performed by: Shaquille Hewitt DO    Authorized by: Shaquille Hewitt DO  Comparison: compared with previous ECG   Similar to previous ECG  Rhythm: sinus bradycardia  Rate: bradycardic  Conduction: conduction normal  QRS axis: normal    Clinical impression: abnormal EKG          Results  Laboratory Studies  Cholesterol level was 61 last month.    Testing  Stress test couple of years ago was normal.    Lab Results   Component Value Date    TRIG 82 01/08/2025    HDL 56 01/08/2025    VLDL 16 01/08/2025     Lab Results   Component Value Date    HGBA1C 5.70 (H) 01/08/2025       Assessment / Plan        Problem List Items Addressed This Visit       Morbid  obesity    Mixed hyperlipidemia    RESOLVED: Coronary artery disease of native artery of native heart with stable angina pectoris    Relevant Medications    metoprolol tartrate (LOPRESSOR) 50 MG tablet    Primary hypertension - Primary    Relevant Medications    metoprolol tartrate (LOPRESSOR) 50 MG tablet    Other Relevant Orders    Case Request Cath Lab: Left Heart Cath (Completed)    Coronary artery chronic total occlusion    Relevant Medications    metoprolol tartrate (LOPRESSOR) 50 MG tablet    S/P coronary artery stent placement    CAD S/P percutaneous coronary angioplasty    Relevant Medications    metoprolol tartrate (LOPRESSOR) 50 MG tablet    Other Relevant Orders    Case Request Cath Lab: Left Heart Cath (Completed)    THOMPSON (dyspnea on exertion)    Relevant Orders    Adult Transthoracic Echo Complete W/ Cont if Necessary Per Protocol    Case Request Cath Lab: Left Heart Cath (Completed)    Stable angina    Relevant Medications    metoprolol tartrate (LOPRESSOR) 50 MG tablet     Assessment & Plan  1. Bradycardia.  His heart rate is consistently in the 40s, which may be contributing to his symptoms of fatigue and excessive sleepiness. He is currently on Lopressor 50 mg every night. To address the bradycardia, he is advised to reduce the dosage of Lopressor to half, which may help alleviate the fatigue. He will continue taking Effient, Crestor, and baby aspirin as prescribed.    2. Coronary Artery Disease.  He has a history of a stent placement in the left anterior descending artery ( maker) 4 years ago. He reports similar symptoms now, including fatigue and occasional chest pain. A heart catheterization is scheduled for 02/11/2025 to assess the severity of the blockages. An echocardiogram will also be performed on the same day to evaluate cardiac function and valve integrity. He is advised to continue his current medications, including Effient and Crestor.    3. Hyperlipidemia.  His cholesterol levels  were checked last month and were excellent. He is currently on Crestor for cholesterol management. He should continue taking Crestor as prescribed.    PROCEDURE  Stent placement in the left anterior descending artery approximately 3 to 4 years ago.      Transcribed from ambient dictation for Shaquille Hewitt DO by Shaquille Hewitt DO.  02/03/25   09:58 CST    Patient or patient representative verbalized consent for the use of Ambient Listening during the visit with  Shaquille Hewitt DO for chart documentation. 2/3/2025  10:00 CST

## 2025-02-11 ENCOUNTER — HOSPITAL ENCOUNTER (OUTPATIENT)
Facility: HOSPITAL | Age: 70
Discharge: HOME | End: 2025-02-12
Attending: EMERGENCY MEDICINE | Admitting: EMERGENCY MEDICINE
Payer: MEDICARE

## 2025-02-11 ENCOUNTER — HOSPITAL ENCOUNTER (OUTPATIENT)
Dept: CARDIOLOGY | Facility: HOSPITAL | Age: 70
Discharge: HOME | End: 2025-02-11
Payer: MEDICARE

## 2025-02-11 VITALS
BODY MASS INDEX: 35.42 KG/M2 | SYSTOLIC BLOOD PRESSURE: 128 MMHG | WEIGHT: 276 LBS | DIASTOLIC BLOOD PRESSURE: 73 MMHG | HEIGHT: 74 IN

## 2025-02-11 DIAGNOSIS — I25.10 CAD S/P PERCUTANEOUS CORONARY ANGIOPLASTY: ICD-10-CM

## 2025-02-11 DIAGNOSIS — Z98.61 CAD S/P PERCUTANEOUS CORONARY ANGIOPLASTY: ICD-10-CM

## 2025-02-11 DIAGNOSIS — R06.09 DOE (DYSPNEA ON EXERTION): ICD-10-CM

## 2025-02-11 DIAGNOSIS — I10 PRIMARY HYPERTENSION: ICD-10-CM

## 2025-02-11 PROBLEM — Z98.890 STATUS POST CORONARY ANGIOGRAM: Status: ACTIVE | Noted: 2025-02-11

## 2025-02-11 LAB
ANION GAP SERPL CALCULATED.3IONS-SCNC: 8 MMOL/L (ref 5–15)
AV MEAN PRESS GRAD SYS DOP V1V2: 4 MMHG
AV VMAX SYS DOP: 136 CM/SEC
BASOPHILS # BLD AUTO: 0.03 10*3/MM3 (ref 0–0.2)
BASOPHILS NFR BLD AUTO: 0.5 % (ref 0–1.5)
BH CV ECHO MEAS - AO MAX PG: 7.4 MMHG
BH CV ECHO MEAS - AO ROOT DIAM: 3 CM
BH CV ECHO MEAS - AO V2 VTI: 33.4 CM
BH CV ECHO MEAS - AVA(I,D): 3.9 CM2
BH CV ECHO MEAS - EDV(CUBED): 114.8 ML
BH CV ECHO MEAS - EDV(MOD-SP2): 77 ML
BH CV ECHO MEAS - EDV(MOD-SP4): 103 ML
BH CV ECHO MEAS - EF(MOD-SP2): 59 %
BH CV ECHO MEAS - EF(MOD-SP4): 69.7 %
BH CV ECHO MEAS - ESV(CUBED): 36.9 ML
BH CV ECHO MEAS - ESV(MOD-SP2): 31.6 ML
BH CV ECHO MEAS - ESV(MOD-SP4): 31.2 ML
BH CV ECHO MEAS - FS: 31.5 %
BH CV ECHO MEAS - IVS/LVPW: 0.98 CM
BH CV ECHO MEAS - IVSD: 1.32 CM
BH CV ECHO MEAS - LA DIMENSION: 4.7 CM
BH CV ECHO MEAS - LAT PEAK E' VEL: 9.4 CM/SEC
BH CV ECHO MEAS - LV DIASTOLIC VOL/BSA (35-75): 41.3 CM2
BH CV ECHO MEAS - LV MASS(C)D: 260.3 GRAMS
BH CV ECHO MEAS - LV MAX PG: 5.6 MMHG
BH CV ECHO MEAS - LV MEAN PG: 3 MMHG
BH CV ECHO MEAS - LV SYSTOLIC VOL/BSA (12-30): 12.5 CM2
BH CV ECHO MEAS - LV V1 MAX: 118 CM/SEC
BH CV ECHO MEAS - LV V1 VTI: 31.4 CM
BH CV ECHO MEAS - LVIDD: 4.9 CM
BH CV ECHO MEAS - LVIDS: 3.3 CM
BH CV ECHO MEAS - LVOT AREA: 4.2 CM2
BH CV ECHO MEAS - LVOT DIAM: 2.3 CM
BH CV ECHO MEAS - LVPWD: 1.35 CM
BH CV ECHO MEAS - MED PEAK E' VEL: 7.9 CM/SEC
BH CV ECHO MEAS - MR MAX PG: 62.1 MMHG
BH CV ECHO MEAS - MR MAX VEL: 394 CM/SEC
BH CV ECHO MEAS - MV A MAX VEL: 73.1 CM/SEC
BH CV ECHO MEAS - MV DEC SLOPE: 414 CM/SEC2
BH CV ECHO MEAS - MV DEC TIME: 0.21 SEC
BH CV ECHO MEAS - MV E MAX VEL: 86.6 CM/SEC
BH CV ECHO MEAS - MV E/A: 1.18
BH CV ECHO MEAS - PA V2 MAX: 156 CM/SEC
BH CV ECHO MEAS - RAP SYSTOLE: 3 MMHG
BH CV ECHO MEAS - RV MAX PG: 3.4 MMHG
BH CV ECHO MEAS - RV V1 MAX: 90.8 CM/SEC
BH CV ECHO MEAS - RVSP: 32.4 MMHG
BH CV ECHO MEAS - SV(LVOT): 130.5 ML
BH CV ECHO MEAS - SV(MOD-SP2): 45.4 ML
BH CV ECHO MEAS - SV(MOD-SP4): 71.8 ML
BH CV ECHO MEAS - SVI(LVOT): 52.4 ML/M2
BH CV ECHO MEAS - SVI(MOD-SP2): 18.2 ML/M2
BH CV ECHO MEAS - SVI(MOD-SP4): 28.8 ML/M2
BH CV ECHO MEAS - TAPSE (>1.6): 1.71 CM
BH CV ECHO MEAS - TR MAX PG: 29.4 MMHG
BH CV ECHO MEAS - TR MAX VEL: 271 CM/SEC
BH CV ECHO MEASUREMENTS AVERAGE E/E' RATIO: 10.01
BH CV XLRA - RV BASE: 3.6 CM
BUN SERPL-MCNC: 24 MG/DL (ref 8–23)
BUN/CREAT SERPL: 24.7 (ref 7–25)
CALCIUM SPEC-SCNC: 9.1 MG/DL (ref 8.6–10.5)
CHLORIDE SERPL-SCNC: 106 MMOL/L (ref 98–107)
CO2 SERPL-SCNC: 26 MMOL/L (ref 22–29)
CREAT SERPL-MCNC: 0.97 MG/DL (ref 0.76–1.27)
DEPRECATED RDW RBC AUTO: 41.3 FL (ref 37–54)
EGFRCR SERPLBLD CKD-EPI 2021: 84.5 ML/MIN/1.73
EOSINOPHIL # BLD AUTO: 0.15 10*3/MM3 (ref 0–0.4)
EOSINOPHIL NFR BLD AUTO: 2.5 % (ref 0.3–6.2)
ERYTHROCYTE [DISTWIDTH] IN BLOOD BY AUTOMATED COUNT: 12.5 % (ref 12.3–15.4)
GLUCOSE SERPL-MCNC: 98 MG/DL (ref 65–99)
HCT VFR BLD AUTO: 46.2 % (ref 37.5–51)
HGB BLD-MCNC: 15.9 G/DL (ref 13–17.7)
IMM GRANULOCYTES # BLD AUTO: 0.01 10*3/MM3 (ref 0–0.05)
IMM GRANULOCYTES NFR BLD AUTO: 0.2 % (ref 0–0.5)
INR PPP: 1 (ref 0.91–1.09)
LEFT ATRIUM VOLUME INDEX: 26.6 ML/M2
LEFT ATRIUM VOLUME: 66.2 ML
LV EF BIPLANE MOD: 62.4 %
LYMPHOCYTES # BLD AUTO: 1.7 10*3/MM3 (ref 0.7–3.1)
LYMPHOCYTES NFR BLD AUTO: 28.9 % (ref 19.6–45.3)
MCH RBC QN AUTO: 31.5 PG (ref 26.6–33)
MCHC RBC AUTO-ENTMCNC: 34.4 G/DL (ref 31.5–35.7)
MCV RBC AUTO: 91.5 FL (ref 79–97)
MONOCYTES # BLD AUTO: 0.5 10*3/MM3 (ref 0.1–0.9)
MONOCYTES NFR BLD AUTO: 8.5 % (ref 5–12)
NEUTROPHILS NFR BLD AUTO: 3.5 10*3/MM3 (ref 1.7–7)
NEUTROPHILS NFR BLD AUTO: 59.4 % (ref 42.7–76)
NRBC BLD AUTO-RTO: 0 /100 WBC (ref 0–0.2)
PLATELET # BLD AUTO: 194 10*3/MM3 (ref 140–450)
PMV BLD AUTO: 9.6 FL (ref 6–12)
POTASSIUM SERPL-SCNC: 4.5 MMOL/L (ref 3.5–5.2)
PROTHROMBIN TIME: 13.7 SECONDS (ref 11.8–14.8)
RBC # BLD AUTO: 5.05 10*6/MM3 (ref 4.14–5.8)
SODIUM SERPL-SCNC: 140 MMOL/L (ref 136–145)
WBC NRBC COR # BLD AUTO: 5.89 10*3/MM3 (ref 3.4–10.8)

## 2025-02-11 PROCEDURE — C1769 GUIDE WIRE: HCPCS | Performed by: EMERGENCY MEDICINE

## 2025-02-11 PROCEDURE — 99153 MOD SED SAME PHYS/QHP EA: CPT | Performed by: EMERGENCY MEDICINE

## 2025-02-11 PROCEDURE — S0260 H&P FOR SURGERY: HCPCS | Performed by: EMERGENCY MEDICINE

## 2025-02-11 PROCEDURE — 63710000001 HYDROCODONE-ACETAMINOPHEN 10-325 MG TABLET: Performed by: EMERGENCY MEDICINE

## 2025-02-11 PROCEDURE — 99152 MOD SED SAME PHYS/QHP 5/>YRS: CPT | Performed by: EMERGENCY MEDICINE

## 2025-02-11 PROCEDURE — 25010000002 LIDOCAINE 2% SOLUTION: Performed by: EMERGENCY MEDICINE

## 2025-02-11 PROCEDURE — 25010000002 BIVALIRUDIN TRIFLUOROACETATE 250 MG RECONSTITUTED SOLUTION 1 EACH VIAL: Performed by: EMERGENCY MEDICINE

## 2025-02-11 PROCEDURE — C1894 INTRO/SHEATH, NON-LASER: HCPCS | Performed by: EMERGENCY MEDICINE

## 2025-02-11 PROCEDURE — C1887 CATHETER, GUIDING: HCPCS | Performed by: EMERGENCY MEDICINE

## 2025-02-11 PROCEDURE — 93005 ELECTROCARDIOGRAM TRACING: CPT | Performed by: EMERGENCY MEDICINE

## 2025-02-11 PROCEDURE — A9270 NON-COVERED ITEM OR SERVICE: HCPCS | Performed by: EMERGENCY MEDICINE

## 2025-02-11 PROCEDURE — C1760 CLOSURE DEV, VASC: HCPCS | Performed by: EMERGENCY MEDICINE

## 2025-02-11 PROCEDURE — C1874 STENT, COATED/COV W/DEL SYS: HCPCS | Performed by: EMERGENCY MEDICINE

## 2025-02-11 PROCEDURE — 92928 PRQ TCAT PLMT NTRAC ST 1 LES: CPT | Performed by: EMERGENCY MEDICINE

## 2025-02-11 PROCEDURE — 85610 PROTHROMBIN TIME: CPT | Performed by: EMERGENCY MEDICINE

## 2025-02-11 PROCEDURE — C1725 CATH, TRANSLUMIN NON-LASER: HCPCS | Performed by: EMERGENCY MEDICINE

## 2025-02-11 PROCEDURE — 93458 L HRT ARTERY/VENTRICLE ANGIO: CPT | Performed by: EMERGENCY MEDICINE

## 2025-02-11 PROCEDURE — 25010000002 HEPARIN (PORCINE) 1000-0.9 UT/500ML-% SOLUTION: Performed by: EMERGENCY MEDICINE

## 2025-02-11 PROCEDURE — 25010000002 HEPARIN (PORCINE) 2000-0.9 UNIT/L-% SOLUTION: Performed by: EMERGENCY MEDICINE

## 2025-02-11 PROCEDURE — 80048 BASIC METABOLIC PNL TOTAL CA: CPT | Performed by: EMERGENCY MEDICINE

## 2025-02-11 PROCEDURE — 25010000002 FENTANYL CITRATE (PF) 50 MCG/ML SOLUTION: Performed by: EMERGENCY MEDICINE

## 2025-02-11 PROCEDURE — 63710000001 ROSUVASTATIN 20 MG TABLET: Performed by: EMERGENCY MEDICINE

## 2025-02-11 PROCEDURE — C9600 PERC DRUG-EL COR STENT SING: HCPCS | Performed by: EMERGENCY MEDICINE

## 2025-02-11 PROCEDURE — 85025 COMPLETE CBC W/AUTO DIFF WBC: CPT | Performed by: EMERGENCY MEDICINE

## 2025-02-11 PROCEDURE — 25010000002 DIPHENHYDRAMINE PER 50 MG: Performed by: EMERGENCY MEDICINE

## 2025-02-11 PROCEDURE — 25010000002 MIDAZOLAM HCL (PF) 5 MG/5ML SOLUTION: Performed by: EMERGENCY MEDICINE

## 2025-02-11 PROCEDURE — 93306 TTE W/DOPPLER COMPLETE: CPT

## 2025-02-11 PROCEDURE — 63710000001 GABAPENTIN 300 MG CAPSULE: Performed by: EMERGENCY MEDICINE

## 2025-02-11 PROCEDURE — 93010 ELECTROCARDIOGRAM REPORT: CPT | Performed by: EMERGENCY MEDICINE

## 2025-02-11 PROCEDURE — 25510000001 IOPAMIDOL PER 1 ML: Performed by: EMERGENCY MEDICINE

## 2025-02-11 DEVICE — XIENCE SKYPOINT™ EVEROLIMUS ELUTING CORONARY STENT SYSTEM 3.50 MM X 23 MM / RAPID-EXCHANGE
Type: IMPLANTABLE DEVICE | Site: CORONARY | Status: FUNCTIONAL
Brand: XIENCE SKYPOINT™

## 2025-02-11 DEVICE — XIENCE SKYPOINT™ EVEROLIMUS ELUTING CORONARY STENT SYSTEM 3.50 MM X 18 MM / RAPID-EXCHANGE
Type: IMPLANTABLE DEVICE | Site: CORONARY | Status: FUNCTIONAL
Brand: XIENCE SKYPOINT™

## 2025-02-11 DEVICE — XIENCE SKYPOINT™ EVEROLIMUS ELUTING CORONARY STENT SYSTEM 2.50 MM X 23 MM / RAPID-EXCHANGE
Type: IMPLANTABLE DEVICE | Site: CORONARY | Status: FUNCTIONAL
Brand: XIENCE SKYPOINT™

## 2025-02-11 DEVICE — XIENCE SKYPOINT™ EVEROLIMUS ELUTING CORONARY STENT SYSTEM 2.50 MM X 18 MM / RAPID-EXCHANGE
Type: IMPLANTABLE DEVICE | Site: CORONARY | Status: FUNCTIONAL
Brand: XIENCE SKYPOINT™

## 2025-02-11 RX ORDER — ROSUVASTATIN CALCIUM 20 MG/1
40 TABLET, COATED ORAL NIGHTLY
Status: DISCONTINUED | OUTPATIENT
Start: 2025-02-11 | End: 2025-02-12 | Stop reason: HOSPADM

## 2025-02-11 RX ORDER — METOPROLOL TARTRATE 25 MG/1
25 TABLET, FILM COATED ORAL EVERY 12 HOURS SCHEDULED
Status: DISCONTINUED | OUTPATIENT
Start: 2025-02-11 | End: 2025-02-11

## 2025-02-11 RX ORDER — FENTANYL CITRATE 50 UG/ML
INJECTION, SOLUTION INTRAMUSCULAR; INTRAVENOUS
Status: DISCONTINUED | OUTPATIENT
Start: 2025-02-11 | End: 2025-02-11 | Stop reason: HOSPADM

## 2025-02-11 RX ORDER — ONDANSETRON 4 MG/1
4 TABLET, ORALLY DISINTEGRATING ORAL EVERY 6 HOURS PRN
Status: DISCONTINUED | OUTPATIENT
Start: 2025-02-11 | End: 2025-02-12 | Stop reason: HOSPADM

## 2025-02-11 RX ORDER — LIDOCAINE HYDROCHLORIDE 20 MG/ML
INJECTION, SOLUTION INFILTRATION; PERINEURAL
Status: DISCONTINUED | OUTPATIENT
Start: 2025-02-11 | End: 2025-02-11 | Stop reason: HOSPADM

## 2025-02-11 RX ORDER — PANTOPRAZOLE SODIUM 40 MG/1
40 TABLET, DELAYED RELEASE ORAL
Status: DISCONTINUED | OUTPATIENT
Start: 2025-02-12 | End: 2025-02-12 | Stop reason: HOSPADM

## 2025-02-11 RX ORDER — CYCLOBENZAPRINE HCL 10 MG
10 TABLET ORAL 3 TIMES DAILY PRN
Status: DISCONTINUED | OUTPATIENT
Start: 2025-02-11 | End: 2025-02-12 | Stop reason: HOSPADM

## 2025-02-11 RX ORDER — ONDANSETRON 2 MG/ML
4 INJECTION INTRAMUSCULAR; INTRAVENOUS EVERY 6 HOURS PRN
Status: DISCONTINUED | OUTPATIENT
Start: 2025-02-11 | End: 2025-02-12 | Stop reason: HOSPADM

## 2025-02-11 RX ORDER — HYDROCODONE BITARTRATE AND ACETAMINOPHEN 10; 325 MG/1; MG/1
1 TABLET ORAL 2 TIMES DAILY
Status: DISCONTINUED | OUTPATIENT
Start: 2025-02-11 | End: 2025-02-12 | Stop reason: HOSPADM

## 2025-02-11 RX ORDER — ASPIRIN 81 MG/1
81 TABLET, CHEWABLE ORAL DAILY
Status: DISCONTINUED | OUTPATIENT
Start: 2025-02-12 | End: 2025-02-12 | Stop reason: HOSPADM

## 2025-02-11 RX ORDER — HEPARIN SODIUM 200 [USP'U]/100ML
INJECTION, SOLUTION INTRAVENOUS
Status: DISCONTINUED | OUTPATIENT
Start: 2025-02-11 | End: 2025-02-11 | Stop reason: HOSPADM

## 2025-02-11 RX ORDER — GABAPENTIN 300 MG/1
300 CAPSULE ORAL 3 TIMES DAILY
Status: DISCONTINUED | OUTPATIENT
Start: 2025-02-11 | End: 2025-02-12 | Stop reason: HOSPADM

## 2025-02-11 RX ORDER — SODIUM CHLORIDE 9 MG/ML
3 INJECTION, SOLUTION INTRAVENOUS CONTINUOUS
Status: DISPENSED | OUTPATIENT
Start: 2025-02-11 | End: 2025-02-11

## 2025-02-11 RX ORDER — PRASUGREL 5 MG/1
5 TABLET, FILM COATED ORAL DAILY
Status: DISCONTINUED | OUTPATIENT
Start: 2025-02-12 | End: 2025-02-12 | Stop reason: HOSPADM

## 2025-02-11 RX ORDER — CELECOXIB 200 MG/1
200 CAPSULE ORAL DAILY
Status: DISCONTINUED | OUTPATIENT
Start: 2025-02-12 | End: 2025-02-12 | Stop reason: HOSPADM

## 2025-02-11 RX ORDER — PRASUGREL 10 MG/1
TABLET, FILM COATED ORAL
Status: DISCONTINUED | OUTPATIENT
Start: 2025-02-11 | End: 2025-02-11 | Stop reason: HOSPADM

## 2025-02-11 RX ORDER — SODIUM CHLORIDE 9 MG/ML
50 INJECTION, SOLUTION INTRAVENOUS CONTINUOUS
Status: DISCONTINUED | OUTPATIENT
Start: 2025-02-11 | End: 2025-02-12 | Stop reason: HOSPADM

## 2025-02-11 RX ORDER — ACETAMINOPHEN 325 MG/1
650 TABLET ORAL EVERY 4 HOURS PRN
Status: DISCONTINUED | OUTPATIENT
Start: 2025-02-11 | End: 2025-02-12 | Stop reason: HOSPADM

## 2025-02-11 RX ORDER — DIPHENHYDRAMINE HYDROCHLORIDE 50 MG/ML
INJECTION INTRAMUSCULAR; INTRAVENOUS
Status: DISCONTINUED | OUTPATIENT
Start: 2025-02-11 | End: 2025-02-11 | Stop reason: HOSPADM

## 2025-02-11 RX ORDER — NITROGLYCERIN 0.4 MG/1
0.4 TABLET SUBLINGUAL
Status: DISCONTINUED | OUTPATIENT
Start: 2025-02-11 | End: 2025-02-12 | Stop reason: HOSPADM

## 2025-02-11 RX ORDER — CETIRIZINE HYDROCHLORIDE 10 MG/1
10 TABLET ORAL DAILY
Status: DISCONTINUED | OUTPATIENT
Start: 2025-02-12 | End: 2025-02-12 | Stop reason: HOSPADM

## 2025-02-11 RX ORDER — MIDAZOLAM HYDROCHLORIDE 5 MG/5ML
INJECTION, SOLUTION INTRAMUSCULAR; INTRAVENOUS
Status: DISCONTINUED | OUTPATIENT
Start: 2025-02-11 | End: 2025-02-11 | Stop reason: HOSPADM

## 2025-02-11 RX ORDER — IOPAMIDOL 755 MG/ML
INJECTION, SOLUTION INTRAVASCULAR
Status: DISCONTINUED | OUTPATIENT
Start: 2025-02-11 | End: 2025-02-11 | Stop reason: HOSPADM

## 2025-02-11 RX ORDER — NITROGLYCERIN 0.4 MG/1
0.4 TABLET SUBLINGUAL
Status: DISCONTINUED | OUTPATIENT
Start: 2025-02-11 | End: 2025-02-11 | Stop reason: SDUPTHER

## 2025-02-11 RX ADMIN — HYDROCODONE BITARTRATE AND ACETAMINOPHEN 1 TABLET: 10; 325 TABLET ORAL at 20:11

## 2025-02-11 RX ADMIN — GABAPENTIN 300 MG: 300 CAPSULE ORAL at 20:11

## 2025-02-11 RX ADMIN — ROSUVASTATIN 40 MG: 20 TABLET, FILM COATED ORAL at 20:10

## 2025-02-11 NOTE — OP NOTE
"  [facility]  Date of procedure: 2/11/2025     Procedures performed:     1.  Access to the right femoral artery via modified Seldinger technique and ultrasound guidance  2.  Left heart catheterization with retrograde crossed aortic valve to the left ventricle pressure recorded  3.  LV ventriculography  4.  Selective bilateral coronary angiography  5.  Conscious sedation with continuous hemodynamic monitoring for 1 hour and 5 minutes  6.  Patent hemostasis achieved in the right femoral artery access site using 6 [place] Angio-Seal closure device  7.  Successful PTCA to the left circumflex with a trek Rx 3 x 12 mm balloon  8.  Successful PCI to the proximal left circumflex with a Xience alejandra point 3.5 x 18 mm drug-eluting stent  9.  Successful direct PCI to the mid RCA with a Xience alejandra point 2.5 x 23 mm drug-eluting stent  10.  Successful direct PCI to the proximal RCA with a Xience alejandra point 3.5 x 23 mm drug-eluting stent  11.  Successful direct PCI to the mid/distal RCA with a Xience alejandra point 2.5 x 18 mm drug-eluting stent  12.  Successful noncompliant PTCA to the RCA with an NC trek Tej 4 x 20 mm balloon times multiple inflations and the recently deployed stents    Risk, Benefits, and Alternatives discussed with the patient and/or family.  Plan is for moderate sedation and the patient agrees to proceed with the procedure.  An immediate assessment was done prior to the administration of moderate sedation     Indication: Unstable angina with symptoms similar to when stent was placed in the LAD years ago, history of coronary artery disease status post PCI to the LAD and known  of distal RCA  Premedication: Versed, fentanyl  Contrast: Isovue 258 cc  Radiation: Flouro time= 14.3. Air Kerma= 1043 mGy  Catheters: 6Fr JL4, 6Fr JR4, 6Fr angled pigtail  Guiding catheter: XB 3.5, JR4 guide  PCI Hardware: .014\" BMW wire, 0.014 inch whisper export wire, GuideLiner support catheter, balloons of stents " as outlined above    Procedural details:    The patient was brought to the cath lab and prepped and draped in the usual fashion.  Access was obtained in the right femoral artery via modified Seldinger technique and ultrasound guidance.  A 6 Chinese sheath was placed into the artery and flushed.  Next, a JL 4 catheter was inserted and used to engage the left main and selective left coronary angiography was performed in multiple views.  We then inserted a JR4 which was used to engage the right and selective right coronary angiography was performed in multiple views.  Next, pigtail catheter was inserted and used to cross the aortic valve to the left ventricle pressures were recorded and LV ventriculography was performed.  This catheter was then pulled back across the aortic valve and again pressures were recorded.  Next, an XB 3.5 catheter was inserted and used to engage the left main.  A BMW wire was inserted and advanced into the distal left circumflex.  We then inserted a whisper export wire and tried to advance it into the obtuse marginal branch for protection but was ultimately unsuccessful due to the acute angle.  Next, a trek Rx 3 x 12 mm balloon was inserted into the left circumflex where it was inflated across the lesion.  We then tried to advance the stent but it would not make the turn into the left circumflex from the left main therefore GuideLiner support catheter was inserted.  The stent was then able to be delivered into the proximal left circumflex where it was deployed at 12 ryder for 40 seconds.  Postintervention angiography was performed in multiple views.  Next, everything was withdrawn through the sheath and a JR4 guide was inserted and used to engage the right coronary artery.  The BMW wire was inserted and advanced into the distal vessel.  We then inserted a Xience alejandra point 2.5 x 23 mm drug-eluting stent into the mid vessel where it was deployed at 10 ryder for 40 seconds.  We then inserted a Xience  Skypoint 3.5 x 23 mm stent into the proximal vessel in an overlap fashion where was deployed at 14 ryder for 40 seconds.  Next, an NC trek Tej 4 x 20 mm balloon was inserted into the recently deployed stents where was inflated multiple times in the RCA.  We then inserted a Xience alejandra point 2.5 x 18 mm drug-eluting stent into the mid/distal vessel where was deployed at 12 ryder for 40 seconds.  Postintervention angiography performed multiple views.  Everything was then withdrawn through the sheath and the sheath was flushed.  Patent hemostasis was achieved in the right femoral artery access site using a 6 Indian Angio-Seal closure device.  Patient tolerated the procedure well without any complications.  He left the Cath Lab in stable condition.      I supervised the administration of conscious sedation by nursing staff throughout the case.  First dose was given at 1330 and the end of my face-to-face encounter was at 1435, accounting for a total of 1 hour and 5 minutes of supervision.  During the case, continuous pulse oximetry, heart rate, blood pressure, and patient status were monitored.     Findings:    Hemodynamics:    Aortic: 121/56 mmHg  LV: 131/1 mmHg  LVEDP: 16 mmHg    Left ventriculography:  1. The contractility of the left ventricle is normal.  Estimated ejection fraction 65%.  2.  No significant gradient across aortic valve on pullback      Selective coronary angiography:     Left main: Large-caliber vessel that bifurcates into the LAD and left circumflex, mild disease noted in the distal segment, MARIA ESTHER-3 flow    LAD: Large-caliber vessel with a patent stent in the mid vessel with no significant in-stent stenosis, MARIA ESTHER-3 flow    Diagonals: Small caliber vessels    Left circumflex: Large-caliber vessel with 70 to 80% stenosis in the proximal vessel prior to the bifurcation of the second diagonal branch, remainder the vessel has minor luminal irregularities, MARIA ESTHER-3 flow.  Status post successful PTCA and stent  placement we had continuation MARIA ESTHER-3 flow and 0% residual stenosis remaining    Obtuse marginals: The first OM is small caliber, the second OM is moderate caliber with mild disease    RCA: Large-caliber vessel with diffuse disease throughout the proximal mid and distal vessels.  There is a 70 to 80% stenosis in the proximal vessel, the mid vessel has disease ranging from 20% up to 70%, the distal vessel is chronic totally occluded.  The PDA and AURA fill retrograde and antegrade via an extensive collateral network.  Status post successful direct PCI x 3 overlapping stents and noncompliant PTCA we had continuation of MARIA ESTHER-3 flow and 0% residual stenosis remaining in the segments that were intervened upon.    PDA/AURA: AURA is small caliber, PDA is moderate caliber with mild disease      Estimated Blood Loss: 50 cc    Specimens: None    Complications: None       Impression:  1.  Coronary artery disease as described above including significant lesions noted in the left circumflex and right coronary artery that underwent successful PTCA and stent placement with continuation MARIA ESTHER-3 flow and 0% residual stenosis  2.  Significant fatigue with low heart rates.  3.  Hyperlipidemia      Plan:   1.  We will admit to the floor monitor overnight with plans for discharge home in the morning possibly  2.  At office visit last week, I cut the beta-blocker in half but patient remains fatigued and heart rates remain in the 40s.  We will stop the beta-blocker altogether and hopefully get his heart rates up and eventually more energy and less fatigue  3.  Dual antiplatelet therapy continuation with aspirin and Effient for 6 months minimum.  Can drop to Effient in 6 months only  4.  Continue high intensity statin with Crestor  5.  Referral for cardiac rehab  6.  Follow-up in the cardiology clinic in the near future to continue to optimize his regimen    Shaquille Hewitt, DO  Interventional cardiology  [facility]  group  February 11, 2025

## 2025-02-11 NOTE — Clinical Note
First balloon inflation max pressure = 14 ryder. First balloon inflation duration = 30 seconds. Second inflation of balloon - Max pressure = 16 ryder. 2nd Inflation of balloon - Duration = 20 seconds. Third inflation of balloon - Max pressure = 18 ryder. 3rd Inflation of balloon - Duration = 20 seconds.

## 2025-02-11 NOTE — H&P
Patient seen and examined at bedside.  History and physical not changed as below.    We have performing a diagnostic left heart catheterization with possible intervention based on findings.    Risk, benefits and alternatives were explained and patient wished to proceed.      [facility] - CARDIOLOGY  New Patient Initial Outpatient [facility]     Primary Care Physician: Carl Vu MD     Subjective           Chief Complaint   Patient presents with    Coronary Artery Disease       1 YR FU (FORD)         History of Present Illness  History of Present Illness  The patient presents for evaluation of bradycardia.     He reports a persistent low heart rate, managed with Lopressor 50 mg nightly. He does not experience dizziness or lightheadedness but has been experiencing increased fatigue and excessive sleepiness recently. His last consultation with Dr. Stiles was in 01/2024. He has a history of stent placement in the left anterior descending artery ( maker) approximately 3 to 4 years ago, following his first myocardial infarction in 1994, which resulted in complete occlusion of one artery and significant stenosis in two others. Prior to the stent placement, he experienced similar symptoms of rapid fatigue and excessive sleepiness. He also reports an episode of chest pain, described as a sudden, unusual sensation over the heart area. He is not currently on baby aspirin. He experiences mild dyspnea on exertion, particularly during ambulation, but does not report any episodes of dizziness, lightheadedness, palpitations, or irregular heartbeats.     MEDICATIONS  Lopressor, Effient, Crestor     Review of Systems   Constitutional: Positive for malaise/fatigue. Negative for diaphoresis and fever.   HENT:  Negative for congestion.    Eyes:  Negative for vision loss in left eye and vision loss in right eye.   Cardiovascular:  Positive for chest pain and dyspnea on exertion. Negative for claudication, irregular heartbeat,  leg swelling, orthopnea, palpitations and syncope.   Respiratory:  Negative for cough, shortness of breath and wheezing.    Hematologic/Lymphatic: Negative for adenopathy.   Skin:  Negative for rash.   Musculoskeletal:  Negative for joint pain and joint swelling.   Gastrointestinal:  Negative for abdominal pain, diarrhea, nausea and vomiting.   Neurological:  Positive for weakness. Negative for excessive daytime sleepiness, dizziness, focal weakness, light-headedness and numbness.   Psychiatric/Behavioral:  Negative for depression. The patient does not have insomnia.          Otherwise complete ROS reviewed and negative except as mentioned in the HPI.        Past Medical History:   Medical History        Past Medical History:   Diagnosis Date    Allergic rhinitis due to allergen 08/08/2017    Arthritis      Asthma sometimes    Back pain      CHF (congestive heart failure)      Chronic ethmoidal sinusitis 05/18/2017    Chronic maxillary sinusitis 05/18/2017    Coronary artery disease 1984     stent LAD, Cx, RCA     GERD (gastroesophageal reflux disease)      Heart attack 1994     Dr Stiles     Heartburn      HL (hearing loss)      Hyperlipidemia      Hypertension      Myocardial infarction      Pain       back, & Neck             Past Surgical History:  Surgical History         Past Surgical History:   Procedure Laterality Date    ANKLE SURGERY        BACK SURGERY        CARDIAC CATHETERIZATION   1984, 1994    CARDIAC CATHETERIZATION N/A 10/14/2021     Procedure: Left Heart Cath;  Surgeon: Kishore Stiles MD;  Location: [facility] CATH INVASIVE LOCATION;  Service: Cardiology;  Laterality: N/A;    CHOLECYSTECTOMY   2014     Lararocopic     COLONOSCOPY        CORONARY STENT PLACEMENT        ENDOSCOPIC FUNCTIONAL SINUS SURGERY (FESS) N/A 06/12/2017     Procedure: ENDOSCOPIC FUNCTIONAL SINUS SURGERY WITH LEFT MIDDLE MEATAL ANTROSTOMY AND LEFT ETHMOIDECTOMY;  Surgeon: Jona Garibay MD;  Location: [facility] OR;  Service:      ENDOSCOPIC FUNCTIONAL SINUS SURGERY (FESS) Bilateral 08/10/2020     Procedure: Septoplasty Resection of left sammy bullosa deformity Bilateral maxillary antrostomy and uncinectomy;  Surgeon: Jona Garibay MD;  Location: [facility] OR;  Service: ENT;  Laterality: Bilateral;    ETHMOIDECTOMY Left 06/12/2017     Procedure: ETHMOIDECTOMY ENDOSCOPIC;  Surgeon: Jona Garibay MD;  Location: [facility] OR;  Service:     EYE SURGERY         LASIK    HAND SURGERY        JOINT REPLACEMENT   both hips, left recent May2023    SINUS SURGERY        TOTAL HIP ARTHROPLASTY Bilateral      TOTAL HIP ARTHROPLASTY REVISION Left 11/14/2023     Dr. Swann            Family History: family history includes Arthritis in his mother; Asthma in his brother; Cancer in his brother, brother, and father; Lung cancer in his father; Rheum arthritis in his mother; Stroke in his brother and brother.     Social History:  reports that he quit smoking about 31 years ago. His smoking use included cigarettes. He started smoking about 60 years ago. He has a 43.5 pack-year smoking history. He has never used smokeless tobacco. He reports that he does not currently use alcohol. He reports that he does not use drugs.     Medications:          Prior to Admission medications    Medication Sig Start Date End Date Taking? Authorizing Provider   celecoxib (CeleBREX) 200 MG capsule Take 1 capsule by mouth Daily.     Yes Bassam Ponce MD   cetirizine (zyrTEC) 10 MG tablet Take 1 tablet by mouth Daily.     Yes Bassam Ponce MD   Cholecalciferol (VITAMIN D3) 5000 UNITS capsule capsule Take 1 capsule by mouth 3 (Three) Times a Week. Monday, Wednesday, Friday     Yes Bassam Ponce MD   cyclobenzaprine (FLEXERIL) 10 MG tablet Take 1 tablet by mouth 3 (Three) Times a Day As Needed. 3/14/21   Yes Bassam Ponce MD   diphenhydrAMINE-acetaminophen (Tylenol PM Extra Strength)  MG tablet per tablet Take 2 tablets by mouth Every Night.      Yes Provider, MD Bassam   esomeprazole (nexIUM) 20 MG capsule Take 1 capsule by mouth 2 (Two) Times a Day. 1/30/24   Yes Mario Melara APRN   gabapentin (NEURONTIN) 300 MG capsule Take 1 capsule by mouth 3 (Three) Times a Day.     Yes Provider, MD Bassam   HYDROcodone-acetaminophen (NORCO)  MG per tablet Take 1 tablet by mouth 2 (Two) Times a Day. BACK PAIN     Yes ProviderBassam MD   Magnesium 400 MG capsule   6/15/24   Yes Provider, MD Bassam   metoprolol tartrate (LOPRESSOR) 50 MG tablet Take 0.5 tablets by mouth every night at bedtime. 2/3/25   Yes Shaquille Hewitt, DO   Multiple Vitamins-Minerals (MULTIVITAMIN WITH MINERALS) tablet tablet Take 1 tablet by mouth Daily.     Yes ProviderBassam MD   multivitamin with minerals (Vitamin D3 Complete) tablet tablet       Yes Provider, MD Bassam   nitroglycerin (NITROSTAT) 0.4 MG SL tablet 1 under the tongue as needed for angina, may repeat q5mins for up three doses 9/17/21   Yes Giovanna Márquez APRN   Omeprazole 20 MG tablet delayed-release Take 20 mg by mouth 2 (Two) Times a Day. 9/25/24   Yes Mario Melara APRN   prasugrel (EFFIENT) 5 MG tablet Take 1 tablet by mouth Daily. 4/23/24   Yes Giovanna Márquez APRN   Psyllium (DAILY FIBER PO) Take  by mouth.     Yes ProviderBassam MD   rosuvastatin (CRESTOR) 40 MG tablet Take 1 tablet by mouth Every Night. 9/26/24   Yes Giovanna Márquez APRN   metoprolol tartrate (LOPRESSOR) 50 MG tablet Take 1 tablet by mouth every night at bedtime. 9/13/24 2/3/25 Yes Alejandrina Serrano APRN   fluticasone (FLONASE) 50 MCG/ACT nasal spray 2 sprays into the nostril(s) as directed by provider Daily for 30 days. 9/16/24 10/16/24   Mario Melara APRN      Allergies:  Allergies         Allergies   Allergen Reactions    Brilinta [Ticagrelor] Shortness Of Breath       Gasping for air               Objective      Vital Signs: /64   Pulse 54   SpO2 99%      Vitals and  nursing note reviewed.   Constitutional:       Appearance: Normal and healthy appearance. Well-developed and not in distress.   Eyes:      Extraocular Movements: Extraocular movements intact.      Pupils: Pupils are equal, round, and reactive to light.   HENT:      Head: Normocephalic and atraumatic.    Mouth/Throat:      Pharynx: Oropharynx is clear.   Neck:      Vascular: JVD normal.      Trachea: Trachea normal.   Pulmonary:      Effort: Pulmonary effort is normal.      Breath sounds: Normal breath sounds. No wheezing. No rhonchi. No rales.   Cardiovascular:      PMI at left midclavicular line. Bradycardia present. Regular rhythm. Normal S1. Normal S2.       Murmurs: There is no murmur.      No gallop.  No click. No rub.   Pulses:     Dorsalis pedis: 2+ bilaterally.     Posterior tibial: 2+ bilaterally.  Abdominal:      General: Bowel sounds are normal.      Palpations: Abdomen is soft.      Tenderness: There is no abdominal tenderness.   Musculoskeletal: Normal range of motion.      Cervical back: Normal range of motion and neck supple. Skin:     General: Skin is warm and dry.      Capillary Refill: Capillary refill takes less than 2 seconds.   Feet:      Right foot:      Skin integrity: Skin integrity normal.      Left foot:      Skin integrity: Skin integrity normal.   Neurological:      Mental Status: Alert and oriented to person, place and time.      Sensory: Sensation is intact.      Motor: Motor function is intact.      Coordination: Coordination is intact.   Psychiatric:         Speech: Speech normal.         Behavior: Behavior is cooperative.         Physical Exam  Vital Signs  Heart rate is in the 40s.     Results Reviewed:        ECG 12 Lead     Date/Time: 2/3/2025 9:59 AM  Performed by: Shaquille Hewitt DO     Authorized by: Shaquille Hewitt DO  Comparison: compared with previous ECG   Similar to previous ECG  Rhythm: sinus bradycardia  Rate: bradycardic  Conduction: conduction  normal  QRS axis: normal     Clinical impression: abnormal EKG              Results  Laboratory Studies  Cholesterol level was 61 last month.     Testing  Stress test couple of years ago was normal.           Lab Results   Component Value Date     TRIG 82 01/08/2025     HDL 56 01/08/2025     VLDL 16 01/08/2025            Lab Results   Component Value Date     HGBA1C 5.70 (H) 01/08/2025         Assessment / Plan         Problem List Items Addressed This Visit         Morbid obesity     Mixed hyperlipidemia     RESOLVED: Coronary artery disease of native artery of native heart with stable angina pectoris     Relevant Medications     metoprolol tartrate (LOPRESSOR) 50 MG tablet     Primary hypertension - Primary     Relevant Medications     metoprolol tartrate (LOPRESSOR) 50 MG tablet     Other Relevant Orders     Case Request Cath Lab: Left Heart Cath (Completed)     Coronary artery chronic total occlusion     Relevant Medications     metoprolol tartrate (LOPRESSOR) 50 MG tablet     S/P coronary artery stent placement     CAD S/P percutaneous coronary angioplasty     Relevant Medications     metoprolol tartrate (LOPRESSOR) 50 MG tablet     Other Relevant Orders     Case Request Cath Lab: Left Heart Cath (Completed)     THOMPSON (dyspnea on exertion)     Relevant Orders     Adult Transthoracic Echo Complete W/ Cont if Necessary Per Protocol     Case Request Cath Lab: Left Heart Cath (Completed)     Stable angina     Relevant Medications     metoprolol tartrate (LOPRESSOR) 50 MG tablet      Assessment & Plan  1. Bradycardia.  His heart rate is consistently in the 40s, which may be contributing to his symptoms of fatigue and excessive sleepiness. He is currently on Lopressor 50 mg every night. To address the bradycardia, he is advised to reduce the dosage of Lopressor to half, which may help alleviate the fatigue. He will continue taking Effient, Crestor, and baby aspirin as prescribed.     2. Coronary Artery Disease.  He has  a history of a stent placement in the left anterior descending artery ( maker) 4 years ago. He reports similar symptoms now, including fatigue and occasional chest pain. A heart catheterization is scheduled for 02/11/2025 to assess the severity of the blockages. An echocardiogram will also be performed on the same day to evaluate cardiac function and valve integrity. He is advised to continue his current medications, including Effient and Crestor.     3. Hyperlipidemia.  His cholesterol levels were checked last month and were excellent. He is currently on Crestor for cholesterol management. He should continue taking Crestor as prescribed.     PROCEDURE  Stent placement in the left anterior descending artery approximately 3 to 4 years ago.        Transcribed from ambient dictation for Shaquille Hewitt DO by Shaquille Hewitt DO.  02/03/25   09:58 CST     Patient or patient representative verbalized consent for the use of Ambient Listening during the visit with  Shaquille Hewitt DO for chart documentation. 2/3/2025  10:00 CST

## 2025-02-12 VITALS
OXYGEN SATURATION: 96 % | SYSTOLIC BLOOD PRESSURE: 153 MMHG | RESPIRATION RATE: 16 BRPM | WEIGHT: 281.4 LBS | BODY MASS INDEX: 36.11 KG/M2 | DIASTOLIC BLOOD PRESSURE: 80 MMHG | HEIGHT: 74 IN | TEMPERATURE: 97.8 F | HEART RATE: 66 BPM

## 2025-02-12 DIAGNOSIS — Z95.5 S/P DRUG ELUTING CORONARY STENT PLACEMENT: Primary | ICD-10-CM

## 2025-02-12 LAB
ANION GAP SERPL CALCULATED.3IONS-SCNC: 10 MMOL/L (ref 5–15)
BUN SERPL-MCNC: 26 MG/DL (ref 8–23)
BUN/CREAT SERPL: 26.8 (ref 7–25)
CALCIUM SPEC-SCNC: 8.8 MG/DL (ref 8.6–10.5)
CHLORIDE SERPL-SCNC: 105 MMOL/L (ref 98–107)
CO2 SERPL-SCNC: 27 MMOL/L (ref 22–29)
CREAT SERPL-MCNC: 0.97 MG/DL (ref 0.76–1.27)
DEPRECATED RDW RBC AUTO: 43.6 FL (ref 37–54)
EGFRCR SERPLBLD CKD-EPI 2021: 84.5 ML/MIN/1.73
ERYTHROCYTE [DISTWIDTH] IN BLOOD BY AUTOMATED COUNT: 12.4 % (ref 12.3–15.4)
GLUCOSE SERPL-MCNC: 92 MG/DL (ref 65–99)
HCT VFR BLD AUTO: 47.4 % (ref 37.5–51)
HGB BLD-MCNC: 15.4 G/DL (ref 13–17.7)
MCH RBC QN AUTO: 30.9 PG (ref 26.6–33)
MCHC RBC AUTO-ENTMCNC: 32.5 G/DL (ref 31.5–35.7)
MCV RBC AUTO: 95 FL (ref 79–97)
PLATELET # BLD AUTO: 184 10*3/MM3 (ref 140–450)
PMV BLD AUTO: 10 FL (ref 6–12)
POTASSIUM SERPL-SCNC: 4.3 MMOL/L (ref 3.5–5.2)
QT INTERVAL: 452 MS
QTC INTERVAL: 400 MS
RBC # BLD AUTO: 4.99 10*6/MM3 (ref 4.14–5.8)
SODIUM SERPL-SCNC: 142 MMOL/L (ref 136–145)
WBC NRBC COR # BLD AUTO: 7.79 10*3/MM3 (ref 3.4–10.8)

## 2025-02-12 PROCEDURE — 63710000001 HYDROCODONE-ACETAMINOPHEN 10-325 MG TABLET: Performed by: EMERGENCY MEDICINE

## 2025-02-12 PROCEDURE — 93010 ELECTROCARDIOGRAM REPORT: CPT | Performed by: EMERGENCY MEDICINE

## 2025-02-12 PROCEDURE — A9270 NON-COVERED ITEM OR SERVICE: HCPCS | Performed by: EMERGENCY MEDICINE

## 2025-02-12 PROCEDURE — A9270 NON-COVERED ITEM OR SERVICE: HCPCS | Performed by: NURSE PRACTITIONER

## 2025-02-12 PROCEDURE — 63710000001 CELECOXIB 200 MG CAPSULE: Performed by: EMERGENCY MEDICINE

## 2025-02-12 PROCEDURE — 63710000001 GABAPENTIN 300 MG CAPSULE: Performed by: EMERGENCY MEDICINE

## 2025-02-12 PROCEDURE — 80048 BASIC METABOLIC PNL TOTAL CA: CPT | Performed by: EMERGENCY MEDICINE

## 2025-02-12 PROCEDURE — 63710000001 CETIRIZINE 10 MG TABLET: Performed by: EMERGENCY MEDICINE

## 2025-02-12 PROCEDURE — 63710000001 ASPIRIN 81 MG CHEWABLE TABLET: Performed by: EMERGENCY MEDICINE

## 2025-02-12 PROCEDURE — 63710000001 PANTOPRAZOLE 40 MG TABLET DELAYED-RELEASE: Performed by: EMERGENCY MEDICINE

## 2025-02-12 PROCEDURE — 99239 HOSP IP/OBS DSCHRG MGMT >30: CPT | Performed by: NURSE PRACTITIONER

## 2025-02-12 PROCEDURE — 63710000001 CYCLOBENZAPRINE 10 MG TABLET: Performed by: EMERGENCY MEDICINE

## 2025-02-12 PROCEDURE — 63710000001 ACETAMINOPHEN 325 MG TABLET: Performed by: EMERGENCY MEDICINE

## 2025-02-12 PROCEDURE — 63710000001 PRASUGREL 5 MG TABLET: Performed by: EMERGENCY MEDICINE

## 2025-02-12 PROCEDURE — 93005 ELECTROCARDIOGRAM TRACING: CPT | Performed by: EMERGENCY MEDICINE

## 2025-02-12 PROCEDURE — 85027 COMPLETE CBC AUTOMATED: CPT | Performed by: EMERGENCY MEDICINE

## 2025-02-12 PROCEDURE — 63710000001 PRASUGREL 5 MG TABLET: Performed by: NURSE PRACTITIONER

## 2025-02-12 RX ORDER — PRASUGREL 5 MG/1
5 TABLET, FILM COATED ORAL ONCE
Status: COMPLETED | OUTPATIENT
Start: 2025-02-12 | End: 2025-02-12

## 2025-02-12 RX ORDER — ASPIRIN 81 MG/1
81 TABLET, CHEWABLE ORAL DAILY
Start: 2025-02-13

## 2025-02-12 RX ORDER — PRASUGREL 10 MG/1
10 TABLET, FILM COATED ORAL DAILY
Qty: 30 TABLET | Refills: 11 | Status: SHIPPED | OUTPATIENT
Start: 2025-02-12

## 2025-02-12 RX ADMIN — ASPIRIN 81 MG: 81 TABLET, CHEWABLE ORAL at 08:18

## 2025-02-12 RX ADMIN — PANTOPRAZOLE SODIUM 40 MG: 40 TABLET, DELAYED RELEASE ORAL at 05:40

## 2025-02-12 RX ADMIN — PRASUGREL 5 MG: 5 TABLET, FILM COATED ORAL at 08:17

## 2025-02-12 RX ADMIN — CYCLOBENZAPRINE HYDROCHLORIDE 10 MG: 10 TABLET, FILM COATED ORAL at 05:40

## 2025-02-12 RX ADMIN — GABAPENTIN 300 MG: 300 CAPSULE ORAL at 08:17

## 2025-02-12 RX ADMIN — CETIRIZINE HYDROCHLORIDE 10 MG: 10 TABLET, FILM COATED ORAL at 08:17

## 2025-02-12 RX ADMIN — ACETAMINOPHEN 650 MG: 325 TABLET ORAL at 05:40

## 2025-02-12 RX ADMIN — PRASUGREL 5 MG: 5 TABLET, FILM COATED ORAL at 10:53

## 2025-02-12 RX ADMIN — HYDROCODONE BITARTRATE AND ACETAMINOPHEN 1 TABLET: 10; 325 TABLET ORAL at 08:17

## 2025-02-12 RX ADMIN — CELECOXIB 200 MG: 200 CAPSULE ORAL at 08:17

## 2025-02-12 NOTE — PLAN OF CARE
Goal Outcome Evaluation:  Plan of Care Reviewed With: patient           Outcome Evaluation: Admit from COU this evening. VSS AOX4 No c/o voiced. Pt had 4 Stents today. Right groin with gauze tegaderm CDI PPP. Bedrest up. Sinus 60-70 per tele.

## 2025-02-12 NOTE — DISCHARGE SUMMARY
[facility] HEART GROUP DISCHARGE    Date of Discharge:  2/12/2025    Discharge Diagnosis:   THOMPSON (dyspnea on exertion)    Primary hypertension    CAD S/P percutaneous coronary angioplasty    Status post coronary angiogram      Presenting Problem/History of Present Illness  Primary hypertension [I10]  CAD S/P percutaneous coronary angioplasty [I25.10, Z98.61]  THOMPSON (dyspnea on exertion) [R06.09]  Status post coronary angiogram [Z98.890]      Hospital Course  Patient is a 69 y.o. male presented yesterday for outpatient cath for unstable angina. Patient has coronary artery disease and follows with Dr. Hewitt outpatient. Heart cath yesterday revealed patent stents in LAD, 70-80% stenosis of Left Circ and 70-80% stenosis of large RCA- with distal . Patient underwent stents to Left Circ and 3 overlapping stents to RCA. He tolerated procedure well. No complaints overnight. Notes he has been up and ambulatory in his room without issue. No cath site complaints. He did have his beta blocker held due to bradycardia. Continued with bradycardia overnight. He was given home dose of Effient which was 5 mg this am. Will give an additional 5 mg and sent in a script for 10 mg daily.       Procedures Performed  Procedure(s):  Left Heart Cath       Consults:   Consults       No orders found for last 30 day(s).            Pertinent Test Results:   Lab Results (last 24 hours)       Procedure Component Value Units Date/Time    Basic Metabolic Panel [[id number]]  (Abnormal) Collected: 02/12/25 0424    Specimen: Blood Updated: 02/12/25 0508     Glucose 92 mg/dL      BUN 26 mg/dL      Creatinine 0.97 mg/dL      Sodium 142 mmol/L      Potassium 4.3 mmol/L      Chloride 105 mmol/L      CO2 27.0 mmol/L      Calcium 8.8 mg/dL      BUN/Creatinine Ratio 26.8     Anion Gap 10.0 mmol/L      eGFR 84.5 mL/min/1.73     Narrative:      GFR Categories in Chronic Kidney Disease (CKD)      GFR Category          GFR (mL/min/1.73)     "Interpretation  G1                     90 or greater         Normal or high (1)  G2                      60-89                Mild decrease (1)  G3a                   45-59                Mild to moderate decrease  G3b                   30-44                Moderate to severe decrease  G4                    15-29                Severe decrease  G5                    14 or less           Kidney failure          (1)In the absence of evidence of kidney disease, neither GFR category G1 or G2 fulfill the criteria for CKD.    eGFR calculation 2021 CKD-EPI creatinine equation, which does not include race as a factor    CBC (No Diff) [[id number]]  (Normal) Collected: 02/12/25 0424    Specimen: Blood Updated: 02/12/25 0450     WBC 7.79 10*3/mm3      RBC 4.99 10*6/mm3      Hemoglobin 15.4 g/dL      Hematocrit 47.4 %      MCV 95.0 fL      MCH 30.9 pg      MCHC 32.5 g/dL      RDW 12.4 %      RDW-SD 43.6 fl      MPV 10.0 fL      Platelets 184 10*3/mm3              Ejection Fraction  Lab Results   Component Value Date    EF 77 04/07/2023       Echo EF Estimated  Results for orders placed during the hospital encounter of 02/11/25    Adult Transthoracic Echo Complete W/ Cont if Necessary Per Protocol    Interpretation Summary    Left ventricular systolic function is normal. Left ventricular ejection fraction appears to be 61 - 65%.    Left ventricular wall thickness is consistent with mild concentric hypertrophy.    Left ventricular diastolic function was normal.    Normal right ventricular cavity size and systolic function noted.    Trace aortic valve regurgitation is present.       Nuclear Stress Ejection Fraction  No components found for: \"NUCEF\"    Condition on Discharge:  Stable     Physical Exam at Discharge    Vital Signs  Temp:  [97.3 °F (36.3 °C)-99 °F (37.2 °C)] 97.8 °F (36.6 °C)  Heart Rate:  [43-70] 66  Resp:  [14-19] 16  BP: (111-153)/(53-88) 153/80    Physical Exam:  Constitutional:       Appearance: Well-developed. "   Eyes:      Pupils: Pupils are equal, round, and reactive to light.   HENT:      Head: Normocephalic and atraumatic.   Neck:      Vascular: No carotid bruit or JVD.   Pulmonary:      Effort: Pulmonary effort is normal.      Breath sounds: Normal breath sounds.   Cardiovascular:      Normal rate. Regular rhythm.      Murmurs: There is no murmur.      Comments: Right cath, soft, nontender.   Pulses:     Intact distal pulses.   Edema:     Peripheral edema absent.   Abdominal:      General: Bowel sounds are normal.      Palpations: Abdomen is soft.   Musculoskeletal: Normal range of motion.      Cervical back: Normal range of motion and neck supple. Skin:     General: Skin is warm and dry.   Neurological:      Mental Status: Alert and oriented to person, place, and time.      Deep Tendon Reflexes: Reflexes are normal and symmetric.   Psychiatric:         Behavior: Behavior normal.         Thought Content: Thought content normal.         Judgment: Judgment normal.          Discharge Disposition  Home or Self Care    Discharge Medications     Discharge Medications        New Medications        Instructions Start Date   aspirin 81 MG chewable tablet   81 mg, Oral, Daily   Start Date: February 13, 2025            Changes to Medications        Instructions Start Date   prasugrel 10 MG tablet  Commonly known as: EFFIENT  What changed:   medication strength  how much to take   10 mg, Oral, Daily             Continue These Medications        Instructions Start Date   celecoxib 200 MG capsule  Commonly known as: CeleBREX   200 mg, Daily      cetirizine 10 MG tablet  Commonly known as: zyrTEC   10 mg, Daily      cyclobenzaprine 10 MG tablet  Commonly known as: FLEXERIL   10 mg, 3 Times Daily PRN      DAILY FIBER PO   Take  by mouth.      fluticasone 50 MCG/ACT nasal spray  Commonly known as: FLONASE   2 sprays, Nasal, Daily      gabapentin 300 MG capsule  Commonly known as: NEURONTIN   300 mg, 3 Times Daily       HYDROcodone-acetaminophen  MG per tablet  Commonly known as: NORCO   1 tablet, 2 Times Daily      Magnesium 400 MG capsule       multivitamin with minerals tablet tablet   1 tablet, Daily      nitroglycerin 0.4 MG SL tablet  Commonly known as: NITROSTAT   1 under the tongue as needed for angina, may repeat q5mins for up three doses      Omeprazole 20 MG tablet delayed-release   20 mg, Oral, 2 Times Daily      rosuvastatin 40 MG tablet  Commonly known as: CRESTOR   40 mg, Oral, Nightly      Tylenol PM Extra Strength 500-25 MG tablet per tablet  Generic drug: diphenhydrAMINE-acetaminophen   2 tablets, Nightly      vitamin D3 125 MCG (5000 UT) capsule capsule   5,000 Units, 3 Times Weekly             Stop These Medications      metoprolol tartrate 50 MG tablet  Commonly known as: LOPRESSOR              Discharge Diet: Low Salt. Heart Healthy.     Activity at Discharge: Do not lift greater than 5 pounds. Do not drive for 48 hours. Do not bend and twist at waist. Shower only for one week, do not submerge in water. Discussed signs and symptoms of infection including drainage, redness, and pain. Discussed routine groin care.      Follow-up Appointments  Future Appointments   Date Time Provider Department Center   5/5/2025  9:45 AM Shaquille Hewitt DO [facility] CD PAD PAD       Plan:  Patient will be discharged home today. Discussed cath site care. He will go home on Aspirin 81 mg daily and Effient 10 mg daily- for 12 months. He has been maintained on 5 mg daily for some time. Continue Crestor. Cardiac rehab. Follow up in cardiology office in 1 month. Loperssor stopped due to bradycardia. Monitor BP Closely at home. Follow up with PCP in 1 week to recheck BP.         Electronically signed by ISAAC West, 02/12/25, 10:08 AM CST.     Time spent on discharge:  32 minutes

## 2025-02-13 LAB
QT INTERVAL: 384 MS
QTC INTERVAL: 426 MS

## 2025-02-15 DIAGNOSIS — J34.2 NASAL SEPTAL DEVIATION: ICD-10-CM

## 2025-02-15 DIAGNOSIS — J01.81 OTHER ACUTE RECURRENT SINUSITIS: ICD-10-CM

## 2025-02-17 ENCOUNTER — OFFICE VISIT (OUTPATIENT)
Dept: FAMILY MEDICINE CLINIC | Facility: CLINIC | Age: 70
End: 2025-02-17
Payer: MEDICARE

## 2025-02-17 VITALS
HEIGHT: 74 IN | OXYGEN SATURATION: 98 % | SYSTOLIC BLOOD PRESSURE: 136 MMHG | WEIGHT: 285 LBS | DIASTOLIC BLOOD PRESSURE: 87 MMHG | TEMPERATURE: 98.3 F | HEART RATE: 78 BPM | BODY MASS INDEX: 36.57 KG/M2

## 2025-02-17 DIAGNOSIS — Z20.828 EXPOSURE TO THE FLU: Primary | ICD-10-CM

## 2025-02-17 DIAGNOSIS — I25.10 CAD S/P PERCUTANEOUS CORONARY ANGIOPLASTY: ICD-10-CM

## 2025-02-17 DIAGNOSIS — Z98.61 CAD S/P PERCUTANEOUS CORONARY ANGIOPLASTY: ICD-10-CM

## 2025-02-17 PROBLEM — J01.81 OTHER ACUTE RECURRENT SINUSITIS: Status: RESOLVED | Noted: 2020-06-23 | Resolved: 2025-02-17

## 2025-02-17 PROBLEM — E66.01 OBESITY, CLASS III, BMI 40-49.9 (MORBID OBESITY): Status: RESOLVED | Noted: 2018-08-16 | Resolved: 2025-02-17

## 2025-02-17 PROBLEM — E66.813 OBESITY, CLASS III, BMI 40-49.9 (MORBID OBESITY): Status: RESOLVED | Noted: 2018-08-16 | Resolved: 2025-02-17

## 2025-02-17 PROCEDURE — 3075F SYST BP GE 130 - 139MM HG: CPT | Performed by: NURSE PRACTITIONER

## 2025-02-17 PROCEDURE — 87428 SARSCOV & INF VIR A&B AG IA: CPT | Performed by: NURSE PRACTITIONER

## 2025-02-17 PROCEDURE — 1160F RVW MEDS BY RX/DR IN RCRD: CPT | Performed by: NURSE PRACTITIONER

## 2025-02-17 PROCEDURE — 3079F DIAST BP 80-89 MM HG: CPT | Performed by: NURSE PRACTITIONER

## 2025-02-17 PROCEDURE — 99213 OFFICE O/P EST LOW 20 MIN: CPT | Performed by: NURSE PRACTITIONER

## 2025-02-17 PROCEDURE — 1159F MED LIST DOCD IN RCRD: CPT | Performed by: NURSE PRACTITIONER

## 2025-02-17 PROCEDURE — 1126F AMNT PAIN NOTED NONE PRSNT: CPT | Performed by: NURSE PRACTITIONER

## 2025-02-17 RX ORDER — FLUTICASONE PROPIONATE 50 MCG
2 SPRAY, SUSPENSION (ML) NASAL DAILY
Qty: 16 G | Refills: 11 | Status: SHIPPED | OUTPATIENT
Start: 2025-02-17

## 2025-02-17 RX ORDER — OSELTAMIVIR PHOSPHATE 75 MG/1
75 CAPSULE ORAL DAILY
Qty: 7 CAPSULE | Refills: 0 | Status: SHIPPED | OUTPATIENT
Start: 2025-02-17 | End: 2025-02-24

## 2025-02-17 RX ORDER — DEXTROMETHORPHAN HYDROBROMIDE AND PROMETHAZINE HYDROCHLORIDE 15; 6.25 MG/5ML; MG/5ML
5 SYRUP ORAL 4 TIMES DAILY PRN
Qty: 180 ML | Refills: 0 | Status: SHIPPED | OUTPATIENT
Start: 2025-02-17

## 2025-02-17 NOTE — PROGRESS NOTES
CC: Hospital follow-up -arteriogram    History:  George Wilder is a 69 y.o. male who presents today for evaluation of the above problems.      Patient was admitted on 211 and discharged on 212 from [facility] after having arteriogram resulting in the placement of 4 stents.  His blood pressure is borderline at 136/87 today.  This is improved from 153/80 on his hospital discharge.  He reports that he is feeling much better.  He denies any chest pain and has noticed a decrease in his fatigue.    His wife was diagnosed with influenza A this morning.    HPI  ROS:  Review of Systems   Constitutional:  Negative for fatigue and fever.   HENT:  Negative for congestion.    Respiratory:  Negative for cough and shortness of breath.    Cardiovascular:  Negative for chest pain.       Allergies   Allergen Reactions    Brilinta [Ticagrelor] Shortness Of Breath     Gasping for air       Past Medical History:   Diagnosis Date    Allergic rhinitis due to allergen 08/08/2017    Arthritis     Asthma sometimes    Back pain     CHF (congestive heart failure)     Chronic ethmoidal sinusitis 05/18/2017    Chronic maxillary sinusitis 05/18/2017    Coronary artery disease 1984    stent LAD, Cx, RCA     GERD (gastroesophageal reflux disease)     Heart attack 1994    Dr Stiles     Heartburn     HL (hearing loss)     Hyperlipidemia     Hypertension     Myocardial infarction     Pain     back, & Neck      Past Surgical History:   Procedure Laterality Date    ANKLE SURGERY      BACK SURGERY      CARDIAC CATHETERIZATION  1984, 1994    CARDIAC CATHETERIZATION N/A 10/14/2021    Procedure: Left Heart Cath;  Surgeon: Kishore Stiles MD;  Location:  [facility];  Service: Cardiology;  Laterality: N/A;    CARDIAC CATHETERIZATION N/A 2/11/2025    Procedure: Left Heart Cath;  Surgeon: Shaquille Hewitt DO;  Location:  [facility];  Service: Cardiovascular;  Laterality: N/A;    CHOLECYSTECTOMY  2014     Lararocopic     COLONOSCOPY      CORONARY STENT PLACEMENT      ENDOSCOPIC FUNCTIONAL SINUS SURGERY (FESS) N/A 06/12/2017    Procedure: ENDOSCOPIC FUNCTIONAL SINUS SURGERY WITH LEFT MIDDLE MEATAL ANTROSTOMY AND LEFT ETHMOIDECTOMY;  Surgeon: Jona Garibay MD;  Location: [facility] OR;  Service:     ENDOSCOPIC FUNCTIONAL SINUS SURGERY (FESS) Bilateral 08/10/2020    Procedure: Septoplasty Resection of left sammy bullosa deformity Bilateral maxillary antrostomy and uncinectomy;  Surgeon: Jona Garibay MD;  Location:  [facility] OR;  Service: ENT;  Laterality: Bilateral;    ETHMOIDECTOMY Left 06/12/2017    Procedure: ETHMOIDECTOMY ENDOSCOPIC;  Surgeon: Jona Garibay MD;  Location:  [facility] OR;  Service:     EYE SURGERY      LASIK    HAND SURGERY      JOINT REPLACEMENT  both hips, left recent May2023    SINUS SURGERY      TOTAL HIP ARTHROPLASTY Bilateral     TOTAL HIP ARTHROPLASTY REVISION Left 11/14/2023    Dr. Swann     Family History   Problem Relation Age of Onset    Arthritis Mother     Rheum arthritis Mother     Cancer Father     Lung cancer Father         metastatic adenocarcinoma of lung    Stroke Brother     Cancer Brother     Asthma Brother     Stroke Brother     Cancer Brother       reports that he quit smoking about 31 years ago. His smoking use included cigarettes. He started smoking about 60 years ago. He has a 43.5 pack-year smoking history. He has never used smokeless tobacco. He reports that he does not currently use alcohol. He reports that he does not use drugs.      Current Outpatient Medications:     aspirin 81 MG chewable tablet, Chew 1 tablet Daily., Disp: , Rfl:     celecoxib (CeleBREX) 200 MG capsule, Take 1 capsule by mouth Daily., Disp: , Rfl:     cetirizine (zyrTEC) 10 MG tablet, Take 1 tablet by mouth Daily., Disp: , Rfl:     Cholecalciferol (VITAMIN D3) 5000 UNITS capsule capsule, Take 1 capsule by mouth 3 (Three) Times a Week. Monday, Wednesday, Friday, Disp: , Rfl:      "cyclobenzaprine (FLEXERIL) 10 MG tablet, Take 1 tablet by mouth 3 (Three) Times a Day As Needed., Disp: , Rfl:     fluticasone (FLONASE) 50 MCG/ACT nasal spray, USE 2 SPRAYS IN EACH NOSTRIL DAILY AS DIRECTED, Disp: 16 g, Rfl: 11    gabapentin (NEURONTIN) 300 MG capsule, Take 1 capsule by mouth 3 (Three) Times a Day., Disp: , Rfl:     HYDROcodone-acetaminophen (NORCO)  MG per tablet, Take 1 tablet by mouth 2 (Two) Times a Day. BACK PAIN, Disp: , Rfl:     Magnesium 400 MG capsule, , Disp: , Rfl:     Multiple Vitamins-Minerals (MULTIVITAMIN WITH MINERALS) tablet tablet, Take 1 tablet by mouth Daily., Disp: , Rfl:     nitroglycerin (NITROSTAT) 0.4 MG SL tablet, 1 under the tongue as needed for angina, may repeat q5mins for up three doses, Disp: 25 tablet, Rfl: 2    Omeprazole 20 MG tablet delayed-release, Take 20 mg by mouth 2 (Two) Times a Day., Disp: 60 each, Rfl: 4    oseltamivir (Tamiflu) 75 MG capsule, Take 1 capsule by mouth Daily for 7 days., Disp: 7 capsule, Rfl: 0    prasugrel (EFFIENT) 10 MG tablet, Take 1 tablet by mouth Daily., Disp: 30 tablet, Rfl: 11    promethazine-dextromethorphan (PROMETHAZINE-DM) 6.25-15 MG/5ML syrup, Take 5 mL by mouth 4 (Four) Times a Day As Needed for Cough., Disp: 180 mL, Rfl: 0    Psyllium (DAILY FIBER PO), Take  by mouth., Disp: , Rfl:     rosuvastatin (CRESTOR) 40 MG tablet, Take 1 tablet by mouth Every Night., Disp: 90 tablet, Rfl: 3    Current Facility-Administered Medications:     cyanocobalamin injection 1,000 mcg, 1,000 mcg, Intramuscular, Q3 Months, Carl Vu MD, 1,000 mcg at 01/08/25 1221    OBJECTIVE:  /87 (BP Location: Left arm, Patient Position: Sitting, Cuff Size: Large Adult)   Pulse 78   Temp 98.3 °F (36.8 °C) (Oral)   Ht 188 cm (74\")   Wt 129 kg (285 lb)   SpO2 98%   BMI 36.59 kg/m²    Physical Exam  Vitals reviewed.   Constitutional:       Appearance: He is well-developed.   Cardiovascular:      Rate and Rhythm: Normal rate. "   Pulmonary:      Effort: Pulmonary effort is normal.   Neurological:      Mental Status: He is alert and oriented to person, place, and time.   Psychiatric:         Behavior: Behavior normal.         Assessment/Plan    Diagnoses and all orders for this visit:    1. Exposure to the flu (Primary)  -     promethazine-dextromethorphan (PROMETHAZINE-DM) 6.25-15 MG/5ML syrup; Take 5 mL by mouth 4 (Four) Times a Day As Needed for Cough.  Dispense: 180 mL; Refill: 0  -     oseltamivir (Tamiflu) 75 MG capsule; Take 1 capsule by mouth Daily for 7 days.  Dispense: 7 capsule; Refill: 0  -     POCT SARS-CoV-2 Antigen YONAS + Flu    2. CAD S/P percutaneous coronary angioplasty    Patient will follow-up in 1 week for a nurse visit to recheck his blood pressure.  Level today is borderline, though improved from hospital.  Flu prophylaxis started.    An After Visit Summary was printed and given to the patient at discharge.  Return in about 1 week (around 2/24/2025) for Recheck BP - NURSE VISIT.       Janina GRAY 2/17/2025    Electronically signed.

## 2025-02-20 ENCOUNTER — PATIENT ROUNDING (BHMG ONLY) (OUTPATIENT)
Dept: FAMILY MEDICINE CLINIC | Facility: CLINIC | Age: 70
End: 2025-02-20
Payer: MEDICARE

## 2025-02-20 NOTE — PROGRESS NOTES
February 20, 2025    Hello, may I speak with George Wilder?    My name is Marylin      I am  with [facility]  [address], [address]  [place] [address] [phone].    Before we get started may I verify your date of birth? 1955    I am calling to officially welcome you to our practice and ask about your recent visit. Is this a good time to talk? yes    Tell me about your visit with us. What things went well?  everything went well.        We're always looking for ways to make our patients' experiences even better. Do you have recommendations on ways we may improve?  none    Overall were you satisfied with your first visit to our practice? yes       I appreciate you taking the time to speak with me today. Is there anything else I can do for you? no      Thank you, and have a great day.

## 2025-02-24 ENCOUNTER — CLINICAL SUPPORT (OUTPATIENT)
Dept: FAMILY MEDICINE CLINIC | Facility: CLINIC | Age: 70
End: 2025-02-24
Payer: MEDICARE

## 2025-02-24 VITALS — HEART RATE: 67 BPM | SYSTOLIC BLOOD PRESSURE: 144 MMHG | DIASTOLIC BLOOD PRESSURE: 78 MMHG

## 2025-02-24 DIAGNOSIS — I10 PRIMARY HYPERTENSION: Primary | ICD-10-CM

## 2025-02-24 PROCEDURE — 99211 OFF/OP EST MAY X REQ PHY/QHP: CPT | Performed by: NURSE PRACTITIONER

## 2025-02-24 RX ORDER — METOPROLOL TARTRATE 50 MG
50 TABLET ORAL DAILY
COMMUNITY
Start: 2025-02-22

## 2025-02-24 NOTE — PROGRESS NOTES
Patient returns for BP check.  ISAAC Mendoza advised patients /78.  Patient advised to start back on Lopressor 50 mg one tablet daily and return in 2 weeks for BP check.

## 2025-03-10 ENCOUNTER — CLINICAL SUPPORT (OUTPATIENT)
Dept: FAMILY MEDICINE CLINIC | Facility: CLINIC | Age: 70
End: 2025-03-10
Payer: MEDICARE

## 2025-03-10 VITALS — DIASTOLIC BLOOD PRESSURE: 68 MMHG | SYSTOLIC BLOOD PRESSURE: 118 MMHG

## 2025-03-10 DIAGNOSIS — I10 PRIMARY HYPERTENSION: Primary | ICD-10-CM

## 2025-03-10 NOTE — PROGRESS NOTES
Patient came to office today to have his blood pressure checked after having stents put in and was told to monitor his bp.  This morning it was 118/68 in the left arm while sitting.

## 2025-03-12 DIAGNOSIS — K21.9 LARYNGOPHARYNGEAL REFLUX (LPR): ICD-10-CM

## 2025-03-12 DIAGNOSIS — R09.A2 GLOBUS SENSATION: ICD-10-CM

## 2025-03-12 RX ORDER — OMEPRAZOLE 20 MG/1
20 CAPSULE, DELAYED RELEASE ORAL 2 TIMES DAILY
Qty: 60 CAPSULE | Refills: 11 | Status: SHIPPED | OUTPATIENT
Start: 2025-03-12

## 2025-03-17 ENCOUNTER — OFFICE VISIT (OUTPATIENT)
Dept: CARDIOLOGY | Facility: CLINIC | Age: 70
End: 2025-03-17
Payer: MEDICARE

## 2025-03-17 VITALS
HEART RATE: 57 BPM | DIASTOLIC BLOOD PRESSURE: 70 MMHG | SYSTOLIC BLOOD PRESSURE: 120 MMHG | RESPIRATION RATE: 18 BRPM | BODY MASS INDEX: 36.19 KG/M2 | WEIGHT: 282 LBS | HEIGHT: 74 IN | OXYGEN SATURATION: 97 %

## 2025-03-17 DIAGNOSIS — R00.1 BRADYCARDIA, SINUS: ICD-10-CM

## 2025-03-17 DIAGNOSIS — E78.2 MIXED HYPERLIPIDEMIA: ICD-10-CM

## 2025-03-17 DIAGNOSIS — I25.10 CORONARY ARTERY DISEASE INVOLVING NATIVE CORONARY ARTERY OF NATIVE HEART WITHOUT ANGINA PECTORIS: Primary | ICD-10-CM

## 2025-03-17 DIAGNOSIS — I10 PRIMARY HYPERTENSION: ICD-10-CM

## 2025-03-17 RX ORDER — PRASUGREL 10 MG/1
10 TABLET, FILM COATED ORAL DAILY
Qty: 90 TABLET | Refills: 3 | Status: SHIPPED | OUTPATIENT
Start: 2025-03-17 | End: 2025-03-17 | Stop reason: SDUPTHER

## 2025-03-17 NOTE — PROGRESS NOTES
Subjective:     Encounter Date:03/17/2025      Patient ID: George Wilder is a 69 y.o. male     Chief Complaint:  Coronary Artery Disease  Presents for follow-up visit. Pertinent negatives include no chest pain, dizziness, leg swelling, palpitations, shortness of breath or weight gain. The symptoms have been stable. Compliance with diet is good. Compliance with exercise is good. Compliance with medications is good.     Patient presents today for cardiology follow up after recent heart cath and stents. Patient had an outpatient heart cath 2/11/25. Heart cath revealed patent stents in LAD, 70-80% stenosis of Left Circ and 70-80% stenosis of large RCA- with distal . Patient underwent stents to Left Circ and 3 overlapping stents to RCA. He has asymptomatic bradycardia. Overall he is stable. He is in cardiac rehab and doing well. He denies chest pain. His shortness of breath is much improved. Patient has obesity, hypertension, hyperlipidemia, chronic back pain and coronary artery disease. He follows with Dr. Vu as his PCP. He follows with pain management for his chronic back pain.     The following portions of the patient's history were reviewed and updated as appropriate: allergies, current medications, past medical history, past social history, past and problem list.    Allergies   Allergen Reactions    Brilinta [Ticagrelor] Shortness Of Breath     Gasping for air         Current Outpatient Medications:     aspirin 81 MG chewable tablet, Chew 1 tablet Daily., Disp: , Rfl:     celecoxib (CeleBREX) 200 MG capsule, Take 1 capsule by mouth Daily., Disp: , Rfl:     cetirizine (zyrTEC) 10 MG tablet, Take 1 tablet by mouth Daily., Disp: , Rfl:     Cholecalciferol (VITAMIN D3) 5000 UNITS capsule capsule, Take 1 capsule by mouth 3 (Three) Times a Week. Monday, Wednesday, Friday, Disp: , Rfl:     cyclobenzaprine (FLEXERIL) 10 MG tablet, Take 1 tablet by mouth 3 (Three) Times a Day As Needed., Disp: , Rfl:     fluticasone  (FLONASE) 50 MCG/ACT nasal spray, USE 2 SPRAYS IN EACH NOSTRIL DAILY AS DIRECTED, Disp: 16 g, Rfl: 11    gabapentin (NEURONTIN) 300 MG capsule, Take 1 capsule by mouth 3 (Three) Times a Day., Disp: , Rfl:     HYDROcodone-acetaminophen (NORCO)  MG per tablet, Take 1 tablet by mouth 2 (Two) Times a Day. BACK PAIN, Disp: , Rfl:     Magnesium 400 MG capsule, , Disp: , Rfl:     metoprolol tartrate (LOPRESSOR) 50 MG tablet, Take 1 tablet by mouth Daily., Disp: , Rfl:     Multiple Vitamins-Minerals (MULTIVITAMIN WITH MINERALS) tablet tablet, Take 1 tablet by mouth Daily., Disp: , Rfl:     nitroglycerin (NITROSTAT) 0.4 MG SL tablet, 1 under the tongue as needed for angina, may repeat q5mins for up three doses, Disp: 25 tablet, Rfl: 2    omeprazole (priLOSEC) 20 MG capsule, TAKE 1 CAPSULE TWICE A DAY, Disp: 60 capsule, Rfl: 11    prasugrel (EFFIENT) 10 MG tablet, Take 1 tablet by mouth Daily., Disp: 30 tablet, Rfl: 11    promethazine-dextromethorphan (PROMETHAZINE-DM) 6.25-15 MG/5ML syrup, Take 5 mL by mouth 4 (Four) Times a Day As Needed for Cough., Disp: 180 mL, Rfl: 0    Psyllium (DAILY FIBER PO), Take  by mouth., Disp: , Rfl:     rosuvastatin (CRESTOR) 40 MG tablet, Take 1 tablet by mouth Every Night., Disp: 90 tablet, Rfl: 3    Current Facility-Administered Medications:     cyanocobalamin injection 1,000 mcg, 1,000 mcg, Intramuscular, Q3 Months, Carl Vu MD, 1,000 mcg at 25 1221    Social History     Socioeconomic History    Marital status:    Tobacco Use    Smoking status: Former     Current packs/day: 0.00     Average packs/day: 1.5 packs/day for 29.0 years (43.5 ttl pk-yrs)     Types: Cigarettes     Start date: 1965     Quit date: 1994     Years since quittin.2    Smokeless tobacco: Never    Tobacco comments:     quit date was date of heart attack   Vaping Use    Vaping status: Never Used   Substance and Sexual Activity    Alcohol use: Not Currently    Drug use: Never     Sexual activity: Defer       Review of Systems   Constitutional: Positive for malaise/fatigue. Negative for chills, decreased appetite, fever, weight gain and weight loss.   HENT:  Negative for nosebleeds.    Eyes:  Negative for visual disturbance.   Cardiovascular:  Positive for dyspnea on exertion (much improved). Negative for chest pain, leg swelling, near-syncope, orthopnea, palpitations, paroxysmal nocturnal dyspnea and syncope.   Respiratory:  Negative for cough, hemoptysis, shortness of breath and snoring.    Endocrine: Negative for cold intolerance and heat intolerance.   Hematologic/Lymphatic: Negative for bleeding problem. Does not bruise/bleed easily.   Skin:  Negative for rash.   Musculoskeletal:  Positive for back pain. Negative for falls.   Gastrointestinal:  Negative for abdominal pain, constipation, diarrhea, heartburn, melena, nausea and vomiting.   Genitourinary:  Negative for hematuria.   Neurological:  Negative for dizziness, headaches and light-headedness.   Psychiatric/Behavioral:  Negative for altered mental status.    Allergic/Immunologic: Negative for persistent infections.              Objective:     Constitutional:       Appearance: Healthy appearance. Well-developed and not in distress.   Eyes:      Pupils: Pupils are equal, round, and reactive to light.   HENT:      Head: Normocephalic and atraumatic.   Neck:      Vascular: No carotid bruit or JVD.   Pulmonary:      Effort: Pulmonary effort is normal.      Breath sounds: Normal breath sounds.   Cardiovascular:      Normal rate. Regular rhythm.      Murmurs: There is no murmur.   Pulses:     Intact distal pulses.   Edema:     Peripheral edema absent.   Abdominal:      General: Bowel sounds are normal.      Palpations: Abdomen is soft.   Musculoskeletal: Normal range of motion.      Cervical back: Normal range of motion and neck supple. Skin:     General: Skin is warm and dry.   Neurological:      Mental Status: Alert and oriented to  "person, place, and time.      Deep Tendon Reflexes: Reflexes are normal and symmetric.   Psychiatric:         Behavior: Behavior normal.         Thought Content: Thought content normal.         Judgment: Judgment normal.           Procedures  /70 (BP Location: Right arm, Patient Position: Sitting, Cuff Size: Adult)   Pulse 57   Resp 18   Ht 188 cm (74\")   Wt 128 kg (282 lb)   SpO2 97%   BMI 36.21 kg/m²   Lab Review:   I have reviewed       Lab Results   Component Value Date    CHLPL 133 01/08/2025    TRIG 82 01/08/2025    HDL 56 01/08/2025    LDL 61 01/08/2025      Results for orders placed during the hospital encounter of 02/11/25    Adult Transthoracic Echo Complete W/ Cont if Necessary Per Protocol    Interpretation Summary    Left ventricular systolic function is normal. Left ventricular ejection fraction appears to be 61 - 65%.    Left ventricular wall thickness is consistent with mild concentric hypertrophy.    Left ventricular diastolic function was normal.    Normal right ventricular cavity size and systolic function noted.    Trace aortic valve regurgitation is present.     Assessment:          Diagnosis Plan   1. Coronary artery disease involving native coronary artery of native heart without angina pectoris        2. Mixed hyperlipidemia        3. Primary hypertension        4. Bradycardia, sinus               Plan:       Coronary Artery Disease- stents to LAD in 2021. Stents to Left Circ and RCA in February 2025. Overall he notes he is feeling better. Dyspnea on exertion has improved. Denies chest pain. In Cardiac Rehab. Continue Effient 10 mg daily and Aspirin 81 mg daily. Continue Crestor. LDL well controlled.   Mixed Hyperlipidemia- Continue Crestor 40 mg daily.  Hypertension- blood pressure well controlled.   Sinus Bradycardia- denies symptoms. Continue Lopressor. Monitor for symptoms    Follow up with Dr. Hewitt in 3 months            "

## 2025-03-18 RX ORDER — PRASUGREL 10 MG/1
10 TABLET, FILM COATED ORAL DAILY
Qty: 90 TABLET | Refills: 3 | Status: SHIPPED | OUTPATIENT
Start: 2025-03-18

## 2025-04-11 ENCOUNTER — CLINICAL SUPPORT (OUTPATIENT)
Dept: FAMILY MEDICINE CLINIC | Facility: CLINIC | Age: 70
End: 2025-04-11
Payer: MEDICARE

## 2025-04-11 DIAGNOSIS — E53.8 B12 DEFICIENCY: Primary | ICD-10-CM

## 2025-04-11 RX ADMIN — CYANOCOBALAMIN 1000 MCG: 1000 INJECTION, SOLUTION INTRAMUSCULAR; SUBCUTANEOUS at 12:33

## 2025-04-11 NOTE — PROGRESS NOTES
Administered 1000 mcg cyanocobalamin IM left deltoid.  Patient tolerated well.  No reaction observed. Patient to return in 3 months for next injection.

## 2025-04-21 ENCOUNTER — OFFICE VISIT (OUTPATIENT)
Dept: FAMILY MEDICINE CLINIC | Facility: CLINIC | Age: 70
End: 2025-04-21
Payer: MEDICARE

## 2025-04-21 VITALS
HEIGHT: 74 IN | OXYGEN SATURATION: 98 % | DIASTOLIC BLOOD PRESSURE: 76 MMHG | WEIGHT: 281.2 LBS | BODY MASS INDEX: 36.09 KG/M2 | SYSTOLIC BLOOD PRESSURE: 137 MMHG | TEMPERATURE: 97.7 F | HEART RATE: 64 BPM

## 2025-04-21 DIAGNOSIS — M54.32 LEFT SIDED SCIATICA: Primary | ICD-10-CM

## 2025-04-21 PROCEDURE — 3075F SYST BP GE 130 - 139MM HG: CPT | Performed by: NURSE PRACTITIONER

## 2025-04-21 PROCEDURE — 96372 THER/PROPH/DIAG INJ SC/IM: CPT | Performed by: NURSE PRACTITIONER

## 2025-04-21 PROCEDURE — 1125F AMNT PAIN NOTED PAIN PRSNT: CPT | Performed by: NURSE PRACTITIONER

## 2025-04-21 PROCEDURE — 1159F MED LIST DOCD IN RCRD: CPT | Performed by: NURSE PRACTITIONER

## 2025-04-21 PROCEDURE — 99213 OFFICE O/P EST LOW 20 MIN: CPT | Performed by: NURSE PRACTITIONER

## 2025-04-21 PROCEDURE — 3078F DIAST BP <80 MM HG: CPT | Performed by: NURSE PRACTITIONER

## 2025-04-21 PROCEDURE — 1160F RVW MEDS BY RX/DR IN RCRD: CPT | Performed by: NURSE PRACTITIONER

## 2025-04-21 RX ORDER — TRIAMCINOLONE ACETONIDE 40 MG/ML
40 INJECTION, SUSPENSION INTRA-ARTICULAR; INTRAMUSCULAR ONCE
Status: COMPLETED | OUTPATIENT
Start: 2025-04-21 | End: 2025-04-21

## 2025-04-21 RX ADMIN — TRIAMCINOLONE ACETONIDE 40 MG: 40 INJECTION, SUSPENSION INTRA-ARTICULAR; INTRAMUSCULAR at 10:21

## 2025-04-21 NOTE — PROGRESS NOTES
CC: left hip pain     History:  George Wilder is a 69 y.o. male who presents today for evaluation of the above problems.      Patient complains of left hip pain. Reports that the pain goes from his low back down his buttock and started after push mowing a yard.     HPI  ROS:  Review of Systems   Musculoskeletal:  Positive for back pain.       Allergies   Allergen Reactions    Brilinta [Ticagrelor] Shortness Of Breath     Gasping for air       Past Medical History:   Diagnosis Date    Allergic rhinitis due to allergen 08/08/2017    Arthritis     Asthma sometimes    Back pain     CHF (congestive heart failure)     Chronic ethmoidal sinusitis 05/18/2017    Chronic maxillary sinusitis 05/18/2017    Coronary artery disease 1984    stent LAD, Cx, RCA     GERD (gastroesophageal reflux disease)     Heart attack 1994    Dr Stiles     Heartburn     HL (hearing loss)     Hyperlipidemia     Hypertension     Myocardial infarction     Pain     back, & Neck      Past Surgical History:   Procedure Laterality Date    ANKLE SURGERY      BACK SURGERY      CARDIAC CATHETERIZATION  1984, 1994    CARDIAC CATHETERIZATION N/A 10/14/2021    Procedure: Left Heart Cath;  Surgeon: Kishore Stiles MD;  Location:  [facility]ATH [facility]AS[facility];  Service: Cardiology;  Laterality: N/A;    CARDIAC CATHETERIZATION N/A 2/11/2025    Procedure: Left Heart Cath;  Surgeon: Shaquille Hewitt DO;  Location:  [facility];  Service: Cardiovascular;  Laterality: N/A;    CHOLECYSTECTOMY  2014    Lararocopic     COLONOSCOPY      CORONARY STENT PLACEMENT      ENDOSCOPIC FUNCTIONAL SINUS SURGERY (FESS) N/A 06/12/2017    Procedure: ENDOSCOPIC FUNCTIONAL SINUS SURGERY WITH LEFT MIDDLE MEATAL ANTROSTOMY AND LEFT ETHMOIDECTOMY;  Surgeon: Jona Garibay MD;  Location: [facility] OR;  Service:     ENDOSCOPIC FUNCTIONAL SINUS SURGERY (FESS) Bilateral 08/10/2020    Procedure: Septoplasty Resection of left sammy bullosa deformity Bilateral  maxillary antrostomy and uncinectomy;  Surgeon: Jona Garibay MD;  Location:  [facility] OR;  Service: ENT;  Laterality: Bilateral;    ETHMOIDECTOMY Left 06/12/2017    Procedure: ETHMOIDECTOMY ENDOSCOPIC;  Surgeon: Jona Garibay MD;  Location:  [facility] OR;  Service:     EYE SURGERY      LASIK    HAND SURGERY      JOINT REPLACEMENT  both hips, left recent May2023    SINUS SURGERY      TOTAL HIP ARTHROPLASTY Bilateral     TOTAL HIP ARTHROPLASTY REVISION Left 11/14/2023    Dr. Swann     Family History   Problem Relation Age of Onset    Arthritis Mother     Rheum arthritis Mother     Cancer Father     Lung cancer Father         metastatic adenocarcinoma of lung    Stroke Brother     Cancer Brother     Asthma Brother     Stroke Brother     Cancer Brother       reports that he quit smoking about 31 years ago. His smoking use included cigarettes. He started smoking about 60 years ago. He has a 43.5 pack-year smoking history. He has never used smokeless tobacco. He reports that he does not currently use alcohol. He reports that he does not use drugs.      Current Outpatient Medications:     aspirin 81 MG chewable tablet, Chew 1 tablet Daily., Disp: , Rfl:     celecoxib (CeleBREX) 200 MG capsule, Take 1 capsule by mouth Daily., Disp: , Rfl:     cetirizine (zyrTEC) 10 MG tablet, Take 1 tablet by mouth Daily., Disp: , Rfl:     Cholecalciferol (VITAMIN D3) 5000 UNITS capsule capsule, Take 1 capsule by mouth 3 (Three) Times a Week. Monday, Wednesday, Friday, Disp: , Rfl:     cyclobenzaprine (FLEXERIL) 10 MG tablet, Take 1 tablet by mouth 3 (Three) Times a Day As Needed., Disp: , Rfl:     fluticasone (FLONASE) 50 MCG/ACT nasal spray, USE 2 SPRAYS IN EACH NOSTRIL DAILY AS DIRECTED, Disp: 16 g, Rfl: 11    gabapentin (NEURONTIN) 300 MG capsule, Take 1 capsule by mouth 3 (Three) Times a Day., Disp: , Rfl:     HYDROcodone-acetaminophen (NORCO)  MG per tablet, Take 1 tablet by mouth 2 (Two) Times a Day. BACK PAIN, Disp: ,  "Rfl:     Magnesium 400 MG capsule, , Disp: , Rfl:     metoprolol tartrate (LOPRESSOR) 50 MG tablet, Take 1 tablet by mouth Daily., Disp: , Rfl:     Multiple Vitamins-Minerals (MULTIVITAMIN WITH MINERALS) tablet tablet, Take 1 tablet by mouth Daily., Disp: , Rfl:     omeprazole (priLOSEC) 20 MG capsule, TAKE 1 CAPSULE TWICE A DAY, Disp: 60 capsule, Rfl: 11    prasugrel (EFFIENT) 10 MG tablet, Take 1 tablet by mouth Daily., Disp: 90 tablet, Rfl: 3    Psyllium (DAILY FIBER PO), Take  by mouth., Disp: , Rfl:     rosuvastatin (CRESTOR) 40 MG tablet, Take 1 tablet by mouth Every Night., Disp: 90 tablet, Rfl: 3    nitroglycerin (NITROSTAT) 0.4 MG SL tablet, 1 under the tongue as needed for angina, may repeat q5mins for up three doses (Patient not taking: Reported on 4/21/2025), Disp: 25 tablet, Rfl: 2    Current Facility-Administered Medications:     cyanocobalamin injection 1,000 mcg, 1,000 mcg, Intramuscular, Q3 Months, Carl Vu MD, 1,000 mcg at 04/11/25 1233    OBJECTIVE:  /76 (BP Location: Left arm, Patient Position: Sitting, Cuff Size: Large Adult)   Pulse 64   Temp 97.7 °F (36.5 °C) (Temporal)   Ht 188 cm (74\")   Wt 128 kg (281 lb 3.2 oz)   SpO2 98%   BMI 36.10 kg/m²    Physical Exam  Vitals reviewed.   Constitutional:       Appearance: He is well-developed.   Cardiovascular:      Rate and Rhythm: Normal rate.   Pulmonary:      Effort: Pulmonary effort is normal.   Neurological:      Mental Status: He is alert and oriented to person, place, and time.   Psychiatric:         Behavior: Behavior normal.         Assessment/Plan    Diagnoses and all orders for this visit:    1. Left sided sciatica (Primary)  -     triamcinolone acetonide (KENALOG-40) injection 40 mg        An After Visit Summary was printed and given to the patient at discharge.  Return if symptoms worsen or fail to improve, for Next scheduled follow up.       ISAAC Mendoza     Electronically signed.  "

## 2025-04-28 ENCOUNTER — TELEPHONE (OUTPATIENT)
Dept: OTOLARYNGOLOGY | Facility: CLINIC | Age: 70
End: 2025-04-28
Payer: MEDICARE

## 2025-04-28 DIAGNOSIS — J34.2 NASAL SEPTAL DEVIATION: ICD-10-CM

## 2025-04-28 DIAGNOSIS — R09.A2 GLOBUS SENSATION: ICD-10-CM

## 2025-04-28 DIAGNOSIS — J01.81 OTHER ACUTE RECURRENT SINUSITIS: ICD-10-CM

## 2025-04-28 DIAGNOSIS — K21.9 LARYNGOPHARYNGEAL REFLUX (LPR): ICD-10-CM

## 2025-04-28 RX ORDER — FLUTICASONE PROPIONATE 50 MCG
2 SPRAY, SUSPENSION (ML) NASAL DAILY
Qty: 16 G | Refills: 11 | Status: SHIPPED | OUTPATIENT
Start: 2025-04-28 | End: 2025-05-05 | Stop reason: SDUPTHER

## 2025-04-28 RX ORDER — OMEPRAZOLE 20 MG/1
20 CAPSULE, DELAYED RELEASE ORAL 2 TIMES DAILY
Qty: 60 CAPSULE | Refills: 11 | Status: SHIPPED | OUTPATIENT
Start: 2025-04-28 | End: 2025-05-05 | Stop reason: SDUPTHER

## 2025-04-28 NOTE — TELEPHONE ENCOUNTER
Caller: Traci Wilder    Relationship: Emergency Contact    Best call back number: [phone]    Requested Prescriptions:     omeprazole (priLOSEC) 20 MG capsule   fluticasone (FLONASE) 50 MCG/ACT nasal spray     Pharmacy where request should be sent:    [facility]    Last office visit with prescribing clinician: 8/14/2024   Last telemedicine visit with prescribing clinician: Visit date not found   Next office visit with prescribing clinician: Visit date not found     Additional details provided by patient: NEEDS TO BE A 3 MONTH SUPPLY OR THEY WILL NOT ACCEPT    Does the patient have less than a 3 day supply:  [] Yes  [x] No    Would you like a call back once the refill request has been completed: [x] Yes [] No    If the office needs to give you a call back, can they leave a voicemail: [x] Yes [] No    Dante Us Rep   04/28/25 10:31 CDT

## 2025-05-05 DIAGNOSIS — J34.2 NASAL SEPTAL DEVIATION: ICD-10-CM

## 2025-05-05 DIAGNOSIS — R09.A2 GLOBUS SENSATION: ICD-10-CM

## 2025-05-05 DIAGNOSIS — K21.9 LARYNGOPHARYNGEAL REFLUX (LPR): ICD-10-CM

## 2025-05-05 DIAGNOSIS — J01.81 OTHER ACUTE RECURRENT SINUSITIS: ICD-10-CM

## 2025-05-05 RX ORDER — OMEPRAZOLE 20 MG/1
20 CAPSULE, DELAYED RELEASE ORAL 2 TIMES DAILY
Qty: 90 CAPSULE | Refills: 2 | Status: SHIPPED | OUTPATIENT
Start: 2025-05-05

## 2025-05-05 RX ORDER — FLUTICASONE PROPIONATE 50 MCG
2 SPRAY, SUSPENSION (ML) NASAL DAILY
Qty: 16 G | Refills: 11 | Status: SHIPPED | OUTPATIENT
Start: 2025-05-05

## 2025-05-05 NOTE — TELEPHONE ENCOUNTER
Patient wife called and states script that was sent to [facility] for only 30 day supply. [facility] requested 90 day supplies for insurance purposes.

## 2025-06-17 ENCOUNTER — OFFICE VISIT (OUTPATIENT)
Dept: FAMILY MEDICINE CLINIC | Facility: CLINIC | Age: 70
End: 2025-06-17
Payer: MEDICARE

## 2025-06-17 VITALS
HEART RATE: 51 BPM | HEIGHT: 74 IN | SYSTOLIC BLOOD PRESSURE: 122 MMHG | RESPIRATION RATE: 18 BRPM | BODY MASS INDEX: 35.42 KG/M2 | TEMPERATURE: 98 F | DIASTOLIC BLOOD PRESSURE: 76 MMHG | OXYGEN SATURATION: 98 % | WEIGHT: 276 LBS

## 2025-06-17 DIAGNOSIS — M25.561 CHRONIC PAIN OF RIGHT KNEE: Primary | ICD-10-CM

## 2025-06-17 DIAGNOSIS — G89.29 CHRONIC PAIN OF RIGHT KNEE: Primary | ICD-10-CM

## 2025-06-17 NOTE — PROGRESS NOTES
Subjective   George Wilder is a 69 y.o. male.     History of Present Illness  69-year-old male complaining of increasing right knee pain over many years      The following portions of the patient's history were reviewed and updated as appropriate: allergies, current medications, past family history, past medical history, past social history, past surgical history, and problem list.    Review of Systems   Respiratory:  Negative for shortness of breath.    Cardiovascular:  Negative for chest pain and leg swelling.        Just so cardiology first cardiac coronary stent placement-   Musculoskeletal:  Positive for arthralgias and back pain.        Chronic right knee pain       Objective   Physical Exam  Vitals and nursing note reviewed.   Constitutional:       Appearance: He is obese.   Eyes:      Extraocular Movements: Extraocular movements intact.      Pupils: Pupils are equal, round, and reactive to light.   Pulmonary:      Effort: Pulmonary effort is normal.   Musculoskeletal:         General: Swelling, tenderness and deformity present.      Right lower leg: No edema.      Left lower leg: No edema.      Comments: Right knee pain and deformity   Skin:     General: Skin is warm and dry.   Neurological:      General: No focal deficit present.      Mental Status: He is alert and oriented to person, place, and time.   Psychiatric:         Mood and Affect: Mood normal.         Behavior: Behavior normal.         Thought Content: Thought content normal.         Judgment: Judgment normal.         Assessment & Plan   Diagnoses and all orders for this visit:    1. Chronic pain of right knee (Primary)  -     Ambulatory Referral to Orthopedic Surgery       Plan above

## 2025-07-11 ENCOUNTER — CLINICAL SUPPORT (OUTPATIENT)
Dept: FAMILY MEDICINE CLINIC | Facility: CLINIC | Age: 70
End: 2025-07-11
Payer: MEDICARE

## 2025-07-11 DIAGNOSIS — E53.8 B12 DEFICIENCY: Primary | ICD-10-CM

## 2025-07-11 RX ADMIN — CYANOCOBALAMIN 1000 MCG: 1000 INJECTION, SOLUTION INTRAMUSCULAR; SUBCUTANEOUS at 09:50

## 2025-07-30 ENCOUNTER — OFFICE VISIT (OUTPATIENT)
Dept: CARDIOLOGY | Facility: CLINIC | Age: 70
End: 2025-07-30
Payer: MEDICARE

## 2025-07-30 VITALS
SYSTOLIC BLOOD PRESSURE: 130 MMHG | HEIGHT: 74 IN | HEART RATE: 47 BPM | BODY MASS INDEX: 34.78 KG/M2 | OXYGEN SATURATION: 98 % | WEIGHT: 271 LBS | DIASTOLIC BLOOD PRESSURE: 72 MMHG

## 2025-07-30 DIAGNOSIS — E78.2 MIXED HYPERLIPIDEMIA: ICD-10-CM

## 2025-07-30 DIAGNOSIS — I25.10 CAD S/P PERCUTANEOUS CORONARY ANGIOPLASTY: Primary | ICD-10-CM

## 2025-07-30 DIAGNOSIS — I10 PRIMARY HYPERTENSION: ICD-10-CM

## 2025-07-30 DIAGNOSIS — Z95.5 S/P CORONARY ARTERY STENT PLACEMENT: ICD-10-CM

## 2025-07-30 DIAGNOSIS — I25.118 CORONARY ARTERY DISEASE OF NATIVE ARTERY OF NATIVE HEART WITH STABLE ANGINA PECTORIS: ICD-10-CM

## 2025-07-30 DIAGNOSIS — Z98.61 CAD S/P PERCUTANEOUS CORONARY ANGIOPLASTY: Primary | ICD-10-CM

## 2025-07-30 RX ORDER — METOPROLOL SUCCINATE 25 MG/1
25 TABLET, EXTENDED RELEASE ORAL DAILY
Qty: 90 TABLET | Refills: 3 | Status: SHIPPED | OUTPATIENT
Start: 2025-07-30

## 2025-07-30 RX ORDER — PRASUGREL 5 MG/1
5 TABLET, FILM COATED ORAL DAILY
Qty: 90 TABLET | Refills: 3 | Status: SHIPPED | OUTPATIENT
Start: 2025-07-30

## 2025-08-03 NOTE — PROGRESS NOTES
Subjective:     Encounter Date:07/30/2025      Patient ID: George Wilder is a 69 y.o. male.    Chief Complaint:  History of Present Illness  History of Present Illness  The patient presents for evaluation of coronary artery disease.    He reports no chest pain, pressure, or tightness. He also does not experience any severe shortness of breath, dizziness, or lightheadedness. He occasionally falls asleep but maintains an active lifestyle, working outdoors daily. He is currently on a regimen of baby aspirin and prasugrel 10 mg. He has been taking metoprolol since his heart attack in 1994.    Supplemental Information  He is inquiring about the possibility of receiving injections for his back pain and whether he can continue taking Celebrex.    SOCIAL HISTORY  Occupations: Works outside every day    The following portions of the patient's history were reviewed and updated as appropriate: allergies, current medications, past family history, past medical history, past social history, past surgical history, and problem list.    Review of Systems   Constitutional: Negative for diaphoresis, fever and malaise/fatigue.   HENT:  Negative for congestion.    Eyes:  Negative for vision loss in left eye and vision loss in right eye.   Cardiovascular:  Negative for chest pain, claudication, dyspnea on exertion, irregular heartbeat, leg swelling, orthopnea, palpitations and syncope.   Respiratory:  Negative for cough, shortness of breath and wheezing.    Hematologic/Lymphatic: Negative for adenopathy.   Skin:  Negative for rash.   Musculoskeletal:  Negative for joint pain and joint swelling.   Gastrointestinal:  Negative for abdominal pain, diarrhea, nausea and vomiting.   Neurological:  Negative for excessive daytime sleepiness, dizziness, focal weakness, light-headedness, numbness and weakness.   Psychiatric/Behavioral:  Negative for depression. The patient does not have insomnia.            Current Outpatient Medications:      aspirin 81 MG chewable tablet, Chew 1 tablet Daily., Disp: , Rfl:     celecoxib (CeleBREX) 200 MG capsule, Take 1 capsule by mouth Daily., Disp: , Rfl:     cetirizine (zyrTEC) 10 MG tablet, Take 1 tablet by mouth Daily., Disp: , Rfl:     Cholecalciferol (VITAMIN D3) 5000 UNITS capsule capsule, Take 1 capsule by mouth 3 (Three) Times a Week. Monday, Wednesday, Friday, Disp: , Rfl:     cyclobenzaprine (FLEXERIL) 10 MG tablet, Take 1 tablet by mouth 3 (Three) Times a Day As Needed., Disp: , Rfl:     fluticasone (FLONASE) 50 MCG/ACT nasal spray, 2 sprays by Each Nare route Daily., Disp: 16 g, Rfl: 11    gabapentin (NEURONTIN) 300 MG capsule, Take 1 capsule by mouth 3 (Three) Times a Day., Disp: , Rfl:     HYDROcodone-acetaminophen (NORCO)  MG per tablet, Take 1 tablet by mouth 2 (Two) Times a Day. BACK PAIN, Disp: , Rfl:     Magnesium 400 MG capsule, , Disp: , Rfl:     Multiple Vitamins-Minerals (MULTIVITAMIN WITH MINERALS) tablet tablet, Take 1 tablet by mouth Daily., Disp: , Rfl:     nitroglycerin (NITROSTAT) 0.4 MG SL tablet, 1 under the tongue as needed for angina, may repeat q5mins for up three doses, Disp: 25 tablet, Rfl: 2    omeprazole (priLOSEC) 20 MG capsule, Take 1 capsule by mouth 2 (Two) Times a Day., Disp: 90 capsule, Rfl: 2    Psyllium (DAILY FIBER PO), Take  by mouth., Disp: , Rfl:     rosuvastatin (CRESTOR) 40 MG tablet, Take 1 tablet by mouth Every Night., Disp: 90 tablet, Rfl: 3    metoprolol succinate XL (TOPROL-XL) 25 MG 24 hr tablet, Take 1 tablet by mouth Daily., Disp: 90 tablet, Rfl: 3    prasugrel (EFFIENT) 5 MG tablet, Take 1 tablet by mouth Daily., Disp: 90 tablet, Rfl: 3    Current Facility-Administered Medications:     cyanocobalamin injection 1,000 mcg, 1,000 mcg, Intramuscular, Q3 Months, Carl Vu MD, 1,000 mcg at 07/11/25 0950       Objective:      Vitals:    07/30/25 1100   BP: 130/72   Pulse: (!) 47   SpO2: 98%     Vitals and nursing note reviewed.   Constitutional:        Appearance: Normal and healthy appearance. Well-developed and not in distress.   Eyes:      Extraocular Movements: Extraocular movements intact.      Pupils: Pupils are equal, round, and reactive to light.   HENT:      Head: Normocephalic and atraumatic.    Mouth/Throat:      Pharynx: Oropharynx is clear.   Neck:      Vascular: JVD normal.      Trachea: Trachea normal.   Pulmonary:      Effort: Pulmonary effort is normal.      Breath sounds: Normal breath sounds. No wheezing. No rhonchi. No rales.   Cardiovascular:      PMI at left midclavicular line. Normal rate. Regular rhythm. Normal S1. Normal S2.       Murmurs: There is no murmur.      No gallop.  No click. No rub.   Pulses:     Dorsalis pedis: 2+ bilaterally.     Posterior tibial: 2+ bilaterally.  Abdominal:      General: Bowel sounds are normal.      Palpations: Abdomen is soft.      Tenderness: There is no abdominal tenderness.   Musculoskeletal: Normal range of motion.      Cervical back: Normal range of motion and neck supple. Skin:     General: Skin is warm and dry.      Capillary Refill: Capillary refill takes less than 2 seconds.   Feet:      Right foot:      Skin integrity: Skin integrity normal.      Left foot:      Skin integrity: Skin integrity normal.   Neurological:      Mental Status: Alert and oriented to person, place and time.      Sensory: Sensation is intact.      Motor: Motor function is intact.      Coordination: Coordination is intact.   Psychiatric:         Speech: Speech normal.         Behavior: Behavior is cooperative.       Physical Exam  Vital Signs: Heart rate in the 40s  Heart Rate: 40s  Heart: Heart is strong, normal systolic function    Lab Review:       Procedures  Results  Imaging   - Echocardiogram: Normal systolic function (61-65) and all valves appear normal.    Assessment/Plan:     Problem List Items Addressed This Visit       Mixed hyperlipidemia    Relevant Medications    prasugrel (EFFIENT) 5 MG tablet     RESOLVED: Coronary artery disease of native artery of native heart with stable angina pectoris    Relevant Medications    prasugrel (EFFIENT) 5 MG tablet    metoprolol succinate XL (TOPROL-XL) 25 MG 24 hr tablet    Primary hypertension    Relevant Medications    metoprolol succinate XL (TOPROL-XL) 25 MG 24 hr tablet    S/P coronary artery stent placement    Relevant Medications    prasugrel (EFFIENT) 5 MG tablet    metoprolol succinate XL (TOPROL-XL) 25 MG 24 hr tablet    CAD S/P percutaneous coronary angioplasty - Primary    Relevant Medications    prasugrel (EFFIENT) 5 MG tablet    metoprolol succinate XL (TOPROL-XL) 25 MG 24 hr tablet     Assessment & Plan  1. Coronary artery disease:  - Continue current medication regimen, including baby aspirin and cholesterol-lowering drugs.  - Persist with Effient 10 mg for an additional month, then reduce the dosage to 5 mg starting in September. A new prescription for Effient 5 mg will be sent to the pharmacy.  - Halve the metoprolol dosage from 50 mg to 25 mg and switch the formulation from tartrate to succinate. A new prescription for metoprolol succinate will be provided.  - Cleared to have shots in the back for back pain.  - Continue taking Celebrex as needed.    Follow-up: 07/2026      Recommendations/plans:    Transcribed from ambient dictation for Shaquille Hewitt DO by Shaquille Hewitt DO.  08/03/25   14:56 CDT    Patient or patient representative verbalized consent for the use of Ambient Listening during the visit with  Shaquille Hewitt DO for chart documentation. 8/3/2025  14:56 CDT

## 2025-08-27 ENCOUNTER — OFFICE VISIT (OUTPATIENT)
Dept: OTOLARYNGOLOGY | Facility: CLINIC | Age: 70
End: 2025-08-27
Payer: MEDICARE

## 2025-08-27 VITALS
HEIGHT: 74 IN | SYSTOLIC BLOOD PRESSURE: 154 MMHG | DIASTOLIC BLOOD PRESSURE: 76 MMHG | HEART RATE: 68 BPM | TEMPERATURE: 97.9 F | WEIGHT: 271 LBS | BODY MASS INDEX: 34.78 KG/M2

## 2025-08-27 DIAGNOSIS — J01.81 OTHER ACUTE RECURRENT SINUSITIS: ICD-10-CM

## 2025-08-27 DIAGNOSIS — J34.2 NASAL SEPTAL DEVIATION: ICD-10-CM

## 2025-08-27 DIAGNOSIS — K21.9 LARYNGOPHARYNGEAL REFLUX (LPR): ICD-10-CM

## 2025-08-27 DIAGNOSIS — R09.A2 GLOBUS SENSATION: ICD-10-CM

## 2025-08-27 RX ORDER — FLUTICASONE PROPIONATE 50 MCG
2 SPRAY, SUSPENSION (ML) NASAL DAILY
Qty: 48 G | Refills: 3 | Status: SHIPPED | OUTPATIENT
Start: 2025-08-27

## 2025-08-27 RX ORDER — OMEPRAZOLE 20 MG/1
20 CAPSULE, DELAYED RELEASE ORAL 2 TIMES DAILY
Qty: 90 CAPSULE | Refills: 2 | Status: SHIPPED | OUTPATIENT
Start: 2025-08-27

## (undated) DEVICE — DUAL CUT SAGITTAL BLADE

## (undated) DEVICE — GUIDE CATHETER: Brand: MACH1™

## (undated) DEVICE — Device

## (undated) DEVICE — SOLUTION IRRIG 3000ML 0.9% SOD CHL USP UROMATIC PLAS CONT

## (undated) DEVICE — CONTAINER,SPECIMEN,PNEU TUBE,3OZ,OR STRL: Brand: MEDLINE

## (undated) DEVICE — SUTURE MCRYL SZ 3 0 L18IN ABSRB UD PS 2 3 8 CIR REV CUT NDL MCP497G

## (undated) DEVICE — SUTURE VCRL SZ 2-0 L36IN ABSRB UD L36MM CT-1 1/2 CIR J945H

## (undated) DEVICE — TREK CORONARY DILATATION CATHETER 3.0 MM X 12 MM / RAPID-EXCHANGE: Brand: TREK

## (undated) DEVICE — PINNACLE INTRODUCER SHEATH: Brand: PINNACLE

## (undated) DEVICE — INFLATION DEVICE: Brand: ENCORE™ 26

## (undated) DEVICE — LIGHT SUCT UNTETHERED SCINTILLANT

## (undated) DEVICE — SINU FOAM: Brand: SINU-FOAM

## (undated) DEVICE — 6.3MM ROUND FAST CUTTING BUR

## (undated) DEVICE — SUTURE VCRL SZ 0 L27IN ABSRB UD L36MM CT-1 1/2 CIR J260H

## (undated) DEVICE — CHLORAPREP 26ML ORANGE

## (undated) DEVICE — PK ENT HD AND NK 30

## (undated) DEVICE — PERCLOSE PROGLIDE™ SUTURE-MEDIATED CLOSURE SYSTEM: Brand: PERCLOSE PROGLIDE™

## (undated) DEVICE — KIT BNE CEM PREP FEM QUIK-USE 1 PER CA

## (undated) DEVICE — DEV TORQ GW HOT/PINK

## (undated) DEVICE — REFLEX ULTRA 45 WITH INTEGRATED CABLE: Brand: COBLATION

## (undated) DEVICE — COLLECTION SOCK, GENERAL: Brand: DEROYAL

## (undated) DEVICE — SYR LUERLOK 20CC

## (undated) DEVICE — SUTURE VCRL SZ 3-0 L18IN ABSRB UD PS-1 L24MM 3/8 CIR PRIM J683H

## (undated) DEVICE — 6F .070 XB 3.5 ECO PK: Brand: VISTA BRITE TIP

## (undated) DEVICE — SOL IRR NACL 0.9PCT BT 1000ML

## (undated) DEVICE — GLOVE SURG SZ 85 L12IN FNGR THK13MIL BRN LTX SYN POLYMER W

## (undated) DEVICE — GLOVE SURG SZ 85 L12IN FNGR THK79MIL GRN LTX FREE

## (undated) DEVICE — WIPE MEROCEL 3.625X3IN

## (undated) DEVICE — MODEL BT2000 P/N 700287-012KIT CONTENTS: MANIFOLD WITH SALINE AND CONTRAST PORTS, SALINE TUBING WITH SPIKE AND HAND SYRINGE, TRANSDUCER: Brand: BT2000 AUTOMATED MANIFOLD KIT

## (undated) DEVICE — LARYNGOSCOPE VID MILLER 2 MTL BLADE M HNDL CURAPLEX

## (undated) DEVICE — DRESSING FOAM 4X8IN DISP POSTOP MEPILEX BORD AG

## (undated) DEVICE — PATIENT TRACKER 9734887 NON-INVASIVE

## (undated) DEVICE — PAD, DEFIB, ADULT, RADIOTRANS, PHYSIO: Brand: MEDLINE

## (undated) DEVICE — GLOVE SURG SZ 85 CRM LTX FREE POLYISOPRENE POLYMER BEAD ANTI

## (undated) DEVICE — MODEL AT P65, P/N 701554-001KIT CONTENTS: HAND CONTROLLER, 3-WAY HIGH-PRESSURE STOPCOCK WITH ROTATING END AND PREMIUM HIGH-PRESSURE TUBING: Brand: ANGIOTOUCH® KIT

## (undated) DEVICE — BIT DRL L145MM DIA4.5MM QUIK CPL W/O STP REUSE

## (undated) DEVICE — PK CATH CARD 30 CA/4

## (undated) DEVICE — GLOVE SURG SZ 75 L12IN FNGR THK87MIL DK GRN LTX FREE ISOLEX

## (undated) DEVICE — NC TREK CORONARY DILATATION CATHETER 3.0 MM X 12 MM / RAPID-EXCHANGE: Brand: NC TREK

## (undated) DEVICE — SURGICAL PROCEDURE PACK HIP TOT DBD CDS LOURDES HOSP LTX

## (undated) DEVICE — NEPTUNE E-SEP SMOKE EVACUATION PENCIL, COATED, 70MM BLADE, ROCKER SWITCH: Brand: NEPTUNE E-SEP

## (undated) DEVICE — SURGICAL SUCTION CONNECTING TUBE WITH MALE CONNECTOR AND SUCTION CLAMP, 2 FT. LONG (.6 M), 5 MM I.D.: Brand: CONMED

## (undated) DEVICE — CANN NASL ETCO2 LO/FLO A/

## (undated) DEVICE — CONTAINER,SPECIMEN,OR STERILE,4OZ: Brand: MEDLINE

## (undated) DEVICE — TRAP,MUCUS SPECIMEN,40CC: Brand: MEDLINE

## (undated) DEVICE — CATH DIAG IMPULSE M/ PK 145 5FR

## (undated) DEVICE — DRESSING BORDERED ADH GZ UNIV GEN USE 8INX4IN AND 6INX2IN

## (undated) DEVICE — TREK CORONARY DILATATION CATHETER 2.50 MM X 15 MM / RAPID-EXCHANGE: Brand: TREK

## (undated) DEVICE — BANDAGE COBAN 6 IN WND 6INX5YD FOAM

## (undated) DEVICE — NC TREK NEO™ CORONARY DILATATION CATHETER 4.00 MM X 20 MM / RAPID-EXCHANGE: Brand: NC TREK NEO™

## (undated) DEVICE — KT NDL GUIDE STRL 18GA

## (undated) DEVICE — SOLIDIFIER LIQUI LOC PLUS 2000CC

## (undated) DEVICE — E-Z CLEAN, NON-STICK, PTFE COATED, ELECTROSURGICAL BLADE ELECTRODE, MODIFIED EXTENDED INSULATION, 6.5 INCH (16.5 CM): Brand: MEGADYNE

## (undated) DEVICE — SPONGE,NEURO,0.5"X3",XR,STRL,LF,10/PK: Brand: MEDLINE

## (undated) DEVICE — SUTURE PERMAHAND SZ 0 L30IN NONABSORBABLE BLK SILK BRAID A306H

## (undated) DEVICE — TOTAL HIP

## (undated) DEVICE — TUBING 1895522 5PK STRAIGHTSHOT TO XPS: Brand: STRAIGHTSHOT®

## (undated) DEVICE — BLADE 1884012 RAD12 5PK CURVED 4MM: Brand: RAD®

## (undated) DEVICE — SOLUTION IRRIG 500ML STRL H2O NONPYROGENIC

## (undated) DEVICE — GLV SURG BIOGEL M LTX PF 7 1/2

## (undated) DEVICE — VLV HEMO GUARDIAN INSRT/TOOL W TORQ DEV

## (undated) DEVICE — TUBING, SUCTION, 1/4" X 12', STRAIGHT: Brand: MEDLINE

## (undated) DEVICE — RECIPROCATING BLADE DOUBLE SIDE (74.0 X 0.77MM)

## (undated) DEVICE — DRAPE,ORTHOMAX ,HIP,W/POUCHES: Brand: MEDLINE

## (undated) DEVICE — PK TURNOVER RM ADV

## (undated) DEVICE — SUTURE SZ 0 27IN 5/8 CIR UR-6  TAPER PT VIOLET ABSRB VICRYL J603H

## (undated) DEVICE — NDL HYPO PRECISIONGLIDE/REG 18G 11/2 PNK

## (undated) DEVICE — SUCTION COAGULATOR, 1 PER POUCH: Brand: A&E MEDICAL / DISPOSABLE SUCTION COAGULATOR

## (undated) DEVICE — SUT GUT PLN 4/0 P3 18IN 1644G

## (undated) DEVICE — PAD, DEFIB, ADULT, RADIOTRANS, PHILIPS: Brand: MEDLINE

## (undated) DEVICE — COAXIAL FEMORAL CANAL TIP

## (undated) DEVICE — HI-TORQUE WHISPER ES GUIDE WIRE .014 STRAIGHTTIP 3.0 CM X 190 CM: Brand: HI-TORQUE WHISPER

## (undated) DEVICE — SUTURE ABSRB BRAID COAT UD CP NO 2 27IN VCRL J195H

## (undated) DEVICE — SYSTEM SKIN CLSR 22CM DERMBND PRINEO

## (undated) DEVICE — DEFOGGER!" ANTI FOG KIT: Brand: DEROYAL

## (undated) DEVICE — CANN CO2/O2 NASL A/

## (undated) DEVICE — GLOVE SURG SZ 75 L12IN FNGR THK94MIL TRNSLUC YEL LTX

## (undated) DEVICE — GW STARTER FXD CORE J .035 3X150CM 3MM

## (undated) DEVICE — HUMERAL NOZZLE: Brand: REVOLUTION

## (undated) DEVICE — CODMAN® SURGICAL PATTIES 1/2" X 3" (1.27CM X 7.62CM): Brand: CODMAN®

## (undated) DEVICE — TOOL INSRT GW MTL OR PLSTC

## (undated) DEVICE — INSTR TRACKER 9733533 EM ENT

## (undated) DEVICE — GLOVE SURG SZ 85 L12IN FNGR THK87MIL DK GRN LTX FREE ISOLEX

## (undated) DEVICE — NDL SPINE 25G 31/2IN BLU

## (undated) DEVICE — HI-TORQUE BALANCE MIDDLEWEIGHT UNIVERSAL II GUIDE WIRE STRAIGHT TIP PAK  190 CM: Brand: HI-TORQUE BALANCE MIDDLEWEIGHT UNIVERSAL II

## (undated) DEVICE — TUBE ET 8MM NSL ORAL BASIC CUF INTMED MURPHY EYE RADPQ MRK

## (undated) DEVICE — INF TL MULIPACK FR6: Brand: INFINITI

## (undated) DEVICE — COPILOT BLEEDBACK CONTROL VALVE: Brand: COPILOT

## (undated) DEVICE — SOL IRR NACL 0.9PCT BO 1000ML

## (undated) DEVICE — ANGIO-SEAL VIP VASCULAR CLOSURE DEVICE: Brand: ANGIO-SEAL

## (undated) DEVICE — GW STARTER FXD/CORE PTFE/COAT J/TP/3MM .035IN 10X150CM

## (undated) DEVICE — KT ANTI FOG W/FLD AND SPNG

## (undated) DEVICE — SOLIDIFIER LIQ LIQUILOC/PLUS W/TREAT 2000CC

## (undated) DEVICE — GUIDELINER CATHETERS ARE INTENDED TO BE USED IN CONJUNCTION WITH GUIDE CATHETERS TO ACCESS DISCRETE REGIONS OF THE CORONARY AND/OR PERIPHERAL VASCULATURE, AND TO FACILITATE PLACEMENT OF INTERVENTIONAL DEVICES.: Brand: GUIDELINER® V3 CATHETER

## (undated) DEVICE — 6F .070 XB LAD 3.5 100CM: Brand: VISTA BRITE TIP

## (undated) DEVICE — GW PRESSUREWIRE X WIRELESS FFR 175CM

## (undated) DEVICE — BLADE 1884012EM RAD12 4MM M4 ROTATE ROHS: Brand: FUSION®